# Patient Record
Sex: FEMALE | Race: WHITE | NOT HISPANIC OR LATINO | Employment: FULL TIME | URBAN - METROPOLITAN AREA
[De-identification: names, ages, dates, MRNs, and addresses within clinical notes are randomized per-mention and may not be internally consistent; named-entity substitution may affect disease eponyms.]

---

## 2017-01-19 ENCOUNTER — HOSPITAL ENCOUNTER (OUTPATIENT)
Dept: GENERAL RADIOLOGY | Facility: HOSPITAL | Age: 42
Discharge: HOME | End: 2017-01-19
Attending: NURSE PRACTITIONER | Admitting: NURSE PRACTITIONER

## 2017-01-19 ENCOUNTER — HOSPITAL ENCOUNTER (OUTPATIENT)
Dept: LAB | Facility: HOSPITAL | Age: 42
Discharge: HOME | End: 2017-01-19
Attending: NURSE PRACTITIONER | Admitting: NURSE PRACTITIONER

## 2017-01-19 LAB
ALBUMIN SERPL-MCNC: 4.1 G/DL (ref 3.5–4.8)
ALBUMIN/GLOB SERPL: 1.3 {RATIO} (ref 1–1.7)
ALP SERPL-CCNC: 79 IU/L (ref 32–91)
ALT SERPL-CCNC: 22 IU/L (ref 14–54)
ANION GAP SERPL CALC-SCNC: 14.1 MMOL/L (ref 10–20)
AST SERPL-CCNC: 22 IU/L (ref 15–41)
BASOPHILS # BLD AUTO: 0 10*3/UL (ref 0–0.2)
BASOPHILS NFR BLD AUTO: 1 % (ref 0–2)
BILIRUB SERPL-MCNC: 0.5 MG/DL (ref 0.3–1.2)
BILIRUB UR QL STRIP: NEGATIVE MG/DL
BUN SERPL-MCNC: 15 MG/DL (ref 8–20)
BUN/CREAT SERPL: 21.4 (ref 5.4–26.2)
CALCIUM SERPL-MCNC: 9.3 MG/DL (ref 8.9–10.3)
CASTS URNS QL MICRO: NORMAL /[LPF]
CHLORIDE SERPL-SCNC: 104 MMOL/L (ref 101–111)
COLOR UR: YELLOW
CONV BACTERIA IN URINE MICRO: NEGATIVE
CONV CLARITY OF URINE: CLEAR
CONV CO2: 26 MMOL/L (ref 22–32)
CONV HYALINE CASTS IN URINE MICRO: 1 /[LPF] (ref 0–5)
CONV PROTEIN IN URINE BY AUTOMATED TEST STRIP: NEGATIVE MG/DL
CONV SMALL ROUND CELLS: NORMAL /[HPF]
CONV TOTAL PROTEIN: 7.2 G/DL (ref 6.1–7.9)
CONV UROBILINOGEN IN URINE BY AUTOMATED TEST STRIP: 0.2 MG/DL
CREAT UR-MCNC: 0.7 MG/DL (ref 0.4–1)
CRP SERPL-MCNC: 1.21 MG/DL (ref 0–0.7)
CULTURE INDICATED?: NORMAL
DIFFERENTIAL METHOD BLD: (no result)
EOSINOPHIL # BLD AUTO: 0.1 10*3/UL (ref 0–0.3)
EOSINOPHIL # BLD AUTO: 3 % (ref 0–3)
ERYTHROCYTE [DISTWIDTH] IN BLOOD BY AUTOMATED COUNT: 12.6 % (ref 11.5–14.5)
ERYTHROCYTE [SEDIMENTATION RATE] IN BLOOD BY WESTERGREN METHOD: 28 MM/HR (ref 0–20)
GLOBULIN UR ELPH-MCNC: 3.1 G/DL (ref 2.5–3.8)
GLUCOSE SERPL-MCNC: 92 MG/DL (ref 65–99)
GLUCOSE UR QL: NEGATIVE MG/DL
HCT VFR BLD AUTO: 37 % (ref 35–49)
HGB BLD-MCNC: 12.7 G/DL (ref 12–15)
HGB UR QL STRIP: NEGATIVE
KETONES UR QL STRIP: NEGATIVE MG/DL
LEUKOCYTE ESTERASE UR QL STRIP: NEGATIVE
LYMPHOCYTES # BLD AUTO: 1.2 10*3/UL (ref 0.8–4.8)
LYMPHOCYTES NFR BLD AUTO: 25 % (ref 18–42)
MCH RBC QN AUTO: 29.5 PG (ref 26–32)
MCHC RBC AUTO-ENTMCNC: 34.3 G/DL (ref 32–36)
MCV RBC AUTO: 85.9 FL (ref 80–94)
MONOCYTES # BLD AUTO: 0.4 10*3/UL (ref 0.1–1.3)
MONOCYTES NFR BLD AUTO: 9 % (ref 2–11)
NEUTROPHILS # BLD AUTO: 3.1 10*3/UL (ref 2.3–8.6)
NEUTROPHILS NFR BLD AUTO: 62 % (ref 50–75)
NITRITE UR QL STRIP: NEGATIVE
NRBC BLD AUTO-RTO: 0 /100{WBCS}
NRBC/RBC NFR BLD MANUAL: 0 10*3/UL
PH UR STRIP.AUTO: 5.5 [PH] (ref 4.5–8)
PLATELET # BLD AUTO: 249 10*3/UL (ref 150–450)
PMV BLD AUTO: 8.1 FL (ref 7.4–10.4)
POTASSIUM SERPL-SCNC: 4.1 MMOL/L (ref 3.6–5.1)
RBC # BLD AUTO: 4.31 10*6/UL (ref 4–5.4)
RBC #/AREA URNS HPF: 3 /[HPF] (ref 0–3)
SODIUM SERPL-SCNC: 140 MMOL/L (ref 136–144)
SP GR UR: 1.03 (ref 1–1.03)
SPERM URNS QL MICRO: NORMAL /[HPF]
SQUAMOUS SPT QL MICRO: 1 /[HPF] (ref 0–5)
UNIDENT CRYS URNS QL MICRO: NORMAL /[HPF]
WBC # BLD AUTO: 4.9 10*3/UL (ref 4.5–11.5)
WBC #/AREA URNS HPF: 1 /[HPF] (ref 0–5)
YEAST SPEC QL WET PREP: NORMAL /[HPF]

## 2017-01-20 LAB
ANA SER QL IA: NORMAL
C3 SERPL-MCNC: 124 MG/DL (ref 79–152)
C4 SERPL-MCNC: 28.6 MG/DL (ref 18–55)
CHROMATIN AB SERPL-ACNC: <20 [IU]/ML (ref 0–20)

## 2017-02-10 ENCOUNTER — HOSPITAL ENCOUNTER (OUTPATIENT)
Dept: MAMMOGRAPHY | Facility: HOSPITAL | Age: 42
Discharge: HOME | End: 2017-02-10
Attending: FAMILY MEDICINE | Admitting: FAMILY MEDICINE

## 2017-03-20 ENCOUNTER — HOSPITAL ENCOUNTER (OUTPATIENT)
Dept: OTHER | Facility: HOSPITAL | Age: 42
Setting detail: SPECIMEN
Discharge: HOME | End: 2017-03-20
Attending: NURSE PRACTITIONER | Admitting: NURSE PRACTITIONER

## 2017-03-20 LAB
ALBUMIN SERPL-MCNC: 3.8 G/DL (ref 3.5–4.8)
ALBUMIN/GLOB SERPL: 1.2 {RATIO} (ref 1–1.7)
ALP SERPL-CCNC: 73 IU/L (ref 32–91)
ALT SERPL-CCNC: 44 IU/L (ref 14–54)
ANION GAP SERPL CALC-SCNC: 14.3 MMOL/L (ref 10–20)
AST SERPL-CCNC: 30 IU/L (ref 15–41)
BILIRUB SERPL-MCNC: 0.6 MG/DL (ref 0.3–1.2)
BUN SERPL-MCNC: 16 MG/DL (ref 8–20)
BUN/CREAT SERPL: 32 (ref 5.4–26.2)
CALCIUM SERPL-MCNC: 9 MG/DL (ref 8.9–10.3)
CHLORIDE SERPL-SCNC: 104 MMOL/L (ref 101–111)
CHOLEST SERPL-MCNC: 246 MG/DL
CHOLEST/HDLC SERPL: 4.2 {RATIO}
CONV CO2: 25 MMOL/L (ref 22–32)
CONV LDL CHOLESTEROL DIRECT: 150 MG/DL (ref 0–100)
CONV TOTAL PROTEIN: 7 G/DL (ref 6.1–7.9)
CREAT UR-MCNC: 0.5 MG/DL (ref 0.4–1)
GLOBULIN UR ELPH-MCNC: 3.2 G/DL (ref 2.5–3.8)
GLUCOSE SERPL-MCNC: 89 MG/DL (ref 65–99)
HDLC SERPL-MCNC: 59 MG/DL
LDLC/HDLC SERPL: 2.5 {RATIO}
LIPID INTERPRETATION: ABNORMAL
POTASSIUM SERPL-SCNC: 4.3 MMOL/L (ref 3.6–5.1)
SODIUM SERPL-SCNC: 139 MMOL/L (ref 136–144)
T4 FREE SERPL-MCNC: 0.81 NG/DL (ref 0.58–1.64)
TRIGL SERPL-MCNC: 138 MG/DL
TSH SERPL-ACNC: 0.7 UIU/ML (ref 0.34–5.6)
VLDLC SERPL CALC-MCNC: 36.4 MG/DL

## 2017-07-27 ENCOUNTER — HOSPITAL ENCOUNTER (OUTPATIENT)
Dept: OTHER | Facility: HOSPITAL | Age: 42
Setting detail: SPECIMEN
Discharge: HOME | End: 2017-07-27
Attending: NURSE PRACTITIONER | Admitting: NURSE PRACTITIONER

## 2017-07-27 LAB
ALBUMIN SERPL-MCNC: 4.1 G/DL (ref 3.5–4.8)
ALBUMIN/GLOB SERPL: 1.2 {RATIO} (ref 1–1.7)
ALP SERPL-CCNC: 80 IU/L (ref 32–91)
ALT SERPL-CCNC: 27 IU/L (ref 14–54)
ANION GAP SERPL CALC-SCNC: 12.4 MMOL/L (ref 10–20)
AST SERPL-CCNC: 25 IU/L (ref 15–41)
BILIRUB SERPL-MCNC: 0.4 MG/DL (ref 0.3–1.2)
BUN SERPL-MCNC: 23 MG/DL (ref 8–20)
BUN/CREAT SERPL: 38.3 (ref 5.4–26.2)
CALCIUM SERPL-MCNC: 9.1 MG/DL (ref 8.9–10.3)
CHLORIDE SERPL-SCNC: 106 MMOL/L (ref 101–111)
CHOLEST SERPL-MCNC: 265 MG/DL
CHOLEST/HDLC SERPL: 4.3 {RATIO}
CONV CO2: 24 MMOL/L (ref 22–32)
CONV LDL CHOLESTEROL DIRECT: 173 MG/DL (ref 0–100)
CONV TOTAL PROTEIN: 7.5 G/DL (ref 6.1–7.9)
CREAT UR-MCNC: 0.6 MG/DL (ref 0.4–1)
GLOBULIN UR ELPH-MCNC: 3.4 G/DL (ref 2.5–3.8)
GLUCOSE SERPL-MCNC: 90 MG/DL (ref 65–99)
HDLC SERPL-MCNC: 62 MG/DL
LDLC/HDLC SERPL: 2.8 {RATIO}
LIPID INTERPRETATION: ABNORMAL
POTASSIUM SERPL-SCNC: 4.4 MMOL/L (ref 3.6–5.1)
SODIUM SERPL-SCNC: 138 MMOL/L (ref 136–144)
TRIGL SERPL-MCNC: 145 MG/DL
VLDLC SERPL CALC-MCNC: 30.2 MG/DL

## 2017-09-28 ENCOUNTER — HOSPITAL ENCOUNTER (OUTPATIENT)
Dept: ORTHOPEDIC SURGERY | Facility: CLINIC | Age: 42
Discharge: HOME | End: 2017-09-28
Attending: ORTHOPAEDIC SURGERY | Admitting: ORTHOPAEDIC SURGERY

## 2017-10-03 ENCOUNTER — HOSPITAL ENCOUNTER (OUTPATIENT)
Dept: OTHER | Facility: HOSPITAL | Age: 42
Setting detail: SPECIMEN
Discharge: HOME | End: 2017-10-03
Attending: NURSE PRACTITIONER | Admitting: NURSE PRACTITIONER

## 2017-10-03 LAB
ALBUMIN SERPL-MCNC: 4 G/DL (ref 3.5–4.8)
ALBUMIN/GLOB SERPL: 1.3 {RATIO} (ref 1–1.7)
ALP SERPL-CCNC: 72 IU/L (ref 32–91)
ALT SERPL-CCNC: 24 IU/L (ref 14–54)
ANION GAP SERPL CALC-SCNC: 11 MMOL/L (ref 10–20)
AST SERPL-CCNC: 23 IU/L (ref 15–41)
BASOPHILS # BLD AUTO: 0.1 10*3/UL (ref 0–0.2)
BASOPHILS NFR BLD AUTO: 1 % (ref 0–2)
BILIRUB SERPL-MCNC: 0.3 MG/DL (ref 0.3–1.2)
BUN SERPL-MCNC: 30 MG/DL (ref 8–20)
BUN/CREAT SERPL: 37.5 (ref 5.4–26.2)
CALCIUM SERPL-MCNC: 8.8 MG/DL (ref 8.9–10.3)
CHLORIDE SERPL-SCNC: 106 MMOL/L (ref 101–111)
CHOLEST SERPL-MCNC: 215 MG/DL
CHOLEST/HDLC SERPL: 4.1 {RATIO}
CONV CO2: 26 MMOL/L (ref 22–32)
CONV LDL CHOLESTEROL DIRECT: 126 MG/DL (ref 0–100)
CONV TOTAL PROTEIN: 7 G/DL (ref 6.1–7.9)
CREAT UR-MCNC: 0.8 MG/DL (ref 0.4–1)
DIFFERENTIAL METHOD BLD: (no result)
EOSINOPHIL # BLD AUTO: 0.2 10*3/UL (ref 0–0.3)
EOSINOPHIL # BLD AUTO: 4 % (ref 0–3)
ERYTHROCYTE [DISTWIDTH] IN BLOOD BY AUTOMATED COUNT: 13.2 % (ref 11.5–14.5)
FERRITIN SERPL-MCNC: 48 NG/ML (ref 11–307)
FOLATE SERPL-MCNC: 18.8 NG/ML (ref 5.9–24.8)
GLOBULIN UR ELPH-MCNC: 3 G/DL (ref 2.5–3.8)
GLUCOSE SERPL-MCNC: 106 MG/DL (ref 65–99)
HCT VFR BLD AUTO: 36.4 % (ref 35–49)
HDLC SERPL-MCNC: 53 MG/DL
HGB BLD-MCNC: 12.4 G/DL (ref 12–15)
IRON SATN MFR SERPL: 12 % (ref 15–50)
IRON SERPL-MCNC: 45 UG/DL (ref 28–170)
LDLC/HDLC SERPL: 2.4 {RATIO}
LIPID INTERPRETATION: ABNORMAL
LYMPHOCYTES # BLD AUTO: 1.4 10*3/UL (ref 0.8–4.8)
LYMPHOCYTES NFR BLD AUTO: 21 % (ref 18–42)
MCH RBC QN AUTO: 28.9 PG (ref 26–32)
MCHC RBC AUTO-ENTMCNC: 34.2 G/DL (ref 32–36)
MCV RBC AUTO: 84.5 FL (ref 80–94)
MONOCYTES # BLD AUTO: 0.6 10*3/UL (ref 0.1–1.3)
MONOCYTES NFR BLD AUTO: 9 % (ref 2–11)
NEUTROPHILS # BLD AUTO: 4.2 10*3/UL (ref 2.3–8.6)
NEUTROPHILS NFR BLD AUTO: 65 % (ref 50–75)
NRBC BLD AUTO-RTO: 0 /100{WBCS}
NRBC/RBC NFR BLD MANUAL: 0 10*3/UL
PLATELET # BLD AUTO: 225 10*3/UL (ref 150–450)
PMV BLD AUTO: 7.9 FL (ref 7.4–10.4)
POTASSIUM SERPL-SCNC: 4 MMOL/L (ref 3.6–5.1)
RBC # BLD AUTO: 4.3 10*6/UL (ref 4–5.4)
SODIUM SERPL-SCNC: 139 MMOL/L (ref 136–144)
TIBC SERPL-MCNC: 375 UG/DL (ref 228–428)
TRIGL SERPL-MCNC: 268 MG/DL
TSH SERPL-ACNC: 1.58 UIU/ML (ref 0.34–5.6)
VIT B12 SERPL-MCNC: 353 PG/ML (ref 180–914)
VLDLC SERPL CALC-MCNC: 36.2 MG/DL
WBC # BLD AUTO: 6.5 10*3/UL (ref 4.5–11.5)

## 2017-10-26 ENCOUNTER — HOSPITAL ENCOUNTER (OUTPATIENT)
Dept: PHYSICAL THERAPY | Facility: HOSPITAL | Age: 42
Setting detail: RECURRING SERIES
Discharge: HOME | End: 2018-01-31
Attending: ORTHOPAEDIC SURGERY | Admitting: ORTHOPAEDIC SURGERY

## 2017-11-07 ENCOUNTER — HOSPITAL ENCOUNTER (OUTPATIENT)
Dept: MAMMOGRAPHY | Facility: HOSPITAL | Age: 42
Discharge: HOME | End: 2017-11-07
Attending: NURSE PRACTITIONER | Admitting: NURSE PRACTITIONER

## 2019-02-15 ENCOUNTER — HOSPITAL ENCOUNTER (OUTPATIENT)
Dept: OTHER | Facility: HOSPITAL | Age: 44
Setting detail: SPECIMEN
Discharge: HOME | End: 2019-02-15
Attending: NURSE PRACTITIONER | Admitting: NURSE PRACTITIONER

## 2019-02-15 LAB
ALBUMIN SERPL-MCNC: 4 G/DL (ref 3.5–4.8)
ALBUMIN/GLOB SERPL: 1.3 {RATIO} (ref 1–1.7)
ALP SERPL-CCNC: 73 IU/L (ref 32–91)
ALT SERPL-CCNC: 22 IU/L (ref 14–54)
ANION GAP SERPL CALC-SCNC: 14.6 MMOL/L (ref 10–20)
AST SERPL-CCNC: 22 IU/L (ref 15–41)
BASOPHILS # BLD AUTO: 0.1 10*3/UL (ref 0–0.2)
BASOPHILS NFR BLD AUTO: 1 % (ref 0–2)
BILIRUB SERPL-MCNC: 0.5 MG/DL (ref 0.3–1.2)
BUN SERPL-MCNC: 18 MG/DL (ref 8–20)
BUN/CREAT SERPL: 30 (ref 5.4–26.2)
CALCIUM SERPL-MCNC: 9 MG/DL (ref 8.9–10.3)
CHLORIDE SERPL-SCNC: 106 MMOL/L (ref 101–111)
CHOLEST SERPL-MCNC: 183 MG/DL
CHOLEST/HDLC SERPL: 3.7 {RATIO}
CONV CO2: 23 MMOL/L (ref 22–32)
CONV LDL CHOLESTEROL DIRECT: 119 MG/DL (ref 0–100)
CONV TOTAL PROTEIN: 7.1 G/DL (ref 6.1–7.9)
CREAT UR-MCNC: 0.6 MG/DL (ref 0.4–1)
DIFFERENTIAL METHOD BLD: (no result)
EOSINOPHIL # BLD AUTO: 0.2 10*3/UL (ref 0–0.3)
EOSINOPHIL # BLD AUTO: 4 % (ref 0–3)
ERYTHROCYTE [DISTWIDTH] IN BLOOD BY AUTOMATED COUNT: 12.7 % (ref 11.5–14.5)
GLOBULIN UR ELPH-MCNC: 3.1 G/DL (ref 2.5–3.8)
GLUCOSE SERPL-MCNC: 79 MG/DL (ref 65–99)
HCT VFR BLD AUTO: 37 % (ref 35–49)
HDLC SERPL-MCNC: 50 MG/DL
HGB BLD-MCNC: 12.5 G/DL (ref 12–15)
IRON SATN MFR SERPL: 13 % (ref 15–50)
IRON SERPL-MCNC: 40 UG/DL (ref 28–170)
LDLC/HDLC SERPL: 2.4 {RATIO}
LIPID INTERPRETATION: ABNORMAL
LYMPHOCYTES # BLD AUTO: 1.9 10*3/UL (ref 0.8–4.8)
LYMPHOCYTES NFR BLD AUTO: 32 % (ref 18–42)
MCH RBC QN AUTO: 29.1 PG (ref 26–32)
MCHC RBC AUTO-ENTMCNC: 33.7 G/DL (ref 32–36)
MCV RBC AUTO: 86.5 FL (ref 80–94)
MONOCYTES # BLD AUTO: 0.5 10*3/UL (ref 0.1–1.3)
MONOCYTES NFR BLD AUTO: 8 % (ref 2–11)
NEUTROPHILS # BLD AUTO: 3.3 10*3/UL (ref 2.3–8.6)
NEUTROPHILS NFR BLD AUTO: 55 % (ref 50–75)
NRBC BLD AUTO-RTO: 0 /100{WBCS}
NRBC/RBC NFR BLD MANUAL: 0 10*3/UL
PLATELET # BLD AUTO: 234 10*3/UL (ref 150–450)
PMV BLD AUTO: 8.1 FL (ref 7.4–10.4)
POTASSIUM SERPL-SCNC: 3.6 MMOL/L (ref 3.6–5.1)
RBC # BLD AUTO: 4.28 10*6/UL (ref 4–5.4)
SODIUM SERPL-SCNC: 140 MMOL/L (ref 136–144)
TIBC SERPL-MCNC: 317 UG/DL (ref 228–428)
TRIGL SERPL-MCNC: 119 MG/DL
VLDLC SERPL CALC-MCNC: 14.2 MG/DL
WBC # BLD AUTO: 5.9 10*3/UL (ref 4.5–11.5)

## 2019-06-17 RX ORDER — ZOLPIDEM TARTRATE 10 MG/1
TABLET ORAL
Qty: 30 TABLET | Refills: 0 | Status: SHIPPED | OUTPATIENT
Start: 2019-06-17 | End: 2019-06-18 | Stop reason: SDUPTHER

## 2019-06-17 RX ORDER — ESCITALOPRAM OXALATE 20 MG/1
TABLET ORAL
Qty: 90 TABLET | Refills: 0 | Status: SHIPPED | OUTPATIENT
Start: 2019-06-17 | End: 2019-09-19 | Stop reason: SDUPTHER

## 2019-06-18 RX ORDER — ZOLPIDEM TARTRATE 10 MG/1
10 TABLET ORAL
Qty: 30 TABLET | Refills: 0 | Status: SHIPPED | OUTPATIENT
Start: 2019-06-18 | End: 2019-07-17 | Stop reason: SDUPTHER

## 2019-07-17 RX ORDER — ZOLPIDEM TARTRATE 10 MG/1
TABLET ORAL
Qty: 30 TABLET | Refills: 0 | Status: SHIPPED | OUTPATIENT
Start: 2019-07-17 | End: 2019-08-21 | Stop reason: SDUPTHER

## 2019-08-21 RX ORDER — ZOLPIDEM TARTRATE 10 MG/1
TABLET ORAL
Qty: 30 TABLET | Refills: 0 | Status: SHIPPED | OUTPATIENT
Start: 2019-08-21 | End: 2019-09-19 | Stop reason: SDUPTHER

## 2019-09-19 RX ORDER — ESCITALOPRAM OXALATE 20 MG/1
TABLET ORAL
Qty: 90 TABLET | Refills: 0 | Status: SHIPPED | OUTPATIENT
Start: 2019-09-19 | End: 2020-01-09 | Stop reason: SDUPTHER

## 2019-09-19 RX ORDER — ZOLPIDEM TARTRATE 10 MG/1
TABLET ORAL
Qty: 30 TABLET | Refills: 0 | Status: SHIPPED | OUTPATIENT
Start: 2019-09-19 | End: 2019-10-15 | Stop reason: SDUPTHER

## 2019-10-15 RX ORDER — ZOLPIDEM TARTRATE 10 MG/1
TABLET ORAL
Qty: 30 TABLET | Refills: 0 | Status: SHIPPED | OUTPATIENT
Start: 2019-10-15 | End: 2019-11-21 | Stop reason: SDUPTHER

## 2019-11-20 RX ORDER — ZOLPIDEM TARTRATE 10 MG/1
TABLET ORAL
Qty: 30 TABLET | Refills: 0 | OUTPATIENT
Start: 2019-11-20

## 2019-11-21 ENCOUNTER — TELEPHONE (OUTPATIENT)
Dept: FAMILY MEDICINE CLINIC | Facility: CLINIC | Age: 44
End: 2019-11-21

## 2019-11-21 RX ORDER — ZOLPIDEM TARTRATE 10 MG/1
10 TABLET ORAL
Qty: 30 TABLET | Refills: 1 | Status: SHIPPED | OUTPATIENT
Start: 2019-11-21 | End: 2020-01-09 | Stop reason: SDUPTHER

## 2019-11-21 NOTE — TELEPHONE ENCOUNTER
Please schedule this patient an appointment.  I called her to let her know that her medication was filled.

## 2019-11-30 ENCOUNTER — HOSPITAL ENCOUNTER (EMERGENCY)
Facility: HOSPITAL | Age: 44
Discharge: HOME | End: 2019-11-30
Attending: EMERGENCY MEDICINE | Admitting: EMERGENCY MEDICINE

## 2019-11-30 ENCOUNTER — APPOINTMENT (OUTPATIENT)
Dept: GENERAL RADIOLOGY | Facility: HOSPITAL | Age: 44
End: 2019-11-30

## 2019-11-30 VITALS
TEMPERATURE: 98.6 F | HEIGHT: 62 IN | HEART RATE: 96 BPM | OXYGEN SATURATION: 99 % | RESPIRATION RATE: 16 BRPM | WEIGHT: 202.82 LBS | DIASTOLIC BLOOD PRESSURE: 54 MMHG | BODY MASS INDEX: 37.32 KG/M2 | SYSTOLIC BLOOD PRESSURE: 111 MMHG

## 2019-11-30 DIAGNOSIS — R06.00 DYSPNEA, UNSPECIFIED TYPE: Primary | ICD-10-CM

## 2019-11-30 DIAGNOSIS — J20.9 ACUTE BRONCHITIS, UNSPECIFIED ORGANISM: ICD-10-CM

## 2019-11-30 LAB
FLUAV SUBTYP SPEC NAA+PROBE: NOT DETECTED
FLUBV RNA ISLT QL NAA+PROBE: NOT DETECTED

## 2019-11-30 PROCEDURE — 87502 INFLUENZA DNA AMP PROBE: CPT | Performed by: EMERGENCY MEDICINE

## 2019-11-30 PROCEDURE — 71045 X-RAY EXAM CHEST 1 VIEW: CPT

## 2019-11-30 PROCEDURE — 94770: CPT

## 2019-11-30 PROCEDURE — 99283 EMERGENCY DEPT VISIT LOW MDM: CPT

## 2019-11-30 RX ORDER — AZITHROMYCIN 250 MG/1
TABLET, FILM COATED ORAL
Qty: 6 TABLET | Refills: 0 | Status: SHIPPED | OUTPATIENT
Start: 2019-11-30 | End: 2020-01-09

## 2020-01-09 ENCOUNTER — OFFICE VISIT (OUTPATIENT)
Dept: FAMILY MEDICINE CLINIC | Facility: CLINIC | Age: 45
End: 2020-01-09

## 2020-01-09 VITALS
HEIGHT: 62 IN | OXYGEN SATURATION: 98 % | HEART RATE: 82 BPM | SYSTOLIC BLOOD PRESSURE: 125 MMHG | BODY MASS INDEX: 38.76 KG/M2 | DIASTOLIC BLOOD PRESSURE: 86 MMHG | WEIGHT: 210.6 LBS

## 2020-01-09 DIAGNOSIS — R76.8 ELEVATED ANTINUCLEAR ANTIBODY (ANA) LEVEL: ICD-10-CM

## 2020-01-09 DIAGNOSIS — F41.9 ANXIETY: ICD-10-CM

## 2020-01-09 DIAGNOSIS — E78.2 MIXED HYPERLIPIDEMIA: Primary | ICD-10-CM

## 2020-01-09 DIAGNOSIS — M19.91 PRIMARY OSTEOARTHRITIS, UNSPECIFIED SITE: ICD-10-CM

## 2020-01-09 DIAGNOSIS — E53.8 VITAMIN B12 DEFICIENCY: ICD-10-CM

## 2020-01-09 DIAGNOSIS — E55.9 VITAMIN D DEFICIENCY: ICD-10-CM

## 2020-01-09 DIAGNOSIS — Z12.31 BREAST CANCER SCREENING BY MAMMOGRAM: ICD-10-CM

## 2020-01-09 DIAGNOSIS — F51.01 PRIMARY INSOMNIA: ICD-10-CM

## 2020-01-09 DIAGNOSIS — L65.9 HAIR LOSS: ICD-10-CM

## 2020-01-09 DIAGNOSIS — E89.40 POSTSURGICAL MENOPAUSE: ICD-10-CM

## 2020-01-09 DIAGNOSIS — Z23 FLU VACCINE NEED: ICD-10-CM

## 2020-01-09 DIAGNOSIS — F32.0 CURRENT MILD EPISODE OF MAJOR DEPRESSIVE DISORDER WITHOUT PRIOR EPISODE (HCC): ICD-10-CM

## 2020-01-09 DIAGNOSIS — R10.9 FLANK PAIN: ICD-10-CM

## 2020-01-09 DIAGNOSIS — M32.9 LUPUS (HCC): ICD-10-CM

## 2020-01-09 DIAGNOSIS — R73.09 ABNORMAL GLUCOSE: ICD-10-CM

## 2020-01-09 PROBLEM — M51.369 DEGENERATION OF INTERVERTEBRAL DISC OF LUMBAR REGION: Status: ACTIVE | Noted: 2017-01-26

## 2020-01-09 PROBLEM — M25.50 ARTHRALGIA: Status: ACTIVE | Noted: 2017-01-19

## 2020-01-09 PROBLEM — Z79.890 HORMONE REPLACEMENT THERAPY: Status: ACTIVE | Noted: 2017-10-03

## 2020-01-09 PROBLEM — E78.5 HYPERLIPIDEMIA: Status: ACTIVE | Noted: 2017-07-27

## 2020-01-09 PROBLEM — E66.9 OBESITY: Status: ACTIVE | Noted: 2017-03-20

## 2020-01-09 PROBLEM — M25.512 PAIN IN JOINT OF LEFT SHOULDER: Status: ACTIVE | Noted: 2017-10-19

## 2020-01-09 PROBLEM — M19.90 OSTEOARTHRITIS: Status: ACTIVE | Noted: 2017-01-26

## 2020-01-09 PROBLEM — M51.36 DEGENERATION OF INTERVERTEBRAL DISC OF LUMBAR REGION: Status: ACTIVE | Noted: 2017-01-26

## 2020-01-09 LAB
BILIRUB BLD-MCNC: NEGATIVE MG/DL
CLARITY, POC: CLEAR
COLOR UR: ABNORMAL
GLUCOSE UR STRIP-MCNC: NEGATIVE MG/DL
KETONES UR QL: NEGATIVE
LEUKOCYTE EST, POC: NEGATIVE
NITRITE UR-MCNC: NEGATIVE MG/ML
PH UR: 5 [PH] (ref 5–8)
PROT UR STRIP-MCNC: ABNORMAL MG/DL
RBC # UR STRIP: NEGATIVE /UL
SP GR UR: 1.03 (ref 1–1.03)
UROBILINOGEN UR QL: NORMAL

## 2020-01-09 PROCEDURE — 82306 VITAMIN D 25 HYDROXY: CPT | Performed by: NURSE PRACTITIONER

## 2020-01-09 PROCEDURE — 90471 IMMUNIZATION ADMIN: CPT | Performed by: NURSE PRACTITIONER

## 2020-01-09 PROCEDURE — 82607 VITAMIN B-12: CPT | Performed by: NURSE PRACTITIONER

## 2020-01-09 PROCEDURE — 36415 COLL VENOUS BLD VENIPUNCTURE: CPT | Performed by: NURSE PRACTITIONER

## 2020-01-09 PROCEDURE — 99214 OFFICE O/P EST MOD 30 MIN: CPT | Performed by: NURSE PRACTITIONER

## 2020-01-09 PROCEDURE — 86038 ANTINUCLEAR ANTIBODIES: CPT | Performed by: NURSE PRACTITIONER

## 2020-01-09 PROCEDURE — 80053 COMPREHEN METABOLIC PANEL: CPT | Performed by: NURSE PRACTITIONER

## 2020-01-09 PROCEDURE — 86140 C-REACTIVE PROTEIN: CPT | Performed by: NURSE PRACTITIONER

## 2020-01-09 PROCEDURE — 85027 COMPLETE CBC AUTOMATED: CPT | Performed by: NURSE PRACTITIONER

## 2020-01-09 PROCEDURE — 83036 HEMOGLOBIN GLYCOSYLATED A1C: CPT | Performed by: NURSE PRACTITIONER

## 2020-01-09 PROCEDURE — 80061 LIPID PANEL: CPT | Performed by: NURSE PRACTITIONER

## 2020-01-09 PROCEDURE — 90674 CCIIV4 VAC NO PRSV 0.5 ML IM: CPT | Performed by: NURSE PRACTITIONER

## 2020-01-09 PROCEDURE — 86431 RHEUMATOID FACTOR QUANT: CPT | Performed by: NURSE PRACTITIONER

## 2020-01-09 PROCEDURE — 81002 URINALYSIS NONAUTO W/O SCOPE: CPT | Performed by: NURSE PRACTITIONER

## 2020-01-09 PROCEDURE — 81099 UNLISTED URINALYSIS PX: CPT | Performed by: NURSE PRACTITIONER

## 2020-01-09 RX ORDER — CELECOXIB 100 MG/1
100 CAPSULE ORAL 2 TIMES DAILY PRN
Qty: 60 CAPSULE | Refills: 2 | Status: SHIPPED | OUTPATIENT
Start: 2020-01-09 | End: 2020-07-08

## 2020-01-09 RX ORDER — MULTIVIT-MIN/IRON/FOLIC ACID/K 18-600-40
1 CAPSULE ORAL EVERY 24 HOURS
COMMUNITY
Start: 2017-10-18 | End: 2020-09-02

## 2020-01-09 RX ORDER — ESTRADIOL 1 MG/1
1 TABLET ORAL DAILY
Qty: 90 TABLET | Refills: 1 | Status: SHIPPED | OUTPATIENT
Start: 2020-01-09 | End: 2020-07-08 | Stop reason: SDUPTHER

## 2020-01-09 RX ORDER — ESCITALOPRAM OXALATE 20 MG/1
20 TABLET ORAL DAILY
Qty: 90 TABLET | Refills: 1 | Status: SHIPPED | OUTPATIENT
Start: 2020-01-09 | End: 2020-07-08 | Stop reason: SDUPTHER

## 2020-01-09 RX ORDER — ZOLPIDEM TARTRATE 10 MG/1
10 TABLET ORAL
Qty: 30 TABLET | Refills: 5 | Status: SHIPPED | OUTPATIENT
Start: 2020-01-09 | End: 2020-07-08 | Stop reason: SDUPTHER

## 2020-01-09 NOTE — PROGRESS NOTES
"  Kelly Espinoza is a 44 y.o. female.     Chief Complaint   Patient presents with   • Annual Exam   • Lupus     Says hair loss has been \"accelerating this year\"   • Dry Mouth     Would like to find out if sugar is high   • Flank Pain     Left flank pain, has had hysterectomy.  Says pain is constant, but worse when she bends or squats.   • Med Refill     Has stopped taking estradiol for a while and would like to start again.       History of Present Illness     1. Lupus, significant hair loss, dry mouth, saw Dr. Chao but didn't want to take methotrexate d/t potential s/e. 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 No relief of pain ever.     2. L flank pain, patient reports the pain is described as sharp, constant and in the left flank/left lower quadrant that started approximately 3 days ago.  She has a history of a full hysterectomy and ulcerative colitis, she reports having normal bowel movements and no dysuria or hematuria    3.  Needs mammogram and labs    4.  Post menopausal symptoms, patient stopped estradiol last year but is having increased hot flashes and anxiety lately, would like to restart    5.  Anxiety patient takes Lexapro on a daily basis she had been stable, however with hair loss was concerned the Lexapro was the cause.  She started cutting her Lexapro in half, however her anxiety levels increased.  She is now back to taking full 20 mg tablet daily and has been for the last 2 weeks.      Subjective     Visit Vitals  /86 (BP Location: Left arm, Patient Position: Sitting, Cuff Size: Adult)   Pulse 82   Ht 157.5 cm (62.01\")   Wt 95.5 kg (210 lb 9.6 oz)   SpO2 98%   BMI 38.51 kg/m²       The following portions of the patient's history were reviewed and updated as appropriate: allergies, current medications, past family history, past medical " history, past social history, past surgical history and problem list.    Review of Systems   Constitutional: Negative for chills, fatigue and fever.   HENT: Negative for dental problem, ear pain, sinus pressure and sore throat.    Eyes: Negative for visual disturbance.   Respiratory: Negative for cough, shortness of breath and wheezing.    Gastrointestinal: Positive for abdominal pain. Negative for blood in stool, constipation, diarrhea, nausea, vomiting and GERD.   Genitourinary: Negative for difficulty urinating, frequency, urgency and urinary incontinence.   Musculoskeletal: Positive for arthralgias, back pain, gait problem and myalgias. Negative for joint swelling and neck pain.   Skin: Positive for rash. Negative for dry skin and pallor.   Neurological: Negative for dizziness, seizures, speech difficulty and weakness.   Hematological: Negative for adenopathy.   Psychiatric/Behavioral: Positive for depressed mood and stress. Negative for sleep disturbance. The patient is nervous/anxious.        Objective     Physical Exam   Constitutional: She is oriented to person, place, and time. She appears well-developed and well-nourished. No distress.   HENT:   Head: Normocephalic.   Eyes: Pupils are equal, round, and reactive to light. Conjunctivae are normal.   Neck: Normal range of motion. Neck supple. No JVD present. No thyromegaly present.   Cardiovascular: Normal rate, regular rhythm and normal heart sounds.   No murmur heard.  Pulmonary/Chest: Effort normal and breath sounds normal.   Abdominal: Soft. Bowel sounds are normal. She exhibits no distension. There is tenderness (severe LLQ ttp with palpable nodule).   Musculoskeletal: Normal range of motion. She exhibits tenderness (pain and tenderness multijoints, mod jimenes rom). She exhibits no edema.   Neurological: She is alert and oriented to person, place, and time. No sensory deficit.   Skin: Skin is warm and dry. Rash (scattered petechiae on torso) noted. She is  not diaphoretic. No erythema.   Psychiatric: She has a normal mood and affect. Her behavior is normal. Judgment normal.   Nursing note and vitals reviewed.        Assessment/Plan   April was seen today for annual exam, lupus, dry mouth, flank pain and med refill.    Diagnoses and all orders for this visit:    Mixed hyperlipidemia  -     Lipid Panel  Check labs and notify patient results    Anxiety  Continue Lexapro at 20 mg, if no improvement will consider Trintellix in the future  Primary osteoarthritis, unspecified site    Vitamin B12 deficiency  -     Vitamin B12    Vitamin D deficiency  -     Vitamin D 25 Hydroxy  Continue OTC, check level today.    Current mild episode of major depressive disorder without prior episode (CMS/HCC)  Depression is stable, continue Lexapro    Lupus (CMS/HCC)  -     C-reactive Protein  -     Rheumatoid Factor  -     SARMAD  -     Comprehensive Metabolic Panel  -     CBC (No Diff)  Call Dr. Ibanez for lupus re-eval, saw Dr. Chao in past, will refer if needed, patient reports she will call for appointment.  Trial Celebrex, patient failed ibuprofen, Mobic, diclofenac and topicals    Hair loss  -     Hemoglobin A1c  Likely hormone versus autoimmune    Abnormal glucose  Check A1c    Postsurgical menopause  Okay to resume estradiol, patient is a non-smoker    Flank pain  -     POC Urinalysis Dipstick  -     XR Abdomen KUB; Future  Check KUB, will notify patient results  Needs repeat colonoscopy, hx of UC.   urine dip negative with trace protein    Primary insomnia  -     zolpidem (AMBIEN) 10 MG tablet; Take 1 tablet by mouth every night at bedtime.  Okay to refill Ambien, sleep is currently stable    Breast cancer screening by mammogram  -     Mammo Screening Digital Tomosynthesis Bilateral With CAD; Future    Other orders  -     estradiol (ESTRACE) 1 MG tablet; Take 1 tablet by mouth Daily.  -     escitalopram (LEXAPRO) 20 MG tablet; Take 1 tablet by mouth Daily.  -     celecoxib (CELEBREX)  100 MG capsule; Take 1 capsule by mouth 2 (Two) Times a Day As Needed for Mild Pain .      Patient to return in 6 months or earlier as needed             Glucose   Date Value Ref Range Status   02/15/2019 79 65 - 99 mg/dL Final     BUN   Date Value Ref Range Status   02/15/2019 18 8 - 20 mg/dL Final     Creatinine   Date Value Ref Range Status   02/15/2019 0.6 0.4 - 1.0 mg/dl Final     Sodium   Date Value Ref Range Status   02/15/2019 140 136 - 144 mmol/L Final     Potassium   Date Value Ref Range Status   02/15/2019 3.6 3.6 - 5.1 mmol/L Final     Chloride   Date Value Ref Range Status   02/15/2019 106 101 - 111 mmol/L Final     CO2   Date Value Ref Range Status   02/15/2019 23 22 - 32 mmol/L Final     Calcium   Date Value Ref Range Status   02/15/2019 9.0 8.9 - 10.3 mg/dL Final     Total Protein   Date Value Ref Range Status   02/15/2019 7.1 6.1 - 7.9 g/dL Final     Albumin   Date Value Ref Range Status   02/15/2019 4.0 3.5 - 4.8 g/dL Final     ALT (SGPT)   Date Value Ref Range Status   02/15/2019 22 14 - 54 IU/L Final     AST (SGOT)   Date Value Ref Range Status   02/15/2019 22 15 - 41 IU/L Final     Alkaline Phosphatase   Date Value Ref Range Status   02/15/2019 73 32 - 91 IU/L Final     Total Bilirubin   Date Value Ref Range Status   02/15/2019 0.5 0.3 - 1.2 mg/dL Final     A/G Ratio   Date Value Ref Range Status   02/15/2019 1.3 1.0 - 1.7 Final     BUN/Creatinine Ratio   Date Value Ref Range Status   02/15/2019 30.0 (H) 5.4 - 26.2 Final     Anion Gap   Date Value Ref Range Status   02/15/2019 14.6 10 - 20 Final

## 2020-01-10 LAB
25(OH)D3 SERPL-MCNC: 25.8 NG/ML (ref 30–100)
ALBUMIN SERPL-MCNC: 4.2 G/DL (ref 3.5–5.2)
ALBUMIN/GLOB SERPL: 1.2 G/DL
ALP SERPL-CCNC: 84 U/L (ref 39–117)
ALT SERPL W P-5'-P-CCNC: 19 U/L (ref 1–33)
ANA SER QL: NEGATIVE
ANION GAP SERPL CALCULATED.3IONS-SCNC: 13.7 MMOL/L (ref 5–15)
AST SERPL-CCNC: 21 U/L (ref 1–32)
BILIRUB SERPL-MCNC: 0.3 MG/DL (ref 0.2–1.2)
BUN BLD-MCNC: 17 MG/DL (ref 6–20)
BUN/CREAT SERPL: 27.9 (ref 7–25)
CALCIUM SPEC-SCNC: 9.7 MG/DL (ref 8.6–10.5)
CHLORIDE SERPL-SCNC: 99 MMOL/L (ref 98–107)
CHOLEST SERPL-MCNC: 187 MG/DL (ref 0–200)
CHROMATIN AB SERPL-ACNC: <10 IU/ML (ref 0–14)
CO2 SERPL-SCNC: 25.3 MMOL/L (ref 22–29)
CREAT BLD-MCNC: 0.61 MG/DL (ref 0.57–1)
CRP SERPL-MCNC: 0.69 MG/DL (ref 0–0.5)
DEPRECATED RDW RBC AUTO: 39 FL (ref 37–54)
ERYTHROCYTE [DISTWIDTH] IN BLOOD BY AUTOMATED COUNT: 12.7 % (ref 12.3–15.4)
GFR SERPL CREATININE-BSD FRML MDRD: 107 ML/MIN/1.73
GLOBULIN UR ELPH-MCNC: 3.5 GM/DL
GLUCOSE BLD-MCNC: 84 MG/DL (ref 65–99)
HBA1C MFR BLD: 5.3 % (ref 3.5–5.6)
HCT VFR BLD AUTO: 37.2 % (ref 34–46.6)
HDLC SERPL-MCNC: 45 MG/DL (ref 40–60)
HGB BLD-MCNC: 12.5 G/DL (ref 12–15.9)
LDLC SERPL CALC-MCNC: 90 MG/DL (ref 0–100)
LDLC/HDLC SERPL: 2 {RATIO}
MCH RBC QN AUTO: 28.7 PG (ref 26.6–33)
MCHC RBC AUTO-ENTMCNC: 33.6 G/DL (ref 31.5–35.7)
MCV RBC AUTO: 85.3 FL (ref 79–97)
PLATELET # BLD AUTO: 244 10*3/MM3 (ref 140–450)
PMV BLD AUTO: 10.4 FL (ref 6–12)
POTASSIUM BLD-SCNC: 4.5 MMOL/L (ref 3.5–5.2)
PROT SERPL-MCNC: 7.7 G/DL (ref 6–8.5)
RBC # BLD AUTO: 4.36 10*6/MM3 (ref 3.77–5.28)
SODIUM BLD-SCNC: 138 MMOL/L (ref 136–145)
TRIGL SERPL-MCNC: 259 MG/DL (ref 0–150)
VIT B12 BLD-MCNC: 588 PG/ML (ref 211–946)
VLDLC SERPL-MCNC: 51.8 MG/DL (ref 5–40)
WBC NRBC COR # BLD: 6.34 10*3/MM3 (ref 3.4–10.8)

## 2020-01-15 ENCOUNTER — TELEPHONE (OUTPATIENT)
Dept: FAMILY MEDICINE CLINIC | Facility: CLINIC | Age: 45
End: 2020-01-15

## 2020-01-15 DIAGNOSIS — R10.9 FLANK PAIN: ICD-10-CM

## 2020-01-16 RX ORDER — ERGOCALCIFEROL 1.25 MG/1
50000 CAPSULE ORAL WEEKLY
Qty: 12 CAPSULE | Refills: 1 | Status: SHIPPED | OUTPATIENT
Start: 2020-01-16 | End: 2020-07-08 | Stop reason: SDUPTHER

## 2020-01-16 NOTE — TELEPHONE ENCOUNTER
"Patient said that she takes Vitamin D3 2000 units.  Should she take more?  She said she used to take a statin for her cholesterol but \"it made her feel bad.\"  Is there anything else she can take for her triglycerides?  Thank you."

## 2020-01-16 NOTE — TELEPHONE ENCOUNTER
KUB Shows mild to moderate colonic stool burden greatest in the descending colonic segment.  No urinary tract stone is seen.  Urine sample was normal triglycerides are elevated at 259 should be less than 150,  B12, glucose, blood counts, liver, kidney all within normal limits and autoimmune work-up is negative.   Vitamin D is low, she should start vitamin D 2000 u daily over-the-counter and start MiraLAX daily for constipation.

## 2020-04-16 ENCOUNTER — TELEPHONE (OUTPATIENT)
Dept: FAMILY MEDICINE CLINIC | Facility: CLINIC | Age: 45
End: 2020-04-16

## 2020-04-16 NOTE — TELEPHONE ENCOUNTER
Patient states that celebrex isn't helping her joint pain and asked if there is something stronger you could prescribe for her?

## 2020-04-16 NOTE — TELEPHONE ENCOUNTER
Has patient been able to make appointment with the new rheumatologist Dr. Yvette Ibanez?  Could you check on where that referral process is?  I could prescribe a few tramadol for severe pain, however the narcotic pain medication is not getting to the root of the problem.  She really needs to see rheumatology.

## 2020-04-18 ENCOUNTER — TELEPHONE (OUTPATIENT)
Dept: FAMILY MEDICINE CLINIC | Facility: CLINIC | Age: 45
End: 2020-04-18

## 2020-04-18 DIAGNOSIS — M32.9 LUPUS (HCC): Primary | ICD-10-CM

## 2020-04-18 DIAGNOSIS — M25.50 PAIN, JOINT, MULTIPLE SITES: ICD-10-CM

## 2020-04-18 RX ORDER — TRAMADOL HYDROCHLORIDE 50 MG/1
50 TABLET ORAL EVERY 6 HOURS PRN
Qty: 10 TABLET | Refills: 0 | Status: SHIPPED | OUTPATIENT
Start: 2020-04-18 | End: 2020-07-30 | Stop reason: SDUPTHER

## 2020-04-18 NOTE — TELEPHONE ENCOUNTER
April called with acite joint pain  Hx Lupus. She works at [employer] , has been working long houirs  Taking celebrex, tylenol,  All not helping  Tramadol ordered for 5 days, pt will call office Monday for fu

## 2020-04-20 NOTE — TELEPHONE ENCOUNTER
She used to see Dr. Chao and I believe he referred her to Dr. Yvette Ibanez prior to his longterm.  If still having significant joint pain she needs to call to make appointment with a new rheumatologist.  If needs a new referral let me know.

## 2020-04-20 NOTE — TELEPHONE ENCOUNTER
Patient has not yet seen Dr. Ibanez.  I told her she will need to call them to find out if they're accepting new patients.

## 2020-04-21 ENCOUNTER — TELEPHONE (OUTPATIENT)
Dept: FAMILY MEDICINE CLINIC | Facility: CLINIC | Age: 45
End: 2020-04-21

## 2020-04-21 DIAGNOSIS — M32.9 LUPUS (HCC): Primary | ICD-10-CM

## 2020-04-21 NOTE — TELEPHONE ENCOUNTER
Patient tried making appt with Dr Ibanez but she will need another referral from you.  Can you please order?  Thanks.

## 2020-04-22 ENCOUNTER — TELEPHONE (OUTPATIENT)
Dept: FAMILY MEDICINE CLINIC | Facility: CLINIC | Age: 45
End: 2020-04-22

## 2020-04-22 NOTE — TELEPHONE ENCOUNTER
Pt called ma line left message about referral for a Rheumatologist? I thought she said it had been put in, but she hasn't heard anything.  She said she is really hurting and would like a call back about  What may be going on with this? Thanks bm

## 2020-07-08 ENCOUNTER — OFFICE VISIT (OUTPATIENT)
Dept: FAMILY MEDICINE CLINIC | Facility: CLINIC | Age: 45
End: 2020-07-08

## 2020-07-08 VITALS
HEART RATE: 90 BPM | WEIGHT: 211.8 LBS | TEMPERATURE: 98.4 F | BODY MASS INDEX: 38.98 KG/M2 | HEIGHT: 62 IN | SYSTOLIC BLOOD PRESSURE: 116 MMHG | DIASTOLIC BLOOD PRESSURE: 76 MMHG | OXYGEN SATURATION: 98 %

## 2020-07-08 DIAGNOSIS — F41.9 ANXIETY: ICD-10-CM

## 2020-07-08 DIAGNOSIS — Z79.890 HORMONE REPLACEMENT THERAPY: ICD-10-CM

## 2020-07-08 DIAGNOSIS — K44.9 HIATAL HERNIA: ICD-10-CM

## 2020-07-08 DIAGNOSIS — F51.01 PRIMARY INSOMNIA: ICD-10-CM

## 2020-07-08 DIAGNOSIS — K51.818 OTHER ULCERATIVE COLITIS WITH OTHER COMPLICATION (HCC): Primary | ICD-10-CM

## 2020-07-08 DIAGNOSIS — E55.9 VITAMIN D DEFICIENCY: ICD-10-CM

## 2020-07-08 DIAGNOSIS — E78.2 MIXED HYPERLIPIDEMIA: ICD-10-CM

## 2020-07-08 PROCEDURE — 99214 OFFICE O/P EST MOD 30 MIN: CPT | Performed by: NURSE PRACTITIONER

## 2020-07-08 RX ORDER — OMEPRAZOLE 40 MG/1
40 CAPSULE, DELAYED RELEASE ORAL DAILY
Qty: 90 CAPSULE | Refills: 1 | Status: SHIPPED | OUTPATIENT
Start: 2020-07-08 | End: 2020-10-26 | Stop reason: SDUPTHER

## 2020-07-08 RX ORDER — METRONIDAZOLE 500 MG/1
500 TABLET ORAL 3 TIMES DAILY
Qty: 21 TABLET | Refills: 0 | Status: SHIPPED | OUTPATIENT
Start: 2020-07-08 | End: 2020-09-02

## 2020-07-08 RX ORDER — ERGOCALCIFEROL 1.25 MG/1
50000 CAPSULE ORAL WEEKLY
Qty: 12 CAPSULE | Refills: 1 | Status: SHIPPED | OUTPATIENT
Start: 2020-07-08 | End: 2020-12-23

## 2020-07-08 RX ORDER — ESCITALOPRAM OXALATE 20 MG/1
20 TABLET ORAL DAILY
Qty: 90 TABLET | Refills: 1 | Status: SHIPPED | OUTPATIENT
Start: 2020-07-08 | End: 2021-01-11 | Stop reason: SDUPTHER

## 2020-07-08 RX ORDER — ZOLPIDEM TARTRATE 10 MG/1
10 TABLET ORAL
Qty: 30 TABLET | Refills: 5 | Status: SHIPPED | OUTPATIENT
Start: 2020-07-08 | End: 2021-01-11 | Stop reason: SDUPTHER

## 2020-07-08 RX ORDER — HYDROXYZINE HYDROCHLORIDE 10 MG/1
10 TABLET, FILM COATED ORAL EVERY 8 HOURS PRN
Qty: 30 TABLET | Refills: 2 | Status: SHIPPED | OUTPATIENT
Start: 2020-07-08 | End: 2020-07-08

## 2020-07-08 RX ORDER — ESTRADIOL 1 MG/1
1 TABLET ORAL DAILY
Qty: 90 TABLET | Refills: 1 | Status: SHIPPED | OUTPATIENT
Start: 2020-07-08 | End: 2021-01-11 | Stop reason: SDUPTHER

## 2020-07-08 RX ORDER — HYDROXYZINE HYDROCHLORIDE 10 MG/1
10 TABLET, FILM COATED ORAL EVERY 8 HOURS PRN
Qty: 30 TABLET | Refills: 2 | OUTPATIENT
Start: 2020-07-08 | End: 2020-10-11

## 2020-07-08 NOTE — PROGRESS NOTES
"Chief Complaint   Patient presents with   • Hyperlipidemia     would like another upper/lower gi test because last time she had it, she had a hernia and she is having issues again.   • Follow-up   • Med Refill   • Anxiety     would like to increase lexapro because she is still \"having racing thoughts, etc\"       HPI     Anxiety, patient on Lexapro 20 mg, reports increasing intermittent anxiety especially through the pandemic, she reports weight gain, insomnia, denies hypersomnia, psychomotor agitation, psychomotor retardation, fatigue (loss of energy), feelings of worthlessness (guilt), impaired concentration (indecisiveness), thoughts of death or suicide.       GERD/hiatal hernia/ulcerative colitis, previously diagnosed by gastroenterologist patient cannot remember the name, greater than 10 years ago she has poor diet, high fat and not currently on PPI.  She does report mucus in stool now worsening over the last 2 weeks but present intermittently for the last 2 months, denies blood in the stool, black or tarry stool, nausea, vomiting, constipation, abdominal pain and diarrhea.     Insomnia, This is a chronic problem. The current episode started more than 1 year ago. The problem occurs intermittently. The problem has been unchanged. Pertinent negatives include no abdominal pain, arthralgias, chills, coughing, fever, joint swelling, myalgias, nausea, neck pain, rash, sore throat, vomiting or weakness, pt reports daytime fatigue. Nothing aggravates the symptoms. Treatments tried: OTC sleep aids ineffective, Ambien nightly is effective for staying asleep. The treatment provides significant relief.       The following portions of the patient's history were reviewed and updated as appropriate: allergies, current medications, past family history, past medical history, past social history, past surgical history and problem list.    Past Medical History:   Diagnosis Date   • ADD (attention deficit disorder)    • SARMAD " positive    • Anxiety    • Arthralgia    • Asthma    • Bursitis of left hip    • DDD (degenerative disc disease), lumbar    • Depression, major    • Flu syndrome    • GERD (gastroesophageal reflux disease)    • Hiatal hernia    • Hormone replacement therapy    • Hyperlipidemia    • Inflammatory arthritis    • Influenza    • Insomnia    • Knee pain, bilateral    • Lupus (CMS/HCC)    • Neck pain    • Obesity    • Osteoarthritis    • Pain, joint, shoulder, left    • Pharyngitis, acute    • Postmenopausal    • Sinus congestion    • Surgical menopause    • Vitamin B12 deficiency    • Vitamin D deficiency      Past Surgical History:   Procedure Laterality Date   • CHOLECYSTECTOMY     • LUMBAR DISCECTOMY  2000   • TONSILLECTOMY     • TOTAL ABDOMINAL HYSTERECTOMY WITH SALPINGO OOPHORECTOMY Bilateral 2009    FOR ENDOMETRIOSIS     Family History   Problem Relation Age of Onset   • Hypertension Mother    • Hypertension Father    • Diabetes Other    • Heart disease Other    • Hypertension Other    • Cancer Other      Social History     Tobacco Use   • Smoking status: Former Smoker   Substance Use Topics   • Alcohol use: Not Currently         Current Outpatient Medications:   •  Cholecalciferol (VITAMIN D) 50 MCG (2000 UT) capsule, 1 capsule Daily., Disp: , Rfl:   •  escitalopram (LEXAPRO) 20 MG tablet, Take 1 tablet by mouth Daily., Disp: 90 tablet, Rfl: 1  •  estradiol (ESTRACE) 1 MG tablet, Take 1 tablet by mouth Daily., Disp: 90 tablet, Rfl: 1  •  FOLIC ACID PO, Take  by mouth., Disp: , Rfl:   •  traMADol (ULTRAM) 50 MG tablet, Take 1 tablet by mouth Every 6 (Six) Hours As Needed for Moderate Pain ., Disp: 10 tablet, Rfl: 0  •  vitamin D (ERGOCALCIFEROL) 1.25 MG (73095 UT) capsule capsule, Take 1 capsule by mouth 1 (One) Time Per Week., Disp: 12 capsule, Rfl: 1  •  zolpidem (AMBIEN) 10 MG tablet, Take 1 tablet by mouth every night at bedtime., Disp: 30 tablet, Rfl: 5  •  hydrOXYzine (ATARAX) 10 MG tablet, Take 1 tablet by  "mouth Every 8 (Eight) Hours As Needed for Anxiety., Disp: 30 tablet, Rfl: 2  •  metroNIDAZOLE (Flagyl) 500 MG tablet, Take 1 tablet by mouth 3 (Three) Times a Day., Disp: 21 tablet, Rfl: 0  •  omeprazole (PrilOSEC) 40 MG capsule, Take 1 capsule by mouth Daily., Disp: 90 capsule, Rfl: 1      Review of Systems       A full 12 point review of systems has been obtained as mentioned in HPI, otherwise negative      Vitals:    07/08/20 0930   BP: 116/76   BP Location: Left arm   Patient Position: Sitting   Cuff Size: Large Adult   Pulse: 90   Temp: 98.4 °F (36.9 °C)   TempSrc: Skin   SpO2: 98%   Weight: 96.1 kg (211 lb 12.8 oz)   Height: 157.5 cm (62.01\")     Body mass index is 38.73 kg/m².    Physical Exam   Constitutional: She is oriented to person, place, and time. She appears well-developed and well-nourished. No distress.   Eyes: Pupils are equal, round, and reactive to light.   Neck: Normal range of motion. Neck supple. No JVD present. No thyromegaly present.   Cardiovascular: Normal rate, regular rhythm, normal heart sounds and intact distal pulses.   No murmur heard.  Pulmonary/Chest: Effort normal and breath sounds normal. No respiratory distress.   Abdominal: Soft. Bowel sounds are normal. She exhibits no distension. There is no tenderness.   Musculoskeletal: Normal range of motion. She exhibits no tenderness.   Neurological: She is alert and oriented to person, place, and time. No sensory deficit.   Skin: Skin is warm and dry. She is not diaphoretic.   Psychiatric: Her behavior is normal. Judgment and thought content normal. Her mood appears anxious.   Nursing note and vitals reviewed.      No visits with results within 7 Day(s) from this visit.   Latest known visit with results is:   Office Visit on 01/09/2020   Component Date Value Ref Range Status   • Color 01/09/2020 Straw  Yellow, Straw, Dark Yellow, Khushi Final   • Clarity, UA 01/09/2020 Clear  Clear Final   • Glucose, UA 01/09/2020 Negative  Negative, " 1000 mg/dL (3+) mg/dL Final   • Bilirubin 01/09/2020 Negative  Negative Final   • Ketones, UA 01/09/2020 Negative  Negative Final   • Specific Gravity  01/09/2020 1.030  1.005 - 1.030 Final   • Blood, UA 01/09/2020 Negative  Negative Final   • pH, Urine 01/09/2020 5.0  5.0 - 8.0 Final   • Protein, POC 01/09/2020 Trace* Negative mg/dL Final   • Urobilinogen, UA 01/09/2020 Normal  Normal Final   • Leukocytes 01/09/2020 Negative  Negative Final   • Nitrite, UA 01/09/2020 Negative  Negative Final   • C-Reactive Protein 01/09/2020 0.69* 0.00 - 0.50 mg/dL Final   • Rheumatoid Factor Quantitative 01/09/2020 <10.0  0.0 - 14.0 IU/mL Final   • Antinuclear Antibodies (SARMAD) 01/09/2020 Negative  Negative Final   • Glucose 01/09/2020 84  65 - 99 mg/dL Final   • BUN 01/09/2020 17  6 - 20 mg/dL Final   • Creatinine 01/09/2020 0.61  0.57 - 1.00 mg/dL Final   • Sodium 01/09/2020 138  136 - 145 mmol/L Final   • Potassium 01/09/2020 4.5  3.5 - 5.2 mmol/L Final   • Chloride 01/09/2020 99  98 - 107 mmol/L Final   • CO2 01/09/2020 25.3  22.0 - 29.0 mmol/L Final   • Calcium 01/09/2020 9.7  8.6 - 10.5 mg/dL Final   • Total Protein 01/09/2020 7.7  6.0 - 8.5 g/dL Final   • Albumin 01/09/2020 4.20  3.50 - 5.20 g/dL Final   • ALT (SGPT) 01/09/2020 19  1 - 33 U/L Final   • AST (SGOT) 01/09/2020 21  1 - 32 U/L Final   • Alkaline Phosphatase 01/09/2020 84  39 - 117 U/L Final   • Total Bilirubin 01/09/2020 0.3  0.2 - 1.2 mg/dL Final   • eGFR Non African Amer 01/09/2020 107  >60 mL/min/1.73 Final   • Globulin 01/09/2020 3.5  gm/dL Final   • A/G Ratio 01/09/2020 1.2  g/dL Final   • BUN/Creatinine Ratio 01/09/2020 27.9* 7.0 - 25.0 Final   • Anion Gap 01/09/2020 13.7  5.0 - 15.0 mmol/L Final   • WBC 01/09/2020 6.34  3.40 - 10.80 10*3/mm3 Final   • RBC 01/09/2020 4.36  3.77 - 5.28 10*6/mm3 Final   • Hemoglobin 01/09/2020 12.5  12.0 - 15.9 g/dL Final   • Hematocrit 01/09/2020 37.2  34.0 - 46.6 % Final   • MCV 01/09/2020 85.3  79.0 - 97.0 fL Final   • MCH  01/09/2020 28.7  26.6 - 33.0 pg Final   • MCHC 01/09/2020 33.6  31.5 - 35.7 g/dL Final   • RDW 01/09/2020 12.7  12.3 - 15.4 % Final   • RDW-SD 01/09/2020 39.0  37.0 - 54.0 fl Final   • MPV 01/09/2020 10.4  6.0 - 12.0 fL Final   • Platelets 01/09/2020 244  140 - 450 10*3/mm3 Final   • Hemoglobin A1C 01/09/2020 5.3  3.5 - 5.6 % Final   • 25 Hydroxy, Vitamin D 01/09/2020 25.8* 30.0 - 100.0 ng/ml Final   • Vitamin B-12 01/09/2020 588  211 - 946 pg/mL Final   • Total Cholesterol 01/09/2020 187  0 - 200 mg/dL Final   • Triglycerides 01/09/2020 259* 0 - 150 mg/dL Final   • HDL Cholesterol 01/09/2020 45  40 - 60 mg/dL Final   • LDL Cholesterol  01/09/2020 90  0 - 100 mg/dL Final   • VLDL Cholesterol 01/09/2020 51.8* 5 - 40 mg/dL Final   • LDL/HDL Ratio 01/09/2020 2.00   Final       Diagnoses and all orders for this visit:    1. Other ulcerative colitis with other complication (CMS/HCC) (Primary)  -     Ambulatory Referral to Gastroenterology  -     metroNIDAZOLE (Flagyl) 500 MG tablet; Take 1 tablet by mouth 3 (Three) Times a Day.  Dispense: 21 tablet; Refill: 0  Referral to gastroenterology for possible EGD/colonoscopy  start omeprazole daily and Flagyl, discussed diet modifications and low-fat, no seeds, nuts or kernels.    2. Primary insomnia  -     zolpidem (AMBIEN) 10 MG tablet; Take 1 tablet by mouth every night at bedtime.  Dispense: 30 tablet; Refill: 5  Stable refill and continue Ambien    3. Hiatal hernia  -     Ambulatory Referral to Gastroenterology  Start omeprazole  Discussed weight loss and improving diet    4. Mixed hyperlipidemia  Elevated triglycerides reviewed from January labs, discussed with patient lifestyle modifications and limiting fatty, fried and greasy foods    5. Anxiety  -     hydrOXYzine (ATARAX) 10 MG tablet; Take 1 tablet by mouth Every 8 (Eight) Hours As Needed for Anxiety.  Dispense: 30 tablet; Refill: 2  -     escitalopram (LEXAPRO) 20 MG tablet; Take 1 tablet by mouth Daily.  Dispense:  90 tablet; Refill: 1  Continue Lexapro and add hydroxyzine for as needed use for anxiety    6. Hormone replacement therapy  -     estradiol (ESTRACE) 1 MG tablet; Take 1 tablet by mouth Daily.  Dispense: 90 tablet; Refill: 1  Stable, continue estradiol    7. Vitamin D deficiency  -     vitamin D (ERGOCALCIFEROL) 1.25 MG (53070 UT) capsule capsule; Take 1 capsule by mouth 1 (One) Time Per Week.  Dispense: 12 capsule; Refill: 1  Stable, refill vitamin D    Other orders  -     omeprazole (PrilOSEC) 40 MG capsule; Take 1 capsule by mouth Daily.  Dispense: 90 capsule; Refill: 1    -Patient to call priority radiology check price of mammogram, set up payment plan  -We will plan fasting hypertension panel 1 week prior to next follow-up appointment in 6 months  -Return to clinic earlier if needed

## 2020-07-30 ENCOUNTER — TELEPHONE (OUTPATIENT)
Dept: FAMILY MEDICINE CLINIC | Facility: CLINIC | Age: 45
End: 2020-07-30

## 2020-07-30 DIAGNOSIS — M25.50 PAIN, JOINT, MULTIPLE SITES: ICD-10-CM

## 2020-07-30 DIAGNOSIS — M32.9 LUPUS (HCC): ICD-10-CM

## 2020-07-30 RX ORDER — PREDNISONE 10 MG/1
TABLET ORAL
Qty: 15 TABLET | Refills: 0 | Status: SHIPPED | OUTPATIENT
Start: 2020-07-30 | End: 2020-09-02

## 2020-07-30 RX ORDER — TRAMADOL HYDROCHLORIDE 50 MG/1
50 TABLET ORAL EVERY 6 HOURS PRN
Qty: 20 TABLET | Refills: 0 | Status: SHIPPED | OUTPATIENT
Start: 2020-07-30 | End: 2020-09-18 | Stop reason: SDUPTHER

## 2020-07-30 NOTE — TELEPHONE ENCOUNTER
Pt reports that she is having a flare-up with her lupus.  She has an appt with Dr. Ibanez, but asked if you could send tramadol and prednisone to her pharmacy.  She states that they helped last time when an on-call dr sent them for her.

## 2020-08-19 PROCEDURE — U0003 INFECTIOUS AGENT DETECTION BY NUCLEIC ACID (DNA OR RNA); SEVERE ACUTE RESPIRATORY SYNDROME CORONAVIRUS 2 (SARS-COV-2) (CORONAVIRUS DISEASE [COVID-19]), AMPLIFIED PROBE TECHNIQUE, MAKING USE OF HIGH THROUGHPUT TECHNOLOGIES AS DESCRIBED BY CMS-2020-01-R: HCPCS | Performed by: FAMILY MEDICINE

## 2020-08-23 ENCOUNTER — APPOINTMENT (OUTPATIENT)
Dept: CT IMAGING | Facility: HOSPITAL | Age: 45
End: 2020-08-23

## 2020-08-23 ENCOUNTER — HOSPITAL ENCOUNTER (EMERGENCY)
Facility: HOSPITAL | Age: 45
Discharge: HOME | End: 2020-08-23
Attending: EMERGENCY MEDICINE | Admitting: EMERGENCY MEDICINE

## 2020-08-23 VITALS
TEMPERATURE: 98.3 F | HEART RATE: 64 BPM | BODY MASS INDEX: 39.39 KG/M2 | RESPIRATION RATE: 16 BRPM | WEIGHT: 214.07 LBS | OXYGEN SATURATION: 98 % | SYSTOLIC BLOOD PRESSURE: 124 MMHG | DIASTOLIC BLOOD PRESSURE: 70 MMHG | HEIGHT: 62 IN

## 2020-08-23 DIAGNOSIS — R51.9 NONINTRACTABLE HEADACHE, UNSPECIFIED CHRONICITY PATTERN, UNSPECIFIED HEADACHE TYPE: Primary | ICD-10-CM

## 2020-08-23 PROCEDURE — 25010000002 PROCHLORPERAZINE 10 MG/2ML SOLUTION: Performed by: EMERGENCY MEDICINE

## 2020-08-23 PROCEDURE — 96374 THER/PROPH/DIAG INJ IV PUSH: CPT

## 2020-08-23 PROCEDURE — 25010000002 DIPHENHYDRAMINE PER 50 MG: Performed by: EMERGENCY MEDICINE

## 2020-08-23 PROCEDURE — 25010000002 KETOROLAC TROMETHAMINE PER 15 MG: Performed by: EMERGENCY MEDICINE

## 2020-08-23 PROCEDURE — 96375 TX/PRO/DX INJ NEW DRUG ADDON: CPT

## 2020-08-23 PROCEDURE — 70450 CT HEAD/BRAIN W/O DYE: CPT

## 2020-08-23 PROCEDURE — 99284 EMERGENCY DEPT VISIT MOD MDM: CPT

## 2020-08-23 RX ORDER — SODIUM CHLORIDE 0.9 % (FLUSH) 0.9 %
10 SYRINGE (ML) INJECTION AS NEEDED
Status: DISCONTINUED | OUTPATIENT
Start: 2020-08-23 | End: 2020-08-23 | Stop reason: HOSPADM

## 2020-08-23 RX ORDER — PROCHLORPERAZINE EDISYLATE 5 MG/ML
10 INJECTION INTRAMUSCULAR; INTRAVENOUS ONCE
Status: COMPLETED | OUTPATIENT
Start: 2020-08-23 | End: 2020-08-23

## 2020-08-23 RX ORDER — CYCLOBENZAPRINE HCL 5 MG
5 TABLET ORAL 3 TIMES DAILY PRN
Qty: 15 TABLET | Refills: 0 | Status: SHIPPED | OUTPATIENT
Start: 2020-08-23 | End: 2020-09-02

## 2020-08-23 RX ORDER — KETOROLAC TROMETHAMINE 15 MG/ML
15 INJECTION, SOLUTION INTRAMUSCULAR; INTRAVENOUS ONCE
Status: COMPLETED | OUTPATIENT
Start: 2020-08-23 | End: 2020-08-23

## 2020-08-23 RX ORDER — PREDNISONE 50 MG/1
50 TABLET ORAL DAILY
Qty: 4 TABLET | Refills: 0 | Status: SHIPPED | OUTPATIENT
Start: 2020-08-23 | End: 2020-09-02

## 2020-08-23 RX ORDER — DIPHENHYDRAMINE HYDROCHLORIDE 50 MG/ML
25 INJECTION INTRAMUSCULAR; INTRAVENOUS ONCE
Status: COMPLETED | OUTPATIENT
Start: 2020-08-23 | End: 2020-08-23

## 2020-08-23 RX ADMIN — Medication 10 ML: at 08:34

## 2020-08-23 RX ADMIN — DIPHENHYDRAMINE HYDROCHLORIDE 25 MG: 50 INJECTION, SOLUTION INTRAMUSCULAR; INTRAVENOUS at 08:33

## 2020-08-23 RX ADMIN — SODIUM CHLORIDE 500 ML: 900 INJECTION, SOLUTION INTRAVENOUS at 08:34

## 2020-08-23 RX ADMIN — KETOROLAC TROMETHAMINE 15 MG: 15 INJECTION, SOLUTION INTRAMUSCULAR; INTRAVENOUS at 08:34

## 2020-08-23 RX ADMIN — PROCHLORPERAZINE EDISYLATE 10 MG: 5 INJECTION INTRAMUSCULAR; INTRAVENOUS at 08:34

## 2020-08-23 NOTE — ED PROVIDER NOTES
Subjective   History of Present Illness  Headache  45-year-old female complains of a moderate to severe headache in the left posterior aspect of her head and throughout the left side of her neck and into her left trapezius muscle and shoulder.  She states there is a gradual onset about 1 week ago and has persisted since that time.  She reports no fevers or chills or trauma or focal numbness or weakness or blurry vision.  States it has not been relieved with ibuprofen.  She states she had a COVID screen last week was negative.  She is not reporting any known ill contacts.  She reports no trauma  Review of Systems   Constitutional: Negative.    Eyes: Negative.    Respiratory: Negative.    Cardiovascular: Negative.    Gastrointestinal: Negative.    Genitourinary: Negative.    Musculoskeletal: Positive for myalgias.   Skin: Negative.    Neurological: Positive for headaches. Negative for dizziness, syncope, speech difficulty, weakness and light-headedness.   Hematological: Negative.    Psychiatric/Behavioral: Negative.        Past Medical History:   Diagnosis Date   • ADD (attention deficit disorder)    • SARMAD positive    • Anxiety    • Arthralgia    • Asthma    • Bursitis of left hip    • DDD (degenerative disc disease), lumbar    • Depression, major    • Flu syndrome    • GERD (gastroesophageal reflux disease)    • Hiatal hernia    • Hormone replacement therapy    • Hyperlipidemia    • Inflammatory arthritis    • Influenza    • Insomnia    • Knee pain, bilateral    • Lupus (CMS/HCC)    • Neck pain    • Obesity    • Osteoarthritis    • Pain, joint, shoulder, left    • Pharyngitis, acute    • Postmenopausal    • Sinus congestion    • Surgical menopause     at age 34   • Vitamin B12 deficiency    • Vitamin D deficiency        Allergies   Allergen Reactions   • Sulfa Antibiotics Hives       Past Surgical History:   Procedure Laterality Date   • CHOLECYSTECTOMY     • LUMBAR DISCECTOMY  2000   • TONSILLECTOMY     • TOTAL  ABDOMINAL HYSTERECTOMY WITH SALPINGO OOPHORECTOMY Bilateral 2009    FOR ENDOMETRIOSIS       Family History   Problem Relation Age of Onset   • Hypertension Mother    • Hypertension Father    • Diabetes Other    • Heart disease Other    • Hypertension Other    • Cancer Other        Social History     Socioeconomic History   • Marital status:      Spouse name: Not on file   • Number of children: Not on file   • Years of education: Not on file   • Highest education level: Not on file   Tobacco Use   • Smoking status: Former Smoker   • Smokeless tobacco: Never Used   Substance and Sexual Activity   • Alcohol use: Not Currently   • Drug use: Not Currently       Prior to Admission medications    Medication Sig Start Date End Date Taking? Authorizing Provider   Cholecalciferol (VITAMIN D) 50 MCG (2000 UT) capsule 1 capsule Daily. 10/18/17   Provider, MD Jacinto   escitalopram (LEXAPRO) 20 MG tablet Take 1 tablet by mouth Daily. 7/8/20   Vivian Fontanez APRN   estradiol (ESTRACE) 1 MG tablet Take 1 tablet by mouth Daily. 7/8/20   Vivian Fontanez APRN   FOLIC ACID PO Take  by mouth.    Provider, MD Jacinto   hydrOXYzine (ATARAX) 10 MG tablet Take 1 tablet by mouth Every 8 (Eight) Hours As Needed for Anxiety. 7/8/20   Vivian Fontanez APRANURAG   metroNIDAZOLE (Flagyl) 500 MG tablet Take 1 tablet by mouth 3 (Three) Times a Day. 7/8/20   Vivian Fontanez APRANURAG   omeprazole (PrilOSEC) 40 MG capsule Take 1 capsule by mouth Daily. 7/8/20   Vivian Fontanez L APRN   predniSONE (DELTASONE) 10 MG tablet Take 3 po for 2 days, Take 2 for 2 days, then 1 for 5 days, then stop 7/30/20   Vivian Fontanez L APRN   traMADol (ULTRAM) 50 MG tablet Take 1 tablet by mouth Every 6 (Six) Hours As Needed for Severe Pain . 7/30/20   Vivian Fontanez APRANURAG   vitamin D (ERGOCALCIFEROL) 1.25 MG (58504 UT) capsule capsule Take 1 capsule by mouth 1 (One) Time Per Week. 7/8/20   Vivian Fontanez L APRN   zolpidem (AMBIEN) 10 MG tablet Take 1 tablet by mouth  "every night at bedtime. 7/8/20   Vivian Fontanez APRN     /71 (BP Location: Left arm, Patient Position: Lying)   Pulse 64   Temp 98.3 °F (36.8 °C) (Oral)   Resp 16   Ht 157.5 cm (62\")   Wt 97.1 kg (214 lb 1.1 oz)   SpO2 98%   Breastfeeding No   BMI 39.15 kg/m²   I examined the patient using the appropriate personal protective equipment.        Objective   Physical Exam  General: Well-developed well-appearing, no acute distress, alert and appropriate  Eyes: Pupils round and reactive, sclera nonicteric  HEENT: Mucous membranes moist, no mucosal swelling, tympanic membrane clear  Neck: Supple, no nuchal rigidity,  there is some paraspinous tenderness on the left side posterior lateral and throughout the left trapezius muscle as well as throughout the left occiput, no swelling or erythema or lymphadenopathy  Respirations: Respirations nonlabored, equal breath sounds bilaterally, clear lungs  Heart regular rate and rhythm, no murmurs rubs or gallops,   Abdomen soft nontender nondistended,   Extremities no clubbing cyanosis or edema, calves are symmetric and nontender  Neuro cranial nerves II through XII intact , normal sensory/motor function and strength in four extremities, no slurred speech, no facial droop, normal finger to nose, normal heel to shin, no nuchal rigidity  Psych oriented, pleasant affect  Skin no rash, brisk cap refill  Procedures           ED Course            Results for orders placed or performed during the hospital encounter of 08/19/20   COVID-19,[facility] ROUTINE, NP/OP SWAB IN TRANSPORT MEDIA OR ESWAB 72 HR TAT - Swab, Anterior nasal   Result Value Ref Range    SARS-CoV-2, LUIS ALBERTO Not Detected Not Detected     Ct Head Without Contrast    Result Date: 8/23/2020  Unremarkable noncontrast head CT  Electronically Signed By-Mil Cool DO. On:8/23/2020 9:34 AM This report was finalized on [id number] by  Mil Cool DO..                                      [facility]  Patient continued to " have a normal neurologic exam throughout the emergency room course.  She is not describing thunderclap onset or nuchal rigidity or fever to suggest subarachnoid hemorrhage or meningitis.  Some of her pain is reproducible with palpation in the musculature of the neck and scalp.  There is no sign of soft tissue infection or peripheral neuropathy.  CT scan of the head showed no acute abnormality.  She states she was feeling better after Compazine and Benadryl and Toradol.  She is advised the findings she is discharged good condition was prescribed prednisone and Flexeril.  She was given warning signs for return  Final diagnoses:   Nonintractable headache, unspecified chronicity pattern, unspecified headache type            Danilo Aldana MD  08/23/20 0917

## 2020-08-23 NOTE — DISCHARGE INSTRUCTIONS
Rest, consider heating pad, Flexeril.  Return for increased pain, numbness, weakness, fever, blurry vision, increased dizziness or any other concerns

## 2020-08-24 ENCOUNTER — TELEPHONE (OUTPATIENT)
Dept: URGENT CARE | Facility: CLINIC | Age: 45
End: 2020-08-24

## 2020-09-02 ENCOUNTER — OFFICE VISIT (OUTPATIENT)
Dept: FAMILY MEDICINE CLINIC | Facility: CLINIC | Age: 45
End: 2020-09-02

## 2020-09-02 VITALS
OXYGEN SATURATION: 98 % | WEIGHT: 213.6 LBS | DIASTOLIC BLOOD PRESSURE: 87 MMHG | HEIGHT: 62 IN | SYSTOLIC BLOOD PRESSURE: 123 MMHG | BODY MASS INDEX: 39.31 KG/M2 | TEMPERATURE: 97.3 F | HEART RATE: 103 BPM

## 2020-09-02 DIAGNOSIS — G43.909 MIGRAINE WITHOUT STATUS MIGRAINOSUS, NOT INTRACTABLE, UNSPECIFIED MIGRAINE TYPE: ICD-10-CM

## 2020-09-02 DIAGNOSIS — G25.81 RLS (RESTLESS LEGS SYNDROME): ICD-10-CM

## 2020-09-02 DIAGNOSIS — M54.2 CERVICALGIA: Primary | ICD-10-CM

## 2020-09-02 PROCEDURE — 99214 OFFICE O/P EST MOD 30 MIN: CPT | Performed by: NURSE PRACTITIONER

## 2020-09-02 RX ORDER — GABAPENTIN 300 MG/1
300 CAPSULE ORAL 3 TIMES DAILY
Qty: 90 CAPSULE | Refills: 0 | Status: SHIPPED | OUTPATIENT
Start: 2020-09-02 | End: 2020-09-30 | Stop reason: SDUPTHER

## 2020-09-02 NOTE — PROGRESS NOTES
Chief Complaint   Patient presents with   • Migraine     lasting for a week   • Follow-up     hospital f/u from migraine.  she went to UC, had covid test which was neg.  went to ER, did CT scan which was neg.  It is believed that the pain in coming from her neck so she asked if you could order MRI on neck.   • Restless Legs Syndrome     pt wants to discuss medication.       HPI     Patient with chronic L shoulder/neck pain that typically is precursor to h/a. She reports having a migraine for 1 wk that seems to extend from the mid back/neck and presented to the ED for evaluation, she is currently using ibuprofen, tylenol, completed Prednisone/flexeril which have resolved the migraine, now with continued neck pain, and occasional popping, has not seen chiro, she also reports worsening of restless legs at night.     CT head/brain neg at ED    She does follow with rheumatology, Dr. Ibanez currently, saw Dr. Chao in the past, all labs are negative so far for lupus and rheumatoid arthritis. Dr. Ibanez told the patient xrays of feet, hips, knees show OA. She is on folic acid only.         The following portions of the patient's history were reviewed and updated as appropriate: allergies, current medications, past family history, past medical history, past social history, past surgical history and problem list.    Past Medical History:   Diagnosis Date   • ADD (attention deficit disorder)    • SARMAD positive    • Anxiety    • Arthralgia    • Asthma    • Bursitis of left hip    • DDD (degenerative disc disease), lumbar    • Depression, major    • Flu syndrome    • GERD (gastroesophageal reflux disease)    • Hiatal hernia    • Hormone replacement therapy    • Hyperlipidemia    • Inflammatory arthritis    • Influenza    • Insomnia    • Knee pain, bilateral    • Lupus (CMS/HCC)    • Neck pain    • Obesity    • Osteoarthritis    • Pain, joint, shoulder, left    • Pharyngitis, acute    • Postmenopausal    • Sinus congestion    •  Surgical menopause    • Vitamin B12 deficiency    • Vitamin D deficiency      Past Surgical History:   Procedure Laterality Date   • CHOLECYSTECTOMY     • LUMBAR DISCECTOMY  2000   • TONSILLECTOMY     • TOTAL ABDOMINAL HYSTERECTOMY WITH SALPINGO OOPHORECTOMY Bilateral 2009    FOR ENDOMETRIOSIS     Family History   Problem Relation Age of Onset   • Hypertension Mother    • Hypertension Father    • Diabetes Other    • Heart disease Other    • Hypertension Other    • Cancer Other      Social History     Tobacco Use   • Smoking status: Former Smoker   • Smokeless tobacco: Never Used   Substance Use Topics   • Alcohol use: Not Currently         Current Outpatient Medications:   •  escitalopram (LEXAPRO) 20 MG tablet, Take 1 tablet by mouth Daily., Disp: 90 tablet, Rfl: 1  •  estradiol (ESTRACE) 1 MG tablet, Take 1 tablet by mouth Daily., Disp: 90 tablet, Rfl: 1  •  FOLIC ACID PO, Take  by mouth., Disp: , Rfl:   •  omeprazole (PrilOSEC) 40 MG capsule, Take 1 capsule by mouth Daily., Disp: 90 capsule, Rfl: 1  •  traMADol (ULTRAM) 50 MG tablet, Take 1 tablet by mouth Every 6 (Six) Hours As Needed for Severe Pain ., Disp: 20 tablet, Rfl: 0  •  vitamin D (ERGOCALCIFEROL) 1.25 MG (56186 UT) capsule capsule, Take 1 capsule by mouth 1 (One) Time Per Week., Disp: 12 capsule, Rfl: 1  •  zolpidem (AMBIEN) 10 MG tablet, Take 1 tablet by mouth every night at bedtime., Disp: 30 tablet, Rfl: 5  •  gabapentin (NEURONTIN) 300 MG capsule, Take 1 capsule by mouth 3 (Three) Times a Day., Disp: 90 capsule, Rfl: 0  •  hydrOXYzine (ATARAX) 10 MG tablet, Take 1 tablet by mouth Every 8 (Eight) Hours As Needed for Anxiety., Disp: 30 tablet, Rfl: 2      Review of Systems       Obtained as mentioned in HPI, otherwise negative.       Vitals:    09/02/20 1128   BP: 123/87   BP Location: Left arm   Patient Position: Sitting   Cuff Size: Adult   Pulse: 103   Temp: 97.3 °F (36.3 °C)   TempSrc: Skin   SpO2: 98%   Weight: 96.9 kg (213 lb 9.6 oz)   Height:  "157.5 cm (62.01\")     Body mass index is 39.06 kg/m².     Physical Exam   Constitutional: She is oriented to person, place, and time. She appears well-developed and well-nourished. No distress.   Eyes: Pupils are equal, round, and reactive to light.   Neck: Normal range of motion. Neck supple. No JVD present. No thyromegaly present.   Cardiovascular: Normal rate, regular rhythm, normal heart sounds and intact distal pulses.   No murmur heard.  Pulmonary/Chest: Effort normal and breath sounds normal. No respiratory distress.   Abdominal: Soft. Bowel sounds are normal. She exhibits no distension. There is no tenderness.   Musculoskeletal: Normal range of motion. She exhibits tenderness (c4-5 severe ttp, mild jimenes rom, with kyphosis).   Neurological: She is alert and oriented to person, place, and time. A sensory deficit ( + dec sensation of hands/feet) is present.   Skin: Skin is warm and dry. She is not diaphoretic.   Psychiatric: She has a normal mood and affect. Her behavior is normal. Judgment and thought content normal.   Nursing note and vitals reviewed.      No visits with results within 7 Day(s) from this visit.   Latest known visit with results is:   Admission on 08/19/2020, Discharged on 08/19/2020   Component Date Value Ref Range Status   • SARS-CoV-2, LUIS ALBERTO 08/19/2020 Not Detected  Not Detected Final    This test was developed and its performance characteristics determined  by [employer]. This test has not been FDA cleared or  approved. This test has been authorized by FDA under an Emergency Use  Authorization (EUA). This test is only authorized for the duration of  time the declaration that circumstances exist justifying the  authorization of the emergency use of in vitro diagnostic tests for  detection of SARS-CoV-2 virus and/or diagnosis of COVID-19 infection  under section 564(b)(1) of the Act, 21 U.S.C. 360bbb-3(b)(1), unless  the authorization is terminated or revoked sooner.  When diagnostic " testing is negative, the possibility of a false  negative result should be considered in the context of a patient's  recent exposures and the presence of clinical signs and symptoms  consistent with COVID-19. An individual without symptoms of COVID-19  and who is not shedding SARS-CoV-2 virus would expect to have a  negative (not detected) result in this assay.       Diagnoses and all orders for this visit:    1. Cervicalgia (Primary)  Comments:  Trial gabapentin for neck pain, migraine and RLS.  Check x-ray of the cervical spine, and notify patient results.  Consider referral if needed vs PT/chiro  Orders:  -     XR Spine Cervical Complete 4 or 5 View; Future    2. Migraine without status migrainosus, not intractable, unspecified migraine type  -     XR Spine Cervical Complete 4 or 5 View; Future    3. RLS (restless legs syndrome)    Other orders  -     gabapentin (NEURONTIN) 300 MG capsule; Take 1 capsule by mouth 3 (Three) Times a Day.  Dispense: 90 capsule; Refill: 0       Continue NSAIDs, Tylenol as needed  Return to clinic for next scheduled appointment in January, will plan to collect fasting hypertension panel, hepatitis C antibody labs 1 week prior to the visit

## 2020-09-17 ENCOUNTER — TELEPHONE (OUTPATIENT)
Dept: FAMILY MEDICINE CLINIC | Facility: CLINIC | Age: 45
End: 2020-09-17

## 2020-09-18 DIAGNOSIS — M25.50 PAIN, JOINT, MULTIPLE SITES: ICD-10-CM

## 2020-09-18 DIAGNOSIS — M32.9 LUPUS (HCC): ICD-10-CM

## 2020-09-18 RX ORDER — METHYLPREDNISOLONE 4 MG/1
TABLET ORAL
Qty: 21 TABLET | Refills: 0 | Status: SHIPPED | OUTPATIENT
Start: 2020-09-18 | End: 2020-10-11

## 2020-09-18 RX ORDER — TRAMADOL HYDROCHLORIDE 50 MG/1
50 TABLET ORAL EVERY 6 HOURS PRN
Qty: 20 TABLET | Refills: 0 | OUTPATIENT
Start: 2020-09-18 | End: 2020-10-11

## 2020-09-30 NOTE — TELEPHONE ENCOUNTER
Called patient to see how she is doing and to make sure she received her Medrol dosepak and Tramadol.  Stated she could call the office if she needed anything.  cc

## 2020-10-02 RX ORDER — GABAPENTIN 300 MG/1
300 CAPSULE ORAL 3 TIMES DAILY
Qty: 90 CAPSULE | Refills: 4 | Status: SHIPPED | OUTPATIENT
Start: 2020-10-02 | End: 2021-01-11 | Stop reason: SDUPTHER

## 2020-10-11 PROCEDURE — U0003 INFECTIOUS AGENT DETECTION BY NUCLEIC ACID (DNA OR RNA); SEVERE ACUTE RESPIRATORY SYNDROME CORONAVIRUS 2 (SARS-COV-2) (CORONAVIRUS DISEASE [COVID-19]), AMPLIFIED PROBE TECHNIQUE, MAKING USE OF HIGH THROUGHPUT TECHNOLOGIES AS DESCRIBED BY CMS-2020-01-R: HCPCS | Performed by: NURSE PRACTITIONER

## 2020-10-19 ENCOUNTER — TELEPHONE (OUTPATIENT)
Dept: FAMILY MEDICINE CLINIC | Facility: CLINIC | Age: 45
End: 2020-10-19

## 2020-10-19 NOTE — TELEPHONE ENCOUNTER
Pt reports that she has a lingering that has been present for 8 days.  She said it started improving at 100 and last night jumped up at 102.  Today it was at 101.  She has been taking fever reducers off and on, but didn't last or today.  She asked if that is normal or do you have any recommendations?

## 2020-10-20 NOTE — TELEPHONE ENCOUNTER
She just needs to treat the symptoms, tylenol/ibuprofen, cough meds prn, she can have symptoms for a few weeks or a few days. IF she develops significant soa, resp distress should go to ED.

## 2020-10-26 RX ORDER — OMEPRAZOLE 40 MG/1
40 CAPSULE, DELAYED RELEASE ORAL DAILY
Qty: 90 CAPSULE | Refills: 1 | Status: SHIPPED | OUTPATIENT
Start: 2020-10-26 | End: 2021-04-14 | Stop reason: SDUPTHER

## 2020-11-11 DIAGNOSIS — M32.9 LUPUS (HCC): ICD-10-CM

## 2020-11-11 DIAGNOSIS — M25.50 PAIN, JOINT, MULTIPLE SITES: ICD-10-CM

## 2020-11-11 RX ORDER — METHYLPREDNISOLONE 4 MG/1
TABLET ORAL
Qty: 21 EACH | Refills: 0 | OUTPATIENT
Start: 2020-11-11

## 2020-11-11 RX ORDER — TRAMADOL HYDROCHLORIDE 50 MG/1
TABLET ORAL
Qty: 20 TABLET | Refills: 0 | Status: SHIPPED | OUTPATIENT
Start: 2020-11-11 | End: 2021-01-06 | Stop reason: SDUPTHER

## 2020-11-25 ENCOUNTER — TELEPHONE (OUTPATIENT)
Dept: FAMILY MEDICINE CLINIC | Facility: CLINIC | Age: 45
End: 2020-11-25

## 2020-11-25 NOTE — TELEPHONE ENCOUNTER
Pt reports that the gabapentin has helped a little, but is still experiencing restlessness and pain.  She asked if there is a higher dose she can take?

## 2020-12-21 DIAGNOSIS — E55.9 VITAMIN D DEFICIENCY: ICD-10-CM

## 2020-12-23 RX ORDER — ERGOCALCIFEROL 1.25 MG/1
CAPSULE ORAL
Qty: 12 CAPSULE | Refills: 0 | Status: SHIPPED | OUTPATIENT
Start: 2020-12-23 | End: 2021-01-11 | Stop reason: SDUPTHER

## 2021-01-05 ENCOUNTER — LAB (OUTPATIENT)
Dept: FAMILY MEDICINE CLINIC | Facility: CLINIC | Age: 46
End: 2021-01-05

## 2021-01-05 DIAGNOSIS — Z11.59 NEED FOR HEPATITIS C SCREENING TEST: ICD-10-CM

## 2021-01-05 DIAGNOSIS — E78.2 MIXED HYPERLIPIDEMIA: Primary | ICD-10-CM

## 2021-01-05 DIAGNOSIS — E53.8 VITAMIN B12 DEFICIENCY: ICD-10-CM

## 2021-01-05 PROCEDURE — 36415 COLL VENOUS BLD VENIPUNCTURE: CPT | Performed by: NURSE PRACTITIONER

## 2021-01-05 PROCEDURE — 84443 ASSAY THYROID STIM HORMONE: CPT | Performed by: NURSE PRACTITIONER

## 2021-01-05 PROCEDURE — 80053 COMPREHEN METABOLIC PANEL: CPT | Performed by: NURSE PRACTITIONER

## 2021-01-05 PROCEDURE — 86803 HEPATITIS C AB TEST: CPT | Performed by: NURSE PRACTITIONER

## 2021-01-05 PROCEDURE — 85027 COMPLETE CBC AUTOMATED: CPT | Performed by: NURSE PRACTITIONER

## 2021-01-05 PROCEDURE — 80061 LIPID PANEL: CPT | Performed by: NURSE PRACTITIONER

## 2021-01-06 DIAGNOSIS — M32.9 LUPUS (HCC): ICD-10-CM

## 2021-01-06 DIAGNOSIS — M25.50 PAIN, JOINT, MULTIPLE SITES: ICD-10-CM

## 2021-01-06 LAB
ALBUMIN SERPL-MCNC: 4.3 G/DL (ref 3.5–5.2)
ALBUMIN/GLOB SERPL: 1.7 G/DL
ALP SERPL-CCNC: 74 U/L (ref 39–117)
ALT SERPL W P-5'-P-CCNC: 24 U/L (ref 1–33)
ANION GAP SERPL CALCULATED.3IONS-SCNC: 11.5 MMOL/L (ref 5–15)
AST SERPL-CCNC: 20 U/L (ref 1–32)
BILIRUB SERPL-MCNC: 0.3 MG/DL (ref 0–1.2)
BUN SERPL-MCNC: 22 MG/DL (ref 6–20)
BUN/CREAT SERPL: 36.7 (ref 7–25)
CALCIUM SPEC-SCNC: 9 MG/DL (ref 8.6–10.5)
CHLORIDE SERPL-SCNC: 106 MMOL/L (ref 98–107)
CHOLEST SERPL-MCNC: 208 MG/DL (ref 0–200)
CO2 SERPL-SCNC: 26.5 MMOL/L (ref 22–29)
CREAT SERPL-MCNC: 0.6 MG/DL (ref 0.57–1)
DEPRECATED RDW RBC AUTO: 41 FL (ref 37–54)
ERYTHROCYTE [DISTWIDTH] IN BLOOD BY AUTOMATED COUNT: 13.3 % (ref 12.3–15.4)
GFR SERPL CREATININE-BSD FRML MDRD: 108 ML/MIN/1.73
GLOBULIN UR ELPH-MCNC: 2.5 GM/DL
GLUCOSE SERPL-MCNC: 89 MG/DL (ref 65–99)
HCT VFR BLD AUTO: 36.2 % (ref 34–46.6)
HCV AB SER DONR QL: NORMAL
HDLC SERPL-MCNC: 53 MG/DL (ref 40–60)
HGB BLD-MCNC: 12.2 G/DL (ref 12–15.9)
LDLC SERPL CALC-MCNC: 138 MG/DL (ref 0–100)
LDLC/HDLC SERPL: 2.56 {RATIO}
MCH RBC QN AUTO: 29 PG (ref 26.6–33)
MCHC RBC AUTO-ENTMCNC: 33.7 G/DL (ref 31.5–35.7)
MCV RBC AUTO: 86 FL (ref 79–97)
PLATELET # BLD AUTO: 238 10*3/MM3 (ref 140–450)
PMV BLD AUTO: 10.3 FL (ref 6–12)
POTASSIUM SERPL-SCNC: 3.9 MMOL/L (ref 3.5–5.2)
PROT SERPL-MCNC: 6.8 G/DL (ref 6–8.5)
RBC # BLD AUTO: 4.21 10*6/MM3 (ref 3.77–5.28)
SODIUM SERPL-SCNC: 144 MMOL/L (ref 136–145)
TRIGL SERPL-MCNC: 97 MG/DL (ref 0–150)
TSH SERPL DL<=0.05 MIU/L-ACNC: 1.6 UIU/ML (ref 0.27–4.2)
VLDLC SERPL-MCNC: 17 MG/DL (ref 5–40)
WBC # BLD AUTO: 6.49 10*3/MM3 (ref 3.4–10.8)

## 2021-01-06 RX ORDER — TRAMADOL HYDROCHLORIDE 50 MG/1
50 TABLET ORAL EVERY 6 HOURS PRN
Qty: 20 TABLET | Refills: 0 | Status: SHIPPED | OUTPATIENT
Start: 2021-01-06 | End: 2021-02-01 | Stop reason: SDUPTHER

## 2021-01-11 ENCOUNTER — OFFICE VISIT (OUTPATIENT)
Dept: FAMILY MEDICINE CLINIC | Facility: CLINIC | Age: 46
End: 2021-01-11

## 2021-01-11 VITALS
HEIGHT: 62 IN | OXYGEN SATURATION: 98 % | TEMPERATURE: 97.5 F | HEART RATE: 87 BPM | BODY MASS INDEX: 40.19 KG/M2 | SYSTOLIC BLOOD PRESSURE: 128 MMHG | DIASTOLIC BLOOD PRESSURE: 85 MMHG | WEIGHT: 218.4 LBS

## 2021-01-11 DIAGNOSIS — Z79.890 HORMONE REPLACEMENT THERAPY: ICD-10-CM

## 2021-01-11 DIAGNOSIS — K51.818 OTHER ULCERATIVE COLITIS WITH OTHER COMPLICATION (HCC): ICD-10-CM

## 2021-01-11 DIAGNOSIS — E55.9 VITAMIN D DEFICIENCY: ICD-10-CM

## 2021-01-11 DIAGNOSIS — Z12.11 COLON CANCER SCREENING: ICD-10-CM

## 2021-01-11 DIAGNOSIS — Z12.31 BREAST CANCER SCREENING BY MAMMOGRAM: ICD-10-CM

## 2021-01-11 DIAGNOSIS — F51.01 PRIMARY INSOMNIA: ICD-10-CM

## 2021-01-11 DIAGNOSIS — M54.2 CERVICALGIA: Primary | ICD-10-CM

## 2021-01-11 DIAGNOSIS — F41.9 ANXIETY: ICD-10-CM

## 2021-01-11 DIAGNOSIS — M15.9 PRIMARY OSTEOARTHRITIS INVOLVING MULTIPLE JOINTS: ICD-10-CM

## 2021-01-11 PROCEDURE — 99214 OFFICE O/P EST MOD 30 MIN: CPT | Performed by: NURSE PRACTITIONER

## 2021-01-11 RX ORDER — ESTRADIOL 1 MG/1
1 TABLET ORAL DAILY
Qty: 90 TABLET | Refills: 1 | Status: SHIPPED | OUTPATIENT
Start: 2021-01-11 | End: 2021-02-09 | Stop reason: SDUPTHER

## 2021-01-11 RX ORDER — ERGOCALCIFEROL 1.25 MG/1
50000 CAPSULE ORAL WEEKLY
Qty: 12 CAPSULE | Refills: 1 | Status: SHIPPED | OUTPATIENT
Start: 2021-01-11 | End: 2021-02-09 | Stop reason: SDUPTHER

## 2021-01-11 RX ORDER — GABAPENTIN 400 MG/1
400 CAPSULE ORAL 3 TIMES DAILY
Qty: 90 CAPSULE | Refills: 2 | Status: SHIPPED | OUTPATIENT
Start: 2021-01-11 | End: 2021-02-09 | Stop reason: SDUPTHER

## 2021-01-11 RX ORDER — BUSPIRONE HYDROCHLORIDE 5 MG/1
5 TABLET ORAL 2 TIMES DAILY
Qty: 60 TABLET | Refills: 2 | Status: SHIPPED | OUTPATIENT
Start: 2021-01-11 | End: 2021-02-09 | Stop reason: SDUPTHER

## 2021-01-11 RX ORDER — ESCITALOPRAM OXALATE 20 MG/1
20 TABLET ORAL DAILY
Qty: 90 TABLET | Refills: 1 | Status: SHIPPED | OUTPATIENT
Start: 2021-01-11 | End: 2021-02-09 | Stop reason: SDUPTHER

## 2021-01-11 RX ORDER — ZOLPIDEM TARTRATE 10 MG/1
10 TABLET ORAL
Qty: 30 TABLET | Refills: 5 | Status: SHIPPED | OUTPATIENT
Start: 2021-01-11 | End: 2021-07-14 | Stop reason: SDUPTHER

## 2021-01-11 NOTE — PROGRESS NOTES
"Chief Complaint  Ulcerative Colitis, Anxiety (pt has tried hydroxyzine but made her too tired.  she wants to know if you can add anything to the prozac because it isn't helping like it used to.), Follow-up (6 mo), and Pain (asked about increase of gabapentin, says it doesn't always help)    Subjective          Kelly Espinoza presents to [facility] for   History of Present Illness     Chronic pain, follows yearly, for OA, negative RA/lupus markers, following Dr. Ibanez. Improved with gabapentin but not resolved.     Anxiety, patient on Lexapro 20 mg, still labile with fatigue (loss of energy) and insomnia.  Patient reports fear of driving to [place]. Denies hypersomnia, psychomotor agitation, psychomotor retardation,  feelings of worthlessness (guilt), impaired concentration (indecisiveness), thoughts of death or suicide.       GERD/hiatal hernia/ulcerative colitis, previously diagnosed by gastroenterologist greater than 10 years ago, she has gained weight, still w/ poor diet, high fat, omeprazole helps. Denies blood in the stool, black or tarry stool, nausea, vomiting, constipation, abdominal pain and diarrhea.      Insomnia, This is a chronic problem. The current episode started more than 1 year ago. The problem occurs nightly, The problem has been unchanged. Pertinent negatives include no abdominal pain, arthralgias, chills, coughing, fever, nausea, rash, sore throat, vomiting or weakness, pt reports daytime fatigue. Nothing aggravates the symptoms. Treatments tried: OTC sleep aids ineffective, Ambien nightly is effective for staying asleep. The treatment provides significant relief.       Objective   Vital Signs:   /85 (BP Location: Right arm, Patient Position: Sitting, Cuff Size: Adult)   Pulse 87   Temp 97.5 °F (36.4 °C) (Skin)   Ht 157.5 cm (62.01\")   Wt 99.1 kg (218 lb 6.4 oz)   SpO2 98%   BMI 39.94 kg/m²     Physical Exam  Vitals signs and nursing note reviewed. "   Constitutional:       General: She is not in acute distress.     Appearance: She is well-developed. She is obese. She is not diaphoretic.   Eyes:      Pupils: Pupils are equal, round, and reactive to light.   Neck:      Musculoskeletal: Normal range of motion and neck supple.      Thyroid: No thyromegaly.      Vascular: No JVD.   Cardiovascular:      Rate and Rhythm: Normal rate and regular rhythm.      Heart sounds: Normal heart sounds. No murmur.   Pulmonary:      Effort: Pulmonary effort is normal. No respiratory distress.      Breath sounds: Normal breath sounds.   Abdominal:      General: Bowel sounds are normal. There is no distension.      Palpations: Abdomen is soft.      Tenderness: There is no abdominal tenderness.   Musculoskeletal: Normal range of motion.         General: Tenderness (mutli joint OA, good rom) present.   Skin:     General: Skin is warm and dry.   Neurological:      Mental Status: She is alert and oriented to person, place, and time.      Sensory: No sensory deficit.   Psychiatric:         Mood and Affect: Mood is anxious and depressed.         Behavior: Behavior normal.         Thought Content: Thought content normal.         Judgment: Judgment normal.        Result Review :     Common labs    Common Labsle 1/5/21 1/5/21 1/5/21    0904 0904 0904   BUN  22 (A)    Creatinine  0.60    eGFR Non African Am  108    Sodium  144    Potassium  3.9    Chloride  106    Calcium  9.0    Albumin  4.30    Total Bilirubin  0.3    Alkaline Phosphatase  74    AST (SGOT)  20    ALT (SGPT)  24    WBC 6.49     Hemoglobin 12.2     Hematocrit 36.2     Platelets 238     Triglycerides   97   HDL Cholesterol   53   LDL Cholesterol    138 (A)   (A) Abnormal value                      Assessment and Plan    Problem List Items Addressed This Visit        Endocrine and Metabolic    Hormone replacement therapy    Relevant Medications    estradiol (ESTRACE) 1 MG tablet    Vitamin D deficiency    Relevant Medications     vitamin D (ERGOCALCIFEROL) 1.25 MG (12860 UT) capsule capsule       Mental Health    Anxiety    Relevant Medications    busPIRone (BUSPAR) 5 MG tablet    escitalopram (LEXAPRO) 20 MG tablet       Musculoskeletal and Injuries    Osteoarthritis    Relevant Medications    gabapentin (NEURONTIN) 400 MG capsule       Sleep    Insomnia    Relevant Medications    zolpidem (AMBIEN) 10 MG tablet      Other Visit Diagnoses     Cervicalgia    -  Primary    Other ulcerative colitis with other complication (CMS/HCC)        Breast cancer screening by mammogram        Relevant Orders    Mammo Screening Digital Tomosynthesis Bilateral With CAD    Colon cancer screening        Relevant Orders    Cologuard - Stool, Per Rectum        1. Reviewed labs, discussed healthy heart diet, limiting fatty, fried, greasy foods and increasing exercise habits  2. Ok to increase gabapentin to 400mg TID, keep f/u with Dr. Ibanez as directed, ok for prn tramadol if needed in future.   3. rf ambien, insomnia stable  4. Ordered mammo and cologuard, full hysterectomy, no pap needed. Cont estradiol  5. Cont lexapro, add buspirone BID, titrate prn  6. gerd stable, cont omeprazole       I spent 30 minutes caring for April on this date of service. This time includes time spent by me in the following activities:preparing for the visit, reviewing tests, performing a medically appropriate examination and/or evaluation , counseling and educating the patient/family/caregiver, ordering medications, tests, or procedures and documenting information in the medical record     Follow Up   Return in about 3 months (around 4/11/2021) for gabapentin change, buspirone start, anxiety/dep f/u.  Patient was given instructions and counseling regarding her condition or for health maintenance advice. Please see specific information pulled into the AVS if appropriate.

## 2021-02-01 DIAGNOSIS — M25.50 PAIN, JOINT, MULTIPLE SITES: ICD-10-CM

## 2021-02-01 DIAGNOSIS — M32.9 LUPUS (HCC): ICD-10-CM

## 2021-02-01 RX ORDER — TRAMADOL HYDROCHLORIDE 50 MG/1
50 TABLET ORAL EVERY 6 HOURS PRN
Qty: 20 TABLET | Refills: 0 | Status: SHIPPED | OUTPATIENT
Start: 2021-02-01 | End: 2021-03-11 | Stop reason: SDUPTHER

## 2021-02-04 PROCEDURE — U0003 INFECTIOUS AGENT DETECTION BY NUCLEIC ACID (DNA OR RNA); SEVERE ACUTE RESPIRATORY SYNDROME CORONAVIRUS 2 (SARS-COV-2) (CORONAVIRUS DISEASE [COVID-19]), AMPLIFIED PROBE TECHNIQUE, MAKING USE OF HIGH THROUGHPUT TECHNOLOGIES AS DESCRIBED BY CMS-2020-01-R: HCPCS | Performed by: FAMILY MEDICINE

## 2021-02-09 ENCOUNTER — TELEPHONE (OUTPATIENT)
Dept: FAMILY MEDICINE CLINIC | Facility: CLINIC | Age: 46
End: 2021-02-09

## 2021-02-09 DIAGNOSIS — Z79.890 HORMONE REPLACEMENT THERAPY: ICD-10-CM

## 2021-02-09 DIAGNOSIS — M15.9 PRIMARY OSTEOARTHRITIS INVOLVING MULTIPLE JOINTS: ICD-10-CM

## 2021-02-09 DIAGNOSIS — F41.9 ANXIETY: ICD-10-CM

## 2021-02-09 DIAGNOSIS — E55.9 VITAMIN D DEFICIENCY: ICD-10-CM

## 2021-02-09 RX ORDER — METHYLPREDNISOLONE 4 MG/1
TABLET ORAL
Qty: 21 TABLET | Refills: 0 | Status: SHIPPED | OUTPATIENT
Start: 2021-02-09 | End: 2021-04-14

## 2021-02-09 RX ORDER — AZITHROMYCIN 250 MG/1
TABLET, FILM COATED ORAL
Qty: 6 TABLET | Refills: 0 | Status: SHIPPED | OUTPATIENT
Start: 2021-02-09 | End: 2021-04-14

## 2021-02-09 NOTE — TELEPHONE ENCOUNTER
Pt stated that she has a dry cough, but it having some nasal drainage. She said it is clear most of the time, but sometimes it is yellow colored in the morning. She said she did have a fever when everything first started that was right around 102. She has not had a fever since Saturday.

## 2021-02-09 NOTE — TELEPHONE ENCOUNTER
Caller: Alexis April R    Relationship: Self    Best call back number: [phone]    What medication are you requesting: SOMETHING FOR COUGH/CONGESTION    What are your current symptoms: CHEST FEELING RAW    How long have you been experiencing symptoms: Wednesday 02/03/2021    Have you had these symptoms before:    [x] Yes  [] No    Have you been treated for these symptoms before:   [] Yes  [x] No    If a prescription is needed, what is your preferred [facility] and phone number[facility] [place] [facility] [address] - [phone]  - [phone]      Additional notes: PATIENT STATES THAT SHE WENT TO [facility] ON Thursday 02/04/2021 AND TESTED NEGATIVE FOR COVID. SHE WAS GIVEN STAYHIST AND  TESSLON PEARLS AND THEY ARE NOT HELPING. SHE IS NEEDING SOMETHING FOR COUGH. IT IS KEEPING HER UP AT NIGHT. PLEASE ADVISE.

## 2021-02-09 NOTE — TELEPHONE ENCOUNTER
Please check with patient, is she coughing anything up?  Nasal drainage?  Colored sputum?  Fever?

## 2021-02-09 NOTE — TELEPHONE ENCOUNTER
Sounds like she needs to continue to quarantine 10 days from possible exposure or st day onset s/s, in the event she tested too early. I will send abx and steroid to her pharmacy. Cont mucinex dm, or dayquil/nyquil. Ok for stahist and prn tessalon as well. Thanks.

## 2021-02-10 RX ORDER — GABAPENTIN 400 MG/1
400 CAPSULE ORAL 3 TIMES DAILY
Qty: 90 CAPSULE | Refills: 2 | Status: SHIPPED | OUTPATIENT
Start: 2021-02-10 | End: 2021-04-14 | Stop reason: SDUPTHER

## 2021-02-10 RX ORDER — ERGOCALCIFEROL 1.25 MG/1
50000 CAPSULE ORAL WEEKLY
Qty: 12 CAPSULE | Refills: 1 | Status: SHIPPED | OUTPATIENT
Start: 2021-02-10 | End: 2021-07-14 | Stop reason: SDUPTHER

## 2021-02-10 RX ORDER — ESCITALOPRAM OXALATE 20 MG/1
20 TABLET ORAL DAILY
Qty: 90 TABLET | Refills: 1 | Status: SHIPPED | OUTPATIENT
Start: 2021-02-10 | End: 2021-04-14

## 2021-02-10 RX ORDER — BUSPIRONE HYDROCHLORIDE 5 MG/1
5 TABLET ORAL 2 TIMES DAILY
Qty: 180 TABLET | Refills: 1 | Status: SHIPPED | OUTPATIENT
Start: 2021-02-10 | End: 2021-07-14 | Stop reason: SINTOL

## 2021-02-10 RX ORDER — ESTRADIOL 1 MG/1
1 TABLET ORAL DAILY
Qty: 90 TABLET | Refills: 1 | Status: SHIPPED | OUTPATIENT
Start: 2021-02-10 | End: 2021-07-14

## 2021-03-11 DIAGNOSIS — M32.9 LUPUS (HCC): ICD-10-CM

## 2021-03-11 DIAGNOSIS — M25.50 PAIN, JOINT, MULTIPLE SITES: ICD-10-CM

## 2021-03-11 RX ORDER — TRAMADOL HYDROCHLORIDE 50 MG/1
50 TABLET ORAL EVERY 6 HOURS PRN
Qty: 20 TABLET | Refills: 0 | Status: SHIPPED | OUTPATIENT
Start: 2021-03-11 | End: 2021-04-07 | Stop reason: SDUPTHER

## 2021-03-19 ENCOUNTER — TELEPHONE (OUTPATIENT)
Dept: FAMILY MEDICINE CLINIC | Facility: CLINIC | Age: 46
End: 2021-03-19

## 2021-03-19 DIAGNOSIS — R92.8 ABNORMALITY OF LEFT BREAST ON SCREENING MAMMOGRAM: Primary | ICD-10-CM

## 2021-03-19 NOTE — TELEPHONE ENCOUNTER
[facility] called to say that this patient is scheduled for Left Diagnostic Mammo with US on Monday and they are needing order to proceed.  Recommended by radiologist based on Bilateral Mammo results.  Thanks.  cc

## 2021-03-31 ENCOUNTER — TELEPHONE (OUTPATIENT)
Dept: FAMILY MEDICINE CLINIC | Facility: CLINIC | Age: 46
End: 2021-03-31

## 2021-04-07 DIAGNOSIS — M25.50 PAIN, JOINT, MULTIPLE SITES: ICD-10-CM

## 2021-04-07 DIAGNOSIS — M32.9 LUPUS (HCC): ICD-10-CM

## 2021-04-07 RX ORDER — TRAMADOL HYDROCHLORIDE 50 MG/1
50 TABLET ORAL EVERY 6 HOURS PRN
Qty: 20 TABLET | Refills: 0 | Status: SHIPPED | OUTPATIENT
Start: 2021-04-07 | End: 2021-04-28 | Stop reason: SDUPTHER

## 2021-04-14 ENCOUNTER — OFFICE VISIT (OUTPATIENT)
Dept: FAMILY MEDICINE CLINIC | Facility: CLINIC | Age: 46
End: 2021-04-14

## 2021-04-14 VITALS
HEART RATE: 85 BPM | DIASTOLIC BLOOD PRESSURE: 82 MMHG | TEMPERATURE: 97.7 F | BODY MASS INDEX: 38.87 KG/M2 | WEIGHT: 211.2 LBS | OXYGEN SATURATION: 100 % | SYSTOLIC BLOOD PRESSURE: 122 MMHG | HEIGHT: 62 IN

## 2021-04-14 DIAGNOSIS — R76.8 ELEVATED ANTINUCLEAR ANTIBODY (ANA) LEVEL: ICD-10-CM

## 2021-04-14 DIAGNOSIS — F41.9 ANXIETY: ICD-10-CM

## 2021-04-14 DIAGNOSIS — M15.9 PRIMARY OSTEOARTHRITIS INVOLVING MULTIPLE JOINTS: Primary | ICD-10-CM

## 2021-04-14 DIAGNOSIS — F51.01 PRIMARY INSOMNIA: ICD-10-CM

## 2021-04-14 DIAGNOSIS — G25.81 RESTLESS LEG SYNDROME: ICD-10-CM

## 2021-04-14 PROCEDURE — 99214 OFFICE O/P EST MOD 30 MIN: CPT | Performed by: NURSE PRACTITIONER

## 2021-04-14 RX ORDER — OMEPRAZOLE 40 MG/1
40 CAPSULE, DELAYED RELEASE ORAL DAILY
Qty: 30 CAPSULE | Refills: 5 | Status: SHIPPED | OUTPATIENT
Start: 2021-04-14 | End: 2021-10-20 | Stop reason: SDUPTHER

## 2021-04-14 RX ORDER — DULOXETIN HYDROCHLORIDE 30 MG/1
30 CAPSULE, DELAYED RELEASE ORAL DAILY
Qty: 30 CAPSULE | Refills: 5 | Status: SHIPPED | OUTPATIENT
Start: 2021-04-14 | End: 2021-10-07

## 2021-04-14 RX ORDER — GABAPENTIN 400 MG/1
400 CAPSULE ORAL 3 TIMES DAILY
Qty: 30 CAPSULE | Refills: 5 | Status: SHIPPED | OUTPATIENT
Start: 2021-04-14 | End: 2021-04-15 | Stop reason: SDUPTHER

## 2021-04-14 NOTE — PROGRESS NOTES
PulsesChief Complaint  Anxiety (still having episodes, says maybe it's assoc with pain.  says buspar isn't helping, asked if she needs to switch antidepressant or what you recommend.), Depression, Follow-up (3 mo), Restless Legs Syndrome (says gabapentin is helping), and Hypertension (reports that she could feel pulse in ears and is worried that her bp has been high. )    Subjective          Kelly Espinoza presents to [facility] for   History of Present Illness     Anxiety, as mentioned in chief complaint, Prozac was switched to Lexapro, buspirone was added at low-dose.  Patient denies significant weight loss/gain, insomnia, hypersomnia, psychomotor agitation, psychomotor retardation, fatigue (loss of energy), feelings of worthlessness (guilt), impaired concentration (indecisiveness), thoughts of death or suicide.      Insomnia, This is a chronic problem, started more than 1 year ago, pt stable on medication. Pt denies abdominal pain, arthralgias, chills, coughing, fever, joint swelling, myalgias, nausea, neck pain, rash, sore throat, vomiting or weakness, pt reports daytime fatigue. Nothing aggravates the symptoms. Treatments tried: OTC sleep aids ineffective, ambien effective for staying asleep and provides significant relief.    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  Reports her BP reportedly at work and at home is 120/80 and heart rate consistently in the 70s-80s as well.  Denies chest pain, headache, shortness of air, palpitations and swelling of extremities.     Patient with chronic pain of multiple joints, reports gabapentin is effective, uses tramadol for severe pain.  She has seen rheumatology with negative rheumatoid factor and diagnosed with osteoarthritis.  She is overweight and working on diet improvements and modifications      The following portions of the patient's history were reviewed and updated as  appropriate: allergies, current medications, past family history, past medical history, past social history, past surgical history and problem list.    Past Medical History:   Diagnosis Date   • ADD (attention deficit disorder)    • SARMAD positive    • Anxiety    • Arthralgia    • Asthma    • Bursitis of left hip    • DDD (degenerative disc disease), lumbar    • Depression, major    • Flu syndrome    • GERD (gastroesophageal reflux disease)    • Hiatal hernia    • Hormone replacement therapy    • Hyperlipidemia    • Inflammatory arthritis    • Influenza    • Insomnia    • Knee pain, bilateral    • Lupus (CMS/HCC)    • Neck pain    • Obesity    • Osteoarthritis    • Pain, joint, shoulder, left    • Pharyngitis, acute    • Postmenopausal    • Sinus congestion    • Surgical menopause    • Vitamin B12 deficiency    • Vitamin D deficiency      Past Surgical History:   Procedure Laterality Date   • CHOLECYSTECTOMY     • LUMBAR DISCECTOMY     • TONSILLECTOMY     • TOTAL ABDOMINAL HYSTERECTOMY WITH SALPINGO OOPHORECTOMY Bilateral     FOR ENDOMETRIOSIS     Family History   Problem Relation Age of Onset   • Hypertension Mother    • Hypertension Father    • Diabetes Other    • Heart disease Other    • Hypertension Other    • Cancer Other      Social History     Tobacco Use   • Smoking status: Former Smoker     Packs/day: 0.50     Years: 3.00     Pack years: 1.50     Types: Cigarettes     Start date:      Quit date:      Years since quittin.2   • Smokeless tobacco: Never Used   Substance Use Topics   • Alcohol use: Not Currently       Current Outpatient Medications:   •  busPIRone (BUSPAR) 5 MG tablet, Take 1 tablet by mouth 2 (two) times a day., Disp: 180 tablet, Rfl: 1  •  estradiol (ESTRACE) 1 MG tablet, Take 1 tablet by mouth Daily., Disp: 90 tablet, Rfl: 1  •  gabapentin (NEURONTIN) 400 MG capsule, Take 1 capsule by mouth 3 (Three) Times a Day., Disp: 30 capsule, Rfl: 5  •  omeprazole (priLOSEC) 40 MG  "capsule, Take 1 capsule by mouth Daily., Disp: 30 capsule, Rfl: 5  •  traMADol (ULTRAM) 50 MG tablet, Take 1 tablet by mouth Every 6 (Six) Hours As Needed for Severe Pain ., Disp: 20 tablet, Rfl: 0  •  vitamin D (ERGOCALCIFEROL) 1.25 MG (47157 UT) capsule capsule, Take 1 capsule by mouth 1 (One) Time Per Week., Disp: 12 capsule, Rfl: 1  •  zolpidem (AMBIEN) 10 MG tablet, Take 1 tablet by mouth every night at bedtime., Disp: 30 tablet, Rfl: 5  •  DULoxetine (CYMBALTA) 30 MG capsule, Take 1 capsule by mouth Daily., Disp: 30 capsule, Rfl: 5    Objective   Vital Signs:   /82 (BP Location: Left arm, Patient Position: Sitting, Cuff Size: Adult)   Pulse 85   Temp 97.7 °F (36.5 °C) (Infrared)   Ht 157.5 cm (62.01\")   Wt 95.8 kg (211 lb 3.2 oz)   SpO2 100%   BMI 38.62 kg/m²       Physical Exam  Vitals and nursing note reviewed.   Constitutional:       General: She is not in acute distress.     Appearance: She is well-developed. She is not diaphoretic.   Eyes:      Pupils: Pupils are equal, round, and reactive to light.   Neck:      Thyroid: No thyromegaly.      Vascular: No JVD.   Cardiovascular:      Rate and Rhythm: Normal rate and regular rhythm.      Heart sounds: Normal heart sounds. No murmur heard.     Pulmonary:      Effort: Pulmonary effort is normal. No respiratory distress.      Breath sounds: Normal breath sounds.   Abdominal:      General: Bowel sounds are normal. There is no distension.      Palpations: Abdomen is soft.      Tenderness: There is no abdominal tenderness.   Musculoskeletal:         General: Tenderness (multi joints, chronic oa and pain ) and deformity (multi nodules all joints of fingers) present. Normal range of motion.      Cervical back: Normal range of motion and neck supple.   Skin:     General: Skin is warm and dry.   Neurological:      Mental Status: She is alert and oriented to person, place, and time.      Sensory: No sensory deficit.   Psychiatric:         Behavior: Behavior " normal.         Thought Content: Thought content normal.         Judgment: Judgment normal.          Result Review :     No visits with results within 7 Day(s) from this visit.   Latest known visit with results is:   Admission on 02/04/2021, Discharged on 02/04/2021   Component Date Value Ref Range Status   • SARS-CoV-2, LUIS ALBERTO 02/04/2021 Not Detected  Not Detected Final    Comment: This nucleic acid amplification test was developed and its performance  characteristics determined by [employer]. Nucleic acid  amplification tests include RT-PCR and TMA. This test has not been  FDA cleared or approved. This test has been authorized by FDA under  an Emergency Use Authorization (EUA). This test is only authorized  for the duration of time the declaration that circumstances exist  justifying the authorization of the emergency use of in vitro  diagnostic tests for detection of SARS-CoV-2 virus and/or diagnosis  of COVID-19 infection under section 564(b)(1) of the Act, 21 U.S.C.  360bbb-3(b) (1), unless the authorization is terminated or revoked  sooner.  When diagnostic testing is negative, the possibility of a false  negative result should be considered in the context of a patient's  recent exposures and the presence of clinical signs and symptoms  consistent with COVID-19. An individual without symptoms of COVID-19  and who is not shedding SARS-CoV-2 virus                            would expect to have a  negative (not detected) result in this assay.                       Assessment and Plan    Diagnoses and all orders for this visit:    1. Primary osteoarthritis involving multiple joints (Primary)  Comments:  see rheum as directed, cont camilo, resume celebrex 200mg in am. start cymbalta, d/c lexapro. stop artificial sweeteners.   Orders:  -     gabapentin (NEURONTIN) 400 MG capsule; Take 1 capsule by mouth 3 (Three) Times a Day.  Dispense: 30 capsule; Refill: 5    2. Anxiety  Comments:  Worse, recommend patient  try Cymbalta again for pain/anxiety/depression, DC Lexapro.  Patient has T/F Prozac and Zoloft. cont BuSpar    3. Primary insomnia  Comments:  Likely secondary to pain, somewhat improved with gabapentin, trial Cymbalta, continue buspirone    4. Restless leg syndrome  Comments:  Improved with gabapentin, continue/refill    5. Elevated antinuclear antibody (SARMAD) level  Comments:  History of elevated SARMAD, recommend patient keep follow-ups with rheumatology yearly and as needed    Other orders  -     DULoxetine (CYMBALTA) 30 MG capsule; Take 1 capsule by mouth Daily.  Dispense: 30 capsule; Refill: 5  -     omeprazole (priLOSEC) 40 MG capsule; Take 1 capsule by mouth Daily.  Dispense: 30 capsule; Refill: 5      bp stable, pulsations heard are likely stress/anxiety or pain related in BP.  Continue to keep BP log at home    I spent 25 minutes caring for April on this date of service. This time includes time spent by me in the following activities:preparing for the visit, reviewing tests, performing a medically appropriate examination and/or evaluation , counseling and educating the patient/family/caregiver, ordering medications, tests, or procedures and documenting information in the medical record    Follow Up {Instructions Charge Capture  Follow-up Communications :23}  Return in about 3 months (around 7/14/2021) for insomnia, anxiety.  Patient was given instructions and counseling regarding her condition or for health maintenance advice. Please see specific information pulled into the AVS if appropriate.

## 2021-04-15 DIAGNOSIS — M15.9 PRIMARY OSTEOARTHRITIS INVOLVING MULTIPLE JOINTS: ICD-10-CM

## 2021-04-15 RX ORDER — GABAPENTIN 400 MG/1
400 CAPSULE ORAL 3 TIMES DAILY
Qty: 90 CAPSULE | Refills: 5 | Status: SHIPPED | OUTPATIENT
Start: 2021-04-15 | End: 2021-10-06 | Stop reason: SDUPTHER

## 2021-04-28 DIAGNOSIS — M32.9 LUPUS (HCC): ICD-10-CM

## 2021-04-28 DIAGNOSIS — M25.50 PAIN, JOINT, MULTIPLE SITES: ICD-10-CM

## 2021-04-28 RX ORDER — TRAMADOL HYDROCHLORIDE 50 MG/1
50 TABLET ORAL EVERY 6 HOURS PRN
Qty: 20 TABLET | Refills: 2 | Status: SHIPPED | OUTPATIENT
Start: 2021-04-28 | End: 2021-06-30 | Stop reason: SDUPTHER

## 2021-06-16 RX ORDER — CELECOXIB 200 MG/1
200 CAPSULE ORAL 2 TIMES DAILY
Qty: 60 CAPSULE | Refills: 0 | Status: SHIPPED | OUTPATIENT
Start: 2021-06-16 | End: 2021-07-14 | Stop reason: SDUPTHER

## 2021-06-30 DIAGNOSIS — M32.9 LUPUS (HCC): ICD-10-CM

## 2021-06-30 DIAGNOSIS — M25.50 PAIN, JOINT, MULTIPLE SITES: ICD-10-CM

## 2021-06-30 RX ORDER — TRAMADOL HYDROCHLORIDE 50 MG/1
50 TABLET ORAL EVERY 6 HOURS PRN
Qty: 20 TABLET | Refills: 0 | Status: SHIPPED | OUTPATIENT
Start: 2021-06-30 | End: 2021-07-14 | Stop reason: SDUPTHER

## 2021-07-14 ENCOUNTER — OFFICE VISIT (OUTPATIENT)
Dept: FAMILY MEDICINE CLINIC | Facility: CLINIC | Age: 46
End: 2021-07-14

## 2021-07-14 VITALS
DIASTOLIC BLOOD PRESSURE: 82 MMHG | HEART RATE: 99 BPM | OXYGEN SATURATION: 99 % | HEIGHT: 61 IN | SYSTOLIC BLOOD PRESSURE: 124 MMHG | BODY MASS INDEX: 38.55 KG/M2 | WEIGHT: 204.2 LBS

## 2021-07-14 DIAGNOSIS — E55.9 VITAMIN D DEFICIENCY: ICD-10-CM

## 2021-07-14 DIAGNOSIS — F51.01 PRIMARY INSOMNIA: ICD-10-CM

## 2021-07-14 DIAGNOSIS — M54.2 CERVICALGIA: ICD-10-CM

## 2021-07-14 DIAGNOSIS — F41.9 ANXIETY: Primary | ICD-10-CM

## 2021-07-14 DIAGNOSIS — M15.9 PRIMARY OSTEOARTHRITIS INVOLVING MULTIPLE JOINTS: ICD-10-CM

## 2021-07-14 DIAGNOSIS — Z79.890 HORMONE REPLACEMENT THERAPY: ICD-10-CM

## 2021-07-14 DIAGNOSIS — L84 SKIN CALLUS: ICD-10-CM

## 2021-07-14 DIAGNOSIS — G25.81 RLS (RESTLESS LEGS SYNDROME): ICD-10-CM

## 2021-07-14 PROCEDURE — 99214 OFFICE O/P EST MOD 30 MIN: CPT | Performed by: NURSE PRACTITIONER

## 2021-07-14 RX ORDER — ZOLPIDEM TARTRATE 10 MG/1
10 TABLET ORAL
Qty: 30 TABLET | Refills: 5 | Status: SHIPPED | OUTPATIENT
Start: 2021-07-14 | End: 2021-10-14 | Stop reason: SDUPTHER

## 2021-07-14 RX ORDER — CELECOXIB 200 MG/1
200 CAPSULE ORAL 2 TIMES DAILY
Qty: 60 CAPSULE | Refills: 5 | Status: SHIPPED | OUTPATIENT
Start: 2021-07-14 | End: 2022-01-06

## 2021-07-14 RX ORDER — TRAMADOL HYDROCHLORIDE 50 MG/1
50 TABLET ORAL EVERY 6 HOURS PRN
Qty: 20 TABLET | Refills: 2 | Status: SHIPPED | OUTPATIENT
Start: 2021-07-14 | End: 2021-09-30 | Stop reason: SDUPTHER

## 2021-07-14 RX ORDER — ERGOCALCIFEROL 1.25 MG/1
50000 CAPSULE ORAL WEEKLY
Qty: 12 CAPSULE | Refills: 1 | Status: SHIPPED | OUTPATIENT
Start: 2021-07-14 | End: 2022-01-23 | Stop reason: SDUPTHER

## 2021-07-14 RX ORDER — ESTRADIOL 2 MG/1
2 TABLET ORAL DAILY
Qty: 90 TABLET | Refills: 1 | Status: SHIPPED | OUTPATIENT
Start: 2021-07-14 | End: 2021-10-14 | Stop reason: SDUPTHER

## 2021-07-14 NOTE — PROGRESS NOTES
Chief Complaint  Insomnia, Anxiety, Follow-up (3 mo), and Neck Pain (pt reports that you ordered an xr for her at some point for neck pain.  i didn't see it in her chart so she is asking you to order again.  she never had the other one and reports never having an xray on neck before.)    Subjective          April SABA Espinoza presents to [facility] for   History of Present Illness     Patient with chronic neck pain with radiation into the left shoulder that typically turns into a headache several days/wk. Denies paresthesia. cervical spine x-ray was ordered in September 2020 but not completed.  She is taking gabapentin 3 times daily, celebrex BID which is effective for pain and tramadol for severe breakthrough pain only as needed as well    Chronic OA pain of multiple joints, reports gabapentin is effective, uses tramadol for severe pain, Cymbalta seems to be effective so far and Celebrex BID has improved symptoms as well.  She has seen rheumatology with negative rheumatoid factor and diagnosed with osteoarthritis.  She is working on diet improvements and lifestyle modifications, has lost approximately 15 pounds    Post-menopausal, excessive sweating, worse since starting Cymbalta, had previously been stable on estradiol.     Insomnia, This is a chronic problem, started more than 1 year ago, pt stable on medication. Pt reports daytime fatigue. Nothing aggravates the symptoms. Treatments tried: OTC sleep aids ineffective, Ambien effective for staying asleep and provides significant relief.    Anxiety, improved on Cymbalta patient denies significant weight loss/gain, insomnia, hypersomnia, psychomotor agitation, psychomotor retardation, fatigue (loss of energy), feelings of worthlessness (guilt), impaired concentration (indecisiveness), thoughts of death or suicide.               The following portions of the patient's history were reviewed and updated as appropriate: allergies, current  medications, past family history, past medical history, past social history, past surgical history and problem list.    Past Medical History:   Diagnosis Date   • ADD (attention deficit disorder)    • SARMAD positive    • Anxiety    • Arthralgia    • Asthma    • Bursitis of left hip    • DDD (degenerative disc disease), lumbar    • Depression, major    • Flu syndrome    • GERD (gastroesophageal reflux disease)    • Hiatal hernia    • Hormone replacement therapy    • Hyperlipidemia    • Inflammatory arthritis    • Influenza    • Insomnia    • Knee pain, bilateral    • Lupus (CMS/HCC)    • Neck pain    • Obesity    • Osteoarthritis    • Pain, joint, shoulder, left    • Pharyngitis, acute    • Postmenopausal    • Sinus congestion    • Surgical menopause    • Vitamin B12 deficiency    • Vitamin D deficiency      Past Surgical History:   Procedure Laterality Date   • CHOLECYSTECTOMY     • LUMBAR DISCECTOMY     • TONSILLECTOMY     • TOTAL ABDOMINAL HYSTERECTOMY WITH SALPINGO OOPHORECTOMY Bilateral     FOR ENDOMETRIOSIS     Family History   Problem Relation Age of Onset   • Hypertension Mother    • Hypertension Father    • Diabetes Other    • Heart disease Other    • Hypertension Other    • Cancer Other      Social History     Tobacco Use   • Smoking status: Former Smoker     Packs/day: 0.50     Years: 3.00     Pack years: 1.50     Types: Cigarettes     Start date:      Quit date:      Years since quittin.5   • Smokeless tobacco: Never Used   Substance Use Topics   • Alcohol use: Not Currently     Alcohol/week: 0.0 standard drinks       Current Outpatient Medications:   •  celecoxib (CeleBREX) 200 MG capsule, Take 1 capsule by mouth 2 (Two) Times a Day., Disp: 60 capsule, Rfl: 5  •  DULoxetine (CYMBALTA) 30 MG capsule, Take 1 capsule by mouth Daily., Disp: 30 capsule, Rfl: 5  •  estradiol (ESTRACE) 2 MG tablet, Take 1 tablet by mouth Daily., Disp: 90 tablet, Rfl: 1  •  gabapentin (NEURONTIN) 400 MG  "capsule, Take 1 capsule by mouth 3 (Three) Times a Day., Disp: 90 capsule, Rfl: 5  •  omeprazole (priLOSEC) 40 MG capsule, Take 1 capsule by mouth Daily., Disp: 30 capsule, Rfl: 5  •  traMADol (ULTRAM) 50 MG tablet, Take 1 tablet by mouth Every 6 (Six) Hours As Needed for Severe Pain ., Disp: 20 tablet, Rfl: 2  •  vitamin D (ERGOCALCIFEROL) 1.25 MG (19172 UT) capsule capsule, Take 1 capsule by mouth 1 (One) Time Per Week., Disp: 12 capsule, Rfl: 1  •  zolpidem (AMBIEN) 10 MG tablet, Take 1 tablet by mouth every night at bedtime., Disp: 30 tablet, Rfl: 5    Objective   Vital Signs:   /82 (BP Location: Left arm, Patient Position: Sitting, Cuff Size: Adult)   Pulse 99   Ht 154.9 cm (60.98\")   Wt 92.6 kg (204 lb 3.2 oz)   SpO2 99%   BMI 38.60 kg/m²       Physical Exam  Vitals and nursing note reviewed.   Constitutional:       General: She is not in acute distress.     Appearance: She is well-developed. She is not diaphoretic.   Eyes:      Pupils: Pupils are equal, round, and reactive to light.   Neck:      Thyroid: No thyromegaly.      Vascular: No JVD.   Cardiovascular:      Rate and Rhythm: Normal rate and regular rhythm.      Heart sounds: Normal heart sounds. No murmur heard.     Pulmonary:      Effort: Pulmonary effort is normal. No respiratory distress.      Breath sounds: Normal breath sounds.   Abdominal:      General: Bowel sounds are normal. There is no distension.      Palpations: Abdomen is soft.      Tenderness: There is no abdominal tenderness.   Musculoskeletal:         General: Tenderness (C spine ttp, mod jimenes rom) and deformity (multi nodules all joints of fingers) present. Normal range of motion.      Cervical back: Normal range of motion and neck supple.   Skin:     General: Skin is warm and dry.      Findings: Lesion (callous of the R knee) present.   Neurological:      Mental Status: She is alert and oriented to person, place, and time.      Sensory: No sensory deficit.   Psychiatric:    "      Behavior: Behavior normal.         Thought Content: Thought content normal.         Judgment: Judgment normal.          Result Review :     No visits with results within 7 Day(s) from this visit.   Latest known visit with results is:   Admission on 02/04/2021, Discharged on 02/04/2021   Component Date Value Ref Range Status   • SARS-CoV-2, LUIS ALBERTO 02/04/2021 Not Detected  Not Detected Final    Comment: This nucleic acid amplification test was developed and its performance  characteristics determined by [employer]. Nucleic acid  amplification tests include RT-PCR and TMA. This test has not been  FDA cleared or approved. This test has been authorized by FDA under  an Emergency Use Authorization (EUA). This test is only authorized  for the duration of time the declaration that circumstances exist  justifying the authorization of the emergency use of in vitro  diagnostic tests for detection of SARS-CoV-2 virus and/or diagnosis  of COVID-19 infection under section 564(b)(1) of the Act, 21 U.S.C.  360bbb-3(b) (1), unless the authorization is terminated or revoked  sooner.  When diagnostic testing is negative, the possibility of a false  negative result should be considered in the context of a patient's  recent exposures and the presence of clinical signs and symptoms  consistent with COVID-19. An individual without symptoms of COVID-19  and who is not shedding SARS-CoV-2 virus                            would expect to have a  negative (not detected) result in this assay.                       Assessment and Plan    Diagnoses and all orders for this visit:    1. Anxiety (Primary)  Comments:  Stable and improving on Cymbalta, no longer using buspirone-    2. Primary insomnia  Comments:  Stable, refill Ambien  Orders:  -     zolpidem (AMBIEN) 10 MG tablet; Take 1 tablet by mouth every night at bedtime.  Dispense: 30 tablet; Refill: 5    3. Cervicalgia  Comments:  Chronic, labile at times.  Continue gabapentin,  Celebrex, Cymbalta for pain as well as anxiety/depression and tramadol for severe btp. check c-spine xray.   Orders:  -     XR Spine Cervical Complete 4 or 5 View; Future    4. Hormone replacement therapy  Comments:  Recommend increase estradiol to 2 mg due to excessive sweating  Orders:  -     estradiol (ESTRACE) 2 MG tablet; Take 1 tablet by mouth Daily.  Dispense: 90 tablet; Refill: 1    5. RLS (restless legs syndrome)  Comments:  Stable and improved with gabapentin, continue    6. Vitamin D deficiency  Comments:  We will plan to check hypertension panel, vitamin D and B12 prior to appointment in 3 months fasting  Orders:  -     vitamin D (ERGOCALCIFEROL) 1.25 MG (90718 UT) capsule capsule; Take 1 capsule by mouth 1 (One) Time Per Week.  Dispense: 12 capsule; Refill: 1    7. Primary osteoarthritis involving multiple joints  -     celecoxib (CeleBREX) 200 MG capsule; Take 1 capsule by mouth 2 (Two) Times a Day.  Dispense: 60 capsule; Refill: 5  -     traMADol (ULTRAM) 50 MG tablet; Take 1 tablet by mouth Every 6 (Six) Hours As Needed for Severe Pain .  Dispense: 20 tablet; Refill: 2    8. Skin callus  Comments:  R knee d/t kneeling at job. rec knee pain prn.         I spent 32 minutes caring for Kelly Espinoza on this date of service. This time includes time spent by me in the following activities: preparing for the visit, reviewing tests, performing a medically appropriate examination and/or evaluation , counseling and educating the patient/family/caregiver, ordering medications, tests, or procedures and documenting information in the medical record        Follow Up     Return for pain, insomnia, RLS. HTN panel, vit D and B12 1 wk prior to appt.  Patient was given instructions and counseling regarding her condition or for health maintenance advice. Please see specific information pulled into the AVS if appropriate.      EMR Dragon transcription disclaimer:  Some of this encounter note is an electronic  transcription translation of spoken language to printed text. The electronic translation of spoken language may permit erroneous, or at times, nonsensical words or phrases to be inadvertently transcribed; Although I have reviewed the note for such errors some may still exist.

## 2021-07-15 RX ORDER — ONDANSETRON 4 MG/1
4 TABLET, FILM COATED ORAL EVERY 8 HOURS PRN
Qty: 20 TABLET | Refills: 0 | Status: SHIPPED | OUTPATIENT
Start: 2021-07-15 | End: 2021-12-28

## 2021-07-28 DIAGNOSIS — M47.22 OSTEOARTHRITIS OF SPINE WITH RADICULOPATHY, CERVICAL REGION: ICD-10-CM

## 2021-07-28 DIAGNOSIS — M54.2 CERVICALGIA: Primary | ICD-10-CM

## 2021-08-23 DIAGNOSIS — M47.22 OSTEOARTHRITIS OF SPINE WITH RADICULOPATHY, CERVICAL REGION: ICD-10-CM

## 2021-08-23 DIAGNOSIS — M54.2 CERVICALGIA: Primary | ICD-10-CM

## 2021-09-05 ENCOUNTER — HOSPITAL ENCOUNTER (EMERGENCY)
Facility: HOSPITAL | Age: 46
Discharge: HOME | End: 2021-09-05
Admitting: EMERGENCY MEDICINE

## 2021-09-05 VITALS
BODY MASS INDEX: 37.91 KG/M2 | WEIGHT: 206 LBS | RESPIRATION RATE: 16 BRPM | SYSTOLIC BLOOD PRESSURE: 122 MMHG | HEART RATE: 69 BPM | OXYGEN SATURATION: 95 % | TEMPERATURE: 97.8 F | DIASTOLIC BLOOD PRESSURE: 75 MMHG | HEIGHT: 62 IN

## 2021-09-05 DIAGNOSIS — R51.9 NONINTRACTABLE HEADACHE, UNSPECIFIED CHRONICITY PATTERN, UNSPECIFIED HEADACHE TYPE: Primary | ICD-10-CM

## 2021-09-05 LAB
ANION GAP SERPL CALCULATED.3IONS-SCNC: 8 MMOL/L (ref 5–15)
BASOPHILS # BLD AUTO: 0.1 10*3/MM3 (ref 0–0.2)
BASOPHILS NFR BLD AUTO: 0.7 % (ref 0–1.5)
BUN SERPL-MCNC: 18 MG/DL (ref 6–20)
BUN/CREAT SERPL: 32.1 (ref 7–25)
CALCIUM SPEC-SCNC: 8.8 MG/DL (ref 8.6–10.5)
CHLORIDE SERPL-SCNC: 104 MMOL/L (ref 98–107)
CO2 SERPL-SCNC: 27 MMOL/L (ref 22–29)
CREAT SERPL-MCNC: 0.56 MG/DL (ref 0.57–1)
DEPRECATED RDW RBC AUTO: 40.3 FL (ref 37–54)
EOSINOPHIL # BLD AUTO: 0 10*3/MM3 (ref 0–0.4)
EOSINOPHIL NFR BLD AUTO: 0.4 % (ref 0.3–6.2)
ERYTHROCYTE [DISTWIDTH] IN BLOOD BY AUTOMATED COUNT: 13.5 % (ref 12.3–15.4)
GFR SERPL CREATININE-BSD FRML MDRD: 117 ML/MIN/1.73
GLUCOSE SERPL-MCNC: 94 MG/DL (ref 65–99)
HCT VFR BLD AUTO: 38.9 % (ref 34–46.6)
HGB BLD-MCNC: 13.2 G/DL (ref 12–15.9)
LYMPHOCYTES # BLD AUTO: 1.5 10*3/MM3 (ref 0.7–3.1)
LYMPHOCYTES NFR BLD AUTO: 16.6 % (ref 19.6–45.3)
MCH RBC QN AUTO: 29.5 PG (ref 26.6–33)
MCHC RBC AUTO-ENTMCNC: 34.1 G/DL (ref 31.5–35.7)
MCV RBC AUTO: 86.8 FL (ref 79–97)
MONOCYTES # BLD AUTO: 0.7 10*3/MM3 (ref 0.1–0.9)
MONOCYTES NFR BLD AUTO: 8.3 % (ref 5–12)
NEUTROPHILS NFR BLD AUTO: 6.5 10*3/MM3 (ref 1.7–7)
NEUTROPHILS NFR BLD AUTO: 74 % (ref 42.7–76)
NRBC BLD AUTO-RTO: 0 /100 WBC (ref 0–0.2)
PLATELET # BLD AUTO: 251 10*3/MM3 (ref 140–450)
PMV BLD AUTO: 7.6 FL (ref 6–12)
POTASSIUM SERPL-SCNC: 4.2 MMOL/L (ref 3.5–5.2)
RBC # BLD AUTO: 4.48 10*6/MM3 (ref 3.77–5.28)
SODIUM SERPL-SCNC: 139 MMOL/L (ref 136–145)
WBC # BLD AUTO: 8.7 10*3/MM3 (ref 3.4–10.8)

## 2021-09-05 PROCEDURE — 85025 COMPLETE CBC W/AUTO DIFF WBC: CPT | Performed by: PHYSICIAN ASSISTANT

## 2021-09-05 PROCEDURE — 99283 EMERGENCY DEPT VISIT LOW MDM: CPT

## 2021-09-05 PROCEDURE — 25010000002 DROPERIDOL PER 5 MG: Performed by: PHYSICIAN ASSISTANT

## 2021-09-05 PROCEDURE — 96375 TX/PRO/DX INJ NEW DRUG ADDON: CPT

## 2021-09-05 PROCEDURE — 25010000002 LORAZEPAM PER 2 MG: Performed by: PHYSICIAN ASSISTANT

## 2021-09-05 PROCEDURE — 80048 BASIC METABOLIC PNL TOTAL CA: CPT | Performed by: PHYSICIAN ASSISTANT

## 2021-09-05 PROCEDURE — 25010000002 DIPHENHYDRAMINE PER 50 MG: Performed by: PHYSICIAN ASSISTANT

## 2021-09-05 PROCEDURE — 96374 THER/PROPH/DIAG INJ IV PUSH: CPT

## 2021-09-05 RX ORDER — SODIUM CHLORIDE 0.9 % (FLUSH) 0.9 %
10 SYRINGE (ML) INJECTION AS NEEDED
Status: DISCONTINUED | OUTPATIENT
Start: 2021-09-05 | End: 2021-09-06 | Stop reason: HOSPADM

## 2021-09-05 RX ORDER — LORAZEPAM 2 MG/ML
0.5 INJECTION INTRAMUSCULAR ONCE
Status: COMPLETED | OUTPATIENT
Start: 2021-09-05 | End: 2021-09-05

## 2021-09-05 RX ORDER — DROPERIDOL 2.5 MG/ML
2.5 INJECTION, SOLUTION INTRAMUSCULAR; INTRAVENOUS ONCE
Status: COMPLETED | OUTPATIENT
Start: 2021-09-05 | End: 2021-09-05

## 2021-09-05 RX ORDER — CYCLOBENZAPRINE HCL 5 MG
10 TABLET ORAL 3 TIMES DAILY PRN
Qty: 15 TABLET | Refills: 0 | Status: SHIPPED | OUTPATIENT
Start: 2021-09-05 | End: 2021-10-20 | Stop reason: SDUPTHER

## 2021-09-05 RX ORDER — DIPHENHYDRAMINE HYDROCHLORIDE 50 MG/ML
25 INJECTION INTRAMUSCULAR; INTRAVENOUS ONCE
Status: COMPLETED | OUTPATIENT
Start: 2021-09-05 | End: 2021-09-05

## 2021-09-05 RX ADMIN — DIPHENHYDRAMINE HYDROCHLORIDE 25 MG: 50 INJECTION, SOLUTION INTRAMUSCULAR; INTRAVENOUS at 19:47

## 2021-09-05 RX ADMIN — LORAZEPAM 0.5 MG: 2 INJECTION INTRAMUSCULAR; INTRAVENOUS at 21:07

## 2021-09-05 RX ADMIN — DROPERIDOL 2.5 MG: 2.5 INJECTION, SOLUTION INTRAMUSCULAR; INTRAVENOUS at 19:47

## 2021-09-05 RX ADMIN — SODIUM CHLORIDE 1000 ML: 9 INJECTION, SOLUTION INTRAVENOUS at 19:47

## 2021-09-05 NOTE — ED PROVIDER NOTES
"Subjective   Chief Complaint: Headache    Patient is a 46 year old  female with history of migraines, arthritis, hypertension presents the ER with complaints of migraine for last 4 days.  Patient reports history of migraines, states that her headache today feels similar to previous migraines.  She states the pain starts on her left side of her neck and radiates into her head which is a constant throbbing pain that she rates a 6/10.  Denies thunderclap onset, denies worsening of her life.  She reports some lightheadedness, no dizziness or blurry vision.  No slurred speech or unilateral weakness or paresthesias.  No chest pain or shortness of breath.  She denies any abdominal pain nausea vomiting or diarrhea.  Patient states she has taken Aleve, Tylenol, Excedrin, tramadol for pain relief with no success at home.  Patient states she does not want Compazine because it \"made me feel weird\" last time she was here.    Location: Head    Quality: Throbbing    Duration: 4 days    Timing: Constant    Severity: Mild to moderate    Associated Symptoms: None    PCP: Vivian Fontanez      History provided by:  Patient      Review of Systems   Constitutional: Negative for chills and fever.   HENT: Negative for sore throat and trouble swallowing.    Respiratory: Negative for shortness of breath and wheezing.    Cardiovascular: Negative for chest pain.   Gastrointestinal: Negative for abdominal pain, diarrhea, nausea and vomiting.   Genitourinary: Negative for dysuria.   Musculoskeletal: Negative for myalgias and neck pain.   Skin: Negative for rash.   Neurological: Positive for headaches. Negative for weakness, light-headedness and numbness.   Psychiatric/Behavioral: Negative for behavioral problems.   All other systems reviewed and are negative.      Past Medical History:   Diagnosis Date   • ADD (attention deficit disorder)    • SARMAD positive    • Anxiety    • Arthralgia    • Asthma    • Bursitis of left hip    • DDD " (degenerative disc disease), lumbar    • Depression, major    • Flu syndrome    • GERD (gastroesophageal reflux disease)    • Hiatal hernia    • Hormone replacement therapy    • Hyperlipidemia    • Inflammatory arthritis    • Influenza    • Insomnia    • Knee pain, bilateral    • Lupus (CMS/HCC)    • Neck pain    • Obesity    • Osteoarthritis    • Pain, joint, shoulder, left    • Pharyngitis, acute    • Postmenopausal    • Sinus congestion    • Surgical menopause     at age 34   • Vitamin B12 deficiency    • Vitamin D deficiency        Allergies   Allergen Reactions   • Sulfa Antibiotics Hives   • Compazine [Prochlorperazine] Irritability       Past Surgical History:   Procedure Laterality Date   • CHOLECYSTECTOMY     • LUMBAR DISCECTOMY     • TONSILLECTOMY     • TOTAL ABDOMINAL HYSTERECTOMY WITH SALPINGO OOPHORECTOMY Bilateral     FOR ENDOMETRIOSIS       Family History   Problem Relation Age of Onset   • Hypertension Mother    • Hypertension Father    • Diabetes Other    • Heart disease Other    • Hypertension Other    • Cancer Other        Social History     Socioeconomic History   • Marital status:      Spouse name: Not on file   • Number of children: Not on file   • Years of education: Not on file   • Highest education level: Not on file   Tobacco Use   • Smoking status: Former Smoker     Packs/day: 0.50     Years: 3.00     Pack years: 1.50     Types: Cigarettes     Start date:      Quit date:      Years since quittin.6   • Smokeless tobacco: Never Used   Substance and Sexual Activity   • Alcohol use: Not Currently     Alcohol/week: 0.0 standard drinks   • Drug use: Not Currently           Objective   Physical Exam  Vitals and nursing note reviewed.   Constitutional:       Appearance: Normal appearance. She is well-developed. She is not ill-appearing or toxic-appearing.   HENT:      Head: Normocephalic and atraumatic.      Right Ear: Tympanic membrane normal.      Left Ear: Tympanic  "membrane normal.      Nose: Nose normal.      Mouth/Throat:      Mouth: Mucous membranes are moist.   Eyes:      Pupils: Pupils are equal, round, and reactive to light.   Cardiovascular:      Rate and Rhythm: Normal rate and regular rhythm.      Pulses: Normal pulses.      Heart sounds: Normal heart sounds. No murmur heard.     Pulmonary:      Effort: Pulmonary effort is normal. No respiratory distress.      Breath sounds: Normal breath sounds. No wheezing.   Abdominal:      General: Bowel sounds are normal. There is no distension.      Palpations: Abdomen is soft.      Tenderness: There is no abdominal tenderness.   Musculoskeletal:         General: Normal range of motion.      Cervical back: Normal range of motion. No rigidity.   Skin:     General: Skin is warm and dry.      Capillary Refill: Capillary refill takes less than 2 seconds.      Findings: No rash.   Neurological:      General: No focal deficit present.      Mental Status: She is alert and oriented to person, place, and time.      Cranial Nerves: No cranial nerve deficit.      Comments: No focal neurological deficit   Psychiatric:         Mood and Affect: Mood normal.         Behavior: Behavior normal.         Procedures           ED Course  ED Course as of Sep 05 2142   Sun Sep 05, 2021   2040 Patient reports that her headache feels much better, now complaining of severe anxiety and feeling of \"crawling under my skin\".  Patient does report sensitivity to Compazine, patient may have sensitivity to either Benadryl or Inapsine.  We will try Ativan    [MM]   2133 Patient reevaluated she reports that she feels much better and states she is ready to go home.    [MM]      ED Course User Index  [MM] Vanessa Garcia PA    /77   Pulse 88   Temp 98 °F (36.7 °C)   Resp 16   Ht 157.5 cm (62\")   Wt 93.4 kg (206 lb)   SpO2 97%   BMI 37.68 kg/m²   Labs Reviewed   BASIC METABOLIC PANEL - Abnormal; Notable for the following components:       Result Value "    Creatinine 0.56 (*)     BUN/Creatinine Ratio 32.1 (*)     All other components within normal limits    Narrative:     GFR Normal >60  Chronic Kidney Disease <60  Kidney Failure <15     CBC WITH AUTO DIFFERENTIAL - Abnormal; Notable for the following components:    Lymphocyte % 16.6 (*)     All other components within normal limits   CBC AND DIFFERENTIAL    Narrative:     The following orders were created for panel order CBC & Differential.  Procedure                               Abnormality         Status                     ---------                               -----------         ------                     CBC Auto Differential[[id number]]        Abnormal            Final result                 Please view results for these tests on the individual orders.     Medications   sodium chloride 0.9 % flush 10 mL (has no administration in time range)   sodium chloride 0.9 % bolus 1,000 mL (0 mL Intravenous Stopped 9/5/21 2017)   diphenhydrAMINE (BENADRYL) injection 25 mg (25 mg Intravenous Given 9/5/21 1947)   droperidol (INAPSINE) injection 2.5 mg (2.5 mg Intravenous Given 9/5/21 1947)   LORazepam (ATIVAN) injection 0.5 mg (0.5 mg Intravenous Given 9/5/21 2107)     No radiology results for the last day                                         MDM  Number of Diagnoses or Management Options  Nonintractable headache, unspecified chronicity pattern, unspecified headache type  Diagnosis management comments: MEDICAL DECISION  Epic Chart Review:  Comorbidities: Migraines, hypertension  Differentials: Migraine, brain tumor, meningitis; this list is not all inclusive and does not constitute the entirety of considered causes  Lab interpretation:  Labs viewed by me significant for, as above    While in the ED IV was placed and labs were obtained appropriate PPE was worn during exam and throughout all encounters with the patient.  Patient had the above evaluation.  IV established, lab work obtained.  Patient was given  "droperidol and Benadryl as well as 1 L IV fluids.  Feel exam unremarkable, patient grossly normal neurological exam while in the ER and throughout ER stay.  Patient did have some musculoskeletal tenderness left side of her neck, no nuchal rigidity to suggest meningitis.  Patient requested no CT scan, states that her headache feels similar to previous migraines, denies worst headache of her life or thunderclap onset.  Patient requested no Compazine as it made her \"feel weird \"during her last ER visit.  Patient was given droperidol and Benadryl and 1 L IV fluids.  Lab work unremarkable.  Patient states she started to have feelings of anxiety, was given 0.5 mg Ativan.  Patient reported almost complete relief of her headache after medications.  Patient requesting discharge, states that she feels much better.  Patient be discharged with prescription for Flexeril for muscle stiffness, she states that her headache seems to always start after she has some stiffness and muscle pain on the left side of her neck.    Discharge plan and instructions were discussed with the patient who verbalized understanding and is in agreement with the plan, all questions were answered at this time.  Patient is aware of signs symptoms that would require immediate return to the emergency room.  Patient understands importance of following up with primary care provider for further evaluation and worsening concerns as well as blood pressure recheck in the next 4 weeks.    Patient remained afebrile, nontoxic-appearing, no acute respiratory distress throughout entire emergency room stay.  Patient was discharged in improved stable condition with an upright steady gait.       Amount and/or Complexity of Data Reviewed  Clinical lab tests: reviewed and ordered    Patient Progress  Patient progress: stable      Final diagnoses:   Nonintractable headache, unspecified chronicity pattern, unspecified headache type       ED Disposition  ED Disposition     ED " Disposition Condition Comment    Discharge Stable           Vivian Fontanez, APRN  [address]  [place] [id number] [phone]    Schedule an appointment as soon as possible for a visit in 2 days  As needed, If symptoms worsen         Medication List      New Prescriptions    cyclobenzaprine 5 MG tablet  Commonly known as: FLEXERIL  Take 2 tablets by mouth 3 (Three) Times a Day As Needed for Muscle Spasms for up to 15 doses.           Where to Get Your Medications      These medications were sent to [facility] [place], [place] - [address] - [phone]  - [phone] FX  [address] [facility] [place] [address]    Phone: [phone]   · cyclobenzaprine 5 MG tablet          Vanessa Garcia PA  09/05/21 3778

## 2021-09-05 NOTE — ED NOTES
Pt presents to ED with c/o generalized migraine that began 3 days ago. Pt reports pain is often worse on the left side near neck. Pt reports hx of migraines      Stephenie Garcia, RN  09/05/21 1910

## 2021-09-06 NOTE — DISCHARGE INSTRUCTIONS
Take Tylenol or ibuprofen as needed for headache.  Take Flexeril as needed for muscle stiffness or spasms  Drink plenty fluids    Follow-up with primary care for recheck especially symptoms persist or become worse    Return to the ER for new or worsening symptoms

## 2021-09-30 DIAGNOSIS — M15.9 PRIMARY OSTEOARTHRITIS INVOLVING MULTIPLE JOINTS: ICD-10-CM

## 2021-09-30 RX ORDER — TRAMADOL HYDROCHLORIDE 50 MG/1
50 TABLET ORAL EVERY 6 HOURS PRN
Qty: 20 TABLET | Refills: 0 | Status: SHIPPED | OUTPATIENT
Start: 2021-09-30 | End: 2021-10-20 | Stop reason: SDUPTHER

## 2021-10-06 ENCOUNTER — TREATMENT (OUTPATIENT)
Dept: PHYSICAL THERAPY | Facility: CLINIC | Age: 46
End: 2021-10-06

## 2021-10-06 DIAGNOSIS — M15.9 PRIMARY OSTEOARTHRITIS INVOLVING MULTIPLE JOINTS: ICD-10-CM

## 2021-10-06 DIAGNOSIS — M54.12 RADICULOPATHY, CERVICAL: Primary | ICD-10-CM

## 2021-10-06 PROCEDURE — 97140 MANUAL THERAPY 1/> REGIONS: CPT | Performed by: PHYSICAL THERAPIST

## 2021-10-06 PROCEDURE — 97110 THERAPEUTIC EXERCISES: CPT | Performed by: PHYSICAL THERAPIST

## 2021-10-06 PROCEDURE — 97162 PT EVAL MOD COMPLEX 30 MIN: CPT | Performed by: PHYSICAL THERAPIST

## 2021-10-06 NOTE — PROGRESS NOTES
Physical Therapy Initial Evaluation and Plan of Care    Patient: Kelly Espinoza   : 1975  Diagnosis/ICD-10 Code:  Radiculopathy, cervical [M54.12]  Referring practitioner: ITALO Sanders  Date of Initial Visit: 10/6/2021  Today's Date: 10/6/2021  Patient seen for 1 sessions           Subjective Questionnaire: NDI: 32%       Subjective Evaluation    History of Present Illness  Mechanism of injury: Patient presents to physical therapy with cc of neck pain and pain/N&T in BUE's.  Reports that neck pain has been present for the past several years, however has gotten worse.  Patient states that her headaches recently have caused her to seek treatment at ER due to severity.  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  Wishes to have decreased neck pain with ADL's and work activities.     Pain  Current pain ratin  At worst pain rating: 10  Quality: dull ache and sharp  Relieving factors: ice and heat  Aggravating factors: overhead activity, lifting, sleeping and prolonged positioning  Progression: worsening    Diagnostic Tests  X-ray: abnormal    Treatments  Previous treatment: physical therapy  Patient Goals  Patient goals for therapy: decreased pain, increased motion, return to sport/leisure activities and increased strength             Objective          Tenderness   Cervical Spine   Tenderness in the facet joint.     Additional Tenderness Details  TTP facet C5-C7 shirley    Active Range of Motion   Cervical/Thoracic Spine   Cervical    Flexion: 53 degrees   Extension: 34 degrees with pain  Left lateral flexion: 24 degrees WFL  Right lateral flexion: 25 degrees   Left rotation: 50 degrees WFL  Right rotation: 52 degrees with pain    Strength/Myotome Testing   Cervical Spine     Left   Normal strength    Right Shoulder     Planes of Motion   Flexion: 5   Abduction: 5   External rotation at 0°: 5   Internal rotation at 0°: 5     Right Elbow   Flexion: 4+  Extension:  4+    Right Wrist/Hand   Wrist extension: 4  Wrist flexion: 4-    Tests   Cervical     Left   Negative cervical distraction.     Right   Negative cervical distraction.           Assessment & Plan     Assessment  Impairments: abnormal or restricted ROM, impaired physical strength, lacks appropriate home exercise program and pain with function  Assessment details: Patient presents to physical therapy with s/s congruent with MD diagnosis of cervical radiculopathy.  Patient demonstrates weakness in RUE, as well as ROM limitations in cervical spine.  Noted mild postural deficits.  Patient is appropriate for PT intervention in order to address these deficits so that she may complete work activities with less pain/limitation.   Prognosis: good  Functional Limitations: uncomfortable because of pain  Goals  Plan Goals: In two weeks, patient will report at least 25% reduction in pain level.    In two weeks, patient will demonstrate at least 25% improvement in AROM in cervical spine.    In four weeks, patient will demonstrate at least 60 degrees of cervical rotation bilaterally.   In four weeks, patient will demonstrate at least 4+/5 muscular strength in RUE.   In four weeks, patient will demonstrate decreased perceived disability by decreasing score on NDI by at least 12%.     Plan  Therapy options: will be seen for skilled physical therapy services  Planned modality interventions: cryotherapy, thermotherapy (hydrocollator packs) and traction  Planned therapy interventions: manual therapy, neuromuscular re-education, postural training, soft tissue mobilization, spinal/joint mobilization, stretching, strengthening, therapeutic activities, joint mobilization, home exercise program and functional ROM exercises  Duration in visits: 20  Plan details: No electrical stimulation due to prior increase in pain with this modality     1-3 times per week        Manual Therapy:    10     mins  26848;  Therapeutic Exercise:    15     mins   47064;     Neuromuscular Ivett:        mins  48628;    Therapeutic Activity:          mins  13466;     Gait Training:           mins  05020;     Ultrasound:          mins  32557;    Electrical Stimulation:         mins  22361 ( );  Dry Needling          mins self-pay    Timed Treatment:   25   mins   Total Treatment:     50   mins    PT SIGNATURE: Casey Zavaleta, PT   DATE TREATMENT INITIATED: 10/6/2021    Initial Certification  Certification Period: 1/4/2022  I certify that the therapy services are furnished while this patient is under my care.  The services outlined above are required by this patient, and will be reviewed every 90 days.     PHYSICIAN: Vivian Fontanez, APRN      DATE:     Please sign and return via fax to [phone].. Thank you, [facility].

## 2021-10-07 RX ORDER — DULOXETIN HYDROCHLORIDE 30 MG/1
CAPSULE, DELAYED RELEASE ORAL
Qty: 30 CAPSULE | Refills: 5 | Status: SHIPPED | OUTPATIENT
Start: 2021-10-07 | End: 2022-04-06

## 2021-10-07 RX ORDER — GABAPENTIN 400 MG/1
400 CAPSULE ORAL 3 TIMES DAILY
Qty: 90 CAPSULE | Refills: 5 | Status: SHIPPED | OUTPATIENT
Start: 2021-10-07 | End: 2022-03-22

## 2021-10-14 ENCOUNTER — TELEPHONE (OUTPATIENT)
Dept: PHYSICAL THERAPY | Facility: CLINIC | Age: 46
End: 2021-10-14

## 2021-10-20 ENCOUNTER — TREATMENT (OUTPATIENT)
Dept: PHYSICAL THERAPY | Facility: CLINIC | Age: 46
End: 2021-10-20

## 2021-10-20 ENCOUNTER — OFFICE VISIT (OUTPATIENT)
Dept: FAMILY MEDICINE CLINIC | Facility: CLINIC | Age: 46
End: 2021-10-20

## 2021-10-20 VITALS
TEMPERATURE: 98 F | DIASTOLIC BLOOD PRESSURE: 84 MMHG | OXYGEN SATURATION: 100 % | HEIGHT: 61 IN | WEIGHT: 213.6 LBS | BODY MASS INDEX: 40.33 KG/M2 | SYSTOLIC BLOOD PRESSURE: 129 MMHG | HEART RATE: 82 BPM

## 2021-10-20 DIAGNOSIS — Z79.890 HORMONE REPLACEMENT THERAPY: ICD-10-CM

## 2021-10-20 DIAGNOSIS — F41.9 ANXIETY: ICD-10-CM

## 2021-10-20 DIAGNOSIS — G25.81 RLS (RESTLESS LEGS SYNDROME): ICD-10-CM

## 2021-10-20 DIAGNOSIS — F51.01 PRIMARY INSOMNIA: ICD-10-CM

## 2021-10-20 DIAGNOSIS — E55.9 VITAMIN D DEFICIENCY: ICD-10-CM

## 2021-10-20 DIAGNOSIS — E53.8 VITAMIN B12 DEFICIENCY: ICD-10-CM

## 2021-10-20 DIAGNOSIS — M15.9 PRIMARY OSTEOARTHRITIS INVOLVING MULTIPLE JOINTS: ICD-10-CM

## 2021-10-20 DIAGNOSIS — E78.2 MIXED HYPERLIPIDEMIA: ICD-10-CM

## 2021-10-20 DIAGNOSIS — M54.12 RADICULOPATHY, CERVICAL: Primary | ICD-10-CM

## 2021-10-20 DIAGNOSIS — M54.2 CERVICALGIA: Primary | ICD-10-CM

## 2021-10-20 PROCEDURE — 82607 VITAMIN B-12: CPT | Performed by: NURSE PRACTITIONER

## 2021-10-20 PROCEDURE — 80061 LIPID PANEL: CPT | Performed by: NURSE PRACTITIONER

## 2021-10-20 PROCEDURE — 82306 VITAMIN D 25 HYDROXY: CPT | Performed by: NURSE PRACTITIONER

## 2021-10-20 PROCEDURE — 99214 OFFICE O/P EST MOD 30 MIN: CPT | Performed by: NURSE PRACTITIONER

## 2021-10-20 PROCEDURE — 85027 COMPLETE CBC AUTOMATED: CPT | Performed by: NURSE PRACTITIONER

## 2021-10-20 PROCEDURE — 84443 ASSAY THYROID STIM HORMONE: CPT | Performed by: NURSE PRACTITIONER

## 2021-10-20 PROCEDURE — 80053 COMPREHEN METABOLIC PANEL: CPT | Performed by: NURSE PRACTITIONER

## 2021-10-20 PROCEDURE — 97140 MANUAL THERAPY 1/> REGIONS: CPT | Performed by: PHYSICAL THERAPIST

## 2021-10-20 RX ORDER — OMEPRAZOLE 40 MG/1
40 CAPSULE, DELAYED RELEASE ORAL DAILY
Qty: 30 CAPSULE | Refills: 5 | Status: SHIPPED | OUTPATIENT
Start: 2021-10-20 | End: 2022-04-14

## 2021-10-20 RX ORDER — CYCLOBENZAPRINE HCL 5 MG
10 TABLET ORAL 3 TIMES DAILY PRN
Qty: 30 TABLET | Refills: 2 | Status: SHIPPED | OUTPATIENT
Start: 2021-10-20 | End: 2022-06-01 | Stop reason: SDUPTHER

## 2021-10-20 RX ORDER — TRAMADOL HYDROCHLORIDE 50 MG/1
50 TABLET ORAL EVERY 6 HOURS PRN
Qty: 20 TABLET | Refills: 2 | Status: SHIPPED | OUTPATIENT
Start: 2021-10-20 | End: 2021-11-22 | Stop reason: SDUPTHER

## 2021-10-20 NOTE — PROGRESS NOTES
Chief Complaint  Restless Legs Syndrome, Hypertension (pt reports that her bp was up last week when she went to  for sinusitis, would like to discuss.), and Follow-up (3 mo;  pt reports that she went to PT and they are going to try to get her MRI approved.)    Subjective          Kelly Espinoza presents to [facility] for   History of Present Illness     RLS, labile and worse with busy work week and carrying heavy animals.     One recent episode of HTN during acute illness, bp has been otherwise stable and <140 sbp.     Obesity, pt cut out sugar for months, then fell off the diet, and gained back the weight    Patient with chronic neck pain with radiation into the left shoulder that typically turns into a headache several days/wk. She now reports parasthesia of the right arm with all diferent activities through the day. She is taking gabapentin 3 times daily, celebrex BID which is effective for pain and tramadol for severe breakthrough pain only as needed as well.   XR Spine Cervical Complete 4 or 5 View (07/14/2021)  MRI ordered but denied and pt needs to complete 6 weeks of PT treatment     Chronic OA pain of multiple joints, gabapentin, tramadol for severe pain, Cymbalta and Celebrex BID has improved symptoms.  She has seen rheumatology with negative rheumatoid factor and diagnosed with osteoarthritis.      Post-menopausal, excessive sweating improved since inc dose of estradiol.      Insomnia, This is a chronic problem, started more than 1 year ago, pt stable on medication. Pt reports daytime fatigue. Nothing aggravates the symptoms. Treatments tried: OTC sleep aids ineffective, Ambien effective for staying asleep and provides significant relief.     Anxiety, stable w/ Cymbalta patient denies significant weight loss/gain, insomnia, hypersomnia, psychomotor agitation, psychomotor retardation, fatigue (loss of energy), feelings of worthlessness (guilt), impaired concentration  (indecisiveness), thoughts of death or suicide.         The following portions of the patient's history were reviewed and updated as appropriate: allergies, current medications, past family history, past medical history, past social history, past surgical history and problem list.    Past Medical History:   Diagnosis Date   • ADD (attention deficit disorder)    • SARMAD positive    • Anxiety    • Arthralgia    • Asthma    • Bursitis of left hip    • DDD (degenerative disc disease), lumbar    • Depression, major    • Flu syndrome    • GERD (gastroesophageal reflux disease)    • Hiatal hernia    • Hormone replacement therapy    • Hyperlipidemia    • Inflammatory arthritis    • Influenza    • Insomnia    • Knee pain, bilateral    • Lupus (HCC)    • Neck pain    • Obesity    • Osteoarthritis    • Pain, joint, shoulder, left    • Pharyngitis, acute    • Postmenopausal    • Sinus congestion    • Surgical menopause    • Vitamin B12 deficiency    • Vitamin D deficiency      Past Surgical History:   Procedure Laterality Date   • CHOLECYSTECTOMY     • LUMBAR DISCECTOMY     • TONSILLECTOMY     • TOTAL ABDOMINAL HYSTERECTOMY WITH SALPINGO OOPHORECTOMY Bilateral     FOR ENDOMETRIOSIS     Family History   Problem Relation Age of Onset   • Hypertension Mother    • Hypertension Father    • Diabetes Other    • Heart disease Other    • Hypertension Other    • Cancer Other      Social History     Tobacco Use   • Smoking status: Former Smoker     Packs/day: 0.50     Years: 3.00     Pack years: 1.50     Types: Cigarettes     Start date:      Quit date:      Years since quittin.8   • Smokeless tobacco: Never Used   Substance Use Topics   • Alcohol use: Not Currently     Alcohol/week: 0.0 standard drinks       Current Outpatient Medications:   •  celecoxib (CeleBREX) 200 MG capsule, Take 1 capsule by mouth 2 (Two) Times a Day., Disp: 60 capsule, Rfl: 5  •  cyclobenzaprine (FLEXERIL) 5 MG tablet, Take 2 tablets by mouth 3  "(Three) Times a Day As Needed for Muscle Spasms., Disp: 30 tablet, Rfl: 2  •  DULoxetine (CYMBALTA) 30 MG capsule, TAKE ONE CAPSULE BY MOUTH DAILY, Disp: 30 capsule, Rfl: 5  •  estradiol (ESTRACE) 1 MG tablet, Take 2 tablets by mouth Daily., Disp: , Rfl:   •  gabapentin (NEURONTIN) 400 MG capsule, Take 1 capsule by mouth 3 (Three) Times a Day., Disp: 90 capsule, Rfl: 5  •  omeprazole (priLOSEC) 40 MG capsule, Take 1 capsule by mouth Daily., Disp: 30 capsule, Rfl: 5  •  ondansetron (Zofran) 4 MG tablet, Take 1 tablet by mouth Every 8 (Eight) Hours As Needed for Nausea or Vomiting., Disp: 20 tablet, Rfl: 0  •  traMADol (ULTRAM) 50 MG tablet, Take 1 tablet by mouth Every 6 (Six) Hours As Needed for Severe Pain ., Disp: 20 tablet, Rfl: 2  •  vitamin D (ERGOCALCIFEROL) 1.25 MG (46163 UT) capsule capsule, Take 1 capsule by mouth 1 (One) Time Per Week., Disp: 12 capsule, Rfl: 1  •  zolpidem (Ambien) 10 MG tablet, Take 1 tablet by mouth At Night As Needed., Disp: , Rfl:     Objective   Vital Signs:   /84 (BP Location: Left arm, Patient Position: Sitting, Cuff Size: Large Adult)   Pulse 82   Temp 98 °F (36.7 °C) (Infrared)   Ht 154.9 cm (60.98\")   Wt 96.9 kg (213 lb 9.6 oz)   SpO2 100%   BMI 40.38 kg/m²       Physical Exam  Vitals and nursing note reviewed.   Constitutional:       General: She is not in acute distress.     Appearance: She is well-developed. She is not diaphoretic.   Eyes:      Pupils: Pupils are equal, round, and reactive to light.   Neck:      Thyroid: No thyromegaly.      Vascular: No JVD.   Cardiovascular:      Rate and Rhythm: Normal rate and regular rhythm.      Heart sounds: Normal heart sounds. No murmur heard.      Pulmonary:      Effort: Pulmonary effort is normal. No respiratory distress.      Breath sounds: Normal breath sounds.   Abdominal:      General: Bowel sounds are normal. There is no distension.      Palpations: Abdomen is soft.      Tenderness: There is no abdominal " tenderness.   Musculoskeletal:         General: Tenderness (C-spine ttp, mod jimenes rom and radiation of pain/umbness into RUE) and deformity (multi nodules all joints of fingers) present. Normal range of motion.      Cervical back: Normal range of motion and neck supple.   Skin:     General: Skin is warm and dry.      Findings: Lesion (callous of the R knee) present.   Neurological:      Mental Status: She is alert and oriented to person, place, and time.      Sensory: No sensory deficit.   Psychiatric:         Behavior: Behavior normal.         Thought Content: Thought content normal.         Judgment: Judgment normal.          Result Review :     Office Visit on 10/20/2021   Component Date Value Ref Range Status   • Total Cholesterol 10/20/2021 205* 0 - 200 mg/dL Final   • Triglycerides 10/20/2021 102  0 - 150 mg/dL Final   • HDL Cholesterol 10/20/2021 59  40 - 60 mg/dL Final   • LDL Cholesterol  10/20/2021 128* 0 - 100 mg/dL Final   • VLDL Cholesterol 10/20/2021 18  5 - 40 mg/dL Final   • LDL/HDL Ratio 10/20/2021 2.13   Final   • Glucose 10/20/2021 84  65 - 99 mg/dL Final   • BUN 10/20/2021 16  6 - 20 mg/dL Final   • Creatinine 10/20/2021 0.51* 0.57 - 1.00 mg/dL Final   • Sodium 10/20/2021 142  136 - 145 mmol/L Final   • Potassium 10/20/2021 4.0  3.5 - 5.2 mmol/L Final   • Chloride 10/20/2021 104  98 - 107 mmol/L Final   • CO2 10/20/2021 26.0  22.0 - 29.0 mmol/L Final   • Calcium 10/20/2021 9.0  8.6 - 10.5 mg/dL Final   • Total Protein 10/20/2021 7.4  6.0 - 8.5 g/dL Final   • Albumin 10/20/2021 4.40  3.50 - 5.20 g/dL Final   • ALT (SGPT) 10/20/2021 15  1 - 33 U/L Final   • AST (SGOT) 10/20/2021 16  1 - 32 U/L Final   • Alkaline Phosphatase 10/20/2021 82  39 - 117 U/L Final   • Total Bilirubin 10/20/2021 0.3  0.0 - 1.2 mg/dL Final   • eGFR Non African Amer 10/20/2021 130  >60 mL/min/1.73 Final   • Globulin 10/20/2021 3.0  gm/dL Final   • A/G Ratio 10/20/2021 1.5  g/dL Final   • BUN/Creatinine Ratio 10/20/2021 31.4*  7.0 - 25.0 Final   • Anion Gap 10/20/2021 12.0  5.0 - 15.0 mmol/L Final   • WBC 10/20/2021 6.51  3.40 - 10.80 10*3/mm3 Final   • RBC 10/20/2021 4.50  3.77 - 5.28 10*6/mm3 Final   • Hemoglobin 10/20/2021 12.8  12.0 - 15.9 g/dL Final   • Hematocrit 10/20/2021 39.6  34.0 - 46.6 % Final   • MCV 10/20/2021 88.0  79.0 - 97.0 fL Final   • MCH 10/20/2021 28.4  26.6 - 33.0 pg Final   • MCHC 10/20/2021 32.3  31.5 - 35.7 g/dL Final   • RDW 10/20/2021 12.9  12.3 - 15.4 % Final   • RDW-SD 10/20/2021 41.3  37.0 - 54.0 fl Final   • MPV 10/20/2021 10.2  6.0 - 12.0 fL Final   • Platelets 10/20/2021 261  140 - 450 10*3/mm3 Final   • TSH 10/20/2021 0.828  0.270 - 4.200 uIU/mL Final   • Vitamin B-12 10/20/2021 550  211 - 946 pg/mL Final   • 25 Hydroxy, Vitamin D 10/20/2021 31.9  30.0 - 100.0 ng/ml Final                       Assessment and Plan    Diagnoses and all orders for this visit:    1. Cervicalgia (Primary)    2. Anxiety    3. Vitamin D deficiency  -     Vitamin D 25 Hydroxy    4. RLS (restless legs syndrome)  -     Comprehensive Metabolic Panel  -     CBC (No Diff)  -     TSH  -     Vitamin B12    5. Hormone replacement therapy    6. Primary insomnia    7. Primary osteoarthritis involving multiple joints  -     traMADol (ULTRAM) 50 MG tablet; Take 1 tablet by mouth Every 6 (Six) Hours As Needed for Severe Pain .  Dispense: 20 tablet; Refill: 2    8. Vitamin B12 deficiency    9. Mixed hyperlipidemia  -     Lipid Panel    Other orders  -     cyclobenzaprine (FLEXERIL) 5 MG tablet; Take 2 tablets by mouth 3 (Three) Times a Day As Needed for Muscle Spasms.  Dispense: 30 tablet; Refill: 2  -     omeprazole (priLOSEC) 40 MG capsule; Take 1 capsule by mouth Daily.  Dispense: 30 capsule; Refill: 5      -Patient must complete 6 weeks of physical therapy before insurance will cover MRI of the cervical spine.   -Continue all medications as directed  -Okay for refill tramadol as needed  -Labs fasting today as ordered above will notify  results  -d/w pt hhd and limiting fatty, fried, greasy foods and increasing exercise habits, inc water intake and weight loss.           I spent 30 minutes caring for Kelly Espinoza on this date of service. This time includes time spent by me in the following activities: preparing for the visit, reviewing tests, performing a medically appropriate examination and/or evaluation , counseling and educating the patient/family/caregiver, ordering medications, tests, or procedures and documenting information in the medical record        Follow Up     Return in about 3 months (around 1/20/2022) for C-spine pain, multi joint pain, OA, med refills. .  Patient was given instructions and counseling regarding her condition or for health maintenance advice. Please see specific information pulled into the AVS if appropriate.      EMR Dragon transcription disclaimer:  Some of this encounter note is an electronic transcription translation of spoken language to printed text. The electronic translation of spoken language may permit erroneous, or at times, nonsensical words or phrases to be inadvertently transcribed; Although I have reviewed the note for such errors some may still exist.

## 2021-10-20 NOTE — PROGRESS NOTES
Injection  Injection performed in right arm by Beena Zuleta MA. Patient tolerated the procedure well without complications.  Pt was fasting.  10/20/21   Beena Zuleta MA

## 2021-10-20 NOTE — PROGRESS NOTES
Physical Therapy Daily Progress Note    Patient: April SABA Espinoza   : 1975  Diagnosis/ICD-10 Code:  Radiculopathy, cervical [M54.12]  Referring practitioner: ITALO Sanders  Date of Initial Visit: Type: THERAPY  Noted: 10/6/2021  Today's Date: 10/20/2021  Patient seen for 2 sessions         Kelly Espinoza reports:  Had busy work week last week and reports increased tingling in her hands and increased neck pain on left side.     Objective   See Exercise, Manual, and Modality Logs for complete treatment.       Assessment/Plan     Unable to do modalities due to patient need to leave session early.  Patient tolerates manual therapy well, as well as demonstrates proper technique with newly added postural correction exercise.      Progress per Plan of Care           Manual Therapy:    25     mins  81493;  Therapeutic Exercise:    5     mins  76123;     Neuromuscular Ivett:        mins  20958;    Therapeutic Activity:          mins  92630;     Gait Training:           mins  97254;     Ultrasound:          mins  30684;    Electrical Stimulation:         mins  25748 ( );  Dry Needling          mins self-pay    Timed Treatment:   30   mins   Total Treatment:     30   mins    Casey Zavaleta PT  Physical Therapist

## 2021-10-21 LAB
25(OH)D3 SERPL-MCNC: 31.9 NG/ML (ref 30–100)
ALBUMIN SERPL-MCNC: 4.4 G/DL (ref 3.5–5.2)
ALBUMIN/GLOB SERPL: 1.5 G/DL
ALP SERPL-CCNC: 82 U/L (ref 39–117)
ALT SERPL W P-5'-P-CCNC: 15 U/L (ref 1–33)
ANION GAP SERPL CALCULATED.3IONS-SCNC: 12 MMOL/L (ref 5–15)
AST SERPL-CCNC: 16 U/L (ref 1–32)
BILIRUB SERPL-MCNC: 0.3 MG/DL (ref 0–1.2)
BUN SERPL-MCNC: 16 MG/DL (ref 6–20)
BUN/CREAT SERPL: 31.4 (ref 7–25)
CALCIUM SPEC-SCNC: 9 MG/DL (ref 8.6–10.5)
CHLORIDE SERPL-SCNC: 104 MMOL/L (ref 98–107)
CHOLEST SERPL-MCNC: 205 MG/DL (ref 0–200)
CO2 SERPL-SCNC: 26 MMOL/L (ref 22–29)
CREAT SERPL-MCNC: 0.51 MG/DL (ref 0.57–1)
DEPRECATED RDW RBC AUTO: 41.3 FL (ref 37–54)
ERYTHROCYTE [DISTWIDTH] IN BLOOD BY AUTOMATED COUNT: 12.9 % (ref 12.3–15.4)
GFR SERPL CREATININE-BSD FRML MDRD: 130 ML/MIN/1.73
GLOBULIN UR ELPH-MCNC: 3 GM/DL
GLUCOSE SERPL-MCNC: 84 MG/DL (ref 65–99)
HCT VFR BLD AUTO: 39.6 % (ref 34–46.6)
HDLC SERPL-MCNC: 59 MG/DL (ref 40–60)
HGB BLD-MCNC: 12.8 G/DL (ref 12–15.9)
LDLC SERPL CALC-MCNC: 128 MG/DL (ref 0–100)
LDLC/HDLC SERPL: 2.13 {RATIO}
MCH RBC QN AUTO: 28.4 PG (ref 26.6–33)
MCHC RBC AUTO-ENTMCNC: 32.3 G/DL (ref 31.5–35.7)
MCV RBC AUTO: 88 FL (ref 79–97)
PLATELET # BLD AUTO: 261 10*3/MM3 (ref 140–450)
PMV BLD AUTO: 10.2 FL (ref 6–12)
POTASSIUM SERPL-SCNC: 4 MMOL/L (ref 3.5–5.2)
PROT SERPL-MCNC: 7.4 G/DL (ref 6–8.5)
RBC # BLD AUTO: 4.5 10*6/MM3 (ref 3.77–5.28)
SODIUM SERPL-SCNC: 142 MMOL/L (ref 136–145)
TRIGL SERPL-MCNC: 102 MG/DL (ref 0–150)
TSH SERPL DL<=0.05 MIU/L-ACNC: 0.83 UIU/ML (ref 0.27–4.2)
VIT B12 BLD-MCNC: 550 PG/ML (ref 211–946)
VLDLC SERPL-MCNC: 18 MG/DL (ref 5–40)
WBC # BLD AUTO: 6.51 10*3/MM3 (ref 3.4–10.8)

## 2021-10-27 ENCOUNTER — TREATMENT (OUTPATIENT)
Dept: PHYSICAL THERAPY | Facility: CLINIC | Age: 46
End: 2021-10-27

## 2021-10-27 DIAGNOSIS — M54.12 RADICULOPATHY, CERVICAL: Primary | ICD-10-CM

## 2021-10-27 PROCEDURE — 97140 MANUAL THERAPY 1/> REGIONS: CPT | Performed by: PHYSICAL THERAPIST

## 2021-10-27 NOTE — PROGRESS NOTES
Physical Therapy Daily Progress Note    Patient: April SABA Espinoza   : 1975  Diagnosis/ICD-10 Code:  Radiculopathy, cervical [M54.12]  Referring practitioner: ITALO Sanders  Date of Initial Visit: Type: THERAPY  Noted: 10/6/2021  Today's Date: 10/27/2021  Patient seen for 3 sessions         Kelly Espinoza reports:  Felt well over the past few days.  Reports still mild tingling in right hand.     Objective   See Exercise, Manual, and Modality Logs for complete treatment.       Assessment/Plan     Feeling better after manual therapy.  Noted hypomobility in thoracic spine.  Also noted closing restriction at C5-C6 facet on right.     Progress per Plan of Care           Manual Therapy:    38     mins  01052;  Therapeutic Exercise:    5     mins  04702;     Neuromuscular Ivett:        mins  94148;    Therapeutic Activity:          mins  29620;     Gait Training:           mins  71549;     Ultrasound:          mins  37074;    Electrical Stimulation:         mins  46963 (MC );  Dry Needling          mins self-pay    Timed Treatment:   43   mins   Total Treatment:     43   mins    Casey Zavaleta PT  Physical Therapist

## 2021-11-03 ENCOUNTER — TREATMENT (OUTPATIENT)
Dept: PHYSICAL THERAPY | Facility: CLINIC | Age: 46
End: 2021-11-03

## 2021-11-03 DIAGNOSIS — M54.12 RADICULOPATHY, CERVICAL: Primary | ICD-10-CM

## 2021-11-03 PROCEDURE — 97140 MANUAL THERAPY 1/> REGIONS: CPT | Performed by: PHYSICAL THERAPIST

## 2021-11-03 NOTE — PROGRESS NOTES
Physical Therapy Daily Progress Note      Patient: April SABA Espinoza   : 1975  Diagnosis/ICD-10 Code:  Radiculopathy, cervical [M54.12]  Referring practitioner: ITALO Sanders  Date of Initial Visit: Type: THERAPY  Noted: 10/6/2021  Today's Date: 11/3/2021  Patient seen for 4 sessions             Subjective Therapy is helping but she had set back after yesterday work duties of having to try an hold down a pitbull in order to give it it's shots.    Objective   See Exercise, Manual, and Modality Logs for complete treatment.       Assessment/Plan  Responded well with manual techniques today, having decreased c/o pain.    Progress per Plan of Care           Timed:         Manual Therapy:    38     mins  32805;           Timed Treatment:   38   mins   Total Treatment:     38   mins    Yovany Burciaga PTA  Physical Therapist Assistant

## 2021-11-09 RX ORDER — METHYLPREDNISOLONE 4 MG/1
TABLET ORAL
Qty: 21 TABLET | Refills: 0 | Status: SHIPPED | OUTPATIENT
Start: 2021-11-09 | End: 2021-11-22 | Stop reason: SDUPTHER

## 2021-11-10 ENCOUNTER — TREATMENT (OUTPATIENT)
Dept: PHYSICAL THERAPY | Facility: CLINIC | Age: 46
End: 2021-11-10

## 2021-11-10 DIAGNOSIS — M54.12 RADICULOPATHY, CERVICAL: Primary | ICD-10-CM

## 2021-11-10 PROCEDURE — 97140 MANUAL THERAPY 1/> REGIONS: CPT | Performed by: PHYSICAL THERAPIST

## 2021-11-10 NOTE — PROGRESS NOTES
Physical Therapy Daily Progress Note      Patient: Kelly Espinoza   : 1975  Diagnosis/ICD-10 Code:  Radiculopathy, cervical [M54.12]  Referring practitioner: ITALO Sanders  Date of Initial Visit: Type: THERAPY  Noted: 10/6/2021  Today's Date: 11/10/2021  Patient seen for 5 sessions             Subjective Pt reports that she has been really stiff and hurting the past few days.  She almost cancelled but her  encouraged her to come to PT.    Objective   See Exercise, Manual, and Modality Logs for complete treatment.       Assessment/Plan  Muscle guarding noted in B UT and cervical PVM today.  Pt seemed to respond well with manual techniques today, having decreased c/o stiffness and pain afterwards.    Progress per Plan of Care           Timed:         Manual Therapy:    38     mins  76912;         Timed Treatment:   38   mins   Total Treatment:     38   mins    Yovany Burciaga PTA  Physical Therapist Assistant

## 2021-11-17 ENCOUNTER — TREATMENT (OUTPATIENT)
Dept: PHYSICAL THERAPY | Facility: CLINIC | Age: 46
End: 2021-11-17

## 2021-11-17 DIAGNOSIS — M54.12 RADICULOPATHY, CERVICAL: Primary | ICD-10-CM

## 2021-11-17 PROCEDURE — 97140 MANUAL THERAPY 1/> REGIONS: CPT | Performed by: PHYSICAL THERAPIST

## 2021-11-17 NOTE — PROGRESS NOTES
Physical Therapy Daily Progress Note      Patient: April SABA Espinoza   : 1975  Diagnosis/ICD-10 Code:  Radiculopathy, cervical [M54.12]  Referring practitioner: ITALO Sanders  Date of Initial Visit: Type: THERAPY  Noted: 10/6/2021  Today's Date: 2021  Patient seen for 6 sessions             Subjective Pt felt better after last visit and continues to feel better coming to PT today than she did coming to PT last visit.  Mainly sore on her left side of neck.  She said MRI should be approved since she has been to PT the required amount.     Objective   See Exercise, Manual, and Modality Logs for complete treatment.       Assessment/Plan  Continues to respond well to manual techniques.    Progress per Plan of Care           Timed:         Manual Therapy:    38     mins  82882;         Timed Treatment:   38   mins   Total Treatment:     38   mins        Yovany Burciaga PTA  Physical Therapist Assistant

## 2021-11-22 DIAGNOSIS — M54.2 CERVICALGIA: Primary | ICD-10-CM

## 2021-11-22 DIAGNOSIS — M47.22 OSTEOARTHRITIS OF SPINE WITH RADICULOPATHY, CERVICAL REGION: ICD-10-CM

## 2021-11-22 DIAGNOSIS — M15.9 PRIMARY OSTEOARTHRITIS INVOLVING MULTIPLE JOINTS: ICD-10-CM

## 2021-11-22 RX ORDER — METHYLPREDNISOLONE 4 MG/1
TABLET ORAL
Qty: 21 TABLET | Refills: 0 | Status: SHIPPED | OUTPATIENT
Start: 2021-11-22 | End: 2021-12-03

## 2021-11-22 RX ORDER — TRAMADOL HYDROCHLORIDE 50 MG/1
50 TABLET ORAL EVERY 6 HOURS PRN
Qty: 20 TABLET | Refills: 2 | Status: SHIPPED | OUTPATIENT
Start: 2021-11-22 | End: 2021-12-14 | Stop reason: SDUPTHER

## 2021-11-22 NOTE — TELEPHONE ENCOUNTER
Rx Refill Note  Requested Prescriptions     Pending Prescriptions Disp Refills   • traMADol (ULTRAM) 50 MG tablet 20 tablet 2     Sig: Take 1 tablet by mouth Every 6 (Six) Hours As Needed for Severe Pain .   • methylPREDNISolone (MEDROL) 4 MG dose pack 21 tablet 0     Sig: Take as directed on package instructions.      Last office visit with prescribing clinician: 10/20/2021      Next office visit with prescribing clinician: 1/19/2022            Beena Zuleta MA  11/22/21, 08:14 EST     Please see MyChart message

## 2021-11-29 ENCOUNTER — TREATMENT (OUTPATIENT)
Dept: PHYSICAL THERAPY | Facility: CLINIC | Age: 46
End: 2021-11-29

## 2021-11-29 DIAGNOSIS — M54.12 RADICULOPATHY, CERVICAL: Primary | ICD-10-CM

## 2021-11-29 PROCEDURE — 97140 MANUAL THERAPY 1/> REGIONS: CPT | Performed by: PHYSICAL THERAPIST

## 2021-11-29 NOTE — PROGRESS NOTES
Physical Therapy Daily Progress Note      Patient: April SABA Espinoza   : 1975  Diagnosis/ICD-10 Code:  Radiculopathy, cervical [M54.12]  Referring practitioner: ITALO Sanders  Date of Initial Visit: Type: THERAPY  Noted: 10/6/2021  Today's Date: 2021  Patient seen for 7 sessions         Kelly Espinoza reports:  Tingling in hands still present.  Reports that symptoms have decrease in frequency some since starting PT intervention, however headaches and tingling still present.    Objective   See Exercise, Manual, and Modality Logs for complete treatment.       Assessment/Plan     Feeling slightly better after manual therapy.  Discussed trial of mechanical traction next visit if s/s not improving.     Progress per Plan of Care           Manual Therapy:    40     mins  57765;  Therapeutic Exercise:         mins  17887;     Neuromuscular Ivett:        mins  30228;    Therapeutic Activity:          mins  24515;     Gait Training:           mins  45445;     Ultrasound:          mins  57018;    Electrical Stimulation:         mins  87752 ( );  Dry Needling          mins self-pay    Timed Treatment:   40   mins   Total Treatment:     40   mins    Casey Zavaleta PT  Physical Therapist

## 2021-12-03 PROCEDURE — U0004 COV-19 TEST NON-CDC HGH THRU: HCPCS | Performed by: FAMILY MEDICINE

## 2021-12-08 ENCOUNTER — TREATMENT (OUTPATIENT)
Dept: PHYSICAL THERAPY | Facility: CLINIC | Age: 46
End: 2021-12-08

## 2021-12-08 DIAGNOSIS — M54.12 RADICULOPATHY, CERVICAL: Primary | ICD-10-CM

## 2021-12-08 PROCEDURE — 20560 NDL INSJ W/O NJX 1 OR 2 MUSC: CPT | Performed by: PHYSICAL THERAPIST

## 2021-12-08 PROCEDURE — 97140 MANUAL THERAPY 1/> REGIONS: CPT | Performed by: PHYSICAL THERAPIST

## 2021-12-08 PROCEDURE — 97110 THERAPEUTIC EXERCISES: CPT | Performed by: PHYSICAL THERAPIST

## 2021-12-08 NOTE — PROGRESS NOTES
Physical Therapy Daily Progress Note    Patient: April SABA Espinoza   : 1975  Diagnosis/ICD-10 Code:  Radiculopathy, cervical [M54.12]  Referring practitioner: ITALO Sanders  Date of Initial Visit: Type: THERAPY  Noted: 10/6/2021  Today's Date: 2021  Patient seen for 8 sessions         Kelly Espinoza reports: Worked two 14 hour shifts the past two days.  Reports increased neck pain and pain in middle of back.     Objective   See Exercise, Manual, and Modality Logs for complete treatment.       Assessment/Plan     Noted increased muscle guarding on left side of cervical spine.  Noted improved mobility after MET to lower cervical spine.  Hypomobility noted in upper thoracic spine, improved after mobilization.  Patient reports feeling well after dry needling treatment.  Progressing well.     Progress per Plan of Care           Manual Therapy:    15     mins  14575;  Therapeutic Exercise:    10     mins  63684;     Neuromuscular Ivett:        mins  86017;    Therapeutic Activity:          mins  83365;     Gait Training:           mins  30057;     Ultrasound:          mins  32333;    Electrical Stimulation:         mins  75585 ( );  Dry Needling     10     mins 74447    Timed Treatment:   25   mins   Total Treatment:     35   mins    Casey Zavaleta PT  Physical Therapist

## 2021-12-14 ENCOUNTER — OFFICE VISIT (OUTPATIENT)
Dept: FAMILY MEDICINE CLINIC | Facility: CLINIC | Age: 46
End: 2021-12-14

## 2021-12-14 VITALS
WEIGHT: 213 LBS | BODY MASS INDEX: 39.2 KG/M2 | DIASTOLIC BLOOD PRESSURE: 83 MMHG | OXYGEN SATURATION: 98 % | HEART RATE: 98 BPM | HEIGHT: 62 IN | SYSTOLIC BLOOD PRESSURE: 127 MMHG

## 2021-12-14 DIAGNOSIS — M54.12 CERVICAL RADICULOPATHY: ICD-10-CM

## 2021-12-14 DIAGNOSIS — G56.03 CARPAL TUNNEL SYNDROME, BILATERAL: ICD-10-CM

## 2021-12-14 DIAGNOSIS — M54.2 CERVICALGIA: Primary | ICD-10-CM

## 2021-12-14 DIAGNOSIS — M15.9 PRIMARY OSTEOARTHRITIS INVOLVING MULTIPLE JOINTS: ICD-10-CM

## 2021-12-14 DIAGNOSIS — R01.1 CARDIAC MURMUR: ICD-10-CM

## 2021-12-14 PROCEDURE — 99214 OFFICE O/P EST MOD 30 MIN: CPT | Performed by: NURSE PRACTITIONER

## 2021-12-14 RX ORDER — TRAMADOL HYDROCHLORIDE 50 MG/1
50 TABLET ORAL EVERY 8 HOURS PRN
Qty: 40 TABLET | Refills: 2 | Status: SHIPPED | OUTPATIENT
Start: 2021-12-14 | End: 2022-03-02 | Stop reason: SDUPTHER

## 2021-12-14 NOTE — PROGRESS NOTES
Chief Complaint  Neck Pain and Follow-up (discuss referrals)    Subjective          Kelly Espinoza presents to [facility] for   History of Present Illness     Patient with chronic neck pain, worsening with radiation of pain into the left shoulder and arm, associated with headaches. She continues to report parasthesia of Left greater than right arm with all diferent activities through the day. She is taking gabapentin 3 times daily, celebrex BID which is intermittently effective for pain and tramadol for severe breakthrough pain only as needed as well, but using more frequently. She has completed 8 wks of PT, MRI ordered but denied until PT completed, pain and mobility has not improved. She has a dry needling appt tomorrow for 2nd attempt. She would like referral to neurosurgery vs pain mgmt to improve her function, but MRI needs to be completed.   -Mri was again denied after resubmitted following completion of 6 weeks of PT.   XR Spine Cervical Complete 4 or 5 View (07/14/2021)     BUE/hand R>L numbness, elbow to hand with specific positions and bending of the wrist or repetitive motions.         The following portions of the patient's history were reviewed and updated as appropriate: allergies, current medications, past family history, past medical history, past social history, past surgical history and problem list.    Past Medical History:   Diagnosis Date   • ADD (attention deficit disorder)    • SARMAD positive    • Anxiety    • Arthralgia    • Asthma    • Bursitis of left hip    • DDD (degenerative disc disease), lumbar    • Depression, major    • Flu syndrome    • GERD (gastroesophageal reflux disease)    • Hiatal hernia    • Hormone replacement therapy    • Hyperlipidemia    • Inflammatory arthritis    • Influenza    • Insomnia    • Knee pain, bilateral    • Lupus (HCC)    • Neck pain    • Obesity    • Osteoarthritis    • Pain, joint, shoulder, left    • Pharyngitis, acute    •  Postmenopausal    • Sinus congestion    • Surgical menopause    • Vitamin B12 deficiency    • Vitamin D deficiency      Past Surgical History:   Procedure Laterality Date   • CHOLECYSTECTOMY     • LUMBAR DISCECTOMY     • TONSILLECTOMY     • TOTAL ABDOMINAL HYSTERECTOMY WITH SALPINGO OOPHORECTOMY Bilateral     FOR ENDOMETRIOSIS     Family History   Problem Relation Age of Onset   • Hypertension Mother    • Hypertension Father    • Diabetes Other    • Heart disease Other    • Hypertension Other    • Cancer Other      Social History     Tobacco Use   • Smoking status: Former Smoker     Packs/day: 0.50     Years: 3.00     Pack years: 1.50     Types: Cigarettes     Start date:      Quit date:      Years since quittin.9   • Smokeless tobacco: Never Used   Substance Use Topics   • Alcohol use: Not Currently     Alcohol/week: 0.0 standard drinks       Current Outpatient Medications:   •  celecoxib (CeleBREX) 200 MG capsule, Take 1 capsule by mouth 2 (Two) Times a Day., Disp: 60 capsule, Rfl: 5  •  cyclobenzaprine (FLEXERIL) 5 MG tablet, Take 2 tablets by mouth 3 (Three) Times a Day As Needed for Muscle Spasms., Disp: 30 tablet, Rfl: 2  •  DULoxetine (CYMBALTA) 30 MG capsule, TAKE ONE CAPSULE BY MOUTH DAILY, Disp: 30 capsule, Rfl: 5  •  estradiol (ESTRACE) 2 MG tablet, , Disp: , Rfl:   •  gabapentin (NEURONTIN) 400 MG capsule, Take 1 capsule by mouth 3 (Three) Times a Day., Disp: 90 capsule, Rfl: 5  •  omeprazole (priLOSEC) 40 MG capsule, Take 1 capsule by mouth Daily., Disp: 30 capsule, Rfl: 5  •  ondansetron (Zofran) 4 MG tablet, Take 1 tablet by mouth Every 8 (Eight) Hours As Needed for Nausea or Vomiting., Disp: 20 tablet, Rfl: 0  •  traMADol (ULTRAM) 50 MG tablet, Take 1 tablet by mouth Every 8 (Eight) Hours As Needed for Severe Pain ., Disp: 40 tablet, Rfl: 2  •  vitamin D (ERGOCALCIFEROL) 1.25 MG (95907 UT) capsule capsule, Take 1 capsule by mouth 1 (One) Time Per Week., Disp: 12 capsule, Rfl:  "1  •  zolpidem (Ambien) 10 MG tablet, Take 1 tablet by mouth At Night As Needed., Disp: , Rfl:     Objective   Vital Signs:   /83 (BP Location: Left arm, Patient Position: Sitting, Cuff Size: Large Adult)   Pulse 98   Ht 157.5 cm (62.01\")   Wt 96.6 kg (213 lb)   SpO2 98%   BMI 38.95 kg/m²       Physical Exam  Vitals and nursing note reviewed.   Constitutional:       General: She is not in acute distress.     Appearance: She is well-developed. She is not diaphoretic.   Eyes:      Pupils: Pupils are equal, round, and reactive to light.   Neck:      Thyroid: No thyromegaly.      Vascular: No JVD.   Cardiovascular:      Rate and Rhythm: Normal rate and regular rhythm.      Heart sounds: Murmur heard.       Pulmonary:      Effort: Pulmonary effort is normal. No respiratory distress.      Breath sounds: Normal breath sounds.   Abdominal:      General: Bowel sounds are normal. There is no distension.      Palpations: Abdomen is soft.      Tenderness: There is no abdominal tenderness.   Musculoskeletal:         General: Tenderness (C-spine, mod/severe jimenes rom with radiaton and sensation decrease of LUE. +phalens B, R>L) present.      Cervical back: Neck supple. Tenderness (L) present.   Skin:     General: Skin is warm and dry.   Neurological:      Mental Status: She is alert and oriented to person, place, and time.      Sensory: No sensory deficit.   Psychiatric:         Behavior: Behavior normal.         Thought Content: Thought content normal.         Judgment: Judgment normal.          Result Review :     No visits with results within 7 Day(s) from this visit.   Latest known visit with results is:   Admission on 12/03/2021, Discharged on 12/03/2021   Component Date Value Ref Range Status   • COVID19 12/03/2021 Not Detected  Not Detected - Ref. Range Final                       Assessment and Plan    Diagnoses and all orders for this visit:    1. Cervicalgia (Primary)  Comments:  worse and progressing. " completed PT 8 wks now, will resubmit MRI to be completed at [facility]. refer to neurosurgery vs pain mgmt for injections pending results of MRI  Orders:  -     traMADol (ULTRAM) 50 MG tablet; Take 1 tablet by mouth Every 8 (Eight) Hours As Needed for Severe Pain .  Dispense: 40 tablet; Refill: 2    2. Cervical radiculopathy  Comments:  cont gabapentin, celebrex, rf tramadol and inc dose. pt with worsening of BUE paresis/neuropathy and headaches  Orders:  -     traMADol (ULTRAM) 50 MG tablet; Take 1 tablet by mouth Every 8 (Eight) Hours As Needed for Severe Pain .  Dispense: 40 tablet; Refill: 2    3. Primary osteoarthritis involving multiple joints  Comments:  continue celebrex  Orders:  -     traMADol (ULTRAM) 50 MG tablet; Take 1 tablet by mouth Every 8 (Eight) Hours As Needed for Severe Pain .  Dispense: 40 tablet; Refill: 2    4. Cardiac murmur  Comments:  new, with slight tachycardia today. check echo  Orders:  -     Adult Transthoracic Echo Complete W/ Cont if Necessary Per Protocol; Future    5. Carpal tunnel syndrome, bilateral  Comments:  cont celebrex, rec wrist braces at night. consider [facility] referral.         I spent 30 minutes caring for Kelly Espinoza on this date of service. This time includes time spent by me in the following activities: preparing for the visit, reviewing tests, performing a medically appropriate examination and/or evaluation , counseling and educating the patient/family/caregiver, ordering medications, tests, or procedures and documenting information in the medical record        Follow Up     Return if symptoms worsen or fail to improve, for Next scheduled follow up.  Patient was given instructions and counseling regarding her condition or for health maintenance advice. Please see specific information pulled into the AVS if appropriate.      EMR Dragon transcription disclaimer:  Some of this encounter note is an electronic transcription translation of spoken language to printed text. The  electronic translation of spoken language may permit erroneous, or at times, nonsensical words or phrases to be inadvertently transcribed; Although I have reviewed the note for such errors some may still exist.

## 2021-12-15 ENCOUNTER — TREATMENT (OUTPATIENT)
Dept: PHYSICAL THERAPY | Facility: CLINIC | Age: 46
End: 2021-12-15

## 2021-12-15 DIAGNOSIS — M54.12 RADICULOPATHY, CERVICAL: Primary | ICD-10-CM

## 2021-12-15 PROCEDURE — 97140 MANUAL THERAPY 1/> REGIONS: CPT | Performed by: PHYSICAL THERAPIST

## 2021-12-15 NOTE — PROGRESS NOTES
Physical Therapy Daily Progress Note    Patient: April SABA Espinoza   : 1975  Diagnosis/ICD-10 Code:  Radiculopathy, cervical [M54.12]  Referring practitioner: ITALO Sanders  Date of Initial Visit: Type: THERAPY  Noted: 10/6/2021  Today's Date: 12/15/2021  Patient seen for 9 sessions         Kelly Espinoza reports: Right shoulder was sore for about three days after dry needling treatment at last visit.  Feels mobility is improving some.  Wishes to just do manual therapy today due to needing to  her daughter.     Objective   See Exercise, Manual, and Modality Logs for complete treatment.       Assessment/Plan     Tolerates manual therapy to cervical spine well.  Noted improved left rotation after MET (right sidebending/left rotation).  Progressing well.     Progress per Plan of Care           Manual Therapy:    30     mins  13282;  Therapeutic Exercise:         mins  05762;     Neuromuscular Ivett:        mins  99974;    Therapeutic Activity:          mins  01351;     Gait Training:           mins  28661;     Ultrasound:          mins  39323;    Electrical Stimulation:         mins  95136 ( );  Dry Needling          mins self-pay    Timed Treatment:   30   mins   Total Treatment:     30   mins    Casey Zavaleta PT  Physical Therapist

## 2021-12-28 ENCOUNTER — APPOINTMENT (OUTPATIENT)
Dept: CARDIOLOGY | Facility: HOSPITAL | Age: 46
End: 2021-12-28

## 2021-12-28 PROBLEM — Z11.52 ENCOUNTER FOR SCREENING FOR COVID-19: Status: ACTIVE | Noted: 2021-12-28

## 2021-12-28 PROCEDURE — U0004 COV-19 TEST NON-CDC HGH THRU: HCPCS | Performed by: FAMILY MEDICINE

## 2022-01-06 DIAGNOSIS — M15.9 PRIMARY OSTEOARTHRITIS INVOLVING MULTIPLE JOINTS: ICD-10-CM

## 2022-01-06 RX ORDER — CELECOXIB 200 MG/1
CAPSULE ORAL
Qty: 60 CAPSULE | Refills: 5 | Status: SHIPPED | OUTPATIENT
Start: 2022-01-06 | End: 2022-06-01 | Stop reason: SDUPTHER

## 2022-01-10 RX ORDER — ESTRADIOL 2 MG/1
TABLET ORAL
Qty: 30 TABLET | Refills: 0 | Status: SHIPPED | OUTPATIENT
Start: 2022-01-10 | End: 2022-02-09

## 2022-01-19 DIAGNOSIS — M54.2 CERVICALGIA: ICD-10-CM

## 2022-01-19 DIAGNOSIS — M54.12 CERVICAL RADICULOPATHY: Primary | ICD-10-CM

## 2022-01-23 DIAGNOSIS — F51.01 PRIMARY INSOMNIA: Primary | ICD-10-CM

## 2022-01-23 DIAGNOSIS — E55.9 VITAMIN D DEFICIENCY: ICD-10-CM

## 2022-01-25 RX ORDER — ZOLPIDEM TARTRATE 10 MG/1
10 TABLET ORAL NIGHTLY PRN
Qty: 30 TABLET | Refills: 2 | Status: SHIPPED | OUTPATIENT
Start: 2022-01-25 | End: 2022-06-01 | Stop reason: SDUPTHER

## 2022-01-25 RX ORDER — ERGOCALCIFEROL 1.25 MG/1
50000 CAPSULE ORAL WEEKLY
Qty: 12 CAPSULE | Refills: 1 | Status: SHIPPED | OUTPATIENT
Start: 2022-01-25 | End: 2022-06-01 | Stop reason: SDUPTHER

## 2022-01-26 DIAGNOSIS — R01.1 CARDIAC MURMUR: Primary | ICD-10-CM

## 2022-02-01 ENCOUNTER — APPOINTMENT (OUTPATIENT)
Dept: CARDIOLOGY | Facility: HOSPITAL | Age: 47
End: 2022-02-01

## 2022-02-02 ENCOUNTER — HOSPITAL ENCOUNTER (OUTPATIENT)
Dept: CARDIOLOGY | Facility: HOSPITAL | Age: 47
Discharge: HOME | End: 2022-02-02
Admitting: NURSE PRACTITIONER

## 2022-02-02 DIAGNOSIS — R01.1 CARDIAC MURMUR: ICD-10-CM

## 2022-02-02 LAB
BH CV ECHO MEAS - ACS: 2.1 CM
BH CV ECHO MEAS - AO MAX PG (FULL): 0.83 MMHG
BH CV ECHO MEAS - AO MAX PG: 10 MMHG
BH CV ECHO MEAS - AO MEAN PG (FULL): 0.03 MMHG
BH CV ECHO MEAS - AO MEAN PG: 4.5 MMHG
BH CV ECHO MEAS - AO ROOT AREA (BSA CORRECTED): 1.6
BH CV ECHO MEAS - AO ROOT AREA: 7.9 CM^2
BH CV ECHO MEAS - AO ROOT DIAM: 3.2 CM
BH CV ECHO MEAS - AO V2 MAX: 157.7 CM/SEC
BH CV ECHO MEAS - AO V2 MEAN: 98.8 CM/SEC
BH CV ECHO MEAS - AO V2 VTI: 29.6 CM
BH CV ECHO MEAS - AVA(I,A): 4.2 CM^2
BH CV ECHO MEAS - AVA(I,D): 4.2 CM^2
BH CV ECHO MEAS - AVA(V,A): 3.3 CM^2
BH CV ECHO MEAS - AVA(V,D): 3.3 CM^2
BH CV ECHO MEAS - BSA(HAYCOCK): 2.1 M^2
BH CV ECHO MEAS - BSA: 1.9 M^2
BH CV ECHO MEAS - BZI_BMI: 40.2 KILOGRAMS/M^2
BH CV ECHO MEAS - BZI_METRIC_HEIGHT: 154.9 CM
BH CV ECHO MEAS - BZI_METRIC_WEIGHT: 96.6 KG
BH CV ECHO MEAS - EDV(CUBED): 96.9 ML
BH CV ECHO MEAS - EDV(MOD-SP4): 91.2 ML
BH CV ECHO MEAS - EDV(TEICH): 97 ML
BH CV ECHO MEAS - EF(CUBED): 62.7 %
BH CV ECHO MEAS - EF(MOD-BP): 70 %
BH CV ECHO MEAS - EF(MOD-SP4): 69.8 %
BH CV ECHO MEAS - EF(TEICH): 54.3 %
BH CV ECHO MEAS - ESV(CUBED): 36.1 ML
BH CV ECHO MEAS - ESV(MOD-SP4): 27.6 ML
BH CV ECHO MEAS - ESV(TEICH): 44.3 ML
BH CV ECHO MEAS - FS: 28 %
BH CV ECHO MEAS - IVS/LVPW: 0.91
BH CV ECHO MEAS - IVSD: 0.83 CM
BH CV ECHO MEAS - LA DIMENSION(2D): 3.2 CM
BH CV ECHO MEAS - LV DIASTOLIC VOL/BSA (35-75): 47 ML/M^2
BH CV ECHO MEAS - LV MASS(C)D: 132.2 GRAMS
BH CV ECHO MEAS - LV MASS(C)DI: 68.1 GRAMS/M^2
BH CV ECHO MEAS - LV MAX PG: 9.1 MMHG
BH CV ECHO MEAS - LV MEAN PG: 4.5 MMHG
BH CV ECHO MEAS - LV SYSTOLIC VOL/BSA (12-30): 14.2 ML/M^2
BH CV ECHO MEAS - LV V1 MAX: 151 CM/SEC
BH CV ECHO MEAS - LV V1 MEAN: 100.7 CM/SEC
BH CV ECHO MEAS - LV V1 VTI: 36.2 CM
BH CV ECHO MEAS - LVIDD: 4.6 CM
BH CV ECHO MEAS - LVIDS: 3.3 CM
BH CV ECHO MEAS - LVOT AREA: 3.4 CM^2
BH CV ECHO MEAS - LVOT DIAM: 2.1 CM
BH CV ECHO MEAS - LVPWD: 0.91 CM
BH CV ECHO MEAS - MR MAX PG: 73.8 MMHG
BH CV ECHO MEAS - MR MAX VEL: 428 CM/SEC
BH CV ECHO MEAS - MV A MAX VEL: 77.2 CM/SEC
BH CV ECHO MEAS - MV DEC SLOPE: 479.8 CM/SEC^2
BH CV ECHO MEAS - MV DEC TIME: 0.22 SEC
BH CV ECHO MEAS - MV E MAX VEL: 105.2 CM/SEC
BH CV ECHO MEAS - MV E/A: 1.4
BH CV ECHO MEAS - MV MAX PG: 3.5 MMHG
BH CV ECHO MEAS - MV MEAN PG: 1.9 MMHG
BH CV ECHO MEAS - MV V2 MAX: 93.3 CM/SEC
BH CV ECHO MEAS - MV V2 MEAN: 66.4 CM/SEC
BH CV ECHO MEAS - MV V2 VTI: 21.8 CM
BH CV ECHO MEAS - MVA(VTI): 5.7 CM^2
BH CV ECHO MEAS - PA MAX PG (FULL): 2.4 MMHG
BH CV ECHO MEAS - PA MAX PG: 6.2 MMHG
BH CV ECHO MEAS - PA V2 MAX: 124.4 CM/SEC
BH CV ECHO MEAS - PULM A REVS DUR: 0.07 SEC
BH CV ECHO MEAS - PULM A REVS VEL: 32.6 CM/SEC
BH CV ECHO MEAS - PULM DIAS VEL: 40.2 CM/SEC
BH CV ECHO MEAS - PULM S/D: 1.5
BH CV ECHO MEAS - PULM SYS VEL: 59.3 CM/SEC
BH CV ECHO MEAS - PVA(V,A): 1.3 CM^2
BH CV ECHO MEAS - PVA(V,D): 1.3 CM^2
BH CV ECHO MEAS - QP/QS: 0.37
BH CV ECHO MEAS - RV MAX PG: 3.8 MMHG
BH CV ECHO MEAS - RV MEAN PG: 2.4 MMHG
BH CV ECHO MEAS - RV V1 MAX: 97.5 CM/SEC
BH CV ECHO MEAS - RV V1 MEAN: 76.1 CM/SEC
BH CV ECHO MEAS - RV V1 VTI: 27.7 CM
BH CV ECHO MEAS - RVDD: 1.9 CM
BH CV ECHO MEAS - RVOT AREA: 1.7 CM^2
BH CV ECHO MEAS - RVOT DIAM: 1.5 CM
BH CV ECHO MEAS - SI(AO): 120.4 ML/M^2
BH CV ECHO MEAS - SI(CUBED): 31.3 ML/M^2
BH CV ECHO MEAS - SI(LVOT): 64.4 ML/M^2
BH CV ECHO MEAS - SI(MOD-SP4): 32.8 ML/M^2
BH CV ECHO MEAS - SI(TEICH): 27.1 ML/M^2
BH CV ECHO MEAS - SV(AO): 233.7 ML
BH CV ECHO MEAS - SV(CUBED): 60.8 ML
BH CV ECHO MEAS - SV(LVOT): 125 ML
BH CV ECHO MEAS - SV(MOD-SP4): 63.6 ML
BH CV ECHO MEAS - SV(RVOT): 46.2 ML
BH CV ECHO MEAS - SV(TEICH): 52.7 ML

## 2022-02-02 PROCEDURE — 93306 TTE W/DOPPLER COMPLETE: CPT | Performed by: INTERNAL MEDICINE

## 2022-02-02 PROCEDURE — 93306 TTE W/DOPPLER COMPLETE: CPT

## 2022-02-09 RX ORDER — ESTRADIOL 2 MG/1
TABLET ORAL
Qty: 30 TABLET | Refills: 5 | Status: SHIPPED | OUTPATIENT
Start: 2022-02-09 | End: 2022-08-08

## 2022-02-11 DIAGNOSIS — M47.812 FACET ARTHRITIS, DEGENERATIVE, CERVICAL SPINE: Primary | ICD-10-CM

## 2022-02-18 NOTE — PROGRESS NOTES
CHIEF COMPLAINT  Neck Pain      Subjective   April R Alexis is a 47 y.o. female who was referred by Vivian Fontanez APRN  to our pain management clinic for consultation, evaluation and treatment of neck pain.  Her pain started about 3 years ago without any significant injuries. 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     Neck pain is 4/10 on VAS, at maximum is 5/10. Pain is aching, burning, throbbing, tingling, weakness, numbness in nature. Pain is referred to left neck and shoulder. The pain is constant. The pain is improved by prednisone, tramadol, gabapentin. The pain is worse with bending, lying down, lifting.  Also complains of severe headaches (had to go to ED- starts in occipital region) and numbness and tingling in both hands. Gripping issues with left arm.  Her main complaint is numbness and severe intermittent headaches.    PHQ-9- 9  SOAPP-2     PMH:   Lupus, GERD, anxiety, ADD    Current Medications:   Celebrex 200 mg BID PRN  Cymbalta 30 mg daily  Gabapentin 400 mg TID   Tramadol 50 mg q8h PRN-1/16/2022.  Ambien      Past Medications:    Past Modalities:  TENS:       no          Physical Therapy Within The Last 6 Months     Yes - 8 weeks of PT.  Psychotherapy     no  Massage Therapy      no    Patient Complains Of:  Uro-Fecal Incontinence no  Weight Gain/Loss  no  Fever/Chills   no  Weakness   no      PEG Assessment   What number best describes your pain on average in the past week?4  What number best describes how, during the past week, pain has interfered with your enjoyment of life?5  What number best describes how, during the past week, pain has interfered with your general activity?  5        Current Outpatient Medications:   •  celecoxib (CeleBREX) 200 MG capsule, TAKE ONE CAPSULE BY MOUTH TWICE A DAY, Disp: 60 capsule, Rfl: 5  •  cyclobenzaprine (FLEXERIL) 5 MG tablet, Take 2 tablets by mouth 3 (Three) Times a Day As Needed for Muscle Spasms., Disp: 30 tablet, Rfl: 2  •   DULoxetine (CYMBALTA) 30 MG capsule, TAKE ONE CAPSULE BY MOUTH DAILY, Disp: 30 capsule, Rfl: 5  •  estradiol (ESTRACE) 2 MG tablet, TAKE ONE TABLET BY MOUTH DAILY, Disp: 30 tablet, Rfl: 5  •  gabapentin (NEURONTIN) 400 MG capsule, Take 1 capsule by mouth 3 (Three) Times a Day., Disp: 90 capsule, Rfl: 5  •  omeprazole (priLOSEC) 40 MG capsule, Take 1 capsule by mouth Daily., Disp: 30 capsule, Rfl: 5  •  ondansetron ODT (ZOFRAN-ODT) 4 MG disintegrating tablet, 1 tablet orally every 6 hours as needed for nausea and vomiting, Disp: 12 tablet, Rfl: 0  •  traMADol (ULTRAM) 50 MG tablet, Take 1 tablet by mouth Every 8 (Eight) Hours As Needed for Severe Pain ., Disp: 40 tablet, Rfl: 2  •  vitamin D (ERGOCALCIFEROL) 1.25 MG (14806 UT) capsule capsule, Take 1 capsule by mouth 1 (One) Time Per Week., Disp: 12 capsule, Rfl: 1  •  zolpidem (Ambien) 10 MG tablet, Take 1 tablet by mouth At Night As Needed for Sleep., Disp: 30 tablet, Rfl: 2    The following portions of the patient's history were reviewed and updated as appropriate: allergies, current medications, past family history, past medical history, past social history, past surgical history, and problem list.      REVIEW OF PERTINENT MEDICAL DATA    Past Medical History:   Diagnosis Date   • ADD (attention deficit disorder)    • SARMAD positive    • Anxiety    • Arthralgia    • Asthma    • Bursitis of left hip    • DDD (degenerative disc disease), lumbar    • Depression, major    • Flu syndrome    • GERD (gastroesophageal reflux disease)    • Heart disease    • Hiatal hernia    • Hormone replacement therapy    • Hyperlipidemia    • Inflammatory arthritis    • Influenza    • Insomnia    • Knee pain, bilateral    • Lupus (HCC)    • Neck pain    • Obesity    • Osteoarthritis    • Pain, joint, shoulder, left    • Pharyngitis, acute    • Postmenopausal    • Sinus congestion    • Surgical menopause     at age 34   • Vitamin B12 deficiency    • Vitamin D deficiency      Past Surgical  "History:   Procedure Laterality Date   • CHOLECYSTECTOMY     • LUMBAR DISCECTOMY     • TONSILLECTOMY     • TOTAL ABDOMINAL HYSTERECTOMY WITH SALPINGO OOPHORECTOMY Bilateral     FOR ENDOMETRIOSIS     Family History   Problem Relation Age of Onset   • Hypertension Mother    • Hypertension Father    • Diabetes Other    • Heart disease Other    • Hypertension Other    • Cancer Other      Social History     Socioeconomic History   • Marital status:    Tobacco Use   • Smoking status: Former Smoker     Packs/day: 0.50     Years: 3.00     Pack years: 1.50     Types: Cigarettes     Start date:      Quit date:      Years since quittin.1   • Smokeless tobacco: Never Used   Vaping Use   • Vaping Use: Never used   Substance and Sexual Activity   • Alcohol use: Not Currently     Alcohol/week: 0.0 standard drinks   • Drug use: Not Currently         Review of Systems   Musculoskeletal: Positive for neck pain and neck stiffness.   Neurological: Positive for dizziness and headaches.         Vitals:    22 1423   BP: 134/76   Pulse: 99   Resp: 16   SpO2: 99%   Weight: 93.9 kg (207 lb)   Height: 154.9 cm (61\")   PainSc:   4         Objective   Physical Exam  Neck:     Musculoskeletal:         General: Tenderness present.           Imaging Reviewed:  MRI cervical spine-2022  -C2-3-degenerative facet changes bilateral  C3-4-WNL  C4-5-WNL  C5-6-mild facet changes.  Mild left neural foraminal narrowing.  C6-7-mild bilateral uncovertebral joint spurring.  C7-T1-WNL    Assessment:    1. DDD (degenerative disc disease), cervical    2. High risk medication use    3. Cervical spondylosis    4. Occipital neuralgia of left side         Plan:  1. New patient visit, urine drug screen per office policy. UDS today to monitor for compliance with medication use. The UDS is medically necessary to monitor for compliance due to the potential side effects and complications of misuse of opioids. UDS done today will " review on next visit.   Will take over tramadol if needed.  2. We discussed trying a course of formal physical therapy.  Physical therapy can help strengthen and stretch the muscles around the joints. Continue to be as active as possible.  Patient has done 8 weeks of physical therapy without much improvement.  3. Patient has pain in the neck with radicular pain in the arm, patient has failed conservative therapy including PT and pharmacological management for more than 6 weeks and pain interferes with activities of daily living. Spurling’s test is positive. MRI is not impressive but does show mild left neural foraminal narrowing at C5-6.  Unable to explain right-sided radicular symptoms.  Patient has tried PT, neuropathic agents, pain medications without significant pain relief.  Discussed cervical JAYE C6-7 (left). Discussed the possibility of infection, bleeding, nerve damage, post dural puncture headache, increased pain, paraplegia. Patient understands and agrees.   4.  Discussed with patient that if her headaches continue, she will benefit from left-sided greater and lesser occipital nerve block.    RTC for injection and then 3 week follow up.     Rome Springer DO  Pain Management   [facility]         INSPECT REPORT    As part of the patient's treatment plan, I may be prescribing controlled substances. The patient has been made aware of appropriate use of such medications, including potential risk of somnolence, limited ability to drive and/or work safely, and the potential for dependence or overdose. It has also been made clear that these medications are for use by this patient only, without concomitant use of alcohol or other substances unless prescribed.     Patient has completed prescribing agreement detailing terms of continued prescribing of controlled substances, including monitoring INSPECT reports, urine drug screening, and pill counts if necessary. The patient is aware that inappropriate use will  results in cessation of prescribing such medications.    INSPECT report has been reviewed and scanned into the patient's chart.      EMR Dragon/Transcription Disclaimer:   Much of this encounter note is an electronic transcription/translation of spoken language to printed text. The electronic translation of spoken language may permit erroneous, or at times, nonsensical words or phrases to be inadvertently transcribed; Although I have reviewed the note for such errors, some may still exist.

## 2022-02-21 ENCOUNTER — OFFICE VISIT (OUTPATIENT)
Dept: PAIN MEDICINE | Facility: CLINIC | Age: 47
End: 2022-02-21

## 2022-02-21 VITALS
RESPIRATION RATE: 16 BRPM | WEIGHT: 207 LBS | DIASTOLIC BLOOD PRESSURE: 76 MMHG | OXYGEN SATURATION: 99 % | BODY MASS INDEX: 39.08 KG/M2 | HEIGHT: 61 IN | HEART RATE: 99 BPM | SYSTOLIC BLOOD PRESSURE: 134 MMHG

## 2022-02-21 DIAGNOSIS — M50.30 DDD (DEGENERATIVE DISC DISEASE), CERVICAL: Primary | ICD-10-CM

## 2022-02-21 DIAGNOSIS — Z79.899 HIGH RISK MEDICATION USE: ICD-10-CM

## 2022-02-21 DIAGNOSIS — M47.812 CERVICAL SPONDYLOSIS: ICD-10-CM

## 2022-02-21 DIAGNOSIS — M54.81 OCCIPITAL NEURALGIA OF LEFT SIDE: ICD-10-CM

## 2022-02-21 PROCEDURE — 99204 OFFICE O/P NEW MOD 45 MIN: CPT | Performed by: STUDENT IN AN ORGANIZED HEALTH CARE EDUCATION/TRAINING PROGRAM

## 2022-03-01 ENCOUNTER — TELEPHONE (OUTPATIENT)
Dept: PAIN MEDICINE | Facility: CLINIC | Age: 47
End: 2022-03-01

## 2022-03-01 NOTE — TELEPHONE ENCOUNTER
ATTEMPTED TO WARM TRANSFER    Caller: Kelly BAINS    Relationship to patient: SELF    Best call back number:  [phone]    Chief complaint: PATIENT IS SCHEDULED FOR CERVICAL EPIDURAL ON 3/3/22 AND WANTS TO RESCHEDULE. WANTS TO COME ON 3/2/22 IF POSSIBLE.    Type of visit: EPIDURAL

## 2022-03-02 DIAGNOSIS — M54.12 CERVICAL RADICULOPATHY: ICD-10-CM

## 2022-03-02 DIAGNOSIS — M15.9 PRIMARY OSTEOARTHRITIS INVOLVING MULTIPLE JOINTS: ICD-10-CM

## 2022-03-02 DIAGNOSIS — M54.2 CERVICALGIA: ICD-10-CM

## 2022-03-02 RX ORDER — TRAMADOL HYDROCHLORIDE 50 MG/1
50 TABLET ORAL EVERY 8 HOURS PRN
Qty: 40 TABLET | Refills: 2 | Status: SHIPPED | OUTPATIENT
Start: 2022-03-02 | End: 2022-06-01 | Stop reason: SDUPTHER

## 2022-03-03 ENCOUNTER — APPOINTMENT (OUTPATIENT)
Dept: PAIN MEDICINE | Facility: HOSPITAL | Age: 47
End: 2022-03-03

## 2022-03-08 ENCOUNTER — HOSPITAL ENCOUNTER (OUTPATIENT)
Dept: PAIN MEDICINE | Facility: HOSPITAL | Age: 47
Discharge: HOME | End: 2022-03-08

## 2022-03-08 VITALS
HEART RATE: 78 BPM | TEMPERATURE: 97.1 F | BODY MASS INDEX: 39.08 KG/M2 | SYSTOLIC BLOOD PRESSURE: 125 MMHG | RESPIRATION RATE: 16 BRPM | DIASTOLIC BLOOD PRESSURE: 68 MMHG | WEIGHT: 207 LBS | OXYGEN SATURATION: 97 % | HEIGHT: 61 IN

## 2022-03-08 DIAGNOSIS — M50.30 DDD (DEGENERATIVE DISC DISEASE), CERVICAL: Primary | ICD-10-CM

## 2022-03-08 DIAGNOSIS — R52 PAIN: ICD-10-CM

## 2022-03-08 PROCEDURE — 62321 NJX INTERLAMINAR CRV/THRC: CPT | Performed by: STUDENT IN AN ORGANIZED HEALTH CARE EDUCATION/TRAINING PROGRAM

## 2022-03-08 PROCEDURE — 25010000002 DEXAMETHASONE SODIUM PHOSPHATE 10 MG/ML SOLUTION: Performed by: STUDENT IN AN ORGANIZED HEALTH CARE EDUCATION/TRAINING PROGRAM

## 2022-03-08 PROCEDURE — 77003 FLUOROGUIDE FOR SPINE INJECT: CPT

## 2022-03-08 PROCEDURE — 0 IOPAMIDOL 41 % SOLUTION: Performed by: STUDENT IN AN ORGANIZED HEALTH CARE EDUCATION/TRAINING PROGRAM

## 2022-03-08 RX ORDER — DEXAMETHASONE SODIUM PHOSPHATE 10 MG/ML
10 INJECTION, SOLUTION INTRAMUSCULAR; INTRAVENOUS ONCE
Status: COMPLETED | OUTPATIENT
Start: 2022-03-08 | End: 2022-03-08

## 2022-03-08 RX ORDER — DEXAMETHASONE SODIUM PHOSPHATE 10 MG/ML
10 INJECTION, SOLUTION INTRAMUSCULAR; INTRAVENOUS ONCE
Status: DISCONTINUED | OUTPATIENT
Start: 2022-03-08 | End: 2022-03-09 | Stop reason: HOSPADM

## 2022-03-08 RX ADMIN — IOPAMIDOL 3 ML: 408 INJECTION, SOLUTION INTRATHECAL at 13:39

## 2022-03-08 RX ADMIN — DEXAMETHASONE SODIUM PHOSPHATE 10 MG: 10 INJECTION, SOLUTION INTRAMUSCULAR; INTRAVENOUS at 13:39

## 2022-03-08 NOTE — PROCEDURES
PREOPERATIVE DIAGNOSIS:    1. Cervical DDD    POSTOPERATIVE DIAGNOSIS:  Same.    PROCEDURE PERFORMED: Cervical JAYE C6-7 (left)     PROCEDURE IN DETAIL:    Written informed consent was taken from the patient. Pre-procedure blood pressure and pulse were stable and recorded in patients clinic chart. The patient was brought to the procedure room and placed in the prone position on fluoroscopy table. The patient’s neck was prepped with chloraprep and draped in the usual sterile fashion.   The skin over the C6-7 space was identified under fluoroscopic guidance and infiltrated with 1% lidocaine for local anesthesia via 25 gauge needle.  A 17 gauge Tuohy needle was inserted through the skin under fluoroscopic guidance until we got to the epidural space with loss of resistance technique.  Negative for CSF and heme.  2 ml of omnipaque contrast was injected under continuous fluoroscopic guidance and good spread was noted from C5-7 space bilaterally. 10 mg of dexamethasone mixed with 4 ml of preservative free normal saline was injected with minimal pressure. The needle was cleared with preservative free normal saline and removed. Band aid was applied.  Following the procedure the patient's vital signs were stable. The patient was discharged home in good condition after being given discharge instructions.    COMPLICATIONS: None.     Rome Springer DO  Pain Management   [facility]

## 2022-03-08 NOTE — DISCHARGE INSTRUCTIONS

## 2022-03-08 NOTE — H&P
H and P reviewed from previous visit and no changes to patient's clinical presentation. Will proceed with procedure as planned. Patient denies history of DM and being on blood thinners.    Rome Springer DO  Pain Management   [facility]

## 2022-03-09 ENCOUNTER — TELEPHONE (OUTPATIENT)
Dept: PAIN MEDICINE | Facility: HOSPITAL | Age: 47
End: 2022-03-09

## 2022-03-22 DIAGNOSIS — M15.9 PRIMARY OSTEOARTHRITIS INVOLVING MULTIPLE JOINTS: ICD-10-CM

## 2022-03-22 RX ORDER — GABAPENTIN 400 MG/1
CAPSULE ORAL
Qty: 90 CAPSULE | Refills: 5 | Status: SHIPPED | OUTPATIENT
Start: 2022-03-22 | End: 2022-08-31

## 2022-04-06 RX ORDER — DULOXETIN HYDROCHLORIDE 30 MG/1
CAPSULE, DELAYED RELEASE ORAL
Qty: 30 CAPSULE | Refills: 5 | Status: SHIPPED | OUTPATIENT
Start: 2022-04-06 | End: 2022-10-05

## 2022-04-14 RX ORDER — OMEPRAZOLE 40 MG/1
CAPSULE, DELAYED RELEASE ORAL
Qty: 30 CAPSULE | Refills: 5 | Status: SHIPPED | OUTPATIENT
Start: 2022-04-14 | End: 2022-06-01

## 2022-04-21 PROCEDURE — U0004 COV-19 TEST NON-CDC HGH THRU: HCPCS | Performed by: NURSE PRACTITIONER

## 2022-06-01 ENCOUNTER — OFFICE VISIT (OUTPATIENT)
Dept: FAMILY MEDICINE CLINIC | Facility: CLINIC | Age: 47
End: 2022-06-01

## 2022-06-01 VITALS
OXYGEN SATURATION: 99 % | BODY MASS INDEX: 40.52 KG/M2 | SYSTOLIC BLOOD PRESSURE: 140 MMHG | DIASTOLIC BLOOD PRESSURE: 85 MMHG | WEIGHT: 214.6 LBS | HEART RATE: 89 BPM | HEIGHT: 61 IN

## 2022-06-01 DIAGNOSIS — I10 PRIMARY HYPERTENSION: ICD-10-CM

## 2022-06-01 DIAGNOSIS — J45.21 MILD INTERMITTENT ASTHMA WITH ACUTE EXACERBATION: ICD-10-CM

## 2022-06-01 DIAGNOSIS — R09.81 NASAL CONGESTION: Primary | ICD-10-CM

## 2022-06-01 DIAGNOSIS — I34.0 MODERATE MITRAL REGURGITATION BY PRIOR ECHOCARDIOGRAM: ICD-10-CM

## 2022-06-01 DIAGNOSIS — R05.9 COUGH: ICD-10-CM

## 2022-06-01 DIAGNOSIS — F51.01 PRIMARY INSOMNIA: ICD-10-CM

## 2022-06-01 DIAGNOSIS — M15.9 PRIMARY OSTEOARTHRITIS INVOLVING MULTIPLE JOINTS: ICD-10-CM

## 2022-06-01 DIAGNOSIS — E66.01 CLASS 3 SEVERE OBESITY WITHOUT SERIOUS COMORBIDITY WITH BODY MASS INDEX (BMI) OF 40.0 TO 44.9 IN ADULT, UNSPECIFIED OBESITY TYPE: ICD-10-CM

## 2022-06-01 DIAGNOSIS — E55.9 VITAMIN D DEFICIENCY: ICD-10-CM

## 2022-06-01 DIAGNOSIS — M47.812 FACET ARTHRITIS, DEGENERATIVE, CERVICAL SPINE: ICD-10-CM

## 2022-06-01 DIAGNOSIS — M54.2 CERVICALGIA: ICD-10-CM

## 2022-06-01 DIAGNOSIS — Z00.00 PREVENTATIVE HEALTH CARE: ICD-10-CM

## 2022-06-01 LAB
EXPIRATION DATE: NORMAL
FLUAV AG UPPER RESP QL IA.RAPID: NOT DETECTED
FLUBV AG UPPER RESP QL IA.RAPID: NOT DETECTED
INTERNAL CONTROL: NORMAL
Lab: NORMAL
SARS-COV-2 AG UPPER RESP QL IA.RAPID: NOT DETECTED

## 2022-06-01 PROCEDURE — 90715 TDAP VACCINE 7 YRS/> IM: CPT | Performed by: NURSE PRACTITIONER

## 2022-06-01 PROCEDURE — 90471 IMMUNIZATION ADMIN: CPT | Performed by: NURSE PRACTITIONER

## 2022-06-01 PROCEDURE — 99214 OFFICE O/P EST MOD 30 MIN: CPT | Performed by: NURSE PRACTITIONER

## 2022-06-01 PROCEDURE — 87428 SARSCOV & INF VIR A&B AG IA: CPT | Performed by: NURSE PRACTITIONER

## 2022-06-01 RX ORDER — TRAMADOL HYDROCHLORIDE 50 MG/1
50 TABLET ORAL EVERY 8 HOURS PRN
Qty: 40 TABLET | Refills: 2 | Status: SHIPPED | OUTPATIENT
Start: 2022-06-01 | End: 2022-08-29 | Stop reason: SDUPTHER

## 2022-06-01 RX ORDER — ERGOCALCIFEROL 1.25 MG/1
50000 CAPSULE ORAL WEEKLY
Qty: 12 CAPSULE | Refills: 1 | Status: SHIPPED | OUTPATIENT
Start: 2022-06-01 | End: 2022-10-25

## 2022-06-01 RX ORDER — ZOLPIDEM TARTRATE 10 MG/1
10 TABLET ORAL NIGHTLY PRN
Qty: 30 TABLET | Refills: 2 | Status: SHIPPED | OUTPATIENT
Start: 2022-06-01 | End: 2022-08-29

## 2022-06-01 RX ORDER — CELECOXIB 200 MG/1
200 CAPSULE ORAL 2 TIMES DAILY
Qty: 60 CAPSULE | Refills: 5 | Status: SHIPPED | OUTPATIENT
Start: 2022-06-01 | End: 2022-12-19

## 2022-06-01 RX ORDER — ALBUTEROL SULFATE 0.63 MG/3ML
1 SOLUTION RESPIRATORY (INHALATION) EVERY 6 HOURS PRN
Qty: 120 EACH | Refills: 0 | Status: SHIPPED | OUTPATIENT
Start: 2022-06-01

## 2022-06-01 RX ORDER — CYCLOBENZAPRINE HCL 10 MG
10 TABLET ORAL 3 TIMES DAILY PRN
Qty: 30 TABLET | Refills: 5 | Status: SHIPPED | OUTPATIENT
Start: 2022-06-01 | End: 2022-08-29 | Stop reason: SDUPTHER

## 2022-06-01 NOTE — PROGRESS NOTES
Chief Complaint  Chief Complaint   Patient presents with   • Neck Pain     Wants referral to Ortho   • Chest Pain     Tightness, wants Lungs to be listened to           Subjective          April SABA Espinoza presents to [facility] for   History of Present Illness       Answers for HPI/ROS submitted by the patient on 2022  Please describe your symptoms.: Need to recheck some bloodwork on levels that were low.  Still hvn bad, sometimes debilitating legs,hip,arm pain with numbness and burning. , Need TDap vax for work.  Have you had these symptoms before?: Yes  How long have you been having these symptoms?: Greater than 2 weeks  Please list any medications you are currently taking for this condition.: Tramadol,  gabapentin,  tylenol,  celebrex.  Please describe any probable cause for these symptoms. : Osteoarthritis, lupus, bad lower back.  What is the primary reason for your visit?: Other      Patient continues to complain of cervical spine pain, MRI showed multilevel degenerative facet changes with mild neuroforaminal narrowing on the left at C5-6 level.  She underwent epidural spinal injections per Dr. Springer, has helped h/a but not the neck pain. She reports recurrence of R hand numbness, took leftover pred and helped.     HTN, has been borderline elevated in the past, reports lately running 140/80-90, associated with palpitations, soa, denies chest pain, headache and swelling of extremities.  She has also gained weight over the last 1 year.  She has difficulty with exercise due to chronic pain  -2D echocardiogram performed earlier this year due to new murmur revealed moderate mitral regurgitation    Asthma, has not had a flareup in several years.  She reports with the heat and allergens lately has developed shortness of air, nonproductive cough and wheezing.  She has nebulizer at home but her old albuterol prescription has         The following portions of the patient's  history were reviewed and updated as appropriate: allergies, current medications, past family history, past medical history, past social history, past surgical history and problem list.    Past Medical History:   Diagnosis Date   • ADD (attention deficit disorder)    • SARMAD positive    • Anxiety    • Arthralgia    • Asthma    • Bursitis of left hip    • DDD (degenerative disc disease), lumbar    • Depression, major    • Flu syndrome    • GERD (gastroesophageal reflux disease)    • Heart disease    • Hiatal hernia    • Hormone replacement therapy    • Hyperlipidemia    • Inflammatory arthritis    • Influenza    • Insomnia    • Knee pain, bilateral    • Lupus (HCC)    • Neck pain    • Obesity    • Osteoarthritis    • Pain, joint, shoulder, left    • Pharyngitis, acute    • Postmenopausal    • Sinus congestion    • Surgical menopause    • Vitamin B12 deficiency    • Vitamin D deficiency      Past Surgical History:   Procedure Laterality Date   • CHOLECYSTECTOMY     • LUMBAR DISCECTOMY     • TONSILLECTOMY     • TOTAL ABDOMINAL HYSTERECTOMY WITH SALPINGO OOPHORECTOMY Bilateral     FOR ENDOMETRIOSIS     Family History   Problem Relation Age of Onset   • Hypertension Mother    • Hypertension Father    • Diabetes Other    • Heart disease Other    • Hypertension Other    • Cancer Other      Social History     Tobacco Use   • Smoking status: Former Smoker     Packs/day: 0.50     Years: 3.00     Pack years: 1.50     Types: Cigarettes     Start date:      Quit date:      Years since quittin.4   • Smokeless tobacco: Never Used   Substance Use Topics   • Alcohol use: Not Currently     Alcohol/week: 0.0 standard drinks       Current Outpatient Medications:   •  celecoxib (CeleBREX) 200 MG capsule, Take 1 capsule by mouth 2 (Two) Times a Day., Disp: 60 capsule, Rfl: 5  •  cyclobenzaprine (FLEXERIL) 10 MG tablet, Take 1 tablet by mouth 3 (Three) Times a Day As Needed for Muscle Spasms., Disp: 30 tablet, Rfl: 5  •  " DULoxetine (CYMBALTA) 30 MG capsule, TAKE ONE CAPSULE BY MOUTH DAILY, Disp: 30 capsule, Rfl: 5  •  estradiol (ESTRACE) 2 MG tablet, TAKE ONE TABLET BY MOUTH DAILY, Disp: 30 tablet, Rfl: 5  •  gabapentin (NEURONTIN) 400 MG capsule, TAKE ONE CAPSULE BY MOUTH THREE TIMES A DAY, Disp: 90 capsule, Rfl: 5  •  ondansetron ODT (ZOFRAN-ODT) 4 MG disintegrating tablet, 1 tablet orally every 6 hours as needed for nausea and vomiting, Disp: 12 tablet, Rfl: 0  •  traMADol (ULTRAM) 50 MG tablet, Take 1 tablet by mouth Every 8 (Eight) Hours As Needed for Severe Pain ., Disp: 40 tablet, Rfl: 2  •  vitamin D (ERGOCALCIFEROL) 1.25 MG (09890 UT) capsule capsule, Take 1 capsule by mouth 1 (One) Time Per Week., Disp: 12 capsule, Rfl: 1  •  zolpidem (Ambien) 10 MG tablet, Take 1 tablet by mouth At Night As Needed for Sleep., Disp: 30 tablet, Rfl: 2  •  albuterol (ACCUNEB) 0.63 MG/3ML nebulizer solution, Take 3 mL by nebulization Every 6 (Six) Hours As Needed for Wheezing or Shortness of Air., Disp: 120 each, Rfl: 0  •  Chlorcyclizine-Pseudoephed 25-60 MG tablet, Take 1 tablet by mouth 2 (Two) Times a Day As Needed (congestion)., Disp: 42 tablet, Rfl: 0  •  metoprolol tartrate (LOPRESSOR) 25 MG tablet, Take 1 tablet by mouth 2 (Two) Times a Day., Disp: 60 tablet, Rfl: 5    Objective   Vital Signs:   /85   Pulse 89   Ht 154.9 cm (61\")   Wt 97.3 kg (214 lb 9.6 oz)   SpO2 99%   BMI 40.55 kg/m²           Physical Exam  Vitals and nursing note reviewed.   Constitutional:       General: She is not in acute distress.     Appearance: She is well-developed. She is not diaphoretic.   Eyes:      Pupils: Pupils are equal, round, and reactive to light.   Neck:      Thyroid: No thyromegaly.      Vascular: No JVD.   Cardiovascular:      Rate and Rhythm: Normal rate and regular rhythm.      Heart sounds: Murmur ( GIII/VI) heard.   Pulmonary:      Effort: Pulmonary effort is normal. No respiratory distress.      Breath sounds: Wheezing " present.   Abdominal:      General: Bowel sounds are normal. There is no distension.      Palpations: Abdomen is soft.      Tenderness: There is no abdominal tenderness.   Musculoskeletal:         General: Tenderness (Chronic multijoint pain) present. Normal range of motion.      Cervical back: Normal range of motion and neck supple. Tenderness (C-spine mod/severe ttp with severe limited rotation of the neck to the right) present.   Skin:     General: Skin is warm and dry.   Neurological:      Mental Status: She is alert and oriented to person, place, and time.      Sensory: No sensory deficit.   Psychiatric:         Behavior: Behavior normal.         Thought Content: Thought content normal.         Judgment: Judgment normal.          Result Review :     Office Visit on 06/01/2022   Component Date Value Ref Range Status   • SARS Antigen 06/01/2022 Not Detected  Not Detected, Presumptive Negative Final   • Influenza A Antigen HOOD 06/01/2022 Not Detected  Not Detected Final   • Influenza B Antigen HOOD 06/01/2022 Not Detected  Not Detected Final   • Internal Control 06/01/2022 Passed  Passed Final   • Lot Number 06/01/2022 [id number]   Final   • Expiration Date 06/01/2022 [id number]   Final                  Class 3 Severe Obesity (BMI >=40). Obesity-related health conditions include the following: hypertension and osteoarthritis. Obesity is worsening. BMI is is above average; BMI management plan is completed. We discussed low calorie, low carb based diet program, portion control and increasing exercise.           Assessment and Plan    Diagnoses and all orders for this visit:    1. Nasal congestion (Primary)  Comments:  Negative for rapid COVID-19, flu A/B  Orders:  -     POCT SARS-CoV-2 Antigen HOOD + Flu    2. Cough  -     POCT SARS-CoV-2 Antigen HOOD + Flu    3. Facet arthritis, degenerative, cervical spine  Comments:  referral to [facility], pt should call Dr. Springer for further evaluation.   Orders:  -     Ambulatory Referral  to Neurosurgery    4. Moderate mitral regurgitation by prior echocardiogram  Comments:  Consider referral to cardiology, start metoprolol    5. Class 3 severe obesity without serious comorbidity with body mass index (BMI) of 40.0 to 44.9 in adult, unspecified obesity type (HCC)  Comments:  work on weight loss, calories <1800/day, consider weight watchers, low impact exercise    6. Primary hypertension  Comments:  start metoprolol 25mg BID for worsening of hypertension and palpitations.   Orders:  -     metoprolol tartrate (LOPRESSOR) 25 MG tablet; Take 1 tablet by mouth 2 (Two) Times a Day.  Dispense: 60 tablet; Refill: 5    7. Vitamin D deficiency  Comments:  We will plan to check hypertension panel, vitamin D and B12 prior to appointment in 3 months fasting  Orders:  -     vitamin D (ERGOCALCIFEROL) 1.25 MG (61018 UT) capsule capsule; Take 1 capsule by mouth 1 (One) Time Per Week.  Dispense: 12 capsule; Refill: 1    8. Primary osteoarthritis involving multiple joints  Comments:  continue celebrex  Orders:  -     celecoxib (CeleBREX) 200 MG capsule; Take 1 capsule by mouth 2 (Two) Times a Day.  Dispense: 60 capsule; Refill: 5  -     traMADol (ULTRAM) 50 MG tablet; Take 1 tablet by mouth Every 8 (Eight) Hours As Needed for Severe Pain .  Dispense: 40 tablet; Refill: 2    9. Cervicalgia  Comments:  min improvment s/p JAYE, completed PT, MRI completed, still c/o pain, refer to [facility] and f/u with pain mgmt as well. cont camilo, tramadol, flexeril and celebrex.   Orders:  -     cyclobenzaprine (FLEXERIL) 10 MG tablet; Take 1 tablet by mouth 3 (Three) Times a Day As Needed for Muscle Spasms.  Dispense: 30 tablet; Refill: 5  -     traMADol (ULTRAM) 50 MG tablet; Take 1 tablet by mouth Every 8 (Eight) Hours As Needed for Severe Pain .  Dispense: 40 tablet; Refill: 2  -     Ambulatory Referral to Neurosurgery    10. Primary insomnia  Comments:  continue and refill ambien for nightly use  Orders:  -     zolpidem (Ambien) 10 MG  tablet; Take 1 tablet by mouth At Night As Needed for Sleep.  Dispense: 30 tablet; Refill: 2    11. Mild intermittent asthma with acute exacerbation  Comments:  start albuterol nebs, has nebulizer at home.  Also recommend OTC allergy meds and/or Stahist during flareup.  Notify if worse or no better in 3 to 4 days  Orders:  -     albuterol (ACCUNEB) 0.63 MG/3ML nebulizer solution; Take 3 mL by nebulization Every 6 (Six) Hours As Needed for Wheezing or Shortness of Air.  Dispense: 120 each; Refill: 0  -     POCT SARS-CoV-2 Antigen HOOD + Flu    12. Preventative health care  Comments:  Negative for COVID, flu A/B.  Okay for Tdap today  Orders:  -     Tdap Vaccine Greater Than or Equal To 6yo IM        I spent 35 minutes caring for Kelly Espinoza on this date of service. This time includes time spent by me in the following activities: preparing for the visit, reviewing tests, performing a medically appropriate examination and/or evaluation , counseling and educating the patient/family/caregiver, ordering medications, tests, or procedures and documenting information in the medical record        Follow Up     Return in about 3 months (around 9/1/2022) for Recheck HTN, asthma, neck pain, obesity. HTN panel and vit d prior to appt.  Patient was given instructions and counseling regarding her condition or for health maintenance advice. Please see specific information pulled into the AVS if appropriate.        Part of this note may be an electronic transcription/translation of spoken language to printed text using the Dragon Dictation System   [employer]

## 2022-06-09 NOTE — PROGRESS NOTES
Subjective   History of Present Illness: Kelly Espinoza is a 47 y.o. female is being seen for consultation today at the request of ITALO Sanders for neck and bilateral upper extremity pain L>R with numbness and tingling.  Patient has a long history of some neck and left shoulder pain as well as some flareups of numbness and tingling in her upper extremities bilaterally.  She says that this will wake her up from sleeping and can be quite severe to the point she feels she is weak in her hands.  This is intermittent and does not occur throughout the day.  Patient is undergone physical therapy as well as cervical JAYE without lasting improvement.  Patient does note that headache she was having prior to the JAYE did improve afterwards for a period of time.  Patient denies any other difficulty using her hands or dropping things.  She does have a history of lupus.  She says when she gets the numbness and tingling is primarily in her third and fourth digits, but can involve more of her hand and arm.    Chief Complaint   Patient presents with   • Neck Pain   • Arm Pain     Bilateral upper extremity pain L>R          Previous Treatment: Cervical JAYE, Celebrex, Gabapentin, & Tramadol, Physical therapy    The following portions of the patient's history were reviewed and updated as appropriate: allergies, current medications, past family history, past medical history, past social history, past surgical history and problem list.    Review of Systems   Constitutional: Positive for activity change.   HENT: Negative.    Eyes: Negative.    Respiratory: Negative for chest tightness and shortness of breath.    Cardiovascular: Negative for chest pain.   Gastrointestinal: Negative.    Endocrine: Negative.    Genitourinary: Negative.    Musculoskeletal: Positive for myalgias and neck pain.        Upper extremity pain   Skin: Negative.    Allergic/Immunologic: Negative.    Neurological: Positive for weakness and numbness.         "Positive for tingling & burning   Hematological: Negative.    Psychiatric/Behavioral: Negative.        Objective     /90   Pulse 77   Temp 98.4 °F (36.9 °C)   Resp 18   Ht 154.9 cm (61\")   Wt 96.2 kg (212 lb)   SpO2 99%   BMI 40.06 kg/m²    Body mass index is 40.06 kg/m².      Neurologic Exam     Mental Status   Oriented to person, place, and time.     Cranial Nerves   Cranial nerves II through XII intact.     Motor Exam     Strength   Strength 5/5 throughout.     Sensory Exam   Light touch normal.     Gait, Coordination, and Reflexes     Reflexes   Right Sam: absent  Left Sam: absent      Assessment & Plan   Independent Review of Radiographic Studies:      I personally reviewed and interpreted the images from the following studies.    MRI cervical spine: Mild degenerative changes without any significant central or foraminal stenosis    Cervical x-ray: Redemonstration of degenerative changes without evidence of spondylolisthesis    Medical Decision Making:      Kelly Espinoza is a 47 y.o. female with a history of episodes of tingling and numbness in her upper extremities as well as neck pain and headaches.  MRI and x-ray are overall reassuring with no evidence of high-grade central or foraminal stenosis or compression of nerves or spinal cord.  Alignment is also normal.  Her symptoms of numbness and tingling are intermittent and do not necessarily follow pattern for typical peripheral compressive neuropathies.  Patient would benefit from consultation with neurology for further evaluation.  She does have history of autoimmune disease and this could be related.  I will see her back as needed.      Diagnoses and all orders for this visit:    1. Cervical spondylosis without myelopathy (Primary)    2. Hand numbness      No follow-ups on file.    This patient was examined wearing appropriate personal protective equipment.                      Dr. Yonis Cisneros IV    06/13/22  09:00 EDT  "

## 2022-06-13 ENCOUNTER — OFFICE VISIT (OUTPATIENT)
Dept: NEUROSURGERY | Facility: CLINIC | Age: 47
End: 2022-06-13

## 2022-06-13 VITALS
WEIGHT: 212 LBS | RESPIRATION RATE: 18 BRPM | HEIGHT: 61 IN | SYSTOLIC BLOOD PRESSURE: 132 MMHG | TEMPERATURE: 98.4 F | BODY MASS INDEX: 40.02 KG/M2 | HEART RATE: 77 BPM | DIASTOLIC BLOOD PRESSURE: 90 MMHG | OXYGEN SATURATION: 99 %

## 2022-06-13 DIAGNOSIS — M47.812 CERVICAL SPONDYLOSIS WITHOUT MYELOPATHY: Primary | ICD-10-CM

## 2022-06-13 DIAGNOSIS — R20.0 HAND NUMBNESS: ICD-10-CM

## 2022-06-13 PROCEDURE — 99204 OFFICE O/P NEW MOD 45 MIN: CPT | Performed by: NEUROLOGICAL SURGERY

## 2022-06-13 RX ORDER — ALBUTEROL SULFATE 90 UG/1
2 AEROSOL, METERED RESPIRATORY (INHALATION) EVERY 4 HOURS PRN
Qty: 18 G | Refills: 2 | Status: SHIPPED | OUTPATIENT
Start: 2022-06-13 | End: 2022-06-14 | Stop reason: CLARIF

## 2022-06-14 ENCOUNTER — TELEPHONE (OUTPATIENT)
Dept: FAMILY MEDICINE CLINIC | Facility: CLINIC | Age: 47
End: 2022-06-14

## 2022-06-14 RX ORDER — LEVALBUTEROL TARTRATE 45 UG/1
1-2 AEROSOL, METERED ORAL EVERY 4 HOURS PRN
Qty: 15 G | Refills: 5 | Status: SHIPPED | OUTPATIENT
Start: 2022-06-14

## 2022-06-14 NOTE — TELEPHONE ENCOUNTER
Pt's insurance will not cover the albuterol HFA inhaler. They prefer Levalbuterol inhaler. If it is okay to switch please send new script to pharmacy. If you are wanting a PA please let me know and I can get one started. Thank you

## 2022-06-17 ENCOUNTER — PATIENT ROUNDING (BHMG ONLY) (OUTPATIENT)
Dept: NEUROSURGERY | Facility: CLINIC | Age: 47
End: 2022-06-17

## 2022-06-21 RX ORDER — FLUCONAZOLE 150 MG/1
150 TABLET ORAL ONCE
Qty: 2 TABLET | Refills: 0 | Status: SHIPPED | OUTPATIENT
Start: 2022-06-21 | End: 2022-06-21

## 2022-06-23 DIAGNOSIS — U07.1 LAB TEST POSITIVE FOR DETECTION OF COVID-19 VIRUS: Primary | ICD-10-CM

## 2022-06-23 RX ORDER — PROMETHAZINE HYDROCHLORIDE AND PHENYLEPHRINE HYDROCHLORIDE 6.25; 5 MG/5ML; MG/5ML
5 SYRUP ORAL EVERY 4 HOURS PRN
Qty: 120 ML | Refills: 0 | Status: SHIPPED | OUTPATIENT
Start: 2022-06-23 | End: 2022-08-09

## 2022-06-23 RX ORDER — PROMETHAZINE HYDROCHLORIDE, PHENYLEPHRINE HYDROCHLORIDE AND CODEINE PHOSPHATE 6.25; 5; 1 MG/5ML; MG/5ML; MG/5ML
5 SOLUTION ORAL EVERY 6 HOURS PRN
Qty: 120 ML | Refills: 0 | Status: SHIPPED | OUTPATIENT
Start: 2022-06-23 | End: 2022-06-23

## 2022-06-27 ENCOUNTER — OFFICE VISIT (OUTPATIENT)
Dept: FAMILY MEDICINE CLINIC | Facility: CLINIC | Age: 47
End: 2022-06-27

## 2022-06-27 VITALS — TEMPERATURE: 97.9 F | HEART RATE: 116 BPM | OXYGEN SATURATION: 96 %

## 2022-06-27 DIAGNOSIS — H92.01 RIGHT EAR PAIN: ICD-10-CM

## 2022-06-27 DIAGNOSIS — R53.83 FATIGUE, UNSPECIFIED TYPE: ICD-10-CM

## 2022-06-27 DIAGNOSIS — J45.21 MILD INTERMITTENT ASTHMA WITH ACUTE EXACERBATION: ICD-10-CM

## 2022-06-27 DIAGNOSIS — U07.1 COVID-19 VIRUS INFECTION: Primary | ICD-10-CM

## 2022-06-27 PROCEDURE — 99213 OFFICE O/P EST LOW 20 MIN: CPT | Performed by: NURSE PRACTITIONER

## 2022-06-27 RX ORDER — AZITHROMYCIN 250 MG/1
TABLET, FILM COATED ORAL
Qty: 6 TABLET | Refills: 0 | Status: SHIPPED | OUTPATIENT
Start: 2022-06-27 | End: 2022-08-09

## 2022-06-27 NOTE — PROGRESS NOTES
Chief Complaint  Chief Complaint   Patient presents with   • Earache   • Shortness of Breath     Pt reports that the cough syrup isn't really working too.           Subjective          Kelly Espinoza presents to [facility] for   History of Present Illness     Patient has had asthmatic flareup starting around 6/13/2022.  She started taking prednisone 20 mg and using albuterol inhaler that she had at home.  Levalbuterol was refilled to pharmacy due to formulary change.  She then developed thrush, was started on nystatin swish and swallow and provided Diflucan, symptoms resolved.  On 6/22/2022 she tested positive for COVID-19, has had harsh and painful dry cough.  Promethazine VC prescribed to her pharmacy (nationwide shortage of prometh vc with codeine so it was d/c).  Symptoms have have improved slightly, however she presents today for evaluation of ear pain, continued cough, headache and severe fatigue.         The following portions of the patient's history were reviewed and updated as appropriate: allergies, current medications, past family history, past medical history, past social history, past surgical history and problem list.    Past Medical History:   Diagnosis Date   • ADD (attention deficit disorder)    • SARMAD positive    • Anxiety    • Arthralgia    • Asthma    • Bursitis of left hip    • DDD (degenerative disc disease), lumbar    • Depression, major    • Flu syndrome    • GERD (gastroesophageal reflux disease)    • Heart disease    • Hiatal hernia    • Hormone replacement therapy    • Hyperlipidemia    • Inflammatory arthritis    • Influenza    • Insomnia    • Knee pain, bilateral    • Lupus (HCC)    • Neck pain    • Obesity    • Osteoarthritis    • Pain, joint, shoulder, left    • Pharyngitis, acute    • Postmenopausal    • Sinus congestion    • Surgical menopause    • Vitamin B12 deficiency    • Vitamin D deficiency      Past Surgical History:   Procedure Laterality Date    • CHOLECYSTECTOMY     • LUMBAR DISCECTOMY     • TONSILLECTOMY     • TOTAL ABDOMINAL HYSTERECTOMY WITH SALPINGO OOPHORECTOMY Bilateral     FOR ENDOMETRIOSIS     Family History   Problem Relation Age of Onset   • Hypertension Mother    • Hypertension Father    • Diabetes Other    • Heart disease Other    • Hypertension Other    • Cancer Other      Social History     Tobacco Use   • Smoking status: Former Smoker     Packs/day: 0.50     Years: 3.00     Pack years: 1.50     Types: Cigarettes     Start date:      Quit date:      Years since quittin.4   • Smokeless tobacco: Never Used   Substance Use Topics   • Alcohol use: Not Currently     Alcohol/week: 0.0 standard drinks       Current Outpatient Medications:   •  albuterol (ACCUNEB) 0.63 MG/3ML nebulizer solution, Take 3 mL by nebulization Every 6 (Six) Hours As Needed for Wheezing or Shortness of Air., Disp: 120 each, Rfl: 0  •  azithromycin (Zithromax) 250 MG tablet, Take 2 tablets the first day, then 1 tablet daily for 4 days., Disp: 6 tablet, Rfl: 0  •  celecoxib (CeleBREX) 200 MG capsule, Take 1 capsule by mouth 2 (Two) Times a Day., Disp: 60 capsule, Rfl: 5  •  cyclobenzaprine (FLEXERIL) 10 MG tablet, Take 1 tablet by mouth 3 (Three) Times a Day As Needed for Muscle Spasms., Disp: 30 tablet, Rfl: 5  •  DULoxetine (CYMBALTA) 30 MG capsule, TAKE ONE CAPSULE BY MOUTH DAILY, Disp: 30 capsule, Rfl: 5  •  estradiol (ESTRACE) 2 MG tablet, TAKE ONE TABLET BY MOUTH DAILY, Disp: 30 tablet, Rfl: 5  •  gabapentin (NEURONTIN) 400 MG capsule, TAKE ONE CAPSULE BY MOUTH THREE TIMES A DAY, Disp: 90 capsule, Rfl: 5  •  levalbuterol (XOPENEX HFA) 45 MCG/ACT inhaler, Inhale 1-2 puffs Every 4 (Four) Hours As Needed for Wheezing or Shortness of Air., Disp: 15 g, Rfl: 5  •  metoprolol tartrate (LOPRESSOR) 25 MG tablet, Take 1 tablet by mouth 2 (Two) Times a Day., Disp: 60 tablet, Rfl: 5  •  nystatin (MYCOSTATIN) 100,000 unit/mL suspension, Swish and swallow 5 mL  4 (Four) Times a Day. Do not stop until symptoms have resolved for 48 hours, Disp: 473 mL, Rfl: 0  •  ondansetron ODT (ZOFRAN-ODT) 4 MG disintegrating tablet, 1 tablet orally every 6 hours as needed for nausea and vomiting, Disp: 12 tablet, Rfl: 0  •  promethazine-phenylephrine (promethazine-phenylephrine) 6.25-5 MG/5ML syrup syrup, Take 5 mL by mouth Every 4 (Four) Hours As Needed (cough/congestion)., Disp: 120 mL, Rfl: 0  •  traMADol (ULTRAM) 50 MG tablet, Take 1 tablet by mouth Every 8 (Eight) Hours As Needed for Severe Pain ., Disp: 40 tablet, Rfl: 2  •  vitamin D (ERGOCALCIFEROL) 1.25 MG (54082 UT) capsule capsule, Take 1 capsule by mouth 1 (One) Time Per Week., Disp: 12 capsule, Rfl: 1  •  zolpidem (Ambien) 10 MG tablet, Take 1 tablet by mouth At Night As Needed for Sleep., Disp: 30 tablet, Rfl: 2    Objective   Vital Signs:   Pulse 116   Temp 97.9 °F (36.6 °C) (Oral)   SpO2 96%           Physical Exam  Vitals and nursing note reviewed.   Constitutional:       General: She is not in acute distress.     Appearance: She is well-developed. She is ill-appearing. She is not diaphoretic.      Comments: Exam performed at patient's vehicle, provider wearing appropriate PPE   HENT:      Head: Normocephalic.      Right Ear: Tympanic membrane and ear canal normal.      Left Ear: Tympanic membrane and ear canal normal.      Ears:      Comments: B tm's erythemic w/ minimal fluid present R TM.      Nose: Congestion present.   Eyes:      Pupils: Pupils are equal, round, and reactive to light.   Neck:      Thyroid: No thyromegaly.      Vascular: No JVD.   Cardiovascular:      Rate and Rhythm: Regular rhythm. Tachycardia present.      Heart sounds: Normal heart sounds. No murmur heard.  Pulmonary:      Effort: Pulmonary effort is normal. No respiratory distress.      Breath sounds: No wheezing.      Comments: Tight, dry Cough  Abdominal:      General: Bowel sounds are normal. There is no distension.      Palpations: Abdomen  is soft.      Tenderness: There is no abdominal tenderness.   Musculoskeletal:         General: No tenderness. Normal range of motion.      Cervical back: Normal range of motion and neck supple.   Skin:     General: Skin is warm and dry.   Neurological:      Mental Status: She is alert and oriented to person, place, and time.      Sensory: No sensory deficit.   Psychiatric:         Behavior: Behavior normal.         Thought Content: Thought content normal.         Judgment: Judgment normal.          Result Review :     No visits with results within 7 Day(s) from this visit.   Latest known visit with results is:   Office Visit on 06/01/2022   Component Date Value Ref Range Status   • SARS Antigen 06/01/2022 Not Detected  Not Detected, Presumptive Negative Final   • Influenza A Antigen HOOD 06/01/2022 Not Detected  Not Detected Final   • Influenza B Antigen HOOD 06/01/2022 Not Detected  Not Detected Final   • Internal Control 06/01/2022 Passed  Passed Final   • Lot Number 06/01/2022 [id number]   Final   • Expiration Date 06/01/2022 [id number]   Final                             Assessment and Plan    Diagnoses and all orders for this visit:    1. COVID-19 virus infection (Primary)  Comments:  pt 7 days s/p +result, start zpak for ears, cont inhaler, promethazine vc. rec start asa daily for 1mo, vit d, zinc and b12.     2. Fatigue, unspecified type    3. Right ear pain    4. Mild intermittent asthma with acute exacerbation    Other orders  -     azithromycin (Zithromax) 250 MG tablet; Take 2 tablets the first day, then 1 tablet daily for 4 days.  Dispense: 6 tablet; Refill: 0      1. Rec pt get mucinex fast max and treat symptoms with ibuprofen/Tylenol as needed, rest and push fluids.  2. Rec patient to go immediately to ED if develops sudden and worsening of shortness of air  3.  Continue allergy medicines    I spent 20 minutes caring for Kelly Espinoza on this date of service. This time includes time spent by me in the  following activities: preparing for the visit, reviewing tests, performing a medically appropriate examination and/or evaluation , counseling and educating the patient/family/caregiver, ordering medications, tests, or procedures and documenting information in the medical record        Follow Up     Return if symptoms worsen or fail to improve in 2 to 3 days, for Next scheduled follow up.  Patient was given instructions and counseling regarding her condition or for health maintenance advice. Please see specific information pulled into the AVS if appropriate.        Part of this note may be an electronic transcription/translation of spoken language to printed text using the Dragon Dictation System

## 2022-07-28 ENCOUNTER — TELEPHONE (OUTPATIENT)
Dept: FAMILY MEDICINE CLINIC | Facility: CLINIC | Age: 47
End: 2022-07-28

## 2022-07-28 ENCOUNTER — LAB (OUTPATIENT)
Dept: FAMILY MEDICINE CLINIC | Facility: CLINIC | Age: 47
End: 2022-07-28

## 2022-07-28 DIAGNOSIS — R30.0 DYSURIA: Primary | ICD-10-CM

## 2022-07-28 LAB
BILIRUB BLD-MCNC: NEGATIVE MG/DL
CLARITY, POC: CLEAR
COLOR UR: YELLOW
GLUCOSE UR STRIP-MCNC: NEGATIVE MG/DL
KETONES UR QL: NEGATIVE
LEUKOCYTE EST, POC: NEGATIVE
NITRITE UR-MCNC: NEGATIVE MG/ML
PH UR: 5 [PH] (ref 5–8)
PROT UR STRIP-MCNC: ABNORMAL MG/DL
RBC # UR STRIP: ABNORMAL /UL
SP GR UR: 1.03 (ref 1–1.03)
UROBILINOGEN UR QL: NORMAL

## 2022-07-28 PROCEDURE — 81002 URINALYSIS NONAUTO W/O SCOPE: CPT | Performed by: NURSE PRACTITIONER

## 2022-07-28 NOTE — TELEPHONE ENCOUNTER
Pt presented to office today c/o urinary urgency and burning with urination.  Pt reports that the burning just recently started.  Her urine results are in chart, do you want to culture just in case?  Only contained blood and trace protein.

## 2022-07-28 NOTE — TELEPHONE ENCOUNTER
Focus Note    Lucas Kelsey Loo DO, PGY I   OB/GYN Resident  2/5/2021      I have also sent this information the patient on my chart.  Urinalysis has blood and protein present, this is likely a bladder inflammation/cystitis.  There is no bacteria or nitrates, negative for UTI.  Recommend increase water intake, limit 4 C's; citrus, carbonation, chocolate and caffeine. Try cranberry pills or juice daily.  Always wipe front to back, no tub bathing and urinate after any sexual intercourse. Try taking tagamet otc for the pain/inflammation, this is typically used for heartburn but sometimes is effective for cystitis

## 2022-08-02 ENCOUNTER — TELEPHONE (OUTPATIENT)
Dept: FAMILY MEDICINE CLINIC | Facility: CLINIC | Age: 47
End: 2022-08-02

## 2022-08-02 ENCOUNTER — LAB (OUTPATIENT)
Dept: FAMILY MEDICINE CLINIC | Facility: CLINIC | Age: 47
End: 2022-08-02

## 2022-08-02 DIAGNOSIS — R30.0 DYSURIA: Primary | ICD-10-CM

## 2022-08-02 LAB
BILIRUB BLD-MCNC: NEGATIVE MG/DL
CLARITY, POC: CLEAR
COLOR UR: YELLOW
GLUCOSE UR STRIP-MCNC: NEGATIVE MG/DL
KETONES UR QL: NEGATIVE
LEUKOCYTE EST, POC: NEGATIVE
NITRITE UR-MCNC: NEGATIVE MG/ML
PH UR: 7 [PH] (ref 5–8)
PROT UR STRIP-MCNC: NEGATIVE MG/DL
RBC # UR STRIP: ABNORMAL /UL
SP GR UR: 1.01 (ref 1–1.03)
UROBILINOGEN UR QL: NORMAL

## 2022-08-02 PROCEDURE — 87186 SC STD MICRODIL/AGAR DIL: CPT

## 2022-08-02 PROCEDURE — 81001 URINALYSIS AUTO W/SCOPE: CPT | Performed by: NURSE PRACTITIONER

## 2022-08-02 PROCEDURE — 81002 URINALYSIS NONAUTO W/O SCOPE: CPT | Performed by: NURSE PRACTITIONER

## 2022-08-02 PROCEDURE — 87077 CULTURE AEROBIC IDENTIFY: CPT | Performed by: NURSE PRACTITIONER

## 2022-08-02 PROCEDURE — 87086 URINE CULTURE/COLONY COUNT: CPT | Performed by: NURSE PRACTITIONER

## 2022-08-02 NOTE — TELEPHONE ENCOUNTER
Negative UA still, no bacteria. Pls send for ua/cs to confirm.  I did recommend that she start Tagamet OTC for probable cystitis, also increase water intake, limit 4 C's; citrus, carbonation, chocolate and caffeine. Try cranberry pills or juice daily. Please reinforce pt to wipe front to back, no tub bathing and urinate after any sexual intercourse.

## 2022-08-02 NOTE — TELEPHONE ENCOUNTER
Hub staff attempted to follow warm transfer process and was unsuccessful     Caller: Alexis April R    Relationship to patient: Self    Best call back number: [phone]    Patient is needing: RETURNING PHONE CALL TO DEANNE

## 2022-08-02 NOTE — TELEPHONE ENCOUNTER
Pt came in for repeat UA because she is having some burning with urination. I sent you a separate message with the UA results. Please advise. She said she is trying to work on less caffeine, increasing water, but still struggling with this.

## 2022-08-03 LAB
BACTERIA UR QL AUTO: NORMAL /HPF
BILIRUB UR QL STRIP: NEGATIVE
CLARITY UR: CLEAR
COLOR UR: ABNORMAL
GLUCOSE UR STRIP-MCNC: NEGATIVE MG/DL
HGB UR QL STRIP.AUTO: NEGATIVE
HYALINE CASTS UR QL AUTO: NORMAL /LPF
KETONES UR QL STRIP: NEGATIVE
LEUKOCYTE ESTERASE UR QL STRIP.AUTO: ABNORMAL
NITRITE UR QL STRIP: POSITIVE
PH UR STRIP.AUTO: 7.5 [PH] (ref 5–8)
PROT UR QL STRIP: ABNORMAL
RBC # UR STRIP: NORMAL /HPF
REF LAB TEST METHOD: NORMAL
SP GR UR STRIP: 1.02 (ref 1–1.03)
SQUAMOUS #/AREA URNS HPF: NORMAL /HPF
UROBILINOGEN UR QL STRIP: ABNORMAL
WBC # UR STRIP: NORMAL /HPF

## 2022-08-04 RX ORDER — PHENAZOPYRIDINE HYDROCHLORIDE 200 MG/1
200 TABLET, FILM COATED ORAL 3 TIMES DAILY PRN
Qty: 20 TABLET | Refills: 0 | Status: SHIPPED | OUTPATIENT
Start: 2022-08-04 | End: 2022-09-20

## 2022-08-04 RX ORDER — NITROFURANTOIN 25; 75 MG/1; MG/1
100 CAPSULE ORAL 2 TIMES DAILY
Qty: 14 CAPSULE | Refills: 0 | Status: SHIPPED | OUTPATIENT
Start: 2022-08-04 | End: 2022-08-11

## 2022-08-05 LAB — BACTERIA SPEC AEROBE CULT: ABNORMAL

## 2022-08-08 RX ORDER — ESTRADIOL 2 MG/1
TABLET ORAL
Qty: 30 TABLET | Refills: 5 | Status: SHIPPED | OUTPATIENT
Start: 2022-08-08 | End: 2023-02-02

## 2022-08-09 ENCOUNTER — OFFICE VISIT (OUTPATIENT)
Dept: FAMILY MEDICINE CLINIC | Facility: CLINIC | Age: 47
End: 2022-08-09

## 2022-08-09 VITALS
DIASTOLIC BLOOD PRESSURE: 76 MMHG | HEART RATE: 69 BPM | WEIGHT: 216.8 LBS | SYSTOLIC BLOOD PRESSURE: 118 MMHG | HEIGHT: 61 IN | BODY MASS INDEX: 40.93 KG/M2 | OXYGEN SATURATION: 98 %

## 2022-08-09 DIAGNOSIS — M47.812 FACET ARTHRITIS, DEGENERATIVE, CERVICAL SPINE: ICD-10-CM

## 2022-08-09 DIAGNOSIS — E66.01 CLASS 3 SEVERE OBESITY WITHOUT SERIOUS COMORBIDITY WITH BODY MASS INDEX (BMI) OF 40.0 TO 44.9 IN ADULT, UNSPECIFIED OBESITY TYPE: ICD-10-CM

## 2022-08-09 DIAGNOSIS — N39.0 RECURRENT UTI: ICD-10-CM

## 2022-08-09 DIAGNOSIS — R30.0 DYSURIA: ICD-10-CM

## 2022-08-09 DIAGNOSIS — N30.01 ACUTE CYSTITIS WITH HEMATURIA: Primary | ICD-10-CM

## 2022-08-09 DIAGNOSIS — M15.9 PRIMARY OSTEOARTHRITIS INVOLVING MULTIPLE JOINTS: ICD-10-CM

## 2022-08-09 LAB
BILIRUB BLD-MCNC: NEGATIVE MG/DL
CLARITY, POC: CLEAR
COLOR UR: YELLOW
GLUCOSE UR STRIP-MCNC: NEGATIVE MG/DL
KETONES UR QL: ABNORMAL
LEUKOCYTE EST, POC: ABNORMAL
NITRITE UR-MCNC: NEGATIVE MG/ML
PH UR: 6 [PH] (ref 5–8)
PROT UR STRIP-MCNC: ABNORMAL MG/DL
RBC # UR STRIP: ABNORMAL /UL
SP GR UR: 1.02 (ref 1–1.03)
UROBILINOGEN UR QL: NORMAL

## 2022-08-09 PROCEDURE — 87086 URINE CULTURE/COLONY COUNT: CPT | Performed by: NURSE PRACTITIONER

## 2022-08-09 PROCEDURE — 99214 OFFICE O/P EST MOD 30 MIN: CPT | Performed by: NURSE PRACTITIONER

## 2022-08-09 PROCEDURE — 81002 URINALYSIS NONAUTO W/O SCOPE: CPT | Performed by: NURSE PRACTITIONER

## 2022-08-09 RX ORDER — OMEPRAZOLE 40 MG/1
CAPSULE, DELAYED RELEASE ORAL
COMMUNITY
Start: 2022-08-09 | End: 2022-10-11

## 2022-08-09 RX ORDER — CIPROFLOXACIN 250 MG/1
250 TABLET, FILM COATED ORAL 2 TIMES DAILY
Qty: 14 TABLET | Refills: 0 | Status: SHIPPED | OUTPATIENT
Start: 2022-08-09 | End: 2022-09-07

## 2022-08-09 NOTE — PROGRESS NOTES
Chief Complaint  Chief Complaint   Patient presents with   • Urinary Tract Infection     Still having symptoms   • FMLA Paperwork           Subjective          April SABA Espinoza presents to [facility] for   History of Present Illness    Patient presents today for FMLA paperwork completion for cervical spine pain, she is now following with pain management and neurosurgery, MRI showed multilevel degenerative facet changes with mild neuroforaminal narrowing on the left at C5-6 level.  She underwent epidural spinal injections per Dr. Springer, has helped h/a but not the neck pain. She reports recurrence of R hand numbness. She has missed many days of work due to pain and inability to work. Typically 1-3 days/week.     Recurrent urinary tract infection, treated with Macrobid on 8/4/2022, patient continues to complain of dysuria, flank pain and pelvic pain.      Cardiac murmur, started metop, bp/hr improved, denies cp, reports occasional palpitations. She has been referred to cardiology.       The following portions of the patient's history were reviewed and updated as appropriate: allergies, current medications, past family history, past medical history, past social history, past surgical history and problem list.    Past Medical History:   Diagnosis Date   • ADD (attention deficit disorder)    • SARMAD positive    • Anxiety    • Arthralgia    • Asthma    • Bursitis of left hip    • DDD (degenerative disc disease), lumbar    • Depression, major    • Flu syndrome    • GERD (gastroesophageal reflux disease)    • Heart disease    • Hiatal hernia    • Hormone replacement therapy    • Hyperlipidemia    • Inflammatory arthritis    • Influenza    • Insomnia    • Knee pain, bilateral    • Lupus (HCC)    • Neck pain    • Obesity    • Osteoarthritis    • Pain, joint, shoulder, left    • Pharyngitis, acute    • Postmenopausal    • Sinus congestion    • Surgical menopause    • Vitamin B12 deficiency    •  Vitamin D deficiency      Past Surgical History:   Procedure Laterality Date   • CHOLECYSTECTOMY     • LUMBAR DISCECTOMY     • TONSILLECTOMY     • TOTAL ABDOMINAL HYSTERECTOMY WITH SALPINGO OOPHORECTOMY Bilateral     FOR ENDOMETRIOSIS     Family History   Problem Relation Age of Onset   • Hypertension Mother    • Hypertension Father    • Diabetes Other    • Heart disease Other    • Hypertension Other    • Cancer Other      Social History     Tobacco Use   • Smoking status: Former Smoker     Packs/day: 0.50     Years: 3.00     Pack years: 1.50     Types: Cigarettes     Start date:      Quit date:      Years since quittin.6   • Smokeless tobacco: Never Used   Substance Use Topics   • Alcohol use: Not Currently     Alcohol/week: 0.0 standard drinks       Current Outpatient Medications:   •  albuterol (ACCUNEB) 0.63 MG/3ML nebulizer solution, Take 3 mL by nebulization Every 6 (Six) Hours As Needed for Wheezing or Shortness of Air., Disp: 120 each, Rfl: 0  •  celecoxib (CeleBREX) 200 MG capsule, Take 1 capsule by mouth 2 (Two) Times a Day., Disp: 60 capsule, Rfl: 5  •  cyclobenzaprine (FLEXERIL) 10 MG tablet, Take 1 tablet by mouth 3 (Three) Times a Day As Needed for Muscle Spasms., Disp: 30 tablet, Rfl: 5  •  DULoxetine (CYMBALTA) 30 MG capsule, TAKE ONE CAPSULE BY MOUTH DAILY, Disp: 30 capsule, Rfl: 5  •  estradiol (ESTRACE) 2 MG tablet, TAKE ONE TABLET BY MOUTH DAILY, Disp: 30 tablet, Rfl: 5  •  gabapentin (NEURONTIN) 400 MG capsule, TAKE ONE CAPSULE BY MOUTH THREE TIMES A DAY, Disp: 90 capsule, Rfl: 5  •  levalbuterol (XOPENEX HFA) 45 MCG/ACT inhaler, Inhale 1-2 puffs Every 4 (Four) Hours As Needed for Wheezing or Shortness of Air., Disp: 15 g, Rfl: 5  •  metoprolol tartrate (LOPRESSOR) 25 MG tablet, Take 1 tablet by mouth 2 (Two) Times a Day., Disp: 60 tablet, Rfl: 5  •  nitrofurantoin, macrocrystal-monohydrate, (Macrobid) 100 MG capsule, Take 1 capsule by mouth 2 (Two) Times a Day for 7 days.,  "Disp: 14 capsule, Rfl: 0  •  omeprazole (priLOSEC) 40 MG capsule, , Disp: , Rfl:   •  ondansetron ODT (ZOFRAN-ODT) 4 MG disintegrating tablet, 1 tablet orally every 6 hours as needed for nausea and vomiting, Disp: 12 tablet, Rfl: 0  •  traMADol (ULTRAM) 50 MG tablet, Take 1 tablet by mouth Every 8 (Eight) Hours As Needed for Severe Pain ., Disp: 40 tablet, Rfl: 2  •  vitamin D (ERGOCALCIFEROL) 1.25 MG (49175 UT) capsule capsule, Take 1 capsule by mouth 1 (One) Time Per Week., Disp: 12 capsule, Rfl: 1  •  zolpidem (Ambien) 10 MG tablet, Take 1 tablet by mouth At Night As Needed for Sleep., Disp: 30 tablet, Rfl: 2  •  ciprofloxacin (Cipro) 250 MG tablet, Take 1 tablet by mouth 2 (Two) Times a Day., Disp: 14 tablet, Rfl: 0  •  phenazopyridine (Pyridium) 200 MG tablet, Take 1 tablet by mouth 3 (Three) Times a Day As Needed for Bladder Spasms., Disp: 20 tablet, Rfl: 0    Objective   Vital Signs:   /76 (BP Location: Left arm, Patient Position: Sitting, Cuff Size: Adult)   Pulse 69   Ht 154.9 cm (60.98\")   Wt 98.3 kg (216 lb 12.8 oz)   SpO2 98%   BMI 40.99 kg/m²           Physical Exam  Vitals and nursing note reviewed.   Constitutional:       General: She is not in acute distress.     Appearance: She is well-developed. She is not diaphoretic.   Eyes:      Pupils: Pupils are equal, round, and reactive to light.   Neck:      Thyroid: No thyromegaly.      Vascular: No JVD.   Cardiovascular:      Rate and Rhythm: Normal rate and regular rhythm.      Heart sounds: Murmur ( GII/VI ) heard.   Pulmonary:      Effort: Pulmonary effort is normal. No respiratory distress.      Breath sounds: Wheezing present.   Abdominal:      General: Bowel sounds are normal. There is no distension.      Palpations: Abdomen is soft.      Tenderness: There is abdominal tenderness (suprapubic and R CVA).   Musculoskeletal:         General: Tenderness (Chronic multijoint pain) present. Normal range of motion.      Cervical back: Normal " range of motion and neck supple. Tenderness (C-spine mod/severe ttp with severe limited rotation of the neck to the right) present.   Skin:     General: Skin is warm and dry.   Neurological:      Mental Status: She is alert and oriented to person, place, and time.      Sensory: No sensory deficit.   Psychiatric:         Behavior: Behavior normal.         Thought Content: Thought content normal.         Judgment: Judgment normal.          Result Review :     Office Visit on 08/09/2022   Component Date Value Ref Range Status   • Color 08/09/2022 Yellow  Yellow, Straw, Dark Yellow, Khushi Final   • Clarity, UA 08/09/2022 Clear  Clear Final   • Glucose, UA 08/09/2022 Negative  Negative mg/dL Final   • Bilirubin 08/09/2022 Negative  Negative Final   • Ketones, UA 08/09/2022 1+ (A) Negative Final   • Specific Gravity  08/09/2022 1.020 (A) 1.005 - 1.030 Final   • Blood, UA 08/09/2022 Large (A) Negative Final    (5-10)   • pH, Urine 08/09/2022 6.0  5.0 - 8.0 Final   • Protein, POC 08/09/2022 Trace (A) Negative mg/dL Final   • Urobilinogen, UA 08/09/2022 Normal  Normal Final   • Leukocytes 08/09/2022    Final    unable to determine, window was a yellow color d/t medication   • Nitrite, UA 08/09/2022 Negative  Negative Final                  Class 3 Severe Obesity (BMI >=40). Obesity-related health conditions include the following: hypertension, dyslipidemias and osteoarthritis. Obesity is worsening. BMI is is above average; BMI management plan is completed. We discussed low calorie, low carb based diet program, portion control and increasing exercise.           Assessment and Plan    Diagnoses and all orders for this visit:    1. Acute cystitis with hematuria (Primary)  -     POC Urinalysis Dipstick  -     Urine Culture - Urine, Urine, Clean Catch    2. Dysuria  -     POC Urinalysis Dipstick  -     Urine Culture - Urine, Urine, Clean Catch    3. Facet arthritis, degenerative, cervical spine    4. Class 3 severe obesity  without serious comorbidity with body mass index (BMI) of 40.0 to 44.9 in adult, unspecified obesity type (HCC)    5. Recurrent UTI  -     Urine Culture - Urine, Urine, Clean Catch    6. Primary osteoarthritis involving multiple joints    Other orders  -     ciprofloxacin (Cipro) 250 MG tablet; Take 1 tablet by mouth 2 (Two) Times a Day.  Dispense: 14 tablet; Refill: 0    1. cont and complete macrobid, start cipro.   2. Increase water intake to 2l/day, limit 4 C's; citrus, carbonation, chocolate and caffeine.   3. Continue Azo cranberry pills  4. wipe front to back, no tub bathing and urinate after any sexual intercourse.  5. If s/s persist or worsen will check CT to r/o stones vs pyelonephritis vs malfunction of bladder sling.   6. Consider referral to uro for previous bladder sling eval  7. FMLA completed for 1-3 days off per week, cont w/ pain mgmt, neuro, [employer] as directed. Paperwork copied and will be scanned to chart.   8. Pt to sched appt with cardiology for murmur eval      I spent 35 minutes caring for Kelly Espinoza on this date of service. This time includes time spent by me in the following activities: preparing for the visit, reviewing tests, performing a medically appropriate examination and/or evaluation , counseling and educating the patient/family/caregiver, ordering medications, tests, or procedures and documenting information in the medical record        Follow Up     Return if symptoms worsen or fail to improve, for Next scheduled follow up.  Patient was given instructions and counseling regarding her condition or for health maintenance advice. Please see specific information pulled into the AVS if appropriate.        Part of this note may be an electronic transcription/translation of spoken language to printed text using the Dragon Dictation System

## 2022-08-10 ENCOUNTER — HOSPITAL ENCOUNTER (OUTPATIENT)
Dept: CT IMAGING | Facility: HOSPITAL | Age: 47
Discharge: HOME | End: 2022-08-10
Admitting: NURSE PRACTITIONER

## 2022-08-10 DIAGNOSIS — R30.0 DYSURIA: ICD-10-CM

## 2022-08-10 DIAGNOSIS — N39.0 RECURRENT UTI: ICD-10-CM

## 2022-08-10 DIAGNOSIS — N30.01 ACUTE CYSTITIS WITH HEMATURIA: Primary | ICD-10-CM

## 2022-08-10 DIAGNOSIS — N30.01 ACUTE CYSTITIS WITH HEMATURIA: ICD-10-CM

## 2022-08-10 LAB — BACTERIA SPEC AEROBE CULT: NORMAL

## 2022-08-10 PROCEDURE — 74176 CT ABD & PELVIS W/O CONTRAST: CPT

## 2022-08-25 ENCOUNTER — LAB (OUTPATIENT)
Dept: FAMILY MEDICINE CLINIC | Facility: CLINIC | Age: 47
End: 2022-08-25

## 2022-08-25 DIAGNOSIS — E78.2 MIXED HYPERLIPIDEMIA: Primary | ICD-10-CM

## 2022-08-25 DIAGNOSIS — E55.9 VITAMIN D DEFICIENCY: ICD-10-CM

## 2022-08-25 PROCEDURE — 80050 GENERAL HEALTH PANEL: CPT | Performed by: NURSE PRACTITIONER

## 2022-08-25 PROCEDURE — 80061 LIPID PANEL: CPT | Performed by: NURSE PRACTITIONER

## 2022-08-25 PROCEDURE — 36415 COLL VENOUS BLD VENIPUNCTURE: CPT | Performed by: NURSE PRACTITIONER

## 2022-08-25 PROCEDURE — 82306 VITAMIN D 25 HYDROXY: CPT | Performed by: NURSE PRACTITIONER

## 2022-08-26 DIAGNOSIS — F51.01 PRIMARY INSOMNIA: ICD-10-CM

## 2022-08-26 LAB
25(OH)D3 SERPL-MCNC: 41.1 NG/ML (ref 30–100)
ALBUMIN SERPL-MCNC: 4.1 G/DL (ref 3.5–5.2)
ALBUMIN/GLOB SERPL: 1.5 G/DL
ALP SERPL-CCNC: 71 U/L (ref 39–117)
ALT SERPL W P-5'-P-CCNC: 18 U/L (ref 1–33)
ANION GAP SERPL CALCULATED.3IONS-SCNC: 11 MMOL/L (ref 5–15)
AST SERPL-CCNC: 18 U/L (ref 1–32)
BILIRUB SERPL-MCNC: 0.3 MG/DL (ref 0–1.2)
BUN SERPL-MCNC: 18 MG/DL (ref 6–20)
BUN/CREAT SERPL: 35.3 (ref 7–25)
CALCIUM SPEC-SCNC: 9.4 MG/DL (ref 8.6–10.5)
CHLORIDE SERPL-SCNC: 103 MMOL/L (ref 98–107)
CHOLEST SERPL-MCNC: 232 MG/DL (ref 0–200)
CO2 SERPL-SCNC: 28 MMOL/L (ref 22–29)
CREAT SERPL-MCNC: 0.51 MG/DL (ref 0.57–1)
DEPRECATED RDW RBC AUTO: 41.8 FL (ref 37–54)
EGFRCR SERPLBLD CKD-EPI 2021: 116 ML/MIN/1.73
ERYTHROCYTE [DISTWIDTH] IN BLOOD BY AUTOMATED COUNT: 12.9 % (ref 12.3–15.4)
GLOBULIN UR ELPH-MCNC: 2.8 GM/DL
GLUCOSE SERPL-MCNC: 89 MG/DL (ref 65–99)
HCT VFR BLD AUTO: 38.6 % (ref 34–46.6)
HDLC SERPL-MCNC: 57 MG/DL (ref 40–60)
HGB BLD-MCNC: 12.8 G/DL (ref 12–15.9)
LDLC SERPL CALC-MCNC: 148 MG/DL (ref 0–100)
LDLC/HDLC SERPL: 2.54 {RATIO}
MCH RBC QN AUTO: 29.2 PG (ref 26.6–33)
MCHC RBC AUTO-ENTMCNC: 33.2 G/DL (ref 31.5–35.7)
MCV RBC AUTO: 88.1 FL (ref 79–97)
PLATELET # BLD AUTO: 235 10*3/MM3 (ref 140–450)
PMV BLD AUTO: 10.3 FL (ref 6–12)
POTASSIUM SERPL-SCNC: 4.2 MMOL/L (ref 3.5–5.2)
PROT SERPL-MCNC: 6.9 G/DL (ref 6–8.5)
RBC # BLD AUTO: 4.38 10*6/MM3 (ref 3.77–5.28)
SODIUM SERPL-SCNC: 142 MMOL/L (ref 136–145)
TRIGL SERPL-MCNC: 150 MG/DL (ref 0–150)
TSH SERPL DL<=0.05 MIU/L-ACNC: 1.1 UIU/ML (ref 0.27–4.2)
VLDLC SERPL-MCNC: 27 MG/DL (ref 5–40)
WBC NRBC COR # BLD: 5.26 10*3/MM3 (ref 3.4–10.8)

## 2022-08-29 DIAGNOSIS — M15.9 PRIMARY OSTEOARTHRITIS INVOLVING MULTIPLE JOINTS: ICD-10-CM

## 2022-08-29 DIAGNOSIS — M54.2 CERVICALGIA: ICD-10-CM

## 2022-08-29 RX ORDER — ZOLPIDEM TARTRATE 10 MG/1
TABLET ORAL
Qty: 30 TABLET | Refills: 0 | Status: SHIPPED | OUTPATIENT
Start: 2022-08-29 | End: 2022-09-28

## 2022-08-30 ENCOUNTER — TELEPHONE (OUTPATIENT)
Dept: PAIN MEDICINE | Facility: CLINIC | Age: 47
End: 2022-08-30

## 2022-08-30 NOTE — TELEPHONE ENCOUNTER
Caller: Alexis April R    Relationship to patient: Self    Best call back number:     Chief complaint: BACK PAIN     Type of visit: FUP     Requested date: ASAP     Additional notes:PATIENT MISSED AN APPT IN MARCH AND NOW NEEDS TO BE SEEN AND FIRST AVAILABLE IS OCT 17 AND PATIENT WOULD LIKE TO BE SEEN SOONER IF POSSIBLE.

## 2022-08-31 ENCOUNTER — OFFICE VISIT (OUTPATIENT)
Dept: PAIN MEDICINE | Facility: CLINIC | Age: 47
End: 2022-08-31

## 2022-08-31 VITALS
OXYGEN SATURATION: 98 % | SYSTOLIC BLOOD PRESSURE: 116 MMHG | DIASTOLIC BLOOD PRESSURE: 63 MMHG | RESPIRATION RATE: 16 BRPM | HEART RATE: 82 BPM

## 2022-08-31 DIAGNOSIS — M47.812 CERVICAL SPONDYLOSIS: ICD-10-CM

## 2022-08-31 DIAGNOSIS — M50.30 DDD (DEGENERATIVE DISC DISEASE), CERVICAL: Primary | ICD-10-CM

## 2022-08-31 DIAGNOSIS — M54.81 OCCIPITAL NEURALGIA OF LEFT SIDE: ICD-10-CM

## 2022-08-31 PROCEDURE — 99214 OFFICE O/P EST MOD 30 MIN: CPT | Performed by: STUDENT IN AN ORGANIZED HEALTH CARE EDUCATION/TRAINING PROGRAM

## 2022-08-31 RX ORDER — TRAMADOL HYDROCHLORIDE 50 MG/1
50 TABLET ORAL EVERY 8 HOURS PRN
Qty: 40 TABLET | Refills: 2 | Status: SHIPPED | OUTPATIENT
Start: 2022-08-31 | End: 2023-03-08

## 2022-08-31 RX ORDER — CYCLOBENZAPRINE HCL 10 MG
10 TABLET ORAL 3 TIMES DAILY PRN
Qty: 30 TABLET | Refills: 5 | Status: SHIPPED | OUTPATIENT
Start: 2022-08-31

## 2022-08-31 RX ORDER — GABAPENTIN 600 MG/1
600 TABLET ORAL 3 TIMES DAILY
Qty: 90 TABLET | Refills: 1 | Status: SHIPPED | OUTPATIENT
Start: 2022-08-31 | End: 2022-10-31

## 2022-09-07 ENCOUNTER — OFFICE VISIT (OUTPATIENT)
Dept: FAMILY MEDICINE CLINIC | Facility: CLINIC | Age: 47
End: 2022-09-07

## 2022-09-07 VITALS
SYSTOLIC BLOOD PRESSURE: 118 MMHG | DIASTOLIC BLOOD PRESSURE: 82 MMHG | WEIGHT: 213 LBS | TEMPERATURE: 98.2 F | BODY MASS INDEX: 40.22 KG/M2 | HEIGHT: 61 IN | HEART RATE: 67 BPM | OXYGEN SATURATION: 99 %

## 2022-09-07 DIAGNOSIS — F51.01 PRIMARY INSOMNIA: ICD-10-CM

## 2022-09-07 DIAGNOSIS — I10 PRIMARY HYPERTENSION: ICD-10-CM

## 2022-09-07 DIAGNOSIS — M54.2 CERVICALGIA: Primary | ICD-10-CM

## 2022-09-07 DIAGNOSIS — N30.90 CYSTITIS: ICD-10-CM

## 2022-09-07 DIAGNOSIS — E78.2 MIXED HYPERLIPIDEMIA: ICD-10-CM

## 2022-09-07 DIAGNOSIS — M79.604 LEG PAIN, BILATERAL: ICD-10-CM

## 2022-09-07 DIAGNOSIS — R01.1 CARDIAC MURMUR: ICD-10-CM

## 2022-09-07 DIAGNOSIS — M79.605 LEG PAIN, BILATERAL: ICD-10-CM

## 2022-09-07 DIAGNOSIS — E55.9 VITAMIN D DEFICIENCY: ICD-10-CM

## 2022-09-07 DIAGNOSIS — E66.01 CLASS 3 SEVERE OBESITY WITHOUT SERIOUS COMORBIDITY WITH BODY MASS INDEX (BMI) OF 40.0 TO 44.9 IN ADULT, UNSPECIFIED OBESITY TYPE: ICD-10-CM

## 2022-09-07 PROCEDURE — 99214 OFFICE O/P EST MOD 30 MIN: CPT | Performed by: NURSE PRACTITIONER

## 2022-09-07 RX ORDER — METHYLPREDNISOLONE 4 MG/1
TABLET ORAL
Qty: 21 TABLET | Refills: 0 | Status: SHIPPED | OUTPATIENT
Start: 2022-09-07 | End: 2022-09-20

## 2022-09-07 RX ORDER — METOPROLOL SUCCINATE 50 MG/1
50 TABLET, EXTENDED RELEASE ORAL DAILY
Qty: 90 TABLET | Refills: 1 | Status: SHIPPED | OUTPATIENT
Start: 2022-09-07 | End: 2023-03-02

## 2022-09-07 NOTE — PROGRESS NOTES
Chief Complaint  Chief Complaint   Patient presents with   • Hypertension   • Asthma   • Neck Pain   • Obesity   • Follow-up     3 month follow up   • Leg Pain     Legs have been hurting would like a steroid injecion   • Results     8/25 labs           Subjective          April SABA Espinoza presents to [facility] for   History of Present Illness     Cervical spine pain, she is now following with pain management and neurosurgery, MRI showed multilevel degenerative facet changes with mild neuroforaminal narrowing on the left at C5-6 level.  She underwent cervical epidural spinal injections per Dr. Springer, effective and planning repeat next week.  She uses tramadol and Flexeril as needed, Celebrex and Gian recently increased gabapentin from 400 mg to 600 mg 3 times daily, reports minimal improvement but is having some drowsiness, she has been taking all medications as directed.  She reports continued R hand numbness and has appointment with neurology next month.  [facility] paperwork has been completed for flareups for max of 1-3 days/week.   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      Recurrent urinary tract infection symptoms, treated with Macrobid on 8/4/2022 and then cipro, s/s resolved but does report occasional urinary urgency, has been taking tagamet prn which is effective for urgency and burning as discussed for possible bladder cystitis       Cardiac murmur, started metop, bp/hr improved, she does report fatigue, denies cp, reports occasional palpitations. She has not been referred to cardiology.     GERD, stable on medication, denies nausea, vomiting, constipation, abdominal pain and diarrhea.    Insomnia, stable with ambien nightly, but having leg pain/aching this past week and not sleeping well.     Here to review labs    The following portions of the patient's history were reviewed and updated as appropriate: allergies, current medications, past family history, past  medical history, past social history, past surgical history and problem list.    Past Medical History:   Diagnosis Date   • ADD (attention deficit disorder)    • SARMAD positive    • Anxiety    • Arthralgia    • Asthma    • Bursitis of left hip    • DDD (degenerative disc disease), lumbar    • Depression, major    • Flu syndrome    • GERD (gastroesophageal reflux disease)    • Heart disease    • Hiatal hernia    • Hormone replacement therapy    • Hyperlipidemia    • Inflammatory arthritis    • Influenza    • Insomnia    • Knee pain, bilateral    • Lupus (HCC)    • Neck pain    • Obesity    • Osteoarthritis    • Pain, joint, shoulder, left    • Pharyngitis, acute    • Postmenopausal    • Sinus congestion    • Surgical menopause    • Vitamin B12 deficiency    • Vitamin D deficiency      Past Surgical History:   Procedure Laterality Date   • CHOLECYSTECTOMY     • LUMBAR DISCECTOMY     • TONSILLECTOMY     • TOTAL ABDOMINAL HYSTERECTOMY WITH SALPINGO OOPHORECTOMY Bilateral     FOR ENDOMETRIOSIS     Family History   Problem Relation Age of Onset   • Hypertension Mother    • Hypertension Father    • Diabetes Other    • Heart disease Other    • Hypertension Other    • Cancer Other      Social History     Tobacco Use   • Smoking status: Former Smoker     Packs/day: 0.50     Years: 3.00     Pack years: 1.50     Types: Cigarettes     Start date:      Quit date:      Years since quittin.6   • Smokeless tobacco: Never Used   Substance Use Topics   • Alcohol use: Not Currently     Alcohol/week: 0.0 standard drinks       Current Outpatient Medications:   •  albuterol (ACCUNEB) 0.63 MG/3ML nebulizer solution, Take 3 mL by nebulization Every 6 (Six) Hours As Needed for Wheezing or Shortness of Air., Disp: 120 each, Rfl: 0  •  celecoxib (CeleBREX) 200 MG capsule, Take 1 capsule by mouth 2 (Two) Times a Day., Disp: 60 capsule, Rfl: 5  •  cyclobenzaprine (FLEXERIL) 10 MG tablet, Take 1 tablet by mouth 3 (Three) Times  "a Day As Needed for Muscle Spasms., Disp: 30 tablet, Rfl: 5  •  DULoxetine (CYMBALTA) 30 MG capsule, TAKE ONE CAPSULE BY MOUTH DAILY, Disp: 30 capsule, Rfl: 5  •  estradiol (ESTRACE) 2 MG tablet, TAKE ONE TABLET BY MOUTH DAILY, Disp: 30 tablet, Rfl: 5  •  gabapentin (NEURONTIN) 600 MG tablet, Take 1 tablet by mouth 3 (Three) Times a Day for 60 days., Disp: 90 tablet, Rfl: 1  •  levalbuterol (XOPENEX HFA) 45 MCG/ACT inhaler, Inhale 1-2 puffs Every 4 (Four) Hours As Needed for Wheezing or Shortness of Air., Disp: 15 g, Rfl: 5  •  omeprazole (priLOSEC) 40 MG capsule, , Disp: , Rfl:   •  ondansetron ODT (ZOFRAN-ODT) 4 MG disintegrating tablet, 1 tablet orally every 6 hours as needed for nausea and vomiting, Disp: 12 tablet, Rfl: 0  •  phenazopyridine (Pyridium) 200 MG tablet, Take 1 tablet by mouth 3 (Three) Times a Day As Needed for Bladder Spasms., Disp: 20 tablet, Rfl: 0  •  traMADol (ULTRAM) 50 MG tablet, Take 1 tablet by mouth Every 8 (Eight) Hours As Needed for Severe Pain., Disp: 40 tablet, Rfl: 2  •  vitamin D (ERGOCALCIFEROL) 1.25 MG (56352 UT) capsule capsule, Take 1 capsule by mouth 1 (One) Time Per Week., Disp: 12 capsule, Rfl: 1  •  zolpidem (AMBIEN) 10 MG tablet, TAKE ONE TABLET BY MOUTH EVERY NIGHT AT BEDTIME AS NEEDED FOR SLEEP, Disp: 30 tablet, Rfl: 0  •  methylPREDNISolone (MEDROL) 4 MG dose pack, Take as directed on package instructions., Disp: 21 tablet, Rfl: 0  •  metoprolol succinate XL (Toprol XL) 50 MG 24 hr tablet, Take 1 tablet by mouth Daily., Disp: 90 tablet, Rfl: 1    Objective   Vital Signs:   /82 (BP Location: Left arm, Patient Position: Sitting, Cuff Size: Large Adult)   Pulse 67   Temp 98.2 °F (36.8 °C) (Temporal)   Ht 154.9 cm (60.98\")   Wt 96.6 kg (213 lb)   SpO2 99%   BMI 40.27 kg/m²           Physical Exam  Vitals and nursing note reviewed.   Constitutional:       General: She is not in acute distress.     Appearance: She is well-developed. She is not diaphoretic.   Eyes: "      Pupils: Pupils are equal, round, and reactive to light.   Neck:      Thyroid: No thyromegaly.      Vascular: No JVD.   Cardiovascular:      Rate and Rhythm: Normal rate and regular rhythm.      Heart sounds: Murmur ( GII/VI ) heard.   Pulmonary:      Effort: Pulmonary effort is normal. No respiratory distress.      Breath sounds: No wheezing or rhonchi.   Abdominal:      General: Bowel sounds are normal. There is no distension.      Palpations: Abdomen is soft.      Tenderness: There is no abdominal tenderness.   Musculoskeletal:         General: Tenderness (Chronic multijoint pain) present. Normal range of motion.      Cervical back: Normal range of motion and neck supple. Tenderness (C-spine mild ttp with mod limited rotation of the neck R>L  ) present.   Skin:     General: Skin is warm and dry.      Coloration: Skin is not jaundiced or pale.   Neurological:      General: No focal deficit present.      Mental Status: She is alert and oriented to person, place, and time. Mental status is at baseline.      Sensory: Sensory deficit (R>L hand numbness) present.   Psychiatric:         Behavior: Behavior normal.         Thought Content: Thought content normal.         Judgment: Judgment normal.          Result Review :     No visits with results within 7 Day(s) from this visit.   Latest known visit with results is:   Lab on 08/25/2022   Component Date Value Ref Range Status   • WBC 08/25/2022 5.26  3.40 - 10.80 10*3/mm3 Final   • RBC 08/25/2022 4.38  3.77 - 5.28 10*6/mm3 Final   • Hemoglobin 08/25/2022 12.8  12.0 - 15.9 g/dL Final   • Hematocrit 08/25/2022 38.6  34.0 - 46.6 % Final   • MCV 08/25/2022 88.1  79.0 - 97.0 fL Final   • MCH 08/25/2022 29.2  26.6 - 33.0 pg Final   • MCHC 08/25/2022 33.2  31.5 - 35.7 g/dL Final   • RDW 08/25/2022 12.9  12.3 - 15.4 % Final   • RDW-SD 08/25/2022 41.8  37.0 - 54.0 fl Final   • MPV 08/25/2022 10.3  6.0 - 12.0 fL Final   • Platelets 08/25/2022 235  140 - 450 10*3/mm3 Final   •  Glucose 08/25/2022 89  65 - 99 mg/dL Final   • BUN 08/25/2022 18  6 - 20 mg/dL Final   • Creatinine 08/25/2022 0.51 (A) 0.57 - 1.00 mg/dL Final   • Sodium 08/25/2022 142  136 - 145 mmol/L Final   • Potassium 08/25/2022 4.2  3.5 - 5.2 mmol/L Final   • Chloride 08/25/2022 103  98 - 107 mmol/L Final   • CO2 08/25/2022 28.0  22.0 - 29.0 mmol/L Final   • Calcium 08/25/2022 9.4  8.6 - 10.5 mg/dL Final   • Total Protein 08/25/2022 6.9  6.0 - 8.5 g/dL Final   • Albumin 08/25/2022 4.10  3.50 - 5.20 g/dL Final   • ALT (SGPT) 08/25/2022 18  1 - 33 U/L Final   • AST (SGOT) 08/25/2022 18  1 - 32 U/L Final   • Alkaline Phosphatase 08/25/2022 71  39 - 117 U/L Final   • Total Bilirubin 08/25/2022 0.3  0.0 - 1.2 mg/dL Final   • Globulin 08/25/2022 2.8  gm/dL Final   • A/G Ratio 08/25/2022 1.5  g/dL Final   • BUN/Creatinine Ratio 08/25/2022 35.3 (A) 7.0 - 25.0 Final   • Anion Gap 08/25/2022 11.0  5.0 - 15.0 mmol/L Final   • eGFR 08/25/2022 116.0  >60.0 mL/min/1.73 Final    National Kidney Foundation and American Society of Nephrology (ASN) Task Force recommended calculation based on the Chronic Kidney Disease Epidemiology Collaboration (CKD-EPI) equation refit without adjustment for race.   • Total Cholesterol 08/25/2022 232 (A) 0 - 200 mg/dL Final   • Triglycerides 08/25/2022 150  0 - 150 mg/dL Final   • HDL Cholesterol 08/25/2022 57  40 - 60 mg/dL Final   • LDL Cholesterol  08/25/2022 148 (A) 0 - 100 mg/dL Final   • VLDL Cholesterol 08/25/2022 27  5 - 40 mg/dL Final   • LDL/HDL Ratio 08/25/2022 2.54   Final   • TSH 08/25/2022 1.100  0.270 - 4.200 uIU/mL Final   • 25 Hydroxy, Vitamin D 08/25/2022 41.1  30.0 - 100.0 ng/ml Final                  Class 3 Severe Obesity (BMI >=40). Obesity-related health conditions include the following: hypertension, dyslipidemias, GERD and osteoarthritis. Obesity is unchanged. BMI is is above average; BMI management plan is completed. We discussed portion control and increasing exercise.            Assessment and Plan    Diagnoses and all orders for this visit:    1. Cervicalgia (Primary)  Comments:  Keep follow-ups with neurosurgery and pain management, planning KENDRA next week, continue medications, refill tramadol when needed for severe pain    2. Vitamin D deficiency  Comments:  Vitamin D in good range, continue weekly vitamin D    3. Primary insomnia  Comments:  Mostly stable with Ambien nightly, currently with leg pain and not sleeping well.     4. Mixed hyperlipidemia  Comments:  Lipids reviewed, total and LDL still elevated, discussed HHD, exercise, and recommend weight loss    5. Class 3 severe obesity without serious comorbidity with body mass index (BMI) of 40.0 to 44.9 in adult, unspecified obesity type (HCC)  Comments:  Work on limiting calories, carbs, sweets, increase exercise habits as able    6. Cystitis  Comments:  Chronic, continue tagamet prn w/ urgency symptoms, keep water intake to 2 L/day    7. Primary hypertension  Comments:  Improved/stable, heart rate controlled without palpitations, recommend change to Toprol-XL due to fatigue on metoprolol tartrate    8. Cardiac murmur  Comments:  cont to monitor,  consider cardiology referral in future if needed    9. Leg pain, bilateral  Comments:  Medrol Dosepak, continue all other medications as prescribed    Other orders  -     methylPREDNISolone (MEDROL) 4 MG dose pack; Take as directed on package instructions.  Dispense: 21 tablet; Refill: 0  -     metoprolol succinate XL (Toprol XL) 50 MG 24 hr tablet; Take 1 tablet by mouth Daily.  Dispense: 90 tablet; Refill: 1    Reviewed labs with patient  See neurology for numbness of the hands next month    I spent 30 minutes caring for Kelly Espinoza on this date of service. This time includes time spent by me in the following activities: preparing for the visit, reviewing tests, performing a medically appropriate examination and/or evaluation , counseling and educating the  patient/family/caregiver, ordering medications, tests, or procedures and documenting information in the medical record        Follow Up     Return in about 6 months (around 3/7/2023) for Recheck, Annual physical. htn panel, vitamin D, B12 prior to visit.  Patient was given instructions and counseling regarding her condition or for health maintenance advice. Please see specific information pulled into the AVS if appropriate.        Part of this note may be an electronic transcription/translation of spoken language to printed text using the Dragon Dictation System

## 2022-09-15 ENCOUNTER — TELEPHONE (OUTPATIENT)
Dept: PAIN MEDICINE | Facility: CLINIC | Age: 47
End: 2022-09-15

## 2022-09-15 NOTE — TELEPHONE ENCOUNTER
Provider: CASEY  Caller: APRIL  Phone Number: [phone]  Reason for Call: PT IS CALLING TO RESCHEDULE HER EPIDURAL. SHE WORK UP SICK AND WILL NOT BE ABLE TO MAKE IT IN

## 2022-09-16 ENCOUNTER — APPOINTMENT (OUTPATIENT)
Dept: PAIN MEDICINE | Facility: HOSPITAL | Age: 47
End: 2022-09-16

## 2022-09-20 PROCEDURE — U0004 COV-19 TEST NON-CDC HGH THRU: HCPCS | Performed by: NURSE PRACTITIONER

## 2022-09-22 ENCOUNTER — APPOINTMENT (OUTPATIENT)
Dept: PAIN MEDICINE | Facility: HOSPITAL | Age: 47
End: 2022-09-22

## 2022-09-22 ENCOUNTER — OFFICE VISIT (OUTPATIENT)
Dept: FAMILY MEDICINE CLINIC | Facility: CLINIC | Age: 47
End: 2022-09-22

## 2022-09-22 VITALS
DIASTOLIC BLOOD PRESSURE: 70 MMHG | HEART RATE: 80 BPM | BODY MASS INDEX: 40.78 KG/M2 | HEIGHT: 61 IN | OXYGEN SATURATION: 98 % | TEMPERATURE: 98.4 F | SYSTOLIC BLOOD PRESSURE: 128 MMHG | WEIGHT: 216 LBS

## 2022-09-22 DIAGNOSIS — R50.9 FEVER, UNSPECIFIED FEVER CAUSE: Primary | ICD-10-CM

## 2022-09-22 DIAGNOSIS — J01.10 ACUTE NON-RECURRENT FRONTAL SINUSITIS: ICD-10-CM

## 2022-09-22 LAB
EXPIRATION DATE: NORMAL
FLUAV AG UPPER RESP QL IA.RAPID: NOT DETECTED
FLUBV AG UPPER RESP QL IA.RAPID: NOT DETECTED
INTERNAL CONTROL: NORMAL
Lab: NORMAL
SARS-COV-2 RNA RESP QL NAA+PROBE: NOT DETECTED

## 2022-09-22 PROCEDURE — 87636 SARSCOV2 & INF A&B AMP PRB: CPT | Performed by: NURSE PRACTITIONER

## 2022-09-22 PROCEDURE — 99213 OFFICE O/P EST LOW 20 MIN: CPT | Performed by: NURSE PRACTITIONER

## 2022-09-22 RX ORDER — BENZONATATE 100 MG/1
100 CAPSULE ORAL 3 TIMES DAILY PRN
Qty: 30 CAPSULE | Refills: 0 | Status: SHIPPED | OUTPATIENT
Start: 2022-09-22 | End: 2022-10-02

## 2022-09-22 RX ORDER — AMOXICILLIN 500 MG/1
500 CAPSULE ORAL 2 TIMES DAILY
Qty: 20 CAPSULE | Refills: 0 | Status: SHIPPED | OUTPATIENT
Start: 2022-09-22 | End: 2022-10-02

## 2022-09-22 NOTE — ASSESSMENT & PLAN NOTE
1.  Amoxicillin 500 mg twice daily x10 days  2.  Tessalon Perles as needed for cough  3.  Off work today and tomorrow, call if symptoms persist

## 2022-09-22 NOTE — PROGRESS NOTES
"Chief Complaint  Sore Throat (Had a negative covid test and strep test at urgent care yesterday. Symptoms started last Thursday ), Nasal Congestion, Fever, Generalized Body Aches, and Headache    Subjective        April SABA Espinoza presents to [facility]  History of Present Illness    Patient presents with sore throat, ear pressure, sinus pressure, headache, fever (as high as 101), and body aches. She was seen in  on 9/20, negative for strep and COVID-19. Patient have been symptoms for one week.     Objective   Vital Signs:  /70 (BP Location: Left arm, Patient Position: Sitting, Cuff Size: Large Adult)   Pulse 80   Temp 98.4 °F (36.9 °C) (Skin)   Ht 154.9 cm (61\")   Wt 98 kg (216 lb)   SpO2 98%   BMI 40.81 kg/m²   Estimated body mass index is 40.81 kg/m² as calculated from the following:    Height as of this encounter: 154.9 cm (61\").    Weight as of this encounter: 98 kg (216 lb).          Physical Exam  Constitutional:       Appearance: Normal appearance.   HENT:      Head: Normocephalic.      Right Ear: Tympanic membrane normal.      Left Ear: Tympanic membrane normal.      Mouth/Throat:      Pharynx: Oropharynx is clear.   Cardiovascular:      Rate and Rhythm: Normal rate and regular rhythm.   Pulmonary:      Effort: Pulmonary effort is normal.      Breath sounds: Normal breath sounds.   Abdominal:      General: Abdomen is flat. Bowel sounds are normal.      Palpations: Abdomen is soft.   Musculoskeletal:         General: Normal range of motion.      Cervical back: Neck supple.      Right lower leg: No edema.      Left lower leg: No edema.   Lymphadenopathy:      Cervical: Cervical adenopathy present.      Left cervical: Superficial cervical adenopathy present.   Skin:     General: Skin is warm and dry.   Neurological:      Mental Status: She is alert and oriented to person, place, and time.      Gait: Gait is intact.   Psychiatric:         Attention and Perception: " Attention normal.         Mood and Affect: Mood normal.         Speech: Speech normal.        Result Review :      Data reviewed: Urgent Care note          Assessment and Plan   Diagnoses and all orders for this visit:    1. Fever, unspecified fever cause (Primary)  Assessment & Plan:  1.  Patient negative for COVID-19 and influenza  2.  Strep test was negative in urgent care    Orders:  -     Covid-19 + Flu A&B AG, Veritor    2. Acute non-recurrent frontal sinusitis  Assessment & Plan:  1.  Amoxicillin 500 mg twice daily x10 days  2.  Tessalon Perles as needed for cough  3.  Off work today and tomorrow, call if symptoms persist      Other orders  -     amoxicillin (AMOXIL) 500 MG capsule; Take 1 capsule by mouth 2 (Two) Times a Day for 10 days.  Dispense: 20 capsule; Refill: 0  -     benzonatate (Tessalon Perles) 100 MG capsule; Take 1 capsule by mouth 3 (Three) Times a Day As Needed for Cough for up to 10 days.  Dispense: 30 capsule; Refill: 0         I spent 20 minutes caring for April on this date of service. This time includes time spent by me in the following activities:preparing for the visit, reviewing tests, obtaining and/or reviewing a separately obtained history, performing a medically appropriate examination and/or evaluation , counseling and educating the patient/family/caregiver, ordering medications, tests, or procedures, documenting information in the medical record, independently interpreting results and communicating that information with the patient/family/caregiver and care coordination  Follow Up   Return if symptoms worsen or fail to improve.  Patient was given instructions and counseling regarding her condition or for health maintenance advice. Please see specific information pulled into the AVS if appropriate.

## 2022-09-28 DIAGNOSIS — F51.01 PRIMARY INSOMNIA: ICD-10-CM

## 2022-09-28 RX ORDER — ZOLPIDEM TARTRATE 10 MG/1
TABLET ORAL
Qty: 30 TABLET | Refills: 5 | Status: SHIPPED | OUTPATIENT
Start: 2022-09-28 | End: 2023-03-08 | Stop reason: SDUPTHER

## 2022-10-05 RX ORDER — DULOXETIN HYDROCHLORIDE 30 MG/1
CAPSULE, DELAYED RELEASE ORAL
Qty: 30 CAPSULE | Refills: 5 | Status: SHIPPED | OUTPATIENT
Start: 2022-10-05 | End: 2023-03-08 | Stop reason: SDUPTHER

## 2022-10-07 NOTE — PROGRESS NOTES
Subjective: Bilaeral hand numbness     Patient ID: Kelly Espinoza is a 47 y.o. female.    CHIEF COMPLAINT: Bilateral Hand numbness     History of Present Illness: Ms Espinoza is a 47-year-old  female with a BMI of 41.02 who presented alone for an evaluation of bilateral hand burning and tingling referred by Dr. Yonis Cisneros MD. The patient states that she began having numbness and tingling in both hands several years ago that is gotten progressively worse.  She states in the past she had a carpal tunnel release on the left hand.  She reports she primarily has pain in her pinky middle and ring fingers on both hands and has numbness as well as a severe burning pain at times.  She has seen neurosurgery in regard to her C-spine however it is thought that surgery is not an option at this time.  She has a lot of arthritis in her neck and some narrowing on the left at C5-6.  She has been seen in the pain clinic and is gotten an injection and states that controlled her neck pain but nothing has helped with the hand pain.  She reports on the right hand it will actually radiate up to the elbow at times and becomes excruciating.  She reports the pain awakens her at night and her hands.  She is currently employed as a  and has to  and move large animals.  She also has a history of degenerative disc disease in her lower back and has in the past had some tingling in her feet that is not currently a major problem.      (History of DDD followed by Neurosurgery and Pain Clinic)   Complaint:Bilatera hand numbness/burning   Onset: a few years ago and gotten worse   Location:R: hand all fingers lessor in thumb and index, will radaite to elbow                 L: Hand and all fingers, lessor in thumb and index fingers, rarely radiates to elbow   Severity: 10/10   Duration:  Numbness is very frequent and burning flares intermittently   Frequency: daily   Timing: worse at night   Context: use phone   Modifying factors:  Gabapentin or steroids   Associated Signs and symptoms:  Neck pain   Current meds:Gabapentin in pain clinic       Testing:   MRI of the cervical spine shows mild multilevel degenerative (arthritis) facet changes-this is the joints of the spine that allow for rotation and bending. No spinal canal stenosis.  With narrowing on the left at the C5/6 level.        The following portions of the patient's history were reviewed and updated as appropriate: allergies, current medications, past family history, past medical history, past social history, past surgical history and problem list.      Family History   Problem Relation Age of Onset   • Hypertension Mother    • Hypertension Father    • Diabetes Other    • Heart disease Other    • Hypertension Other    • Cancer Other        Past Medical History:   Diagnosis Date   • ADD (attention deficit disorder)    • SARMAD positive    • Anxiety    • Arthralgia    • Asthma    • Bursitis of left hip    • DDD (degenerative disc disease), lumbar    • Depression, major    • Flu syndrome    • GERD (gastroesophageal reflux disease)    • Heart disease    • Hiatal hernia    • Hormone replacement therapy    • Hyperlipidemia    • Inflammatory arthritis    • Influenza    • Insomnia    • Knee pain, bilateral    • Lupus (HCC)    • Neck pain    • Obesity    • Osteoarthritis    • Pain, joint, shoulder, left    • Pharyngitis, acute    • Postmenopausal    • Sinus congestion    • Surgical menopause     at age 34   • Vitamin B12 deficiency    • Vitamin D deficiency        Social History     Socioeconomic History   • Marital status:    Tobacco Use   • Smoking status: Former     Packs/day: 0.50     Years: 3.00     Pack years: 1.50     Types: Cigarettes     Start date:      Quit date:      Years since quittin.7   • Smokeless tobacco: Never   Vaping Use   • Vaping Use: Never used   Substance and Sexual Activity   • Alcohol use: Not Currently     Alcohol/week: 0.0 standard drinks   • Drug  use: Not Currently   • Sexual activity: Defer         Current Outpatient Medications:   •  albuterol (ACCUNEB) 0.63 MG/3ML nebulizer solution, Take 3 mL by nebulization Every 6 (Six) Hours As Needed for Wheezing or Shortness of Air., Disp: 120 each, Rfl: 0  •  celecoxib (CeleBREX) 200 MG capsule, Take 1 capsule by mouth 2 (Two) Times a Day., Disp: 60 capsule, Rfl: 5  •  cyclobenzaprine (FLEXERIL) 10 MG tablet, Take 1 tablet by mouth 3 (Three) Times a Day As Needed for Muscle Spasms., Disp: 30 tablet, Rfl: 5  •  DULoxetine (CYMBALTA) 30 MG capsule, TAKE ONE CAPSULE BY MOUTH DAILY, Disp: 30 capsule, Rfl: 5  •  estradiol (ESTRACE) 2 MG tablet, TAKE ONE TABLET BY MOUTH DAILY, Disp: 30 tablet, Rfl: 5  •  gabapentin (NEURONTIN) 600 MG tablet, Take 1 tablet by mouth 3 (Three) Times a Day for 60 days., Disp: 90 tablet, Rfl: 1  •  levalbuterol (XOPENEX HFA) 45 MCG/ACT inhaler, Inhale 1-2 puffs Every 4 (Four) Hours As Needed for Wheezing or Shortness of Air., Disp: 15 g, Rfl: 5  •  metoprolol succinate XL (Toprol XL) 50 MG 24 hr tablet, Take 1 tablet by mouth Daily., Disp: 90 tablet, Rfl: 1  •  omeprazole (priLOSEC) 40 MG capsule, TAKE ONE CAPSULE BY MOUTH DAILY, Disp: 30 capsule, Rfl: 0  •  ondansetron ODT (ZOFRAN-ODT) 4 MG disintegrating tablet, 1 tablet orally every 6 hours as needed for nausea and vomiting, Disp: 12 tablet, Rfl: 0  •  traMADol (ULTRAM) 50 MG tablet, Take 1 tablet by mouth Every 8 (Eight) Hours As Needed for Severe Pain., Disp: 40 tablet, Rfl: 2  •  vitamin D (ERGOCALCIFEROL) 1.25 MG (00021 UT) capsule capsule, Take 1 capsule by mouth 1 (One) Time Per Week., Disp: 12 capsule, Rfl: 1  •  zolpidem (AMBIEN) 10 MG tablet, TAKE ONE TABLET BY MOUTH EVERY NIGHT AT BEDTIME AS NEEDED FOR SLEEP, Disp: 30 tablet, Rfl: 5  •  brompheniramine-pseudoephedrine-DM (Bromfed DM) 30-2-10 MG/5ML syrup, Take 5 mL by mouth 4 (Four) Times a Day As Needed for Cough., Disp: 119 mL, Rfl: 0    Review of Systems   Constitutional:  Positive for activity change and fatigue.   Cardiovascular: Positive for palpitations.   Genitourinary: Positive for flank pain.   Musculoskeletal: Positive for back pain, neck pain and neck stiffness.   Skin: Positive for rash.   Allergic/Immunologic: Positive for environmental allergies and immunocompromised state.   Neurological: Positive for dizziness, weakness, numbness and headaches.   Psychiatric/Behavioral: Positive for sleep disturbance. The patient is nervous/anxious.         I have reviewed ROS completed by medical assistant.     Objective:    Neurologic Exam     Mental Status   Oriented to person, place, and time.   Speech: speech is normal     Cranial Nerves     CN III, IV, VI   Pupils are equal, round, and reactive to light.    Gait, Coordination, and Reflexes     Gait  Gait: normal    Coordination   Finger to nose coordination: normal  Tandem walking coordination: normal    Reflexes   Right brachioradialis: 2+  Left brachioradialis: 2+  Right biceps: 1+  Left biceps: 1+  Right triceps: 1+  Left triceps: 1+  Right patellar: 2+  Left patellar: 2+  Right achilles: 1+  Left achilles: 1+      Physical Exam  Vitals and nursing note reviewed.   Constitutional:       Appearance: Normal appearance. She is overweight.   HENT:      Head: Normocephalic and atraumatic.      Right Ear: Hearing normal.      Left Ear: Hearing normal.      Nose: Nose normal.      Mouth/Throat:      Lips: Pink.      Mouth: Mucous membranes are moist.   Eyes:      General: Lids are normal. No visual field deficit.     Pupils: Pupils are equal, round, and reactive to light.   Cardiovascular:      Rate and Rhythm: Normal rate and regular rhythm.      Pulses: Normal pulses.      Heart sounds: Normal heart sounds, S1 normal and S2 normal.   Pulmonary:      Effort: Pulmonary effort is normal.      Breath sounds: Normal breath sounds.   Musculoskeletal:         General: Normal range of motion.      Cervical back: Normal range of motion.  Tenderness (Some tenderness looking down and lateral) present.      Comments: BUE 5/5 including   BLE 5/5 including dorsiflexion plantar flexion   Skin:     General: Skin is warm and dry.   Neurological:      Mental Status: She is alert and oriented to person, place, and time.      Cranial Nerves: No cranial nerve deficit, dysarthria or facial asymmetry.      Sensory: Sensory deficit (Bilateral hands as noted above) present.      Motor: Atrophy (Palmar atrophy bilateral hands and thenar muscle) present. No weakness, tremor, abnormal muscle tone, seizure activity or pronator drift.      Coordination: Coordination is intact. Romberg sign negative. Coordination normal. Finger-Nose-Finger Test and Heel to Shin Test normal. Rapid alternating movements normal.      Gait: Gait is intact. Gait and tandem walk normal.      Deep Tendon Reflexes: Babinski sign absent on the right side. Babinski sign absent on the left side.      Reflex Scores:       Tricep reflexes are 1+ on the right side and 1+ on the left side.       Bicep reflexes are 1+ on the right side and 1+ on the left side.       Brachioradialis reflexes are 2+ on the right side and 2+ on the left side.       Patellar reflexes are 2+ on the right side and 2+ on the left side.       Achilles reflexes are 1+ on the right side and 1+ on the left side.  Psychiatric:         Attention and Perception: Attention normal.         Mood and Affect: Mood normal.         Speech: Speech normal.         Behavior: Behavior normal. Behavior is cooperative.         Assessment/Plan:  The patient will be scheduled for a bilateral upper extremity EMG nerve conduction study and that report should be forwarded to Dr. Cisneros.   No labs were ordered at this time as this is most likely related to C-spine.  Labs could be ordered at a later time if needed.  The patient should continue follow-ups in the pain clinic.      Diagnoses and all orders for this visit:    1. Paresthesia and pain of  both upper extremities (Primary)  -     EMG 2 Limbs; Future    2. Degeneration of intervertebral disc of lumbar region        Return in about 6 months (around 4/12/2023) for Valentina or Dr Seipel .    I spent 34 minutes caring for April on this date of service. This time includes time spent by me in the following activities: reviewing tests, obtaining and/or reviewing a separately obtained history, performing a medically appropriate examination and/or evaluation, counseling and educating the patient/family/caregiver, ordering medications, tests, or procedures and documenting information in the medical record.      This document has been electronically signed by Valentina GUAJARDO on October 12, 2022 15:40 EDT

## 2022-10-11 RX ORDER — OMEPRAZOLE 40 MG/1
CAPSULE, DELAYED RELEASE ORAL
Qty: 30 CAPSULE | Refills: 0 | Status: SHIPPED | OUTPATIENT
Start: 2022-10-11 | End: 2022-11-07

## 2022-10-12 ENCOUNTER — OFFICE VISIT (OUTPATIENT)
Dept: NEUROLOGY | Facility: CLINIC | Age: 47
End: 2022-10-12

## 2022-10-12 VITALS
WEIGHT: 217 LBS | HEIGHT: 61 IN | TEMPERATURE: 98.7 F | DIASTOLIC BLOOD PRESSURE: 86 MMHG | BODY MASS INDEX: 40.97 KG/M2 | HEART RATE: 83 BPM | SYSTOLIC BLOOD PRESSURE: 122 MMHG | OXYGEN SATURATION: 97 %

## 2022-10-12 DIAGNOSIS — M79.601 PARESTHESIA AND PAIN OF BOTH UPPER EXTREMITIES: Primary | ICD-10-CM

## 2022-10-12 DIAGNOSIS — M79.602 PARESTHESIA AND PAIN OF BOTH UPPER EXTREMITIES: Primary | ICD-10-CM

## 2022-10-12 DIAGNOSIS — R20.2 PARESTHESIA AND PAIN OF BOTH UPPER EXTREMITIES: Primary | ICD-10-CM

## 2022-10-12 PROCEDURE — 99213 OFFICE O/P EST LOW 20 MIN: CPT | Performed by: NURSE PRACTITIONER

## 2022-10-19 ENCOUNTER — TELEPHONE (OUTPATIENT)
Dept: PAIN MEDICINE | Facility: CLINIC | Age: 47
End: 2022-10-19

## 2022-10-19 NOTE — TELEPHONE ENCOUNTER
Hub staff attempted to follow warm transfer process and was unsuccessful    Caller: Kelly BAINS    Relationship to patient: SELF    Best call back number: [phone]    Patient is needing: WANTS TO RESCHEDULE EPIDURAL

## 2022-10-24 ENCOUNTER — OFFICE VISIT (OUTPATIENT)
Dept: PAIN MEDICINE | Facility: CLINIC | Age: 47
End: 2022-10-24

## 2022-10-24 ENCOUNTER — TELEPHONE (OUTPATIENT)
Dept: PAIN MEDICINE | Facility: CLINIC | Age: 47
End: 2022-10-24

## 2022-10-24 VITALS
SYSTOLIC BLOOD PRESSURE: 137 MMHG | OXYGEN SATURATION: 98 % | HEART RATE: 84 BPM | DIASTOLIC BLOOD PRESSURE: 72 MMHG | RESPIRATION RATE: 16 BRPM

## 2022-10-24 DIAGNOSIS — M53.3 SACROILIAC JOINT DYSFUNCTION: Primary | ICD-10-CM

## 2022-10-24 DIAGNOSIS — Z79.899 HIGH RISK MEDICATION USE: Primary | ICD-10-CM

## 2022-10-24 DIAGNOSIS — M50.30 DDD (DEGENERATIVE DISC DISEASE), CERVICAL: ICD-10-CM

## 2022-10-24 DIAGNOSIS — M47.812 CERVICAL SPONDYLOSIS: ICD-10-CM

## 2022-10-24 DIAGNOSIS — M54.81 OCCIPITAL NEURALGIA OF LEFT SIDE: ICD-10-CM

## 2022-10-24 PROCEDURE — 99214 OFFICE O/P EST MOD 30 MIN: CPT | Performed by: STUDENT IN AN ORGANIZED HEALTH CARE EDUCATION/TRAINING PROGRAM

## 2022-10-24 RX ORDER — HYDROCODONE BITARTRATE AND ACETAMINOPHEN 5; 325 MG/1; MG/1
1 TABLET ORAL 3 TIMES DAILY PRN
Qty: 21 TABLET | Refills: 0 | Status: SHIPPED | OUTPATIENT
Start: 2022-10-24 | End: 2022-10-31

## 2022-10-24 RX ORDER — METHYLPREDNISOLONE 4 MG/1
TABLET ORAL
Qty: 21 TABLET | Refills: 0 | Status: SHIPPED | OUTPATIENT
Start: 2022-10-24 | End: 2022-11-30

## 2022-10-24 NOTE — PROGRESS NOTES
Subjective   Kelly Espinoza is a 47 y.o. female is here for follow up for neck pain. Patient was last seen on 3/8/2022 with KENDRA C6-C7 with 75% pain relief lasting for about 4 months. Patient did not follow-up since that time. She has been seen by neurosurgery and was told she was non surgical candidate. Will be getting b/l EMG with Dr. Berumen. She also new complaint of lower back pain. Long history of lower back pain but significantly worse few days ago after overuse.     On last visit:     Neck pain is 4/10 on VAS, and maximum 5/10.  Pain is aching, burning, throbbing, tingling, weakness, numbness in nature.  Pain is referred to left neck and shoulder.  Pain is constant.  Improved by prednisone, tramadol, gabapentin.  Worse with bending, lying down, lifting.  Also complains of severe headache in occipital region and numbness and tingling in both hands. - mostly in 3,4, 5th digits  Gripping issues in left arm.  Her main complaint is numbness and severe intermittent headaches.    Lower back pain is 7/10 on VAS, at maximum is 8/10. Pain is sharp, shooting and stabbing in nature. Pain is referred left buttock, left anterior thigh, left anterior shin and left foot. The pain is constnat. The pain is improved by nothing. The pain is worse with bending.      Previous Injection:   3/8/2022-KENDRA C6/7- 75% pain relief- pain relief, improved headaches and improved flexibility - 4 months     Hx: Referred by Vivian Fontanez APRN  for neck pain.  Her pain started about 3 years ago without any significant injuries. 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        PHQ-9- 9  SOAPP-2      PMH:   Lupus, GERD, anxiety, ADD, s/p L5-S1 dissectomy 2000.      Current Medications:   Celebrex 200 mg BID PRN  Cymbalta 30 mg daily  Gabapentin 600 mg TID   Tramadol 50 mg q8h PRN-1/16/2022.  Ambien        Past Medications:     Past Modalities:  TENS:                                                                           no                                                  Physical Therapy Within The Last 6 Months              Yes - 8 weeks of PT.  Psychotherapy                                                            no  Massage Therapy                                                       no     Patient Complains Of:  Uro-Fecal Incontinence          no  Weight Gain/Loss                   no  Fever/Chills                             no  Weakness                               no           Current Outpatient Medications:   •  albuterol (ACCUNEB) 0.63 MG/3ML nebulizer solution, Take 3 mL by nebulization Every 6 (Six) Hours As Needed for Wheezing or Shortness of Air., Disp: 120 each, Rfl: 0  •  brompheniramine-pseudoephedrine-DM (Bromfed DM) 30-2-10 MG/5ML syrup, Take 5 mL by mouth 4 (Four) Times a Day As Needed for Cough., Disp: 119 mL, Rfl: 0  •  celecoxib (CeleBREX) 200 MG capsule, Take 1 capsule by mouth 2 (Two) Times a Day., Disp: 60 capsule, Rfl: 5  •  cyclobenzaprine (FLEXERIL) 10 MG tablet, Take 1 tablet by mouth 3 (Three) Times a Day As Needed for Muscle Spasms., Disp: 30 tablet, Rfl: 5  •  DULoxetine (CYMBALTA) 30 MG capsule, TAKE ONE CAPSULE BY MOUTH DAILY, Disp: 30 capsule, Rfl: 5  •  estradiol (ESTRACE) 2 MG tablet, TAKE ONE TABLET BY MOUTH DAILY, Disp: 30 tablet, Rfl: 5  •  gabapentin (NEURONTIN) 600 MG tablet, Take 1 tablet by mouth 3 (Three) Times a Day for 60 days., Disp: 90 tablet, Rfl: 1  •  levalbuterol (XOPENEX HFA) 45 MCG/ACT inhaler, Inhale 1-2 puffs Every 4 (Four) Hours As Needed for Wheezing or Shortness of Air., Disp: 15 g, Rfl: 5  •  metoprolol succinate XL (Toprol XL) 50 MG 24 hr tablet, Take 1 tablet by mouth Daily., Disp: 90 tablet, Rfl: 1  •  omeprazole (priLOSEC) 40 MG capsule, TAKE ONE CAPSULE BY MOUTH DAILY, Disp: 30 capsule, Rfl: 0  •  ondansetron ODT (ZOFRAN-ODT) 4 MG disintegrating tablet, 1 tablet orally every 6 hours as needed for nausea and vomiting, Disp: 12 tablet, Rfl: 0  •  traMADol  (ULTRAM) 50 MG tablet, Take 1 tablet by mouth Every 8 (Eight) Hours As Needed for Severe Pain., Disp: 40 tablet, Rfl: 2  •  vitamin D (ERGOCALCIFEROL) 1.25 MG (48513 UT) capsule capsule, Take 1 capsule by mouth 1 (One) Time Per Week., Disp: 12 capsule, Rfl: 1  •  zolpidem (AMBIEN) 10 MG tablet, TAKE ONE TABLET BY MOUTH EVERY NIGHT AT BEDTIME AS NEEDED FOR SLEEP, Disp: 30 tablet, Rfl: 5  •  HYDROcodone-acetaminophen (NORCO) 5-325 MG per tablet, Take 1 tablet by mouth 3 (Three) Times a Day As Needed for Severe Pain for up to 7 days., Disp: 21 tablet, Rfl: 0  •  methylPREDNISolone (MEDROL) 4 MG dose pack, Take as directed on package instructions., Disp: 21 tablet, Rfl: 0    The following portions of the patient's history were reviewed and updated as appropriate: allergies, current medications, past family history, past medical history, past social history, past surgical history, and problem list.      REVIEW OF PERTINENT MEDICAL DATA    Past Medical History:   Diagnosis Date   • ADD (attention deficit disorder)    • SARMAD positive    • Anxiety    • Arthralgia    • Asthma    • Bursitis of left hip    • DDD (degenerative disc disease), lumbar    • Depression, major    • Flu syndrome    • GERD (gastroesophageal reflux disease)    • Heart disease    • Hiatal hernia    • Hormone replacement therapy    • Hyperlipidemia    • Inflammatory arthritis    • Influenza    • Insomnia    • Knee pain, bilateral    • Lupus (HCC)    • Neck pain    • Obesity    • Osteoarthritis    • Pain, joint, shoulder, left    • Pharyngitis, acute    • Postmenopausal    • Sinus congestion    • Surgical menopause     at age 34   • Vitamin B12 deficiency    • Vitamin D deficiency      Past Surgical History:   Procedure Laterality Date   • CHOLECYSTECTOMY     • LUMBAR DISCECTOMY  2000   • TONSILLECTOMY     • TOTAL ABDOMINAL HYSTERECTOMY WITH SALPINGO OOPHORECTOMY Bilateral 2009    FOR ENDOMETRIOSIS     Family History   Problem Relation Age of Onset   •  Hypertension Mother    • Hypertension Father    • Diabetes Other    • Heart disease Other    • Hypertension Other    • Cancer Other      Social History     Socioeconomic History   • Marital status:    Tobacco Use   • Smoking status: Former     Packs/day: 0.50     Years: 3.00     Pack years: 1.50     Types: Cigarettes     Start date:      Quit date:      Years since quittin.8   • Smokeless tobacco: Never   Vaping Use   • Vaping Use: Never used   Substance and Sexual Activity   • Alcohol use: Not Currently     Alcohol/week: 0.0 standard drinks   • Drug use: Not Currently   • Sexual activity: Defer         Review of Systems   Musculoskeletal: Positive for arthralgias, back pain and neck pain.         Vitals:    10/24/22 1444   BP: 137/72   Pulse: 84   Resp: 16   SpO2: 98%   PainSc:   7         Objective   Physical Exam  Neck:     Musculoskeletal:         General: Tenderness present.        Arms:         Legs:    Neurological:      Deep Tendon Reflexes:      Reflex Scores:       Tricep reflexes are 2+ on the right side and 2+ on the left side.       Bicep reflexes are 2+ on the right side and 2+ on the left side.       Brachioradialis reflexes are 2+ on the right side and 2+ on the left side.     Comments: Motor strength 5/5 b/l UE  Sensory intact b/l UE             Imaging Reviewed:  MRI cervical spine-2022  -C2-3-degenerative facet changes bilateral  C3-4-WNL  C4-5-WNL  C5-6-mild facet changes.  Mild left neural foraminal narrowing.  C6-7-mild bilateral uncovertebral joint spurring.  C7-T1-WNL      Assessment:    1. Sacroiliac joint dysfunction    2. DDD (degenerative disc disease), cervical    3. Cervical spondylosis    4. Occipital neuralgia of left side         Plan:   1.UDS to be done as part on initial patient evaluation. Patient is on opioids and UDS should be positive for opioids prescribed and negative for medications not prescribed and any illicit drugs including marijuana to be  considered for long term opioid therapy, repeat UDS will be done randomly for compliance with the medication prescribed.  2. We discussed trying a course of formal physical therapy.  Physical therapy can help strengthen and stretch the muscles around the joints. Continue to be as active as possible.  Patient has done 8 weeks of physical therapy without much improvement.  3.. Continue Gabapepntin 600 mg TID (1 refill). Side effects discussed with the patient including but not limited to somnolence, dizziness, ataxia, headache, fatigue, blurred vision, cold or flu-like symptoms,delusions, hoarseness, lack or loss of strength, lower back or side pain, swelling of the hands, feet, or lower legs trembling or shaking. Patient understands and agrees with the plan.  4.  Patient states that she has tried taking 2 tramadol at a time without significant pain relief (prescribed by PCP).  Due to severe pain, will temporarily prescribe Norco 5-325 mg TID PRN (#21 tabs).  Advised not to take tramadol while taking Norco. Discussed with the patient regarding long-term side effects of opioids including but not limited to opioid induced hormonal suppression, hyperalgesia, fatigue, weight gain, possible opioid induced altered immune system, addiction, tolerance, dependence, risk of hearing loss and elevated risk of myocardial infarction. Proper use and potential life threatening side effects of over use discussed with patient. Patient states understanding of their use and risks.  5. Excellent relief with KENDRA lasting for about 4 months. Patient has pain in the neck with radicular pain in the arm, patient has failed conservative therapy including PT and pharmacological management for more than 6 weeks and pain interferes with activities of daily living. Spurling’s test is positive. MRI is not impressive but does show mild left neural foraminal narrowing at C5-6.  Unable to explain right-sided radicular symptoms.  Patient has tried PT,  neuropathic agents, pain medications without significant pain relief. She had good relief with KENDRA previously.  Discussed repeat cervical JAYE C7-T1 (left). Discussed the possibility of infection, bleeding, nerve damage, post dural puncture headache, increased pain, paraplegia. Patient understands and agrees. Scheduled.   6.  Patient has severe left SI joint tenderness with MELE positive and intermittent radiculopathy.  Due to recent onset, will prescribe Medrol Dosepak for 5 days to see if that improves pain.  We will consider left SI joint injection in future if pain does not improve.  Patient understands and agrees with the plan.  7. Start Medrol dose pack for 5 days.        RTC for injection.      Rome Springer DO  Pain Management   [facility]            INSPECT REPORT    As part of the patient's treatment plan, I may be prescribing controlled substances. The patient has been made aware of appropriate use of such medications, including potential risk of somnolence, limited ability to drive and/or work safely, and the potential for dependence or overdose. It has also been made clear that these medications are for use by this patient only, without concomitant use of alcohol or other substances unless prescribed.     Patient has completed prescribing agreement detailing terms of continued prescribing of controlled substances, including monitoring INSPECT reports, urine drug screening, and pill counts if necessary. The patient is aware that inappropriate use will results in cessation of prescribing such medications.    INSPECT report has been reviewed and scanned into the patient's chart.

## 2022-10-24 NOTE — TELEPHONE ENCOUNTER
Caller: Alexis, April R    Relationship to patient: Self    Best call back number:[phone]    Chief complaint: LOWER BACK - PATIENT STATED SHE CAN BARELY WALK     Type of visit: NEW PROBLEM    Requested date: ASAP - TODAY IF POSSIBLE     Additional notes: PATIENT STATED DR PEÑA KNOWS ABOUT LOWER BACK BUT HE HASNT TREATED IT. SHE WANTS TO GET IN TO SEE HIM ASAP-- IS TAKING GABAPENTIN (FROM DR PEÑA ) AND TRAMADOL FROM PCP BUT SHE THINKS SHE MAY NEED SOMETHING ELSE AND POSSIBLY A STEROID PACK.    PLEASE CALL AND ADVISE- TY

## 2022-10-25 DIAGNOSIS — E55.9 VITAMIN D DEFICIENCY: ICD-10-CM

## 2022-10-25 RX ORDER — ERGOCALCIFEROL 1.25 MG/1
CAPSULE ORAL
Qty: 4 CAPSULE | Refills: 5 | Status: SHIPPED | OUTPATIENT
Start: 2022-10-25 | End: 2023-03-08 | Stop reason: SDUPTHER

## 2022-10-31 RX ORDER — GABAPENTIN 600 MG/1
TABLET ORAL
Qty: 90 TABLET | Refills: 1 | Status: SHIPPED | OUTPATIENT
Start: 2022-10-31 | End: 2022-11-30 | Stop reason: SDUPTHER

## 2022-11-07 RX ORDER — OMEPRAZOLE 40 MG/1
CAPSULE, DELAYED RELEASE ORAL
Qty: 30 CAPSULE | Refills: 0 | Status: SHIPPED | OUTPATIENT
Start: 2022-11-07 | End: 2022-12-05

## 2022-11-08 ENCOUNTER — HOSPITAL ENCOUNTER (OUTPATIENT)
Dept: PAIN MEDICINE | Facility: HOSPITAL | Age: 47
Discharge: HOME | End: 2022-11-08

## 2022-11-08 ENCOUNTER — TELEPHONE (OUTPATIENT)
Dept: PAIN MEDICINE | Facility: HOSPITAL | Age: 47
End: 2022-11-08

## 2022-11-08 VITALS
OXYGEN SATURATION: 99 % | WEIGHT: 217 LBS | HEART RATE: 67 BPM | RESPIRATION RATE: 16 BRPM | DIASTOLIC BLOOD PRESSURE: 76 MMHG | TEMPERATURE: 97.7 F | HEIGHT: 61 IN | SYSTOLIC BLOOD PRESSURE: 112 MMHG | BODY MASS INDEX: 40.97 KG/M2

## 2022-11-08 DIAGNOSIS — M50.30 DDD (DEGENERATIVE DISC DISEASE), CERVICAL: Primary | ICD-10-CM

## 2022-11-08 DIAGNOSIS — R52 PAIN: ICD-10-CM

## 2022-11-08 PROCEDURE — 25010000002 DEXAMETHASONE SODIUM PHOSPHATE 10 MG/ML SOLUTION: Performed by: STUDENT IN AN ORGANIZED HEALTH CARE EDUCATION/TRAINING PROGRAM

## 2022-11-08 PROCEDURE — 0 IOPAMIDOL 41 % SOLUTION: Performed by: STUDENT IN AN ORGANIZED HEALTH CARE EDUCATION/TRAINING PROGRAM

## 2022-11-08 PROCEDURE — 77003 FLUOROGUIDE FOR SPINE INJECT: CPT

## 2022-11-08 PROCEDURE — 62321 NJX INTERLAMINAR CRV/THRC: CPT | Performed by: STUDENT IN AN ORGANIZED HEALTH CARE EDUCATION/TRAINING PROGRAM

## 2022-11-08 RX ORDER — DEXAMETHASONE SODIUM PHOSPHATE 10 MG/ML
10 INJECTION, SOLUTION INTRAMUSCULAR; INTRAVENOUS ONCE
Status: COMPLETED | OUTPATIENT
Start: 2022-11-08 | End: 2022-11-08

## 2022-11-08 RX ADMIN — IOPAMIDOL 3 ML: 408 INJECTION, SOLUTION INTRATHECAL at 14:52

## 2022-11-08 RX ADMIN — DEXAMETHASONE SODIUM PHOSPHATE 10 MG: 10 INJECTION, SOLUTION INTRAMUSCULAR; INTRAVENOUS at 15:00

## 2022-11-08 NOTE — DISCHARGE INSTRUCTIONS

## 2022-11-08 NOTE — H&P
H and P reviewed from previous visit and no changes to patient's clinical presentation. Will proceed with procedure as planned. Patient denies history of DM and being on blood thinners.    Tramadol brought in by patient and destroyed after counting by RN.    Rome Springer DO  Pain Management   [facility]

## 2022-11-08 NOTE — PROCEDURES
PREOPERATIVE DIAGNOSIS:    1. Cervical DDD    POSTOPERATIVE DIAGNOSIS:  Same.    PROCEDURE PERFORMED: Cervical JAYE C 7-T1     PROCEDURE IN DETAIL:    Written informed consent was taken from the patient. Pre-procedure blood pressure and pulse were stable and recorded in patients clinic chart. The patient was brought to the procedure room and placed in the prone position on fluoroscopy table. The patient’s neck was prepped with chloraprep and draped in the usual sterile fashion.   The skin over the C 7-T1 space was identified under fluoroscopic guidance and infiltrated with 1% lidocaine for local anesthesia via 25 gauge needle.  A 17 gauge Tuohy needle was inserted through the skin under fluoroscopic guidance until we got to the epidural space with loss of resistance technique.  Negative for CSF and heme.  2 ml of omnipaque contrast was injected under continuous fluoroscopic guidance and good spread was noted from C5-7 space bilaterally. 10 mg of dexamethasone mixed with 4 ml of preservative free normal saline was injected with minimal pressure. The needle was cleared with preservative free normal saline and removed. Band aid was applied.  Following the procedure the patient's vital signs were stable. The patient was discharged home in good condition after being given discharge instructions.    COMPLICATIONS: None    Rome Springer DO  Pain Management   [facility]

## 2022-11-09 ENCOUNTER — TELEPHONE (OUTPATIENT)
Dept: PAIN MEDICINE | Facility: HOSPITAL | Age: 47
End: 2022-11-09

## 2022-11-09 DIAGNOSIS — M50.30 DDD (DEGENERATIVE DISC DISEASE), CERVICAL: Primary | ICD-10-CM

## 2022-11-09 RX ORDER — HYDROCODONE BITARTRATE AND ACETAMINOPHEN 5; 325 MG/1; MG/1
1 TABLET ORAL 3 TIMES DAILY PRN
Qty: 21 TABLET | Refills: 0 | Status: SHIPPED | OUTPATIENT
Start: 2022-11-09 | End: 2022-11-20 | Stop reason: SDUPTHER

## 2022-11-09 NOTE — TELEPHONE ENCOUNTER
Pt sts she is sore.  Reminded to utilize ice today, heat tomorrow as desired.  Told it may take a few days for the steroid to begin working, but that she may call with any questions or concerns.

## 2022-11-14 DIAGNOSIS — Z12.31 BREAST CANCER SCREENING BY MAMMOGRAM: Primary | ICD-10-CM

## 2022-11-20 DIAGNOSIS — M50.30 DDD (DEGENERATIVE DISC DISEASE), CERVICAL: ICD-10-CM

## 2022-11-21 RX ORDER — HYDROCODONE BITARTRATE AND ACETAMINOPHEN 5; 325 MG/1; MG/1
1 TABLET ORAL 3 TIMES DAILY PRN
Qty: 21 TABLET | Refills: 0 | Status: SHIPPED | OUTPATIENT
Start: 2022-11-21 | End: 2022-11-30 | Stop reason: SDUPTHER

## 2022-11-24 DIAGNOSIS — E55.9 VITAMIN D DEFICIENCY: ICD-10-CM

## 2022-11-28 RX ORDER — ERGOCALCIFEROL 1.25 MG/1
CAPSULE ORAL
Qty: 4 CAPSULE | Refills: 5 | OUTPATIENT
Start: 2022-11-28

## 2022-11-29 NOTE — PROGRESS NOTES
Subjective   Kelly Espinoza is a 47 y.o. female is here for follow-up for neck pain.  Last seen on 11/8/2022 for KENDRA C7-T1 with improved headache and some pain relief. She has aggravated pain when she was moving 150 lbs dog at work. Lower back pain is improved since last visit.    Will be getting b/l EMG with Dr. Berumen-12/5/2022.     On last visit: Started Norco- helps    Neck pain is 4/10 on VAS, and maximum 6/10.  Pain is aching, burning, throbbing, tingling, weakness, numbness in nature.  Pain is referred to left neck and shoulder.  Pain is constant.  Improved by prednisone, tramadol, gabapentin.  Worse with bending, lying down, lifting.  Also complains of severe headache in occipital region and numbness and tingling in both hands. - mostly in 3,4, 5th digits  Gripping issues in left arm.  Her main complaint is numbness and severe intermittent headaches.     Lower back pain is 7/10 on VAS, at maximum is 8/10. Pain is sharp, shooting and stabbing in nature. Pain is referred left buttock, left anterior thigh, left anterior shin and left foot. The pain is constnat. The pain is improved by nothing. The pain is worse with bending.      Previous Injection:   11/8/2022-KENDRA C7-T1- improved headache, still has some pain.   3/8/2022-KENDRA C6/7- 75% pain relief- pain relief, improved headaches and improved flexibility - 4 months     Hx: Referred by Vivian Fontanez APRN  for neck pain.  Her pain started about 3 years ago without any significant injuries. 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   She has been seen by neurosurgery and was told she was non surgical candidate.        PHQ-9- 9  SOAPP-2      PMH:   Lupus, GERD, anxiety, ADD, s/p L5-S1 dissectomy 2000.      Current Medications:   Norco 5-325 mg BID PRN  Celebrex 200 mg BID PRN  Cymbalta 30 mg daily  Gabapentin 600 mg TID   Ambien        Past Medications:   Tramadol 50 mg q8h PRN-1/16/2022 - didn't help     Past Modalities:  TENS:                                                                           no                                                  Physical Therapy Within The Last 6 Months              Yes - 8 weeks of PT.  Psychotherapy                                                            no  Massage Therapy                                                       no     Patient Complains Of:  Uro-Fecal Incontinence          no  Weight Gain/Loss                   no  Fever/Chills                             no  Weakness                               no           Current Outpatient Medications:   •  albuterol (ACCUNEB) 0.63 MG/3ML nebulizer solution, Take 3 mL by nebulization Every 6 (Six) Hours As Needed for Wheezing or Shortness of Air., Disp: 120 each, Rfl: 0  •  brompheniramine-pseudoephedrine-DM (Bromfed DM) 30-2-10 MG/5ML syrup, Take 5 mL by mouth 4 (Four) Times a Day As Needed for Cough., Disp: 119 mL, Rfl: 0  •  celecoxib (CeleBREX) 200 MG capsule, Take 1 capsule by mouth 2 (Two) Times a Day., Disp: 60 capsule, Rfl: 5  •  cyclobenzaprine (FLEXERIL) 10 MG tablet, Take 1 tablet by mouth 3 (Three) Times a Day As Needed for Muscle Spasms., Disp: 30 tablet, Rfl: 5  •  DULoxetine (CYMBALTA) 30 MG capsule, TAKE ONE CAPSULE BY MOUTH DAILY, Disp: 30 capsule, Rfl: 5  •  estradiol (ESTRACE) 2 MG tablet, TAKE ONE TABLET BY MOUTH DAILY, Disp: 30 tablet, Rfl: 5  •  gabapentin (NEURONTIN) 600 MG tablet, Take 1 tablet by mouth 3 (Three) Times a Day., Disp: 90 tablet, Rfl: 1  •  HYDROcodone-acetaminophen (NORCO) 5-325 MG per tablet, Take 1 tablet by mouth 2 (Two) Times a Day As Needed for Severe Pain for up to 30 days., Disp: 60 tablet, Rfl: 0  •  levalbuterol (XOPENEX HFA) 45 MCG/ACT inhaler, Inhale 1-2 puffs Every 4 (Four) Hours As Needed for Wheezing or Shortness of Air., Disp: 15 g, Rfl: 5  •  metoprolol succinate XL (Toprol XL) 50 MG 24 hr tablet, Take 1 tablet by mouth Daily., Disp: 90 tablet, Rfl: 1  •  omeprazole (priLOSEC) 40 MG capsule,  TAKE ONE CAPSULE BY MOUTH DAILY, Disp: 30 capsule, Rfl: 0  •  ondansetron ODT (ZOFRAN-ODT) 4 MG disintegrating tablet, 1 tablet orally every 6 hours as needed for nausea and vomiting, Disp: 12 tablet, Rfl: 0  •  traMADol (ULTRAM) 50 MG tablet, Take 1 tablet by mouth Every 8 (Eight) Hours As Needed for Severe Pain., Disp: 40 tablet, Rfl: 2  •  vitamin D (ERGOCALCIFEROL) 1.25 MG (17418 UT) capsule capsule, TAKE ONE CAPSULE BY MOUTH ONCE WEEKLY, Disp: 4 capsule, Rfl: 5  •  zolpidem (AMBIEN) 10 MG tablet, TAKE ONE TABLET BY MOUTH EVERY NIGHT AT BEDTIME AS NEEDED FOR SLEEP, Disp: 30 tablet, Rfl: 5  •  [START ON 12/30/2022] HYDROcodone-acetaminophen (NORCO) 5-325 MG per tablet, Take 1 tablet by mouth 2 (Two) Times a Day As Needed for Severe Pain for up to 30 days., Disp: 60 tablet, Rfl: 0    The following portions of the patient's history were reviewed and updated as appropriate: allergies, current medications, past family history, past medical history, past social history, past surgical history, and problem list.      REVIEW OF PERTINENT MEDICAL DATA    Past Medical History:   Diagnosis Date   • ADD (attention deficit disorder)    • SARMAD positive    • Anxiety    • Arthralgia    • Asthma    • Bursitis of left hip    • DDD (degenerative disc disease), lumbar    • Depression, major    • Flu syndrome    • GERD (gastroesophageal reflux disease)    • Heart disease    • Hiatal hernia    • Hormone replacement therapy    • Hyperlipidemia    • Inflammatory arthritis    • Influenza    • Insomnia    • Knee pain, bilateral    • Lupus (HCC)    • Neck pain    • Obesity    • Osteoarthritis    • Pain, joint, shoulder, left    • Pharyngitis, acute    • Postmenopausal    • Sinus congestion    • Surgical menopause     at age 34   • Vitamin B12 deficiency    • Vitamin D deficiency      Past Surgical History:   Procedure Laterality Date   • CHOLECYSTECTOMY     • LUMBAR DISCECTOMY  2000   • TONSILLECTOMY     • TOTAL ABDOMINAL HYSTERECTOMY WITH  SALPINGO OOPHORECTOMY Bilateral     FOR ENDOMETRIOSIS     Family History   Problem Relation Age of Onset   • Hypertension Mother    • Hypertension Father    • Diabetes Other    • Heart disease Other    • Hypertension Other    • Cancer Other      Social History     Socioeconomic History   • Marital status:    Tobacco Use   • Smoking status: Former     Packs/day: 0.50     Years: 3.00     Pack years: 1.50     Types: Cigarettes     Start date:      Quit date:      Years since quittin.9   • Smokeless tobacco: Never   Vaping Use   • Vaping Use: Never used   Substance and Sexual Activity   • Alcohol use: Not Currently     Alcohol/week: 0.0 standard drinks   • Drug use: Not Currently   • Sexual activity: Defer         Review of Systems   Musculoskeletal: Positive for arthralgias, back pain and neck pain.         Vitals:    22 1443   BP: 131/68   Pulse: 74   Resp: 16   SpO2: 96%   PainSc:   6         Objective   Physical Exam  Neck:     Musculoskeletal:         General: Tenderness present.        Arms:         Legs:    Neurological:      Deep Tendon Reflexes:      Reflex Scores:       Tricep reflexes are 2+ on the right side and 2+ on the left side.       Bicep reflexes are 2+ on the right side and 2+ on the left side.       Brachioradialis reflexes are 2+ on the right side and 2+ on the left side.     Comments: Motor strength 5/5 b/l UE  Sensory intact b/l UE             Imaging Reviewed:  MRI cervical spine-2022  -C2-3-degenerative facet changes bilateral  C3-4-WNL  C4-5-WNL  C5-6-mild facet changes.  Mild left neural foraminal narrowing.  C6-7-mild bilateral uncovertebral joint spurring.  C7-T1-WNL      Assessment:    1. Cervical spondylosis    2. DDD (degenerative disc disease), cervical         Plan:   1. UDS consistent with patient interview on 10/24/22. Narcotic contract signed - 22.   2. We discussed trying a course of formal physical therapy.  Physical therapy can help  strengthen and stretch the muscles around the joints. Continue to be as active as possible.  Patient has done 8 weeks of physical therapy without much improvement.  3.. Continue Gabapepntin 600 mg TID (1 refill). Side effects discussed with the patient including but not limited to somnolence, dizziness, ataxia, headache, fatigue, blurred vision, cold or flu-like symptoms,delusions, hoarseness, lack or loss of strength, lower back or side pain, swelling of the hands, feet, or lower legs trembling or shaking. Patient understands and agrees with the plan.  4.  Continue Norco 5-325 mg BID PRN (11/30; 12/30).  Discussed with the patient regarding long-term side effects of opioids including but not limited to opioid induced hormonal suppression, hyperalgesia, fatigue, weight gain, possible opioid induced altered immune system, addiction, tolerance, dependence, risk of hearing loss and elevated risk of myocardial infarction. Proper use and potential life threatening side effects of over use discussed with patient. Patient states understanding of their use and risks.  5. Excellent relief with KENDRA lasting for about 4 months. Patient has pain in the neck with radicular pain in the arm, patient has failed conservative therapy including PT and pharmacological management for more than 6 weeks and pain interferes with activities of daily living. Spurling’s test is positive. MRI is not impressive but does show mild left neural foraminal narrowing at C5-6.  Unable to explain right-sided radicular symptoms.  Patient has tried PT, neuropathic agents, pain medications without significant pain relief. She had good relief with KENDRA previously.  Some relief with KENDRA C7-T1. Will reevaluate after EMG.   6.  Patient has severe left SI joint tenderness with MELE positive and intermittent radiculopathy.  Due to recent onset, will prescribe Medrol Dosepak for 5 days to see if that improves pain.  We will consider left SI joint injection in  future if pain worsens in lower back.        RTC in 2 months.     Rome Springer DO  Pain Management   [facility]            INSPECT REPORT    As part of the patient's treatment plan, I may be prescribing controlled substances. The patient has been made aware of appropriate use of such medications, including potential risk of somnolence, limited ability to drive and/or work safely, and the potential for dependence or overdose. It has also been made clear that these medications are for use by this patient only, without concomitant use of alcohol or other substances unless prescribed.     Patient has completed prescribing agreement detailing terms of continued prescribing of controlled substances, including monitoring INSPECT reports, urine drug screening, and pill counts if necessary. The patient is aware that inappropriate use will results in cessation of prescribing such medications.    INSPECT report has been reviewed and scanned into the patient's chart.

## 2022-11-30 ENCOUNTER — OFFICE VISIT (OUTPATIENT)
Dept: PAIN MEDICINE | Facility: CLINIC | Age: 47
End: 2022-11-30

## 2022-11-30 VITALS
HEART RATE: 74 BPM | RESPIRATION RATE: 16 BRPM | OXYGEN SATURATION: 96 % | SYSTOLIC BLOOD PRESSURE: 131 MMHG | DIASTOLIC BLOOD PRESSURE: 68 MMHG

## 2022-11-30 DIAGNOSIS — M50.30 DDD (DEGENERATIVE DISC DISEASE), CERVICAL: ICD-10-CM

## 2022-11-30 DIAGNOSIS — M47.812 CERVICAL SPONDYLOSIS: Primary | ICD-10-CM

## 2022-11-30 PROCEDURE — 99214 OFFICE O/P EST MOD 30 MIN: CPT | Performed by: STUDENT IN AN ORGANIZED HEALTH CARE EDUCATION/TRAINING PROGRAM

## 2022-11-30 RX ORDER — GABAPENTIN 600 MG/1
600 TABLET ORAL 3 TIMES DAILY
Qty: 90 TABLET | Refills: 1 | Status: SHIPPED | OUTPATIENT
Start: 2022-11-30 | End: 2023-01-24 | Stop reason: SDUPTHER

## 2022-11-30 RX ORDER — HYDROCODONE BITARTRATE AND ACETAMINOPHEN 5; 325 MG/1; MG/1
1 TABLET ORAL 2 TIMES DAILY PRN
Qty: 60 TABLET | Refills: 0 | Status: SHIPPED | OUTPATIENT
Start: 2022-11-30 | End: 2022-12-30

## 2022-11-30 RX ORDER — HYDROCODONE BITARTRATE AND ACETAMINOPHEN 5; 325 MG/1; MG/1
1 TABLET ORAL 2 TIMES DAILY PRN
Qty: 60 TABLET | Refills: 0 | Status: SHIPPED | OUTPATIENT
Start: 2022-12-30 | End: 2023-01-24 | Stop reason: SDUPTHER

## 2022-12-05 ENCOUNTER — PROCEDURE VISIT (OUTPATIENT)
Dept: NEUROLOGY | Facility: CLINIC | Age: 47
End: 2022-12-05

## 2022-12-05 VITALS
SYSTOLIC BLOOD PRESSURE: 96 MMHG | WEIGHT: 217 LBS | TEMPERATURE: 98 F | DIASTOLIC BLOOD PRESSURE: 69 MMHG | HEIGHT: 61 IN | BODY MASS INDEX: 40.97 KG/M2 | HEART RATE: 76 BPM

## 2022-12-05 DIAGNOSIS — G56.03 BILATERAL CARPAL TUNNEL SYNDROME: Primary | ICD-10-CM

## 2022-12-05 PROCEDURE — 95910 NRV CNDJ TEST 7-8 STUDIES: CPT | Performed by: PSYCHIATRY & NEUROLOGY

## 2022-12-05 PROCEDURE — 95886 MUSC TEST DONE W/N TEST COMP: CPT | Performed by: PSYCHIATRY & NEUROLOGY

## 2022-12-05 RX ORDER — OMEPRAZOLE 40 MG/1
CAPSULE, DELAYED RELEASE ORAL
Qty: 30 CAPSULE | Refills: 0 | Status: SHIPPED | OUTPATIENT
Start: 2022-12-05 | End: 2023-01-03

## 2022-12-05 NOTE — PROGRESS NOTES
EMG and Nerve Conduction Studies    The complete report includes the data sheets.     Date of Study: 12/5/22    Referring Provider:SEKOU PUCKETT        History: BUE-PARESTHESIA    Results:    Prior to starting the procedure, the patient's identity was verified, pertinent available records were reviewed, the nature of the procedure was explained, the appropriate site of the exam were confirmed directly with the patient, and a pre-procedure pause was performed for final verification of all the above.    1.  The right median sensory and motor distal latencies are prolonged with forearm conduction velocity in the normal range.    2.  The left median sensory distal latency was prolonged the motor distal latency was normal with normal forearm conduction velocity.    3.  The right and left ulnar sensory and motor nerve conduction studies were normal.    4.  EMG of the muscles examined in the bilateral C5-T1 myotomes were normal except for minor neurogenic changes in the right abductor pollicis brevis muscle    Impression:    This is an abnormal study.  There is evidence of moderate right and mild left median neuropathy at the wrist.      Electronically signed by :    Joseph Seipel, M.D.  December 5, 2022

## 2022-12-06 ENCOUNTER — TELEPHONE (OUTPATIENT)
Dept: NEUROLOGY | Facility: CLINIC | Age: 47
End: 2022-12-06

## 2022-12-06 DIAGNOSIS — G56.03 BILATERAL CARPAL TUNNEL SYNDROME: Primary | ICD-10-CM

## 2022-12-06 NOTE — PROGRESS NOTES
Please call the patient regarding her abnormal result.  Bilateral Carpal tunnel and I will refer to Hand Surgeons

## 2022-12-06 NOTE — TELEPHONE ENCOUNTER
----- Message from ITALO Toribio sent at 12/6/2022 12:23 PM EST -----  Please call the patient regarding her abnormal result.  Bilateral Carpal tunnel and I will refer to Hand Surgeons

## 2022-12-12 ENCOUNTER — TELEPHONE (OUTPATIENT)
Dept: NEUROLOGY | Facility: CLINIC | Age: 47
End: 2022-12-12

## 2022-12-17 DIAGNOSIS — M15.9 PRIMARY OSTEOARTHRITIS INVOLVING MULTIPLE JOINTS: ICD-10-CM

## 2022-12-19 RX ORDER — CELECOXIB 200 MG/1
CAPSULE ORAL
Qty: 60 CAPSULE | Refills: 3 | Status: SHIPPED | OUTPATIENT
Start: 2022-12-19

## 2023-01-03 RX ORDER — OMEPRAZOLE 40 MG/1
CAPSULE, DELAYED RELEASE ORAL
Qty: 30 CAPSULE | Refills: 0 | Status: SHIPPED | OUTPATIENT
Start: 2023-01-03 | End: 2023-01-30

## 2023-01-24 NOTE — PROGRESS NOTES
Subjective   Kelly Espinoza is a 47 y.o. female is here for follow-up for neck pain.  Last seen on 11/30/2022.  EMG has been done since last visit and was found to have bilateral carpal tunnel syndrome and has been referred to hand surgery and had b/l carpal tunnel injection with 50% pain relief and was also diagnosed with cubital tunnel syndrome. Her main complaint is Left sided headaches.     On last visit:     Neck pain is 4/10 on VAS, and maximum 6/10.  Pain is aching, burning, throbbing, tingling, weakness, numbness in nature.  Pain is referred to left neck and shoulder.  Pain is constant.  Improved by prednisone, tramadol, gabapentin.  Worse with bending, lying down, lifting.  Also complains of severe headache in occipital region and numbness and tingling in both hands. - mostly in 3,4, 5th digits  Gripping issues in left arm.  Her main complaint is numbness and severe intermittent headaches - left sided headache starting in occipital region radiating to left temporal and frontal region.     Lower back pain is 7/10 on VAS, at maximum is 8/10. Pain is sharp, shooting and stabbing in nature. Pain is referred left buttock, left anterior thigh, left anterior shin and left foot. The pain is constnat. The pain is improved by nothing. The pain is worse with bending.      Previous Injection:   11/8/2022-KENDRA C7-T1- improved headache, still has some pain.   3/8/2022-KENDRA C6/7- 75% pain relief- pain relief, improved headaches and improved flexibility - 4 months     Hx: Referred by Vivian Fontanez APRN  for neck pain.  Her pain started about 3 years ago without any significant injuries. 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   She has been seen by neurosurgery and was told she was non surgical candidate.        PHQ-9- 9  SOAPP-2      PMH:   Lupus, GERD, anxiety, ADD, s/p L5-S1 dissectomy 2000.      Current Medications:   Norco 5-325 mg BID PRN  Celebrex 200 mg BID PRN  Cymbalta 30 mg  daily  Gabapentin 600 mg TID   Ambien        Past Medications:   Tramadol 50 mg q8h PRN-1/16/2022 - didn't help     Past Modalities:  TENS:                                                                          no                                                  Physical Therapy Within The Last 6 Months              Yes - 8 weeks of PT.  Psychotherapy                                                            no  Massage Therapy                                                       no     Patient Complains Of:  Uro-Fecal Incontinence          no  Weight Gain/Loss                   no  Fever/Chills                             no  Weakness                               no           Current Outpatient Medications:   •  albuterol (ACCUNEB) 0.63 MG/3ML nebulizer solution, Take 3 mL by nebulization Every 6 (Six) Hours As Needed for Wheezing or Shortness of Air., Disp: 120 each, Rfl: 0  •  brompheniramine-pseudoephedrine-DM (Bromfed DM) 30-2-10 MG/5ML syrup, Take 5 mL by mouth 4 (Four) Times a Day As Needed for Cough., Disp: 119 mL, Rfl: 0  •  celecoxib (CeleBREX) 200 MG capsule, TAKE ONE CAPSULE BY MOUTH TWICE A DAY, Disp: 60 capsule, Rfl: 3  •  cyclobenzaprine (FLEXERIL) 10 MG tablet, Take 1 tablet by mouth 3 (Three) Times a Day As Needed for Muscle Spasms., Disp: 30 tablet, Rfl: 5  •  DULoxetine (CYMBALTA) 30 MG capsule, TAKE ONE CAPSULE BY MOUTH DAILY, Disp: 30 capsule, Rfl: 5  •  estradiol (ESTRACE) 2 MG tablet, TAKE ONE TABLET BY MOUTH DAILY, Disp: 30 tablet, Rfl: 5  •  gabapentin (NEURONTIN) 600 MG tablet, Take 1 tablet by mouth 3 (Three) Times a Day., Disp: 90 tablet, Rfl: 1  •  [START ON 1/29/2023] HYDROcodone-acetaminophen (NORCO) 5-325 MG per tablet, Take 1 tablet by mouth 2 (Two) Times a Day As Needed for Severe Pain for up to 30 days., Disp: 60 tablet, Rfl: 0  •  [START ON 2/28/2023] HYDROcodone-acetaminophen (NORCO) 5-325 MG per tablet, Take 1 tablet by mouth 2 (Two) Times a Day As Needed for Severe Pain  for up to 30 days., Disp: 60 tablet, Rfl: 0  •  levalbuterol (XOPENEX HFA) 45 MCG/ACT inhaler, Inhale 1-2 puffs Every 4 (Four) Hours As Needed for Wheezing or Shortness of Air., Disp: 15 g, Rfl: 5  •  metoprolol succinate XL (Toprol XL) 50 MG 24 hr tablet, Take 1 tablet by mouth Daily., Disp: 90 tablet, Rfl: 1  •  omeprazole (priLOSEC) 40 MG capsule, TAKE ONE CAPSULE BY MOUTH DAILY, Disp: 30 capsule, Rfl: 0  •  ondansetron ODT (ZOFRAN-ODT) 4 MG disintegrating tablet, 1 tablet orally every 6 hours as needed for nausea and vomiting, Disp: 12 tablet, Rfl: 0  •  traMADol (ULTRAM) 50 MG tablet, Take 1 tablet by mouth Every 8 (Eight) Hours As Needed for Severe Pain., Disp: 40 tablet, Rfl: 2  •  vitamin D (ERGOCALCIFEROL) 1.25 MG (79959 UT) capsule capsule, TAKE ONE CAPSULE BY MOUTH ONCE WEEKLY, Disp: 4 capsule, Rfl: 5  •  zolpidem (AMBIEN) 10 MG tablet, TAKE ONE TABLET BY MOUTH EVERY NIGHT AT BEDTIME AS NEEDED FOR SLEEP, Disp: 30 tablet, Rfl: 5    The following portions of the patient's history were reviewed and updated as appropriate: allergies, current medications, past family history, past medical history, past social history, past surgical history, and problem list.      REVIEW OF PERTINENT MEDICAL DATA    Past Medical History:   Diagnosis Date   • ADD (attention deficit disorder)    • SARMAD positive    • Anxiety    • Arthralgia    • Asthma    • Bursitis of left hip    • Carpal tunnel syndrome     Roberto.   • DDD (degenerative disc disease), lumbar    • Depression, major    • Flu syndrome    • GERD (gastroesophageal reflux disease)    • Heart disease    • Hiatal hernia    • Hormone replacement therapy    • Hyperlipidemia    • Inflammatory arthritis    • Influenza    • Insomnia    • Knee pain, bilateral    • Lupus (HCC)    • Neck pain    • Obesity    • Osteoarthritis    • Pain, joint, shoulder, left    • Pharyngitis, acute    • Postmenopausal    • Sinus congestion    • Surgical menopause     at age 34   • Vitamin B12  deficiency    • Vitamin D deficiency      Past Surgical History:   Procedure Laterality Date   • CHOLECYSTECTOMY     • LUMBAR DISCECTOMY     • TONSILLECTOMY     • TOTAL ABDOMINAL HYSTERECTOMY WITH SALPINGO OOPHORECTOMY Bilateral     FOR ENDOMETRIOSIS     Family History   Problem Relation Age of Onset   • Hypertension Mother    • Hypertension Father    • Diabetes Other    • Heart disease Other    • Hypertension Other    • Cancer Other      Social History     Socioeconomic History   • Marital status:    Tobacco Use   • Smoking status: Former     Packs/day: 0.50     Years: 3.00     Pack years: 1.50     Types: Cigarettes     Start date:      Quit date:      Years since quittin.0   • Smokeless tobacco: Never   Vaping Use   • Vaping Use: Never used   Substance and Sexual Activity   • Alcohol use: Not Currently     Alcohol/week: 0.0 standard drinks   • Drug use: Not Currently   • Sexual activity: Defer         Review of Systems   Musculoskeletal: Positive for arthralgias, back pain and neck pain.         Vitals:    23 1354   BP: 123/78   Pulse: 90   Resp: 16   SpO2: 98%   PainSc:   5         Objective   Physical Exam  Neck:     Musculoskeletal:         General: Tenderness present.        Arms:         Legs:    Neurological:      Deep Tendon Reflexes:      Reflex Scores:       Tricep reflexes are 2+ on the right side and 2+ on the left side.       Bicep reflexes are 2+ on the right side and 2+ on the left side.       Brachioradialis reflexes are 2+ on the right side and 2+ on the left side.     Comments: Motor strength 5/5 b/l UE  Sensory intact b/l UE             Imaging Reviewed:  MRI cervical spine-2022  -C2-3-degenerative facet changes bilateral  C3-4-WNL  C4-5-WNL  C5-6-mild facet changes.  Mild left neural foraminal narrowing.  C6-7-mild bilateral uncovertebral joint spurring.  C7-T1-WNL    EMG upper extremity-2022  - Abnormal study.  Evidence of moderate right and mild left  median neuropathy at the wrist consistent with carpal tunnel syndrome    Assessment:    1. Occipital neuralgia of left side    2. DDD (degenerative disc disease), cervical    3. Cervical spondylosis         Plan:   1. UDS consistent with patient interview on 10/24/22. Narcotic contract signed - 11/30/22.   2. We discussed trying a course of formal physical therapy.  Physical therapy can help strengthen and stretch the muscles around the joints. Continue to be as active as possible.  Patient has done 8 weeks of physical therapy without much improvement.  3.. Continue Gabapepntin 600 mg TID (1 refill). Side effects discussed with the patient including but not limited to somnolence, dizziness, ataxia, headache, fatigue, blurred vision, cold or flu-like symptoms,delusions, hoarseness, lack or loss of strength, lower back or side pain, swelling of the hands, feet, or lower legs trembling or shaking. Patient understands and agrees with the plan.  4.  Continue Norco 5-325 mg BID PRN (1/29; 2/28).  Discussed with the patient regarding long-term side effects of opioids including but not limited to opioid induced hormonal suppression, hyperalgesia, fatigue, weight gain, possible opioid induced altered immune system, addiction, tolerance, dependence, risk of hearing loss and elevated risk of myocardial infarction. Proper use and potential life threatening side effects of over use discussed with patient. Patient states understanding of their use and risks.  5. Patient has pain in the head with referred pain on the top of the head. Left Occipital tenderness present. Discussed left greater and lesser occipital nerve block. Discussed the possibility of infection, bleeding, nerve damage, headache, increased pain. Patient understands and agrees.  6.  Patient has severe left SI joint tenderness with MELE positive and intermittent radiculopathy.  Due to recent onset, will prescribe Medrol Dosepak for 5 days to see if that improves  pain.  We will consider left SI joint injection in future if pain worsens in lower back.        RTC for injection and then 3 weeks follow up.     Rome Springer DO  Pain Management   [facility]            INSPECT REPORT    As part of the patient's treatment plan, I may be prescribing controlled substances. The patient has been made aware of appropriate use of such medications, including potential risk of somnolence, limited ability to drive and/or work safely, and the potential for dependence or overdose. It has also been made clear that these medications are for use by this patient only, without concomitant use of alcohol or other substances unless prescribed.     Patient has completed prescribing agreement detailing terms of continued prescribing of controlled substances, including monitoring INSPECT reports, urine drug screening, and pill counts if necessary. The patient is aware that inappropriate use will results in cessation of prescribing such medications.    INSPECT report has been reviewed and scanned into the patient's chart.

## 2023-01-25 ENCOUNTER — OFFICE VISIT (OUTPATIENT)
Dept: PAIN MEDICINE | Facility: CLINIC | Age: 48
End: 2023-01-25
Payer: COMMERCIAL

## 2023-01-25 ENCOUNTER — TELEPHONE (OUTPATIENT)
Dept: PAIN MEDICINE | Facility: CLINIC | Age: 48
End: 2023-01-25
Payer: COMMERCIAL

## 2023-01-25 VITALS
HEART RATE: 90 BPM | OXYGEN SATURATION: 98 % | RESPIRATION RATE: 16 BRPM | SYSTOLIC BLOOD PRESSURE: 123 MMHG | DIASTOLIC BLOOD PRESSURE: 78 MMHG

## 2023-01-25 DIAGNOSIS — M47.812 CERVICAL SPONDYLOSIS: ICD-10-CM

## 2023-01-25 DIAGNOSIS — M50.30 DDD (DEGENERATIVE DISC DISEASE), CERVICAL: ICD-10-CM

## 2023-01-25 DIAGNOSIS — M54.81 OCCIPITAL NEURALGIA OF LEFT SIDE: Primary | ICD-10-CM

## 2023-01-25 PROCEDURE — 99214 OFFICE O/P EST MOD 30 MIN: CPT | Performed by: STUDENT IN AN ORGANIZED HEALTH CARE EDUCATION/TRAINING PROGRAM

## 2023-01-25 RX ORDER — HYDROCODONE BITARTRATE AND ACETAMINOPHEN 5; 325 MG/1; MG/1
1 TABLET ORAL 2 TIMES DAILY PRN
Qty: 60 TABLET | Refills: 0 | Status: SHIPPED | OUTPATIENT
Start: 2023-02-28 | End: 2023-03-07 | Stop reason: SDUPTHER

## 2023-01-25 RX ORDER — HYDROCODONE BITARTRATE AND ACETAMINOPHEN 5; 325 MG/1; MG/1
1 TABLET ORAL 2 TIMES DAILY PRN
Qty: 60 TABLET | Refills: 0 | Status: SHIPPED | OUTPATIENT
Start: 2023-01-29 | End: 2023-02-28

## 2023-01-25 RX ORDER — GABAPENTIN 600 MG/1
600 TABLET ORAL 3 TIMES DAILY
Qty: 90 TABLET | Refills: 1 | Status: SHIPPED | OUTPATIENT
Start: 2023-01-25 | End: 2023-03-07 | Stop reason: SDUPTHER

## 2023-01-30 RX ORDER — OMEPRAZOLE 40 MG/1
CAPSULE, DELAYED RELEASE ORAL
Qty: 30 CAPSULE | Refills: 0 | Status: SHIPPED | OUTPATIENT
Start: 2023-01-30 | End: 2023-02-27

## 2023-02-02 RX ORDER — ESTRADIOL 2 MG/1
TABLET ORAL
Qty: 30 TABLET | Refills: 5 | Status: SHIPPED | OUTPATIENT
Start: 2023-02-02

## 2023-02-03 RX ORDER — AZITHROMYCIN 250 MG/1
TABLET, FILM COATED ORAL
Qty: 6 TABLET | Refills: 0 | Status: SHIPPED | OUTPATIENT
Start: 2023-02-03 | End: 2023-03-08

## 2023-02-15 ENCOUNTER — PROCEDURE VISIT (OUTPATIENT)
Dept: PAIN MEDICINE | Facility: CLINIC | Age: 48
End: 2023-02-15
Payer: COMMERCIAL

## 2023-02-15 VITALS
DIASTOLIC BLOOD PRESSURE: 73 MMHG | RESPIRATION RATE: 16 BRPM | SYSTOLIC BLOOD PRESSURE: 119 MMHG | HEART RATE: 84 BPM | OXYGEN SATURATION: 96 %

## 2023-02-15 DIAGNOSIS — M54.81 OCCIPITAL NEURALGIA OF LEFT SIDE: Primary | ICD-10-CM

## 2023-02-15 PROCEDURE — 64450 NJX AA&/STRD OTHER PN/BRANCH: CPT | Performed by: STUDENT IN AN ORGANIZED HEALTH CARE EDUCATION/TRAINING PROGRAM

## 2023-02-15 PROCEDURE — 64405 NJX AA&/STRD GR OCPL NRV: CPT | Performed by: STUDENT IN AN ORGANIZED HEALTH CARE EDUCATION/TRAINING PROGRAM

## 2023-02-15 RX ORDER — METHYLPREDNISOLONE ACETATE 40 MG/ML
40 INJECTION, SUSPENSION INTRA-ARTICULAR; INTRALESIONAL; INTRAMUSCULAR; SOFT TISSUE ONCE
Status: COMPLETED | OUTPATIENT
Start: 2023-02-15 | End: 2023-02-15

## 2023-02-15 RX ORDER — BUPIVACAINE HYDROCHLORIDE 2.5 MG/ML
5 INJECTION, SOLUTION INFILTRATION; PERINEURAL ONCE
Status: COMPLETED | OUTPATIENT
Start: 2023-02-15 | End: 2023-02-15

## 2023-02-15 RX ADMIN — BUPIVACAINE HYDROCHLORIDE 5 ML: 2.5 INJECTION, SOLUTION INFILTRATION; PERINEURAL at 14:27

## 2023-02-15 RX ADMIN — METHYLPREDNISOLONE ACETATE 40 MG: 40 INJECTION, SUSPENSION INTRA-ARTICULAR; INTRALESIONAL; INTRAMUSCULAR; SOFT TISSUE at 14:25

## 2023-02-15 NOTE — PROGRESS NOTES
H and P reviewed from previous visit and no changes to patient's clinical presentation. Will proceed with procedure as planned. Patient denies history of DM and being on blood thinners.    Dx: Left occipital neuralgia.   Procedure: Left greater and lesser occipital nerve block  The patient's chart was reviewed.  After obtaining written informed consent, the patient was placed in a sitting position with the cervical spine flexed and the forehead on a padded bedside table.  The left occipital artery was palpated at the level of the superior nuchal ridge.  The area was prepped with antiseptic solution.  A 27 gauge 1 1/2 inch needle is inserted just medial to the artery and advanced perpendicular until the needle approached the periostium of the underlying occipital bone.  Then, the needle was redirected superiorly.  After gentle aspiration, 2 ml of the solution was injected in a fanlike manner.  The left lesser occipital nerve was blocked by redirecting the needle laterally and slightly inferiorly, 2 ml of the solution was injected in a fanlike manner.  A mixture of 0.25% marcaine with 40 mg of depo-medrol was injected slowly.  The patient tolerated the procedure well.  The needle was flushed and withdrawn, and a Band-Aid was applied.    Rome Springer DO  Pain Management   [facility]

## 2023-02-27 RX ORDER — OMEPRAZOLE 40 MG/1
CAPSULE, DELAYED RELEASE ORAL
Qty: 30 CAPSULE | Refills: 0 | Status: SHIPPED | OUTPATIENT
Start: 2023-02-27 | End: 2023-03-27

## 2023-03-02 ENCOUNTER — CLINICAL SUPPORT (OUTPATIENT)
Dept: FAMILY MEDICINE CLINIC | Facility: CLINIC | Age: 48
End: 2023-03-02
Payer: COMMERCIAL

## 2023-03-02 DIAGNOSIS — E55.9 VITAMIN D DEFICIENCY: Primary | ICD-10-CM

## 2023-03-02 DIAGNOSIS — I10 PRIMARY HYPERTENSION: ICD-10-CM

## 2023-03-02 DIAGNOSIS — E53.8 VITAMIN B12 DEFICIENCY: ICD-10-CM

## 2023-03-02 DIAGNOSIS — E78.2 MIXED HYPERLIPIDEMIA: ICD-10-CM

## 2023-03-02 PROCEDURE — 36415 COLL VENOUS BLD VENIPUNCTURE: CPT | Performed by: NURSE PRACTITIONER

## 2023-03-02 PROCEDURE — 82607 VITAMIN B-12: CPT | Performed by: NURSE PRACTITIONER

## 2023-03-02 PROCEDURE — 82306 VITAMIN D 25 HYDROXY: CPT | Performed by: NURSE PRACTITIONER

## 2023-03-02 PROCEDURE — 80050 GENERAL HEALTH PANEL: CPT | Performed by: NURSE PRACTITIONER

## 2023-03-02 PROCEDURE — 80061 LIPID PANEL: CPT | Performed by: NURSE PRACTITIONER

## 2023-03-02 RX ORDER — METOPROLOL SUCCINATE 50 MG/1
TABLET, EXTENDED RELEASE ORAL
Qty: 90 TABLET | Refills: 1 | Status: SHIPPED | OUTPATIENT
Start: 2023-03-02

## 2023-03-02 NOTE — PROGRESS NOTES
Venipuncture Blood Specimen Collection  Venipuncture performed in the right arm by Mer Grubbs MA with good hemostasis. Patient tolerated the procedure well without complications.   03/02/23   Mer Grubbs MA

## 2023-03-03 LAB
25(OH)D3 SERPL-MCNC: 24.5 NG/ML (ref 30–100)
ALBUMIN SERPL-MCNC: 4.5 G/DL (ref 3.5–5.2)
ALBUMIN/GLOB SERPL: 1.6 G/DL
ALP SERPL-CCNC: 89 U/L (ref 39–117)
ALT SERPL W P-5'-P-CCNC: 24 U/L (ref 1–33)
ANION GAP SERPL CALCULATED.3IONS-SCNC: 6.7 MMOL/L (ref 5–15)
AST SERPL-CCNC: 24 U/L (ref 1–32)
BILIRUB SERPL-MCNC: 0.3 MG/DL (ref 0–1.2)
BUN SERPL-MCNC: 20 MG/DL (ref 6–20)
BUN/CREAT SERPL: 31.3 (ref 7–25)
CALCIUM SPEC-SCNC: 9.6 MG/DL (ref 8.6–10.5)
CHLORIDE SERPL-SCNC: 104 MMOL/L (ref 98–107)
CHOLEST SERPL-MCNC: 226 MG/DL (ref 0–200)
CO2 SERPL-SCNC: 29.3 MMOL/L (ref 22–29)
CREAT SERPL-MCNC: 0.64 MG/DL (ref 0.57–1)
DEPRECATED RDW RBC AUTO: 38 FL (ref 37–54)
EGFRCR SERPLBLD CKD-EPI 2021: 109.2 ML/MIN/1.73
ERYTHROCYTE [DISTWIDTH] IN BLOOD BY AUTOMATED COUNT: 12.2 % (ref 12.3–15.4)
GLOBULIN UR ELPH-MCNC: 2.9 GM/DL
GLUCOSE SERPL-MCNC: 93 MG/DL (ref 65–99)
HCT VFR BLD AUTO: 37.4 % (ref 34–46.6)
HDLC SERPL-MCNC: 67 MG/DL (ref 40–60)
HGB BLD-MCNC: 12.6 G/DL (ref 12–15.9)
LDLC SERPL CALC-MCNC: 130 MG/DL (ref 0–100)
LDLC/HDLC SERPL: 1.87 {RATIO}
MCH RBC QN AUTO: 28.7 PG (ref 26.6–33)
MCHC RBC AUTO-ENTMCNC: 33.7 G/DL (ref 31.5–35.7)
MCV RBC AUTO: 85.2 FL (ref 79–97)
PLATELET # BLD AUTO: 258 10*3/MM3 (ref 140–450)
PMV BLD AUTO: 10.2 FL (ref 6–12)
POTASSIUM SERPL-SCNC: 4.2 MMOL/L (ref 3.5–5.2)
PROT SERPL-MCNC: 7.4 G/DL (ref 6–8.5)
RBC # BLD AUTO: 4.39 10*6/MM3 (ref 3.77–5.28)
SODIUM SERPL-SCNC: 140 MMOL/L (ref 136–145)
TRIGL SERPL-MCNC: 167 MG/DL (ref 0–150)
TSH SERPL DL<=0.05 MIU/L-ACNC: 1.15 UIU/ML (ref 0.27–4.2)
VIT B12 BLD-MCNC: 522 PG/ML (ref 211–946)
VLDLC SERPL-MCNC: 29 MG/DL (ref 5–40)
WBC NRBC COR # BLD: 5.2 10*3/MM3 (ref 3.4–10.8)

## 2023-03-07 NOTE — PROGRESS NOTES
Subjective   Kelly Espinoza is a 48 y.o. female is here for follow-up for neck pain.  Last seen on 2/15/2020 for left occipital nerve block with excellent relief with sharp, shooting pain. She has increased headaches for last 3 days due to allergies and has been on Zyrtec. Feels like her neck pain has increased as well.      On last visit:     Neck pain is 7/10 on VAS, and maximum 8/10.  Pain is aching, burning, throbbing, tingling, weakness, numbness in nature.  Pain is referred to left neck and shoulder.  Pain is constant.  Improved by prednisone, tramadol, gabapentin.  Worse with bending, lying down, lifting.  Also complains of severe headache in occipital region and numbness and tingling in both hands. - mostly in 3,4, 5th digits  Gripping issues in left arm.  Her main complaint is numbness and severe intermittent headaches - left sided headache starting in occipital region radiating to left temporal and frontal region.     Lower back pain is 7/10 on VAS, at maximum is 8/10. Pain is sharp, shooting and stabbing in nature. Pain is referred left buttock, left anterior thigh, left anterior shin and left foot. The pain is constnat. The pain is improved by nothing. The pain is worse with bending.      Previous Injection:   2/15/2023- left occipital nerve block- good relief.   11/8/2022-KENDRA C7-T1- improved headache, still has some pain.   3/8/2022-KENDRA C6/7- 75% pain relief- pain relief, improved headaches and improved flexibility - 4 months     Hx: Referred by Vivian Fontanez APRN  for neck pain.  Her pain started about 3 years ago without any significant injuries. 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   She has been seen by neurosurgery and was told she was non surgical candidate.  EMG showed bilateral carpal tunnel syndrome and cubital tunnel syndrome.  S/p bilateral carpal tunnel injection with 50% relief.       PHQ-9- 9  SOAPP-2      PMH:   Lupus, GERD, anxiety, ADD, s/p L5-S1  dissectomy 2000.      Current Medications:   Norco 5-325 mg BID PRN  Celebrex 200 mg BID PRN  Cymbalta 30 mg daily  Gabapentin 600 mg TID   Ambien        Past Medications:   Tramadol 50 mg q8h PRN-1/16/2022 - didn't help     Past Modalities:  TENS:                                                                          no                                                  Physical Therapy Within The Last 6 Months              Yes - 8 weeks of PT.  Psychotherapy                                                            no  Massage Therapy                                                       no     Patient Complains Of:  Uro-Fecal Incontinence          no  Weight Gain/Loss                   no  Fever/Chills                             no  Weakness                               no           Current Outpatient Medications:   •  albuterol (ACCUNEB) 0.63 MG/3ML nebulizer solution, Take 3 mL by nebulization Every 6 (Six) Hours As Needed for Wheezing or Shortness of Air., Disp: 120 each, Rfl: 0  •  celecoxib (CeleBREX) 200 MG capsule, TAKE ONE CAPSULE BY MOUTH TWICE A DAY, Disp: 60 capsule, Rfl: 3  •  cyclobenzaprine (FLEXERIL) 10 MG tablet, Take 1 tablet by mouth 3 (Three) Times a Day As Needed for Muscle Spasms., Disp: 30 tablet, Rfl: 5  •  DULoxetine (CYMBALTA) 30 MG capsule, Take 1 capsule by mouth Daily., Disp: 30 capsule, Rfl: 5  •  estradiol (ESTRACE) 2 MG tablet, TAKE ONE TABLET BY MOUTH DAILY, Disp: 30 tablet, Rfl: 5  •  gabapentin (NEURONTIN) 600 MG tablet, Take 1 tablet by mouth 3 (Three) Times a Day., Disp: 90 tablet, Rfl: 1  •  [START ON 3/30/2023] HYDROcodone-acetaminophen (NORCO) 5-325 MG per tablet, Take 1 tablet by mouth 2 (Two) Times a Day As Needed for Severe Pain for up to 30 days., Disp: 60 tablet, Rfl: 0  •  [START ON 4/29/2023] HYDROcodone-acetaminophen (NORCO) 5-325 MG per tablet, Take 1 tablet by mouth 2 (Two) Times a Day As Needed for Severe Pain for up to 30 days., Disp: 60 tablet, Rfl: 0  •   levalbuterol (XOPENEX HFA) 45 MCG/ACT inhaler, Inhale 1-2 puffs Every 4 (Four) Hours As Needed for Wheezing or Shortness of Air., Disp: 15 g, Rfl: 5  •  metoprolol succinate XL (TOPROL-XL) 50 MG 24 hr tablet, TAKE ONE TABLET BY MOUTH DAILY, Disp: 90 tablet, Rfl: 1  •  omeprazole (priLOSEC) 40 MG capsule, TAKE ONE CAPSULE BY MOUTH DAILY, Disp: 30 capsule, Rfl: 0  •  ondansetron ODT (ZOFRAN-ODT) 4 MG disintegrating tablet, 1 tablet orally every 6 hours as needed for nausea and vomiting, Disp: 12 tablet, Rfl: 0  •  vitamin D (ERGOCALCIFEROL) 1.25 MG (53057 UT) capsule capsule, Take 1 capsule by mouth 1 (One) Time Per Week., Disp: 4 capsule, Rfl: 5  •  zolpidem (AMBIEN) 10 MG tablet, Take 1 tablet by mouth At Night As Needed for Sleep., Disp: 30 tablet, Rfl: 5    The following portions of the patient's history were reviewed and updated as appropriate: allergies, current medications, past family history, past medical history, past social history, past surgical history, and problem list.      REVIEW OF PERTINENT MEDICAL DATA    Past Medical History:   Diagnosis Date   • ADD (attention deficit disorder)    • SARMAD positive    • Anxiety    • Arthralgia    • Asthma    • Bursitis of left hip    • Carpal tunnel syndrome     Roberto.   • DDD (degenerative disc disease), lumbar    • Depression, major    • Flu syndrome    • GERD (gastroesophageal reflux disease)    • Heart disease    • Hiatal hernia    • Hormone replacement therapy    • Hyperlipidemia    • Inflammatory arthritis    • Influenza    • Insomnia    • Knee pain, bilateral    • Lupus (HCC)    • Migraine    • Neck pain    • Obesity    • Osteoarthritis    • Pain, joint, shoulder, left    • Pharyngitis, acute    • Postmenopausal    • Sinus congestion    • Surgical menopause     at age 34   • Vitamin B12 deficiency    • Vitamin D deficiency      Past Surgical History:   Procedure Laterality Date   • CHOLECYSTECTOMY     • LUMBAR DISCECTOMY  2000   • TONSILLECTOMY     • TOTAL  ABDOMINAL HYSTERECTOMY WITH SALPINGO OOPHORECTOMY Bilateral     FOR ENDOMETRIOSIS     Family History   Problem Relation Age of Onset   • Hypertension Mother    • Hypertension Father    • Diabetes Other    • Heart disease Other    • Hypertension Other    • Cancer Other      Social History     Socioeconomic History   • Marital status:    Tobacco Use   • Smoking status: Former     Packs/day: 0.50     Years: 3.00     Pack years: 1.50     Types: Cigarettes     Start date:      Quit date:      Years since quittin.1     Passive exposure: Never   • Smokeless tobacco: Never   Vaping Use   • Vaping Use: Never used   Substance and Sexual Activity   • Alcohol use: Not Currently     Alcohol/week: 0.0 standard drinks   • Drug use: Not Currently   • Sexual activity: Defer         Review of Systems   Musculoskeletal: Positive for arthralgias, back pain and neck pain.         Vitals:    23 1442   BP: 116/75   Pulse: 95   Resp: 16   SpO2: 97%   PainSc:   7         Objective   Physical Exam  Neck:     Musculoskeletal:         General: Tenderness present.        Arms:         Legs:    Neurological:      Deep Tendon Reflexes:      Reflex Scores:       Tricep reflexes are 2+ on the right side and 2+ on the left side.       Bicep reflexes are 2+ on the right side and 2+ on the left side.       Brachioradialis reflexes are 2+ on the right side and 2+ on the left side.     Comments: Motor strength 5/5 b/l UE  Sensory intact b/l UE             Imaging Reviewed:  MRI cervical spine-2022  -C2-3-degenerative facet changes bilateral  C3-4-WNL  C4-5-WNL  C5-6-mild facet changes.  Mild left neural foraminal narrowing.  C6-7-mild bilateral uncovertebral joint spurring.  C7-T1-WNL    EMG upper extremity-2022  - Abnormal study.  Evidence of moderate right and mild left median neuropathy at the wrist consistent with carpal tunnel syndrome    Assessment:    1. DDD (degenerative disc disease), cervical    2.  Cervical spondylosis    3. Occipital neuralgia of left side    4. High risk medication use         Plan:   1. UDS consistent with patient interview on 10/24/22. Narcotic contract signed - 11/30/22.   2. We discussed trying a course of formal physical therapy.  Physical therapy can help strengthen and stretch the muscles around the joints. Continue to be as active as possible.  Patient has done 8 weeks of physical therapy without much improvement.  3.. Continue Gabapepntin 600 mg TID (1 refill). Side effects discussed with the patient including but not limited to somnolence, dizziness, ataxia, headache, fatigue, blurred vision, cold or flu-like symptoms,delusions, hoarseness, lack or loss of strength, lower back or side pain, swelling of the hands, feet, or lower legs trembling or shaking. Patient understands and agrees with the plan.  4.  Continue Norco 5-325 mg BID PRN (3/30; 4/29).  Discussed with the patient regarding long-term side effects of opioids including but not limited to opioid induced hormonal suppression, hyperalgesia, fatigue, weight gain, possible opioid induced altered immune system, addiction, tolerance, dependence, risk of hearing loss and elevated risk of myocardial infarction. Proper use and potential life threatening side effects of over use discussed with patient. Patient states understanding of their use and risks.  5. .  Patient has severe left SI joint tenderness with MELE positive and intermittent radiculopathy.  Due to recent onset, will prescribe Medrol Dosepak for 5 days to see if that improves pain.  We will consider left SI joint injection in future if pain worsens in lower back.   6. Some of her neck pain is worsening. She also has worsening of headaches due to allergies. We will wait to see if treating allergies improve her pain. If she conitues to have increased neck pain and radicular pain, will repeat KENDRA C7-T1. Patient will give us a call to schedule if needed.     RTC in 2  months.     Rome Springer DO  Pain Management   [facility]            INSPECT REPORT    As part of the patient's treatment plan, I may be prescribing controlled substances. The patient has been made aware of appropriate use of such medications, including potential risk of somnolence, limited ability to drive and/or work safely, and the potential for dependence or overdose. It has also been made clear that these medications are for use by this patient only, without concomitant use of alcohol or other substances unless prescribed.     Patient has completed prescribing agreement detailing terms of continued prescribing of controlled substances, including monitoring INSPECT reports, urine drug screening, and pill counts if necessary. The patient is aware that inappropriate use will results in cessation of prescribing such medications.    INSPECT report has been reviewed and scanned into the patient's chart.

## 2023-03-08 ENCOUNTER — OFFICE VISIT (OUTPATIENT)
Dept: FAMILY MEDICINE CLINIC | Facility: CLINIC | Age: 48
End: 2023-03-08
Payer: COMMERCIAL

## 2023-03-08 VITALS
DIASTOLIC BLOOD PRESSURE: 82 MMHG | BODY MASS INDEX: 41.61 KG/M2 | SYSTOLIC BLOOD PRESSURE: 118 MMHG | WEIGHT: 220.4 LBS | OXYGEN SATURATION: 98 % | HEIGHT: 61 IN | HEART RATE: 73 BPM

## 2023-03-08 DIAGNOSIS — I10 PRIMARY HYPERTENSION: ICD-10-CM

## 2023-03-08 DIAGNOSIS — Z00.00 PREVENTATIVE HEALTH CARE: ICD-10-CM

## 2023-03-08 DIAGNOSIS — G56.03 BILATERAL CARPAL TUNNEL SYNDROME: ICD-10-CM

## 2023-03-08 DIAGNOSIS — G89.29 CHRONIC PAIN OF RIGHT KNEE: Primary | ICD-10-CM

## 2023-03-08 DIAGNOSIS — M15.9 PRIMARY OSTEOARTHRITIS INVOLVING MULTIPLE JOINTS: ICD-10-CM

## 2023-03-08 DIAGNOSIS — F51.01 PRIMARY INSOMNIA: ICD-10-CM

## 2023-03-08 DIAGNOSIS — M54.2 CERVICALGIA: ICD-10-CM

## 2023-03-08 DIAGNOSIS — E78.2 MIXED HYPERLIPIDEMIA: ICD-10-CM

## 2023-03-08 DIAGNOSIS — M25.561 CHRONIC PAIN OF RIGHT KNEE: Primary | ICD-10-CM

## 2023-03-08 DIAGNOSIS — E66.01 CLASS 3 SEVERE OBESITY WITHOUT SERIOUS COMORBIDITY WITH BODY MASS INDEX (BMI) OF 40.0 TO 44.9 IN ADULT, UNSPECIFIED OBESITY TYPE: ICD-10-CM

## 2023-03-08 DIAGNOSIS — E55.9 VITAMIN D DEFICIENCY: ICD-10-CM

## 2023-03-08 PROCEDURE — 99396 PREV VISIT EST AGE 40-64: CPT | Performed by: NURSE PRACTITIONER

## 2023-03-08 RX ORDER — DULOXETIN HYDROCHLORIDE 30 MG/1
30 CAPSULE, DELAYED RELEASE ORAL DAILY
Qty: 30 CAPSULE | Refills: 5 | Status: SHIPPED | OUTPATIENT
Start: 2023-03-08

## 2023-03-08 RX ORDER — ERGOCALCIFEROL 1.25 MG/1
50000 CAPSULE ORAL WEEKLY
Qty: 4 CAPSULE | Refills: 5 | Status: SHIPPED | OUTPATIENT
Start: 2023-03-08

## 2023-03-08 RX ORDER — ZOLPIDEM TARTRATE 10 MG/1
10 TABLET ORAL NIGHTLY PRN
Qty: 30 TABLET | Refills: 5 | Status: SHIPPED | OUTPATIENT
Start: 2023-03-08

## 2023-03-08 NOTE — PROGRESS NOTES
Chief Complaint  Chief Complaint   Patient presents with   • Annual Exam     Discuss blood work from last week.    • Weight Loss     Pt having trouble losing weight, pt states she does not exercise regularly and has poor eating habits. Pt would like to discuss medication to help her lose weight    • Knee Pain     Pt has arthritis and is noticing increased pain in right knee            Subjective          Kelly Espinoza presents to [facility] for   History of Present Illness     Patient presents for annual exam and to discuss labs collected 3/3/2023    Chronic right knee pain, xray last done 2018 and normal, on celebrex    Obesity, poor diet, does not exercise, craves sugar, drinks full sugar sodas-2 per day, coffees, [employer], works 10 hour shifts and doesn't want to walk/exercise when gets home.     Cervical spine pain and h/a's, following with pain management and neurosurgery, MRI showed multilevel degenerative facet changes with mild neuroforaminal narrowing on the left at C5-6 level. Received cervical epidural spinal injections and recent occipital steroid injections per Dr. Springer. She uses norco now, tramadol d/c'd and Flexeril as needed, Celebrex and gabapentin 600 mg 3 times daily, taking all medications as directed.   -has FMLA for flareups for max of 1-3 days/week.      CTS and cubital tunnel syndrome, EMG completed per neuro, received injections and some imrpovement.      Cystitis, s/s improved, she does take baths, took tagamet briefly, but all s/s resolved now.        Cardiac murmur, started metoprolol, bp/hr improved, she does report fatigue, denies cp, reports occasional palpitations.     GERD, stable on medication, denies nausea, vomiting, constipation, abdominal pain and diarrhea.     Insomnia, stable with ambien nightly    Here to review labs      The following portions of the patient's history were reviewed and updated as appropriate: allergies, current medications,  past family history, past medical history, past social history, past surgical history and problem list.    Past Medical History:   Diagnosis Date   • ADD (attention deficit disorder)    • SARMAD positive    • Anxiety    • Arthralgia    • Asthma    • Bursitis of left hip    • Carpal tunnel syndrome    • DDD (degenerative disc disease), lumbar    • Depression, major    • Flu syndrome    • GERD (gastroesophageal reflux disease)    • Heart disease    • Hiatal hernia    • Hormone replacement therapy    • Hyperlipidemia    • Inflammatory arthritis    • Influenza    • Insomnia    • Knee pain, bilateral    • Lupus    • Migraine    • Neck pain    • Obesity    • Osteoarthritis    • Pain, joint, shoulder, left    • Pharyngitis, acute    • Postmenopausal    • Sinus congestion    • Surgical menopause    • Vitamin B12 deficiency    • Vitamin D deficiency      Past Surgical History:   Procedure Laterality Date   • CHOLECYSTECTOMY     • LUMBAR DISCECTOMY     • TONSILLECTOMY     • TOTAL ABDOMINAL HYSTERECTOMY WITH SALPINGO OOPHORECTOMY Bilateral     FOR ENDOMETRIOSIS     Family History   Problem Relation Age of Onset   • Hypertension Mother    • Hypertension Father    • Diabetes Other    • Heart disease Other    • Hypertension Other    • Cancer Other      Social History     Tobacco Use   • Smoking status: Former     Packs/day: 0.50     Years: 3.00     Pack years: 1.50     Types: Cigarettes     Start date:      Quit date:      Years since quittin.2     Passive exposure: Never   • Smokeless tobacco: Never   Substance Use Topics   • Alcohol use: Not Currently     Alcohol/week: 0.0 standard drinks       Current Outpatient Medications:   •  albuterol (ACCUNEB) 0.63 MG/3ML nebulizer solution, Take 3 mL by nebulization Every 6 (Six) Hours As Needed for Wheezing or Shortness of Air., Disp: 120 each, Rfl: 0  •  celecoxib (CeleBREX) 200 MG capsule, TAKE ONE CAPSULE BY MOUTH TWICE A DAY, Disp: 60 capsule, Rfl: 3  •   "cyclobenzaprine (FLEXERIL) 10 MG tablet, Take 1 tablet by mouth 3 (Three) Times a Day As Needed for Muscle Spasms., Disp: 30 tablet, Rfl: 5  •  DULoxetine (CYMBALTA) 30 MG capsule, Take 1 capsule by mouth Daily., Disp: 30 capsule, Rfl: 5  •  estradiol (ESTRACE) 2 MG tablet, TAKE ONE TABLET BY MOUTH DAILY, Disp: 30 tablet, Rfl: 5  •  levalbuterol (XOPENEX HFA) 45 MCG/ACT inhaler, Inhale 1-2 puffs Every 4 (Four) Hours As Needed for Wheezing or Shortness of Air., Disp: 15 g, Rfl: 5  •  metoprolol succinate XL (TOPROL-XL) 50 MG 24 hr tablet, TAKE ONE TABLET BY MOUTH DAILY, Disp: 90 tablet, Rfl: 1  •  ondansetron ODT (ZOFRAN-ODT) 4 MG disintegrating tablet, 1 tablet orally every 6 hours as needed for nausea and vomiting, Disp: 12 tablet, Rfl: 0  •  vitamin D (ERGOCALCIFEROL) 1.25 MG (71001 UT) capsule capsule, Take 1 capsule by mouth 1 (One) Time Per Week., Disp: 4 capsule, Rfl: 5  •  zolpidem (AMBIEN) 10 MG tablet, Take 1 tablet by mouth At Night As Needed for Sleep., Disp: 30 tablet, Rfl: 5  •  gabapentin (NEURONTIN) 600 MG tablet, Take 1 tablet by mouth 3 (Three) Times a Day., Disp: 90 tablet, Rfl: 1  •  HYDROcodone-acetaminophen (NORCO) 5-325 MG per tablet, Take 1 tablet by mouth 2 (Two) Times a Day As Needed for Severe Pain for up to 30 days., Disp: 60 tablet, Rfl: 0  •  [START ON 4/29/2023] HYDROcodone-acetaminophen (NORCO) 5-325 MG per tablet, Take 1 tablet by mouth 2 (Two) Times a Day As Needed for Severe Pain for up to 30 days., Disp: 60 tablet, Rfl: 0  •  omeprazole (priLOSEC) 40 MG capsule, TAKE ONE CAPSULE BY MOUTH DAILY, Disp: 30 capsule, Rfl: 0    Objective   Vital Signs:   /82 (BP Location: Right arm, Patient Position: Sitting, Cuff Size: Large Adult)   Pulse 73   Ht 154.9 cm (60.98\")   Wt 100 kg (220 lb 6.4 oz)   SpO2 98%   BMI 41.67 kg/m²           Physical Exam  Vitals and nursing note reviewed.   Constitutional:       General: She is not in acute distress.     Appearance: Normal appearance. " She is well-developed. She is obese. She is not ill-appearing or diaphoretic.   Eyes:      Pupils: Pupils are equal, round, and reactive to light.   Neck:      Thyroid: No thyromegaly.      Vascular: No JVD.   Cardiovascular:      Rate and Rhythm: Normal rate and regular rhythm.      Heart sounds: Normal heart sounds. No murmur heard.  Pulmonary:      Effort: Pulmonary effort is normal. No respiratory distress.      Breath sounds: Normal breath sounds. No wheezing or rhonchi.   Abdominal:      General: Bowel sounds are normal. There is no distension.      Palpations: Abdomen is soft.      Tenderness: There is no abdominal tenderness.   Musculoskeletal:         General: Tenderness (chronic multi joint pain, C-spine and R knee pain, good rom) present. No swelling. Normal range of motion.      Cervical back: Normal range of motion and neck supple. No rigidity.   Skin:     General: Skin is warm and dry.      Coloration: Skin is not jaundiced.   Neurological:      General: No focal deficit present.      Mental Status: She is alert and oriented to person, place, and time. Mental status is at baseline.      Sensory: Sensory deficit (B CTS and cubital tunnel syndrome) present.   Psychiatric:         Mood and Affect: Mood normal.         Behavior: Behavior normal.         Thought Content: Thought content normal.         Judgment: Judgment normal.            Result Review :     Clinical Support on 03/02/2023   Component Date Value Ref Range Status   • WBC 03/02/2023 5.20  3.40 - 10.80 10*3/mm3 Final   • RBC 03/02/2023 4.39  3.77 - 5.28 10*6/mm3 Final   • Hemoglobin 03/02/2023 12.6  12.0 - 15.9 g/dL Final   • Hematocrit 03/02/2023 37.4  34.0 - 46.6 % Final   • MCV 03/02/2023 85.2  79.0 - 97.0 fL Final   • MCH 03/02/2023 28.7  26.6 - 33.0 pg Final   • MCHC 03/02/2023 33.7  31.5 - 35.7 g/dL Final   • RDW 03/02/2023 12.2 (L)  12.3 - 15.4 % Final   • RDW-SD 03/02/2023 38.0  37.0 - 54.0 fl Final   • MPV 03/02/2023 10.2  6.0 - 12.0  fL Final   • Platelets 03/02/2023 258  140 - 450 10*3/mm3 Final   • Glucose 03/02/2023 93  65 - 99 mg/dL Final   • BUN 03/02/2023 20  6 - 20 mg/dL Final   • Creatinine 03/02/2023 0.64  0.57 - 1.00 mg/dL Final   • Sodium 03/02/2023 140  136 - 145 mmol/L Final   • Potassium 03/02/2023 4.2  3.5 - 5.2 mmol/L Final   • Chloride 03/02/2023 104  98 - 107 mmol/L Final   • CO2 03/02/2023 29.3 (H)  22.0 - 29.0 mmol/L Final   • Calcium 03/02/2023 9.6  8.6 - 10.5 mg/dL Final   • Total Protein 03/02/2023 7.4  6.0 - 8.5 g/dL Final   • Albumin 03/02/2023 4.5  3.5 - 5.2 g/dL Final   • ALT (SGPT) 03/02/2023 24  1 - 33 U/L Final   • AST (SGOT) 03/02/2023 24  1 - 32 U/L Final   • Alkaline Phosphatase 03/02/2023 89  39 - 117 U/L Final   • Total Bilirubin 03/02/2023 0.3  0.0 - 1.2 mg/dL Final   • Globulin 03/02/2023 2.9  gm/dL Final   • A/G Ratio 03/02/2023 1.6  g/dL Final   • BUN/Creatinine Ratio 03/02/2023 31.3 (H)  7.0 - 25.0 Final   • Anion Gap 03/02/2023 6.7  5.0 - 15.0 mmol/L Final   • eGFR 03/02/2023 109.2  >60.0 mL/min/1.73 Final   • Total Cholesterol 03/02/2023 226 (H)  0 - 200 mg/dL Final   • Triglycerides 03/02/2023 167 (H)  0 - 150 mg/dL Final   • HDL Cholesterol 03/02/2023 67 (H)  40 - 60 mg/dL Final   • LDL Cholesterol  03/02/2023 130 (H)  0 - 100 mg/dL Final   • VLDL Cholesterol 03/02/2023 29  5 - 40 mg/dL Final   • LDL/HDL Ratio 03/02/2023 1.87   Final   • TSH 03/02/2023 1.150  0.270 - 4.200 uIU/mL Final   • 25 Hydroxy, Vitamin D 03/02/2023 24.5 (L)  30.0 - 100.0 ng/ml Final   • Vitamin B-12 03/02/2023 522  211 - 946 pg/mL Final                  Class 3 Severe Obesity (BMI >=40). Obesity-related health conditions include the following: hypertension, dyslipidemias and osteoarthritis. Obesity is worsening. BMI is is above average; BMI management plan is completed. We discussed low calorie, low carb based diet program, portion control and increasing exercise.           Assessment and Plan    Diagnoses and all orders for  this visit:    1. Chronic pain of right knee (Primary)  Comments:  limit use and knealing on right knee, cont celebrex    2. Primary osteoarthritis involving multiple joints    3. Mixed hyperlipidemia  Comments:  lipids worse, rec HHD, weight loss, exercise    4. Primary hypertension  Comments:  stable, cont metop    5. Class 3 severe obesity without serious comorbidity with body mass index (BMI) of 40.0 to 44.9 in adult, unspecified obesity type  Comments:  rec <1600 hafsa diet  cut out sodas/coffees/fast food  get on myfitness pal hortencia  start walking      6. Cervicalgia  Comments:  cont with pain mgmt, cont celebrex, gabapentin, norco prn    7. Vitamin D deficiency  Comments:  We will plan to check hypertension panel, vitamin D and B12 prior to appointment in 6 months fasting  Orders:  -     vitamin D (ERGOCALCIFEROL) 1.25 MG (41189 UT) capsule capsule; Take 1 capsule by mouth 1 (One) Time Per Week.  Dispense: 4 capsule; Refill: 5    8. Primary insomnia  Comments:  continue and refill ambien for nightly use  Orders:  -     zolpidem (AMBIEN) 10 MG tablet; Take 1 tablet by mouth At Night As Needed for Sleep.  Dispense: 30 tablet; Refill: 5    9. Bilateral carpal tunnel syndrome  Comments:  s/s improved, cotn with hand surgery team prn    10. Preventative health care    Other orders  -     DULoxetine (CYMBALTA) 30 MG capsule; Take 1 capsule by mouth Daily.  Dispense: 30 capsule; Refill: 5      Age appropriate preventative counseling provided, including healthy lifestyle modifications and exercise      I spent 30 minutes caring for Kelly Espinoza on this date of service. This time includes time spent by me in the following activities: preparing for the visit, reviewing tests, performing a medically appropriate examination and/or evaluation , counseling and educating the patient/family/caregiver, ordering medications, tests, or procedures and documenting information in the medical record        Follow Up     Return in  about 6 months (around 9/8/2023) for Recheck. HTN panel and vitamin D prior to appt.  Patient was given instructions and counseling regarding her condition or for health maintenance advice. Please see specific information pulled into the AVS if appropriate.        Part of this note may be an electronic transcription/translation of spoken language to printed text using the Dragon Dictation System

## 2023-03-09 ENCOUNTER — OFFICE VISIT (OUTPATIENT)
Dept: PAIN MEDICINE | Facility: CLINIC | Age: 48
End: 2023-03-09
Payer: COMMERCIAL

## 2023-03-09 VITALS
HEART RATE: 95 BPM | OXYGEN SATURATION: 97 % | SYSTOLIC BLOOD PRESSURE: 116 MMHG | RESPIRATION RATE: 16 BRPM | DIASTOLIC BLOOD PRESSURE: 75 MMHG

## 2023-03-09 DIAGNOSIS — Z79.899 HIGH RISK MEDICATION USE: ICD-10-CM

## 2023-03-09 DIAGNOSIS — M54.81 OCCIPITAL NEURALGIA OF LEFT SIDE: ICD-10-CM

## 2023-03-09 DIAGNOSIS — M47.812 CERVICAL SPONDYLOSIS: ICD-10-CM

## 2023-03-09 DIAGNOSIS — M50.30 DDD (DEGENERATIVE DISC DISEASE), CERVICAL: Primary | ICD-10-CM

## 2023-03-09 PROCEDURE — 99214 OFFICE O/P EST MOD 30 MIN: CPT | Performed by: STUDENT IN AN ORGANIZED HEALTH CARE EDUCATION/TRAINING PROGRAM

## 2023-03-09 RX ORDER — HYDROCODONE BITARTRATE AND ACETAMINOPHEN 5; 325 MG/1; MG/1
1 TABLET ORAL 2 TIMES DAILY PRN
Qty: 60 TABLET | Refills: 0 | Status: SHIPPED | OUTPATIENT
Start: 2023-03-30 | End: 2023-04-29

## 2023-03-09 RX ORDER — GABAPENTIN 600 MG/1
600 TABLET ORAL 3 TIMES DAILY
Qty: 90 TABLET | Refills: 1 | Status: SHIPPED | OUTPATIENT
Start: 2023-03-09

## 2023-03-09 RX ORDER — HYDROCODONE BITARTRATE AND ACETAMINOPHEN 5; 325 MG/1; MG/1
1 TABLET ORAL 2 TIMES DAILY PRN
Qty: 60 TABLET | Refills: 0 | Status: SHIPPED | OUTPATIENT
Start: 2023-04-29 | End: 2023-05-29

## 2023-03-27 RX ORDER — OMEPRAZOLE 40 MG/1
CAPSULE, DELAYED RELEASE ORAL
Qty: 30 CAPSULE | Refills: 0 | Status: SHIPPED | OUTPATIENT
Start: 2023-03-27

## 2023-04-17 DIAGNOSIS — M15.9 PRIMARY OSTEOARTHRITIS INVOLVING MULTIPLE JOINTS: ICD-10-CM

## 2023-04-17 RX ORDER — CELECOXIB 200 MG/1
CAPSULE ORAL
Qty: 60 CAPSULE | Refills: 3 | Status: SHIPPED | OUTPATIENT
Start: 2023-04-17

## 2023-04-24 RX ORDER — DULOXETIN HYDROCHLORIDE 30 MG/1
CAPSULE, DELAYED RELEASE ORAL
Qty: 30 CAPSULE | Refills: 5 | OUTPATIENT
Start: 2023-04-24

## 2023-04-24 RX ORDER — OMEPRAZOLE 40 MG/1
CAPSULE, DELAYED RELEASE ORAL
Qty: 30 CAPSULE | Refills: 0 | Status: SHIPPED | OUTPATIENT
Start: 2023-04-24

## 2023-05-09 RX ORDER — HYDROCODONE BITARTRATE AND ACETAMINOPHEN 5; 325 MG/1; MG/1
1 TABLET ORAL 2 TIMES DAILY PRN
Qty: 60 TABLET | Refills: 0 | Status: CANCELLED | OUTPATIENT
Start: 2023-05-09 | End: 2023-06-08

## 2023-05-09 NOTE — PROGRESS NOTES
Subjective   Kelly Espinoza is a 48 y.o. female is here for follow-up for neck pain.  She is s/p hand and arm surgery with Dr. Faulkner on 4/21/2023 with improvement in numbness in R hand.  She will be scheduled for left wrist as well. Last seen on 3/9/2023. She has some increased pain during the evening time and Norco 5 mg is not helping significantly.       On last visit:     Neck pain is 7/10 on VAS, and maximum 8/10.  Pain is aching, burning, throbbing, tingling, weakness, numbness in nature.  Pain is referred to left neck and shoulder.  Pain is constant.  Improved by prednisone, tramadol, gabapentin.  Worse with bending, lying down, lifting.  Also complains of severe headache in occipital region and numbness and tingling in both hands. - mostly in 3,4, 5th digits  Gripping issues in left arm.  Her main complaint is numbness and severe intermittent headaches - left sided headache starting in occipital region radiating to left temporal and frontal region.     Lower back pain is 7/10 on VAS, at maximum is 8/10. Pain is sharp, shooting and stabbing in nature. Pain is referred left buttock, left anterior thigh, left anterior shin and left foot. The pain is constnat. The pain is improved by nothing. The pain is worse with bending.      Previous Injection:   2/15/2023- left occipital nerve block- good relief with sharp shooting pain.   11/8/2022-KENDRA C7-T1- improved headache, still has some pain.   3/8/2022-KENDRA C6/7- 75% pain relief- pain relief, improved headaches and improved flexibility - 4 months     Hx: Referred by Vivian Fontanez APRN  for neck pain.  Her pain started about 3 years ago without any significant injuries. 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   She has been seen by neurosurgery and was told she was non surgical candidate.  EMG showed bilateral carpal tunnel syndrome and cubital tunnel syndrome.  S/p bilateral carpal tunnel injection with 50% relief.       PHQ-9-  9  SOAPP-2      PMH:   Lupus, GERD, anxiety, ADD, s/p L5-S1 dissectomy 2000.      Current Medications:   Norco 5-325 mg BID PRN  Celebrex 200 mg BID PRN  Cymbalta 30 mg daily  Gabapentin 600 mg TID   Ambien        Past Medications:   Tramadol 50 mg q8h PRN-1/16/2022 - didn't help     Past Modalities:  TENS:                                                                          no                                                  Physical Therapy Within The Last 6 Months              Yes - 8 weeks of PT.  Psychotherapy                                                            no  Massage Therapy                                                       no     Patient Complains Of:  Uro-Fecal Incontinence          no  Weight Gain/Loss                   no  Fever/Chills                             no  Weakness                               no           Current Outpatient Medications:   •  albuterol (ACCUNEB) 0.63 MG/3ML nebulizer solution, Take 3 mL by nebulization Every 6 (Six) Hours As Needed for Wheezing or Shortness of Air., Disp: 120 each, Rfl: 0  •  celecoxib (CeleBREX) 200 MG capsule, TAKE ONE CAPSULE BY MOUTH TWICE A DAY, Disp: 60 capsule, Rfl: 3  •  cyclobenzaprine (FLEXERIL) 10 MG tablet, Take 1 tablet by mouth 3 (Three) Times a Day As Needed for Muscle Spasms., Disp: 30 tablet, Rfl: 5  •  DULoxetine (CYMBALTA) 30 MG capsule, Take 1 capsule by mouth Daily., Disp: 30 capsule, Rfl: 5  •  estradiol (ESTRACE) 2 MG tablet, TAKE ONE TABLET BY MOUTH DAILY, Disp: 30 tablet, Rfl: 5  •  [START ON 5/29/2023] gabapentin (NEURONTIN) 600 MG tablet, Take 1 tablet by mouth 3 (Three) Times a Day., Disp: 90 tablet, Rfl: 1  •  levalbuterol (XOPENEX HFA) 45 MCG/ACT inhaler, Inhale 1-2 puffs Every 4 (Four) Hours As Needed for Wheezing or Shortness of Air., Disp: 15 g, Rfl: 5  •  metoprolol succinate XL (TOPROL-XL) 50 MG 24 hr tablet, TAKE ONE TABLET BY MOUTH DAILY, Disp: 90 tablet, Rfl: 1  •  omeprazole (priLOSEC) 40 MG capsule,  TAKE ONE CAPSULE BY MOUTH DAILY, Disp: 30 capsule, Rfl: 0  •  ondansetron ODT (ZOFRAN-ODT) 4 MG disintegrating tablet, 1 tablet orally every 6 hours as needed for nausea and vomiting, Disp: 12 tablet, Rfl: 0  •  vitamin D (ERGOCALCIFEROL) 1.25 MG (08113 UT) capsule capsule, Take 1 capsule by mouth 1 (One) Time Per Week., Disp: 4 capsule, Rfl: 5  •  zolpidem (AMBIEN) 10 MG tablet, Take 1 tablet by mouth At Night As Needed for Sleep., Disp: 30 tablet, Rfl: 5  •  [START ON 5/29/2023] HYDROcodone-acetaminophen (NORCO) 7.5-325 MG per tablet, Take 1 tablet by mouth 2 (Two) Times a Day As Needed for Moderate Pain for up to 30 days., Disp: 60 tablet, Rfl: 0    The following portions of the patient's history were reviewed and updated as appropriate: allergies, current medications, past family history, past medical history, past social history, past surgical history, and problem list.      REVIEW OF PERTINENT MEDICAL DATA    Past Medical History:   Diagnosis Date   • ADD (attention deficit disorder)    • SARMAD positive    • Anxiety    • Arthralgia    • Asthma    • Bursitis of left hip    • Carpal tunnel syndrome     Roberto.   • DDD (degenerative disc disease), lumbar    • Depression, major    • Flu syndrome    • GERD (gastroesophageal reflux disease)    • Heart disease    • Hiatal hernia    • Hormone replacement therapy    • Hyperlipidemia    • Inflammatory arthritis    • Influenza    • Insomnia    • Knee pain, bilateral    • Lupus    • Migraine    • Neck pain    • Obesity    • Osteoarthritis    • Pain, joint, shoulder, left    • Pharyngitis, acute    • Postmenopausal    • Sinus congestion    • Surgical menopause     at age 34   • Vitamin B12 deficiency    • Vitamin D deficiency      Past Surgical History:   Procedure Laterality Date   • CHOLECYSTECTOMY     • LUMBAR DISCECTOMY  2000   • TONSILLECTOMY     • TOTAL ABDOMINAL HYSTERECTOMY WITH SALPINGO OOPHORECTOMY Bilateral 2009    FOR ENDOMETRIOSIS     Family History   Problem  Relation Age of Onset   • Hypertension Mother    • Hypertension Father    • Diabetes Other    • Heart disease Other    • Hypertension Other    • Cancer Other      Social History     Socioeconomic History   • Marital status:    Tobacco Use   • Smoking status: Former     Packs/day: 0.50     Years: 3.00     Pack years: 1.50     Types: Cigarettes     Start date:      Quit date:      Years since quittin.3     Passive exposure: Never   • Smokeless tobacco: Never   Vaping Use   • Vaping Use: Never used   Substance and Sexual Activity   • Alcohol use: Not Currently     Alcohol/week: 0.0 standard drinks   • Drug use: Not Currently   • Sexual activity: Defer         Review of Systems   Musculoskeletal: Positive for arthralgias, back pain and neck pain.         Vitals:    05/10/23 1305   BP: 135/80   Pulse: 100   Resp: 16   SpO2: 97%   PainSc:   6         Objective   Physical Exam  Neck:     Musculoskeletal:         General: Tenderness present.        Legs:    Neurological:      Deep Tendon Reflexes:      Reflex Scores:       Tricep reflexes are 2+ on the right side and 2+ on the left side.       Bicep reflexes are 2+ on the right side and 2+ on the left side.       Brachioradialis reflexes are 2+ on the right side and 2+ on the left side.     Comments: Motor strength 5/5 b/l UE  Sensory intact b/l UE             Imaging Reviewed:  MRI cervical spine-2022  -C2-3-degenerative facet changes bilateral  C3-4-WNL  C4-5-WNL  C5-6-mild facet changes.  Mild left neural foraminal narrowing.  C6-7-mild bilateral uncovertebral joint spurring.  C7-T1-WNL    EMG upper extremity-2022  - Abnormal study.  Evidence of moderate right and mild left median neuropathy at the wrist consistent with carpal tunnel syndrome    Assessment:    1. Occipital neuralgia of left side    2. DDD (degenerative disc disease), cervical    3. Cervical spondylosis    4. Bilateral carpal tunnel syndrome    5. High risk medication use          Plan:   1.  Repeat UDS-5/10/2023.  UDS consistent with patient interview on 10/24/22. Narcotic contract signed - 11/30/22.   2. We discussed trying a course of formal physical therapy.  Physical therapy can help strengthen and stretch the muscles around the joints. Continue to be as active as possible.  Patient has done 8 weeks of physical therapy without much improvement.  3.. Continue Gabapepntin 600 mg TID (1 refill). Side effects discussed with the patient including but not limited to somnolence, dizziness, ataxia, headache, fatigue, blurred vision, cold or flu-like symptoms,delusions, hoarseness, lack or loss of strength, lower back or side pain, swelling of the hands, feet, or lower legs trembling or shaking. Patient understands and agrees with the plan.  4.  Due to increased pain, increase Norco 7.5-325 mg BID PRN (3/30; 4/29).  Discussed with the patient regarding long-term side effects of opioids including but not limited to opioid induced hormonal suppression, hyperalgesia, fatigue, weight gain, possible opioid induced altered immune system, addiction, tolerance, dependence, risk of hearing loss and elevated risk of myocardial infarction. Proper use and potential life threatening side effects of over use discussed with patient. Patient states understanding of their use and risks.  5. .  Patient has severe left SI joint tenderness with MELE positive and intermittent radiculopathy.  Due to recent onset, will prescribe Medrol Dosepak for 5 days to see if that improves pain.  We will consider left SI joint injection in future if pain worsens in lower back.   6. Some of her neck pain is worsening. She also has worsening of headaches due to allergies. We will wait to see if treating allergies improve her pain. If she conitues to have increased neck pain and radicular pain, will repeat KENDRA C7-T1. Patient will give us a call to schedule if needed.     RTC in 2 months.     Rome Springer DO  Pain Management    [facility]            INSPECT REPORT    As part of the patient's treatment plan, I may be prescribing controlled substances. The patient has been made aware of appropriate use of such medications, including potential risk of somnolence, limited ability to drive and/or work safely, and the potential for dependence or overdose. It has also been made clear that these medications are for use by this patient only, without concomitant use of alcohol or other substances unless prescribed.     Patient has completed prescribing agreement detailing terms of continued prescribing of controlled substances, including monitoring INSPECT reports, urine drug screening, and pill counts if necessary. The patient is aware that inappropriate use will results in cessation of prescribing such medications.    INSPECT report has been reviewed and scanned into the patient's chart.

## 2023-05-10 ENCOUNTER — OFFICE VISIT (OUTPATIENT)
Dept: PAIN MEDICINE | Facility: CLINIC | Age: 48
End: 2023-05-10
Payer: COMMERCIAL

## 2023-05-10 VITALS
RESPIRATION RATE: 16 BRPM | DIASTOLIC BLOOD PRESSURE: 80 MMHG | SYSTOLIC BLOOD PRESSURE: 135 MMHG | HEART RATE: 100 BPM | OXYGEN SATURATION: 97 %

## 2023-05-10 DIAGNOSIS — G56.03 BILATERAL CARPAL TUNNEL SYNDROME: ICD-10-CM

## 2023-05-10 DIAGNOSIS — M50.30 DDD (DEGENERATIVE DISC DISEASE), CERVICAL: ICD-10-CM

## 2023-05-10 DIAGNOSIS — M54.81 OCCIPITAL NEURALGIA OF LEFT SIDE: ICD-10-CM

## 2023-05-10 DIAGNOSIS — Z79.899 HIGH RISK MEDICATION USE: Primary | ICD-10-CM

## 2023-05-10 DIAGNOSIS — M47.812 CERVICAL SPONDYLOSIS: ICD-10-CM

## 2023-05-10 RX ORDER — HYDROCODONE BITARTRATE AND ACETAMINOPHEN 7.5; 325 MG/1; MG/1
1 TABLET ORAL 2 TIMES DAILY PRN
Qty: 60 TABLET | Refills: 0 | Status: SHIPPED | OUTPATIENT
Start: 2023-05-29 | End: 2023-06-28

## 2023-05-10 RX ORDER — GABAPENTIN 600 MG/1
600 TABLET ORAL 3 TIMES DAILY
Qty: 90 TABLET | Refills: 1 | Status: SHIPPED | OUTPATIENT
Start: 2023-05-29

## 2023-05-25 RX ORDER — OMEPRAZOLE 40 MG/1
CAPSULE, DELAYED RELEASE ORAL
Qty: 30 CAPSULE | Refills: 0 | Status: SHIPPED | OUTPATIENT
Start: 2023-05-25

## 2023-07-24 RX ORDER — OMEPRAZOLE 40 MG/1
CAPSULE, DELAYED RELEASE ORAL
Qty: 30 CAPSULE | Refills: 0 | Status: SHIPPED | OUTPATIENT
Start: 2023-07-24

## 2023-07-31 RX ORDER — ESTRADIOL 2 MG/1
TABLET ORAL
Qty: 30 TABLET | Refills: 5 | Status: SHIPPED | OUTPATIENT
Start: 2023-07-31

## 2023-08-11 RX ORDER — AZITHROMYCIN 250 MG/1
TABLET, FILM COATED ORAL
Qty: 6 TABLET | Refills: 0 | Status: SHIPPED | OUTPATIENT
Start: 2023-08-11

## 2023-08-11 RX ORDER — CELECOXIB 200 MG/1
CAPSULE ORAL
Qty: 60 CAPSULE | OUTPATIENT
Start: 2023-08-11

## 2023-08-15 ENCOUNTER — TELEPHONE (OUTPATIENT)
Dept: FAMILY MEDICINE CLINIC | Facility: CLINIC | Age: 48
End: 2023-08-15
Payer: COMMERCIAL

## 2023-08-15 DIAGNOSIS — J45.21 MILD INTERMITTENT ASTHMA WITH ACUTE EXACERBATION: Primary | ICD-10-CM

## 2023-08-15 RX ORDER — PREDNISONE 10 MG/1
TABLET ORAL
Qty: 33 TABLET | Refills: 0 | Status: SHIPPED | OUTPATIENT
Start: 2023-08-15

## 2023-08-15 RX ORDER — TIOTROPIUM BROMIDE INHALATION SPRAY 3.12 UG/1
2 SPRAY, METERED RESPIRATORY (INHALATION)
Qty: 4 G | Refills: 0 | Status: SHIPPED | OUTPATIENT
Start: 2023-08-15

## 2023-08-15 NOTE — TELEPHONE ENCOUNTER
PATIENT CALLED AND STATES SHE HAS FINISHED THE ANTIBIOTIC AND WILL TAKE THE LAST STEROID TONIGHT.   SHE STATES HER CHEST FEELS TIGHT AND VERY WINDED.    SHE IS TAKING HER INHALER AND ALBUTEROL     NO APPOINTMENTS TODAY    PLEASE CALL AND ADVISE [phone]      [facility] [id number] - [place], [place] - [address] - [phone]  - [phone]  [phone]

## 2023-08-16 RX ORDER — ESTRADIOL 2 MG/1
2 TABLET ORAL DAILY
Qty: 30 TABLET | Refills: 5 | OUTPATIENT
Start: 2023-08-16

## 2023-08-21 RX ORDER — OMEPRAZOLE 40 MG/1
CAPSULE, DELAYED RELEASE ORAL
Qty: 30 CAPSULE | Refills: 0 | Status: SHIPPED | OUTPATIENT
Start: 2023-08-21

## 2023-08-21 NOTE — PROGRESS NOTES
Subjective   Kelly Espinoza is a 48 y.o. female is here for follow-up for neck pain.  Last seen on 7/10/2023 for left occipital nerve block. Seen by rheumatology who r/o lupus and has been diagnosed with Fibromyalgia. She is on easy job duty now which has helped as well.       On last visit:     Neck pain is 6/10 on VAS, maximum 8/10.  Pain is aching, throbbing, tingling, weakness and numbness in nature.  Pain is referred to left neck and shoulder.  Constant pain.  Improved by prednisone, norco, gabapentin.  Worse with bending and laying down.  Severe headaches in occipital region and numbness and tingling in both hands-mostly in third, fourth, fifth digit.  Trouble with gripping on left arm.  Main complaint is numbness and severe intermittent headache.  Left-sided occipital region headache radiating to left temporal and frontal region.    Lower back pain is 7/10 on VAS, at maximum is 8/10. Pain is sharp, shooting and stabbing in nature. Pain is referred left buttock, left anterior thigh, left anterior shin and left foot. The pain is constnat. The pain is improved by nothing. The pain is worse with bending.      Previous Injection:   7/10/2023 - left occipital nerve block; left trapezius and rhomboids TPI- improvement  with sharp, shooting pain.   2/15/2023- left occipital nerve block- good relief with sharp shooting pain- 4 months   11/8/2022-KENDRA C7-T1- improved headache, still has some pain.   3/8/2022-KENDRA C6/7- 75% pain relief- pain relief, improved headaches and improved flexibility - 4 months     Hx: Referred by Vivian Fontanez APRN  for neck pain.  Her pain started about 3 years ago without any significant injuries. 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   She has been seen by neurosurgery and was told she was non surgical candidate.  EMG showed bilateral carpal tunnel syndrome and cubital tunnel syndrome.  S/p bilateral carpal tunnel injection with 50% relief. She is s/p R  carpal tunnel release. Seen by rheumatology who r/o lupus and has been diagnosed with Fibromyalgia       PHQ-9- 9  SOAPP-2      PMH:   Lupus, GERD, anxiety, ADD, s/p L5-S1 dissectomy 2000, s/p hand and arm surgery with Dr. Faulkner on 4/21/2023 with improvement in numbness in R hand.    Current Medications:   Norco 5-325 mg BID PRN  Celebrex 200 mg BID PRN  Cymbalta 30 mg daily  Gabapentin 600 mg TID   Ambien        Past Medications:   Tramadol 50 mg q8h PRN-1/16/2022 - didn't help     Past Modalities:  TENS:                                                                          no                                                  Physical Therapy Within The Last 6 Months              Yes - 8 weeks of PT.  Psychotherapy                                                            no  Massage Therapy                                                       no     Patient Complains Of:  Uro-Fecal Incontinence          no  Weight Gain/Loss                   no  Fever/Chills                             no  Weakness                               no           Current Outpatient Medications:     albuterol (ACCUNEB) 0.63 MG/3ML nebulizer solution, Take 3 mL by nebulization Every 6 (Six) Hours As Needed for Wheezing or Shortness of Air., Disp: 120 each, Rfl: 0    azithromycin (Zithromax) 250 MG tablet, Take 2 tablets the first day, then 1 tablet daily for 4 days., Disp: 6 tablet, Rfl: 0    DULoxetine (CYMBALTA) 30 MG capsule, Take 1 capsule by mouth Daily., Disp: 30 capsule, Rfl: 5    estradiol (ESTRACE) 2 MG tablet, TAKE ONE TABLET BY MOUTH DAILY, Disp: 30 tablet, Rfl: 5    [START ON 8/28/2023] gabapentin (NEURONTIN) 600 MG tablet, Take 1 tablet by mouth 3 (Three) Times a Day for 60 days., Disp: 90 tablet, Rfl: 1    [START ON 8/28/2023] HYDROcodone-acetaminophen (NORCO) 5-325 MG per tablet, Take 1 tablet by mouth 2 (Two) Times a Day As Needed for Moderate Pain for up to 30 days., Disp: 60 tablet, Rfl: 0    [START ON 9/27/2023]  HYDROcodone-acetaminophen (NORCO) 5-325 MG per tablet, Take 1 tablet by mouth 2 (Two) Times a Day As Needed for Moderate Pain for up to 30 days., Disp: 60 tablet, Rfl: 0    levalbuterol (XOPENEX HFA) 45 MCG/ACT inhaler, Inhale 1-2 puffs Every 4 (Four) Hours As Needed for Wheezing or Shortness of Air., Disp: 15 g, Rfl: 5    levocetirizine (XYZAL) 5 MG tablet, Take 1 tablet by mouth Every Evening for 14 days., Disp: 14 tablet, Rfl: 0    metoprolol succinate XL (TOPROL-XL) 50 MG 24 hr tablet, TAKE ONE TABLET BY MOUTH DAILY, Disp: 90 tablet, Rfl: 1    montelukast (SINGULAIR) 10 MG tablet, Take 1 tablet by mouth Every Night for 30 days., Disp: 30 tablet, Rfl: 0    omeprazole (priLOSEC) 40 MG capsule, TAKE 1 CAPSULE BY MOUTH DAILY, Disp: 30 capsule, Rfl: 0    predniSONE (DELTASONE) 10 MG tablet, Take 5 po for 2 days, Take 4 for 2 days, then 3 for 2 days, then 2 for 2 days, then 1 for 5 days, then stop, Disp: 33 tablet, Rfl: 0    tiotropium bromide monohydrate (Spiriva Respimat) 2.5 MCG/ACT aerosol solution inhaler, Inhale 2 puffs Daily., Disp: 4 g, Rfl: 0    vitamin D (ERGOCALCIFEROL) 1.25 MG (17909 UT) capsule capsule, Take 1 capsule by mouth 1 (One) Time Per Week., Disp: 4 capsule, Rfl: 5    zinc sulfate (ZINCATE) 220 (50 Zn) MG capsule, Take 1 capsule by mouth Daily., Disp: , Rfl:     zolpidem (AMBIEN) 10 MG tablet, Take 1 tablet by mouth At Night As Needed for Sleep., Disp: 30 tablet, Rfl: 5    fluticasone (FLONASE) 50 MCG/ACT nasal spray, 1 spray into the nostril(s) as directed by provider 2 (Two) Times a Day for 30 days., Disp: 18.2 mL, Rfl: 1    The following portions of the patient's history were reviewed and updated as appropriate: allergies, current medications, past family history, past medical history, past social history, past surgical history, and problem list.      REVIEW OF PERTINENT MEDICAL DATA    Past Medical History:   Diagnosis Date    ADD (attention deficit disorder)     SARMAD positive     Anxiety      Arthralgia     Asthma     Bursitis of left hip     Carpal tunnel syndrome     Roberto.    DDD (degenerative disc disease), lumbar     Depression, major     Fibromyalgia     Flu syndrome     GERD (gastroesophageal reflux disease)     Heart disease     Hiatal hernia     Hormone replacement therapy     Hyperlipidemia     Inflammatory arthritis     Influenza     Insomnia     Knee pain, bilateral     Lupus     Migraine     Neck pain     Obesity     Osteoarthritis     Pain, joint, shoulder, left     Pharyngitis, acute     Postmenopausal     Sinus congestion     Surgical menopause     at age 34    Vitamin B12 deficiency     Vitamin D deficiency      Past Surgical History:   Procedure Laterality Date    CARPAL TUNNEL RELEASE WITH CUBITAL TUNNEL RELEASE  2023    CHOLECYSTECTOMY      LUMBAR DISCECTOMY      TONSILLECTOMY      TOTAL ABDOMINAL HYSTERECTOMY WITH SALPINGO OOPHORECTOMY Bilateral 2009    FOR ENDOMETRIOSIS     Family History   Problem Relation Age of Onset    Hypertension Mother     Hypertension Father     Diabetes Other     Heart disease Other     Hypertension Other     Cancer Other      Social History     Socioeconomic History    Marital status:    Tobacco Use    Smoking status: Former     Packs/day: 0.50     Years: 3.00     Pack years: 1.50     Types: Cigarettes     Start date:      Quit date:      Years since quittin.6     Passive exposure: Never    Smokeless tobacco: Never   Vaping Use    Vaping Use: Never used   Substance and Sexual Activity    Alcohol use: Not Currently     Alcohol/week: 0.0 standard drinks    Drug use: Not Currently    Sexual activity: Defer         Review of Systems   Musculoskeletal:  Positive for arthralgias, back pain and neck pain.       Vitals:    23 0844   BP: 134/83   Pulse: 73   Resp: 16   SpO2: 97%   PainSc:   6         Objective   Physical Exam  Neck:     Musculoskeletal:         General: Tenderness present.        Legs:    Neurological:      Deep  Tendon Reflexes:      Reflex Scores:       Tricep reflexes are 2+ on the right side and 2+ on the left side.       Bicep reflexes are 2+ on the right side and 2+ on the left side.       Brachioradialis reflexes are 2+ on the right side and 2+ on the left side.     Comments: Motor strength 5/5 b/l UE  Sensory intact b/l UE           Imaging Reviewed:  MRI cervical spine-1/31/2022  -C2-3-degenerative facet changes bilateral  C3-4-WNL  C4-5-WNL  C5-6-mild facet changes.  Mild left neural foraminal narrowing.  C6-7-mild bilateral uncovertebral joint spurring.  C7-T1-WNL    EMG upper extremity-12/5/2022  - Abnormal study.  Evidence of moderate right and mild left median neuropathy at the wrist consistent with carpal tunnel syndrome    Assessment:    1. Cervical spondylosis    2. DDD (degenerative disc disease), cervical    3. Occipital neuralgia of left side    4. Pain    5. Fibromyalgia    6. High risk medication use         Plan:   1.  UDS consistent with patient interview on  5/10/2023 narcotic contract signed - 11/30/22.   2. We discussed trying a course of formal physical therapy.  Physical therapy can help strengthen and stretch the muscles around the joints. Continue to be as active as possible.  Patient has done 8 weeks of physical therapy without much improvement.  3.. Continue Gabapepntin 600 mg TID (1 refill). Side effects discussed with the patient including but not limited to somnolence, dizziness, ataxia, headache, fatigue, blurred vision, cold or flu-like symptoms,delusions, hoarseness, lack or loss of strength, lower back or side pain, swelling of the hands, feet, or lower legs trembling or shaking. Patient understands and agrees with the plan.  4.  Due to increased pain, continue Norco 7.5-325 mg BID PRN (8/28; 9/27).  Discussed with the patient regarding long-term side effects of opioids including but not limited to opioid induced hormonal suppression, hyperalgesia, fatigue, weight gain, possible opioid  induced altered immune system, addiction, tolerance, dependence, risk of hearing loss and elevated risk of myocardial infarction. Proper use and potential life threatening side effects of over use discussed with patient. Patient states understanding of their use and risks.  5. .  Patient has severe left SI joint tenderness with MELE positive and intermittent radiculopathy.  Due to recent onset, will prescribe Medrol Dosepak for 5 days to see if that improves pain.  We will consider left SI joint injection in future if pain worsens in lower back.   6. Agree with Rheum regarding increasing Cymbalta. She will talk to PCP.  7. Recommend TENS unit 15-20 mins 2-3 times daily for left trapezius pain.      RTC in 2 months.    Rome Springer DO  Pain Management   [facility]            INSPECT REPORT    As part of the patient's treatment plan, I may be prescribing controlled substances. The patient has been made aware of appropriate use of such medications, including potential risk of somnolence, limited ability to drive and/or work safely, and the potential for dependence or overdose. It has also been made clear that these medications are for use by this patient only, without concomitant use of alcohol or other substances unless prescribed.     Patient has completed prescribing agreement detailing terms of continued prescribing of controlled substances, including monitoring INSPECT reports, urine drug screening, and pill counts if necessary. The patient is aware that inappropriate use will results in cessation of prescribing such medications.    INSPECT report has been reviewed and scanned into the patient's chart.

## 2023-08-21 NOTE — TELEPHONE ENCOUNTER
Rx Refill Note  Requested Prescriptions     Pending Prescriptions Disp Refills    omeprazole (priLOSEC) 40 MG capsule [Pharmacy Med Name: OMEPRAZOLE DR 40MG CAPSULE, UU] 30 capsule      Sig: TAKE 1 CAPSULE BY MOUTH DAILY      Last office visit with prescribing clinician: 3/8/2023   Last telemedicine visit with prescribing clinician: Visit date not found   Next office visit with prescribing clinician: 9/13/2023                         Would you like a call back once the refill request has been completed: [] Yes [] No    If the office needs to give you a call back, can they leave a voicemail: [] Yes [] No    Skinny Bonds MA  08/21/23, 10:01 EDT

## 2023-08-23 ENCOUNTER — OFFICE VISIT (OUTPATIENT)
Dept: PAIN MEDICINE | Facility: CLINIC | Age: 48
End: 2023-08-23
Payer: COMMERCIAL

## 2023-08-23 VITALS
RESPIRATION RATE: 16 BRPM | OXYGEN SATURATION: 97 % | DIASTOLIC BLOOD PRESSURE: 83 MMHG | SYSTOLIC BLOOD PRESSURE: 134 MMHG | HEART RATE: 73 BPM

## 2023-08-23 DIAGNOSIS — R52 PAIN: ICD-10-CM

## 2023-08-23 DIAGNOSIS — M54.81 OCCIPITAL NEURALGIA OF LEFT SIDE: ICD-10-CM

## 2023-08-23 DIAGNOSIS — Z79.899 HIGH RISK MEDICATION USE: ICD-10-CM

## 2023-08-23 DIAGNOSIS — M47.812 CERVICAL SPONDYLOSIS: Primary | ICD-10-CM

## 2023-08-23 DIAGNOSIS — M79.7 FIBROMYALGIA: ICD-10-CM

## 2023-08-23 DIAGNOSIS — M50.30 DDD (DEGENERATIVE DISC DISEASE), CERVICAL: ICD-10-CM

## 2023-08-23 RX ORDER — HYDROCODONE BITARTRATE AND ACETAMINOPHEN 5; 325 MG/1; MG/1
TABLET ORAL
COMMUNITY
Start: 2023-07-28 | End: 2023-08-23 | Stop reason: SDUPTHER

## 2023-08-23 RX ORDER — HYDROCODONE BITARTRATE AND ACETAMINOPHEN 5; 325 MG/1; MG/1
1 TABLET ORAL 2 TIMES DAILY PRN
Qty: 60 TABLET | Refills: 0 | Status: SHIPPED | OUTPATIENT
Start: 2023-09-27 | End: 2023-10-27

## 2023-08-23 RX ORDER — HYDROCODONE BITARTRATE AND ACETAMINOPHEN 5; 325 MG/1; MG/1
1 TABLET ORAL 2 TIMES DAILY PRN
Qty: 60 TABLET | Refills: 0 | Status: SHIPPED | OUTPATIENT
Start: 2023-08-28 | End: 2023-09-27

## 2023-08-23 RX ORDER — GABAPENTIN 600 MG/1
600 TABLET ORAL 3 TIMES DAILY
Qty: 90 TABLET | Refills: 1 | Status: SHIPPED | OUTPATIENT
Start: 2023-08-28 | End: 2023-10-27

## 2023-08-29 DIAGNOSIS — M50.30 DDD (DEGENERATIVE DISC DISEASE), CERVICAL: ICD-10-CM

## 2023-08-29 DIAGNOSIS — M47.812 CERVICAL SPONDYLOSIS: Primary | ICD-10-CM

## 2023-08-29 RX ORDER — HYDROCODONE BITARTRATE AND ACETAMINOPHEN 7.5; 325 MG/1; MG/1
1 TABLET ORAL 2 TIMES DAILY PRN
Qty: 60 TABLET | Refills: 0 | Status: SHIPPED | OUTPATIENT
Start: 2023-09-27 | End: 2023-10-27

## 2023-08-29 RX ORDER — OMEPRAZOLE 40 MG/1
CAPSULE, DELAYED RELEASE ORAL
Qty: 30 CAPSULE | Refills: 0 | Status: SHIPPED | OUTPATIENT
Start: 2023-08-29

## 2023-08-30 DIAGNOSIS — E55.9 VITAMIN D DEFICIENCY: Primary | ICD-10-CM

## 2023-08-30 DIAGNOSIS — E78.2 MIXED HYPERLIPIDEMIA: ICD-10-CM

## 2023-08-30 RX ORDER — METOPROLOL SUCCINATE 50 MG/1
TABLET, EXTENDED RELEASE ORAL
Qty: 30 TABLET | Refills: 0 | Status: SHIPPED | OUTPATIENT
Start: 2023-08-30

## 2023-09-01 DIAGNOSIS — E55.9 VITAMIN D DEFICIENCY: ICD-10-CM

## 2023-09-05 RX ORDER — ERGOCALCIFEROL 1.25 MG/1
CAPSULE ORAL
Qty: 4 CAPSULE | Refills: 5 | Status: SHIPPED | OUTPATIENT
Start: 2023-09-05

## 2023-09-06 ENCOUNTER — CLINICAL SUPPORT (OUTPATIENT)
Dept: FAMILY MEDICINE CLINIC | Facility: CLINIC | Age: 48
End: 2023-09-06
Payer: COMMERCIAL

## 2023-09-06 DIAGNOSIS — E78.2 MIXED HYPERLIPIDEMIA: Primary | ICD-10-CM

## 2023-09-06 PROCEDURE — 80061 LIPID PANEL: CPT | Performed by: NURSE PRACTITIONER

## 2023-09-06 PROCEDURE — 36415 COLL VENOUS BLD VENIPUNCTURE: CPT | Performed by: NURSE PRACTITIONER

## 2023-09-06 PROCEDURE — 82306 VITAMIN D 25 HYDROXY: CPT | Performed by: NURSE PRACTITIONER

## 2023-09-06 PROCEDURE — 80050 GENERAL HEALTH PANEL: CPT | Performed by: NURSE PRACTITIONER

## 2023-09-06 NOTE — PROGRESS NOTES
Venipuncture Blood Specimen Collection  Venipuncture performed in the right arm by Mer Grubbs MA with good hemostasis. Patient tolerated the procedure well without complications.   09/06/23   Mer Grubbs MA

## 2023-09-07 LAB
25(OH)D3 SERPL-MCNC: 23.3 NG/ML (ref 30–100)
ALBUMIN SERPL-MCNC: 3.9 G/DL (ref 3.5–5.2)
ALBUMIN/GLOB SERPL: 1.3 G/DL
ALP SERPL-CCNC: 75 U/L (ref 39–117)
ALT SERPL W P-5'-P-CCNC: 23 U/L (ref 1–33)
ANION GAP SERPL CALCULATED.3IONS-SCNC: 9.5 MMOL/L (ref 5–15)
AST SERPL-CCNC: 18 U/L (ref 1–32)
BILIRUB SERPL-MCNC: 0.3 MG/DL (ref 0–1.2)
BUN SERPL-MCNC: 14 MG/DL (ref 6–20)
BUN/CREAT SERPL: 23.7 (ref 7–25)
CALCIUM SPEC-SCNC: 9.2 MG/DL (ref 8.6–10.5)
CHLORIDE SERPL-SCNC: 106 MMOL/L (ref 98–107)
CHOLEST SERPL-MCNC: 228 MG/DL (ref 0–200)
CO2 SERPL-SCNC: 26.5 MMOL/L (ref 22–29)
CREAT SERPL-MCNC: 0.59 MG/DL (ref 0.57–1)
DEPRECATED RDW RBC AUTO: 43 FL (ref 37–54)
EGFRCR SERPLBLD CKD-EPI 2021: 111.3 ML/MIN/1.73
ERYTHROCYTE [DISTWIDTH] IN BLOOD BY AUTOMATED COUNT: 13.1 % (ref 12.3–15.4)
GLOBULIN UR ELPH-MCNC: 2.9 GM/DL
GLUCOSE SERPL-MCNC: 88 MG/DL (ref 65–99)
HCT VFR BLD AUTO: 35.4 % (ref 34–46.6)
HDLC SERPL-MCNC: 54 MG/DL (ref 40–60)
HGB BLD-MCNC: 11.6 G/DL (ref 12–15.9)
LDLC SERPL CALC-MCNC: 140 MG/DL (ref 0–100)
LDLC/HDLC SERPL: 2.53 {RATIO}
MCH RBC QN AUTO: 29.1 PG (ref 26.6–33)
MCHC RBC AUTO-ENTMCNC: 32.8 G/DL (ref 31.5–35.7)
MCV RBC AUTO: 88.7 FL (ref 79–97)
PLATELET # BLD AUTO: 195 10*3/MM3 (ref 140–450)
PMV BLD AUTO: 9.9 FL (ref 6–12)
POTASSIUM SERPL-SCNC: 4.4 MMOL/L (ref 3.5–5.2)
PROT SERPL-MCNC: 6.8 G/DL (ref 6–8.5)
RBC # BLD AUTO: 3.99 10*6/MM3 (ref 3.77–5.28)
SODIUM SERPL-SCNC: 142 MMOL/L (ref 136–145)
TRIGL SERPL-MCNC: 188 MG/DL (ref 0–150)
TSH SERPL DL<=0.05 MIU/L-ACNC: 0.84 UIU/ML (ref 0.27–4.2)
VLDLC SERPL-MCNC: 34 MG/DL (ref 5–40)
WBC NRBC COR # BLD: 5.6 10*3/MM3 (ref 3.4–10.8)

## 2023-09-10 DIAGNOSIS — J45.21 MILD INTERMITTENT ASTHMA WITH ACUTE EXACERBATION: ICD-10-CM

## 2023-09-11 RX ORDER — TIOTROPIUM BROMIDE INHALATION SPRAY 3.12 UG/1
SPRAY, METERED RESPIRATORY (INHALATION)
Qty: 4 G | Refills: 0 | Status: SHIPPED | OUTPATIENT
Start: 2023-09-11

## 2023-09-13 ENCOUNTER — OFFICE VISIT (OUTPATIENT)
Dept: FAMILY MEDICINE CLINIC | Facility: CLINIC | Age: 48
End: 2023-09-13
Payer: COMMERCIAL

## 2023-09-13 VITALS
HEIGHT: 62 IN | DIASTOLIC BLOOD PRESSURE: 80 MMHG | HEART RATE: 94 BPM | BODY MASS INDEX: 40.56 KG/M2 | WEIGHT: 220.4 LBS | SYSTOLIC BLOOD PRESSURE: 120 MMHG | OXYGEN SATURATION: 97 %

## 2023-09-13 DIAGNOSIS — K21.9 GASTROESOPHAGEAL REFLUX DISEASE WITHOUT ESOPHAGITIS: ICD-10-CM

## 2023-09-13 DIAGNOSIS — E66.01 CLASS 3 SEVERE OBESITY WITHOUT SERIOUS COMORBIDITY WITH BODY MASS INDEX (BMI) OF 40.0 TO 44.9 IN ADULT, UNSPECIFIED OBESITY TYPE: ICD-10-CM

## 2023-09-13 DIAGNOSIS — J45.21 MILD INTERMITTENT ASTHMA WITH ACUTE EXACERBATION: ICD-10-CM

## 2023-09-13 DIAGNOSIS — E55.9 VITAMIN D DEFICIENCY: ICD-10-CM

## 2023-09-13 DIAGNOSIS — E78.2 MIXED HYPERLIPIDEMIA: Primary | ICD-10-CM

## 2023-09-13 DIAGNOSIS — M15.9 PRIMARY OSTEOARTHRITIS INVOLVING MULTIPLE JOINTS: ICD-10-CM

## 2023-09-13 DIAGNOSIS — L30.9 ECZEMA OF TIP OF FINGER: ICD-10-CM

## 2023-09-13 DIAGNOSIS — I10 PRIMARY HYPERTENSION: ICD-10-CM

## 2023-09-13 DIAGNOSIS — F51.01 PRIMARY INSOMNIA: ICD-10-CM

## 2023-09-13 DIAGNOSIS — M54.2 CERVICALGIA: ICD-10-CM

## 2023-09-13 PROCEDURE — 99214 OFFICE O/P EST MOD 30 MIN: CPT | Performed by: NURSE PRACTITIONER

## 2023-09-13 RX ORDER — DULOXETIN HYDROCHLORIDE 60 MG/1
60 CAPSULE, DELAYED RELEASE ORAL DAILY
Qty: 30 CAPSULE | Refills: 5 | Status: SHIPPED | OUTPATIENT
Start: 2023-09-13

## 2023-09-13 RX ORDER — ALBUTEROL SULFATE 0.63 MG/3ML
1 SOLUTION RESPIRATORY (INHALATION) EVERY 6 HOURS PRN
Qty: 120 EACH | Refills: 0 | Status: SHIPPED | OUTPATIENT
Start: 2023-09-13

## 2023-09-13 RX ORDER — CLOBETASOL PROPIONATE 0.5 MG/G
1 CREAM TOPICAL 2 TIMES DAILY
Qty: 15 G | Refills: 0 | Status: SHIPPED | OUTPATIENT
Start: 2023-09-13

## 2023-09-13 RX ORDER — ZOLPIDEM TARTRATE 10 MG/1
10 TABLET ORAL NIGHTLY PRN
Qty: 30 TABLET | Refills: 5 | Status: SHIPPED | OUTPATIENT
Start: 2023-09-13

## 2023-09-13 RX ORDER — METOPROLOL SUCCINATE 50 MG/1
50 TABLET, EXTENDED RELEASE ORAL DAILY
Qty: 30 TABLET | Refills: 5 | Status: SHIPPED | OUTPATIENT
Start: 2023-09-13

## 2023-09-13 NOTE — PROGRESS NOTES
"Chief Complaint  Chief Complaint   Patient presents with    Follow-up     6 month           Subjective          April SABA Espinoza presents to [facility] for   History of Present Illness    Patient with history of asthma, URI in August, started on steroid, cough syrup and Singulair per urgent care called ofc on 8/11/2023 with progression of symptoms, took Z-Imtiaz and call the office 8/15/2023 with tightness in the chest exam, she has been using albuterol inhaler, she was prescribed Spiriva and prednisone taper, reports symptoms have now resolved    Chronic right knee pain, xray last done 2018 and normal, on celebrex     Obesity, diet improved, drinking more water, lost 10 lbs until was on prednisone for URI.  She exercises minimally, she stress eats and has situation with her dtr.  Has cut back on coffee intake, now trying to drink \"skinny\" coffee and has decreased sodas and fast food.      Cervical spine pain and h/a's, following with pain management and neurosurgery, MRI showed multilevel degenerative facet changes with mild neuroforaminal narrowing on the left at C5-6 level. Received cervical epidural spinal injections and occipital steroid injections per Dr. Springer. She uses norco prn, flexeril as needed, Celebrex and gabapentin 600 mg 3 times daily, taking all medications as directed.   -has FMLA for flareups for max of 1-3 days/week.    -She is now seeing [facility]ology, possible fibromyalgia     CTS and cubital tunnel syndrome, EMG completed per neuro, received injections with significant improvement.         Cardiac murmur, started metoprolol, bp/hr improved, she does report fatigue, denies cp, reports occasional palpitations.     GERD, stopped omep, stable with prn use of pepcid, denies nausea, vomiting, constipation, abdominal pain and diarrhea. Upper/lower GI planned for end of September with Dr. Crowe. Has fam hx of esophageal cancer     Insomnia, stable with ambien " nightly    Anxiety, typically occurred only when driving, she is on cymbalta, she reports having more panic attacks and depression.  Her daughter is an alcoholic and has left the pt with grandchild for about 5mo now. Patient denies significant weight loss/gain, insomnia, hypersomnia, psychomotor agitation, psychomotor retardation, fatigue (loss of energy), feelings of worthlessness (guilt), impaired concentration (indecisiveness), thoughts of death or suicide.       Here to review labs      The following portions of the patient's history were reviewed and updated as appropriate: allergies, current medications, past family history, past medical history, past social history, past surgical history and problem list.    Past Medical History:   Diagnosis Date    ADD (attention deficit disorder)     SARMAD positive     Anxiety     Arthralgia     Asthma     Bursitis of left hip     Carpal tunnel syndrome     DDD (degenerative disc disease), lumbar     Depression, major     Fibromyalgia     Flu syndrome     GERD (gastroesophageal reflux disease)     Heart disease     Hiatal hernia     Hormone replacement therapy     Hyperlipidemia     Inflammatory arthritis     Influenza     Insomnia     Knee pain, bilateral     Lupus     Migraine     Neck pain     Obesity     Osteoarthritis     Pain, joint, shoulder, left     Pharyngitis, acute     Postmenopausal     Sinus congestion     Surgical menopause     Vitamin B12 deficiency     Vitamin D deficiency      Past Surgical History:   Procedure Laterality Date    CARPAL TUNNEL RELEASE WITH CUBITAL TUNNEL RELEASE  08/02/2023    CHOLECYSTECTOMY      LUMBAR DISCECTOMY  2000    TONSILLECTOMY      TOTAL ABDOMINAL HYSTERECTOMY WITH SALPINGO OOPHORECTOMY Bilateral 2009    FOR ENDOMETRIOSIS     Family History   Problem Relation Age of Onset    Hypertension Mother     Hypertension Father     Diabetes Other     Heart disease Other     Hypertension Other     Cancer Other      Social History     Tobacco  Use    Smoking status: Former     Packs/day: 0.50     Years: 3.00     Pack years: 1.50     Types: Cigarettes     Start date:      Quit date:      Years since quittin.7     Passive exposure: Never    Smokeless tobacco: Never   Substance Use Topics    Alcohol use: Not Currently     Alcohol/week: 0.0 standard drinks       Current Outpatient Medications:     albuterol (ACCUNEB) 0.63 MG/3ML nebulizer solution, Take 3 mL by nebulization Every 6 (Six) Hours As Needed for Wheezing or Shortness of Air., Disp: 120 each, Rfl: 0    DULoxetine (CYMBALTA) 60 MG capsule, Take 1 capsule by mouth Daily., Disp: 30 capsule, Rfl: 5    estradiol (ESTRACE) 2 MG tablet, TAKE ONE TABLET BY MOUTH DAILY, Disp: 30 tablet, Rfl: 5    gabapentin (NEURONTIN) 600 MG tablet, Take 1 tablet by mouth 3 (Three) Times a Day for 60 days., Disp: 90 tablet, Rfl: 1    [START ON 2023] HYDROcodone-acetaminophen (NORCO) 7.5-325 MG per tablet, Take 1 tablet by mouth 2 (Two) Times a Day As Needed for Moderate Pain for up to 30 days., Disp: 60 tablet, Rfl: 0    levalbuterol (XOPENEX HFA) 45 MCG/ACT inhaler, Inhale 1-2 puffs Every 4 (Four) Hours As Needed for Wheezing or Shortness of Air., Disp: 15 g, Rfl: 5    metoprolol succinate XL (TOPROL-XL) 50 MG 24 hr tablet, Take 1 tablet by mouth Daily., Disp: 30 tablet, Rfl: 5    vitamin D (ERGOCALCIFEROL) 1.25 MG (06731 UT) capsule capsule, TAKE ONE CAPSULE BY MOUTH ONCE WEEKLY, Disp: 4 capsule, Rfl: 5    zinc sulfate (ZINCATE) 220 (50 Zn) MG capsule, Take 1 capsule by mouth Daily., Disp: , Rfl:     zolpidem (AMBIEN) 10 MG tablet, Take 1 tablet by mouth At Night As Needed for Sleep., Disp: 30 tablet, Rfl: 5    clobetasol (TEMOVATE) 0.05 % cream, Apply 1 application  topically to the appropriate area as directed 2 (Two) Times a Day., Disp: 15 g, Rfl: 0    fluticasone (FLONASE) 50 MCG/ACT nasal spray, 1 spray into the nostril(s) as directed by provider 2 (Two) Times a Day for 30 days., Disp: 18.2 mL,  "Rfl: 1    montelukast (SINGULAIR) 10 MG tablet, Take 1 tablet by mouth Every Night for 30 days., Disp: 30 tablet, Rfl: 0    Spiriva Respimat 2.5 MCG/ACT aerosol solution inhaler, INHALE 2 PUFFS BY MOUTH DAILY (Patient not taking: Reported on 9/13/2023), Disp: 4 g, Rfl: 0    Objective   Vital Signs:   /80   Pulse 94   Ht 157.5 cm (62\")   Wt 100 kg (220 lb 6.4 oz)   SpO2 97%   BMI 40.31 kg/m²           Physical Exam  Constitutional:       General: She is not in acute distress.     Appearance: Normal appearance. She is well-developed. She is not ill-appearing or diaphoretic.   HENT:      Head: Normocephalic.   Eyes:      Conjunctiva/sclera: Conjunctivae normal.      Pupils: Pupils are equal, round, and reactive to light.   Neck:      Thyroid: No thyromegaly.      Vascular: No JVD.   Cardiovascular:      Rate and Rhythm: Normal rate and regular rhythm.      Heart sounds: Normal heart sounds. Murmur heard.   Pulmonary:      Effort: Pulmonary effort is normal. No respiratory distress.      Breath sounds: Normal breath sounds. No wheezing or rhonchi.   Abdominal:      General: Bowel sounds are normal. There is no distension.      Palpations: Abdomen is soft.      Tenderness: There is no abdominal tenderness.   Musculoskeletal:         General: Tenderness (chronic pain C-spine, multi joints, >12 sites pain daily) present. No swelling. Normal range of motion.      Cervical back: Normal range of motion and neck supple. No tenderness.   Lymphadenopathy:      Cervical: No cervical adenopathy.   Skin:     General: Skin is warm and dry.      Coloration: Skin is not jaundiced.      Findings: Rash (right 4th finger eczema surrounding the nailbed. RLE dermatitis-chronic) present. No erythema.   Neurological:      General: No focal deficit present.      Mental Status: She is alert and oriented to person, place, and time. Mental status is at baseline.      Sensory: No sensory deficit.      Motor: No weakness.      Gait: " Gait normal.   Psychiatric:         Mood and Affect: Mood normal.         Behavior: Behavior normal.         Thought Content: Thought content normal.         Judgment: Judgment normal.        Result Review :     No visits with results within 7 Day(s) from this visit.   Latest known visit with results is:   Orders Only on 08/30/2023   Component Date Value Ref Range Status    WBC 09/06/2023 5.60  3.40 - 10.80 10*3/mm3 Final    RBC 09/06/2023 3.99  3.77 - 5.28 10*6/mm3 Final    Hemoglobin 09/06/2023 11.6 (L)  12.0 - 15.9 g/dL Final    Hematocrit 09/06/2023 35.4  34.0 - 46.6 % Final    MCV 09/06/2023 88.7  79.0 - 97.0 fL Final    MCH 09/06/2023 29.1  26.6 - 33.0 pg Final    MCHC 09/06/2023 32.8  31.5 - 35.7 g/dL Final    RDW 09/06/2023 13.1  12.3 - 15.4 % Final    RDW-SD 09/06/2023 43.0  37.0 - 54.0 fl Final    MPV 09/06/2023 9.9  6.0 - 12.0 fL Final    Platelets 09/06/2023 195  140 - 450 10*3/mm3 Final    Glucose 09/06/2023 88  65 - 99 mg/dL Final    BUN 09/06/2023 14  6 - 20 mg/dL Final    Creatinine 09/06/2023 0.59  0.57 - 1.00 mg/dL Final    Sodium 09/06/2023 142  136 - 145 mmol/L Final    Potassium 09/06/2023 4.4  3.5 - 5.2 mmol/L Final    Chloride 09/06/2023 106  98 - 107 mmol/L Final    CO2 09/06/2023 26.5  22.0 - 29.0 mmol/L Final    Calcium 09/06/2023 9.2  8.6 - 10.5 mg/dL Final    Total Protein 09/06/2023 6.8  6.0 - 8.5 g/dL Final    Albumin 09/06/2023 3.9  3.5 - 5.2 g/dL Final    ALT (SGPT) 09/06/2023 23  1 - 33 U/L Final    AST (SGOT) 09/06/2023 18  1 - 32 U/L Final    Alkaline Phosphatase 09/06/2023 75  39 - 117 U/L Final    Total Bilirubin 09/06/2023 0.3  0.0 - 1.2 mg/dL Final    Globulin 09/06/2023 2.9  gm/dL Final    A/G Ratio 09/06/2023 1.3  g/dL Final    BUN/Creatinine Ratio 09/06/2023 23.7  7.0 - 25.0 Final    Anion Gap 09/06/2023 9.5  5.0 - 15.0 mmol/L Final    eGFR 09/06/2023 111.3  >60.0 mL/min/1.73 Final    Total Cholesterol 09/06/2023 228 (H)  0 - 200 mg/dL Final    Triglycerides 09/06/2023 188  (H)  0 - 150 mg/dL Final    HDL Cholesterol 09/06/2023 54  40 - 60 mg/dL Final    LDL Cholesterol  09/06/2023 140 (H)  0 - 100 mg/dL Final    VLDL Cholesterol 09/06/2023 34  5 - 40 mg/dL Final    LDL/HDL Ratio 09/06/2023 2.53   Final    TSH 09/06/2023 0.845  0.270 - 4.200 uIU/mL Final    25 Hydroxy, Vitamin D 09/06/2023 23.3 (L)  30.0 - 100.0 ng/ml Final                              Assessment and Plan    Diagnoses and all orders for this visit:    1. Mixed hyperlipidemia (Primary)    2. Mild intermittent asthma with acute exacerbation  Comments:  cont/refill albuterol nebs  Also recommend OTC allergy meds prn  Orders:  -     albuterol (ACCUNEB) 0.63 MG/3ML nebulizer solution; Take 3 mL by nebulization Every 6 (Six) Hours As Needed for Wheezing or Shortness of Air.  Dispense: 120 each; Refill: 0    3. Vitamin D deficiency  Comments:  We will plan to check hypertension panel, vitamin D and B12 prior to appointment in 6 months fasting    4. Primary insomnia  Comments:  continue and refill ambien for nightly use  Orders:  -     zolpidem (AMBIEN) 10 MG tablet; Take 1 tablet by mouth At Night As Needed for Sleep.  Dispense: 30 tablet; Refill: 5    5. Primary hypertension    6. Primary osteoarthritis involving multiple joints    7. Gastroesophageal reflux disease without esophagitis    8. Cervicalgia    9. Eczema of tip of finger    10. Class 3 severe obesity without serious comorbidity with body mass index (BMI) of 40.0 to 44.9 in adult, unspecified obesity type    Other orders  -     clobetasol (TEMOVATE) 0.05 % cream; Apply 1 application  topically to the appropriate area as directed 2 (Two) Times a Day.  Dispense: 15 g; Refill: 0  -     DULoxetine (CYMBALTA) 60 MG capsule; Take 1 capsule by mouth Daily.  Dispense: 30 capsule; Refill: 5  -     metoprolol succinate XL (TOPROL-XL) 50 MG 24 hr tablet; Take 1 tablet by mouth Daily.  Dispense: 30 tablet; Refill: 5        Patient to start working on weight loss after return  from vacation next week.  Decrease carbohydrates and calories, continue increased water intake.  Try to exercise/walk 150 minutes/week.  If still unable to lose weight recommend she schedule appointment to discuss weight loss options  Labs reviewed  Resume vitamin D  Pain stable, anxiety/depression worse, recommend increase Cymbalta to 60 mg daily  Lipids are worse, discussed diet moments as above and weight loss  Otherwise continue current medication regimen  Follow-up with pain management and rheumatology as directed      I spent 30 minutes caring for Kelly Espinoza on this date of service. This time includes time spent by me in the following activities: preparing for the visit, reviewing tests, performing a medically appropriate examination and/or evaluation , counseling and educating the patient/family/caregiver, ordering medications, tests, or procedures and documenting information in the medical record        Follow Up     Return in about 6 months (around 3/13/2024) for Recheck, Annual physical, pain, HTN, insomnia. HTN panel and vitamin D prior to appt.  Patient was given instructions and counseling regarding her condition or for health maintenance advice. Please see specific information pulled into the AVS if appropriate.        Part of this note may be an electronic transcription/translation of spoken language to printed text using the Dragon Dictation System

## 2023-09-26 RX ORDER — DULOXETIN HYDROCHLORIDE 30 MG/1
CAPSULE, DELAYED RELEASE ORAL
Qty: 90 CAPSULE | OUTPATIENT
Start: 2023-09-26

## 2023-09-29 ENCOUNTER — OFFICE (OUTPATIENT)
Dept: URBAN - METROPOLITAN AREA PATHOLOGY 4 | Facility: PATHOLOGY | Age: 48
End: 2023-09-29
Payer: COMMERCIAL

## 2023-09-29 ENCOUNTER — ON CAMPUS - OUTPATIENT (OUTPATIENT)
Dept: URBAN - METROPOLITAN AREA HOSPITAL 2 | Facility: HOSPITAL | Age: 48
End: 2023-09-29
Payer: COMMERCIAL

## 2023-09-29 VITALS
OXYGEN SATURATION: 97 % | RESPIRATION RATE: 16 BRPM | RESPIRATION RATE: 19 BRPM | DIASTOLIC BLOOD PRESSURE: 69 MMHG | SYSTOLIC BLOOD PRESSURE: 117 MMHG | RESPIRATION RATE: 20 BRPM | HEART RATE: 96 BPM | HEART RATE: 102 BPM | SYSTOLIC BLOOD PRESSURE: 126 MMHG | HEART RATE: 104 BPM | WEIGHT: 214 LBS | DIASTOLIC BLOOD PRESSURE: 71 MMHG | HEIGHT: 61 IN | RESPIRATION RATE: 22 BRPM | SYSTOLIC BLOOD PRESSURE: 106 MMHG | DIASTOLIC BLOOD PRESSURE: 63 MMHG | SYSTOLIC BLOOD PRESSURE: 105 MMHG | TEMPERATURE: 97.5 F | DIASTOLIC BLOOD PRESSURE: 85 MMHG | RESPIRATION RATE: 15 BRPM | DIASTOLIC BLOOD PRESSURE: 83 MMHG | OXYGEN SATURATION: 100 % | HEART RATE: 97 BPM | HEART RATE: 90 BPM | SYSTOLIC BLOOD PRESSURE: 119 MMHG | HEART RATE: 101 BPM | OXYGEN SATURATION: 98 % | DIASTOLIC BLOOD PRESSURE: 84 MMHG | HEART RATE: 86 BPM | SYSTOLIC BLOOD PRESSURE: 100 MMHG | HEART RATE: 94 BPM | DIASTOLIC BLOOD PRESSURE: 86 MMHG | SYSTOLIC BLOOD PRESSURE: 102 MMHG | OXYGEN SATURATION: 96 % | DIASTOLIC BLOOD PRESSURE: 65 MMHG | SYSTOLIC BLOOD PRESSURE: 120 MMHG | HEART RATE: 98 BPM | OXYGEN SATURATION: 99 % | RESPIRATION RATE: 17 BRPM | DIASTOLIC BLOOD PRESSURE: 68 MMHG

## 2023-09-29 DIAGNOSIS — K31.89 OTHER DISEASES OF STOMACH AND DUODENUM: ICD-10-CM

## 2023-09-29 DIAGNOSIS — R13.10 DYSPHAGIA, UNSPECIFIED: ICD-10-CM

## 2023-09-29 DIAGNOSIS — Z12.11 ENCOUNTER FOR SCREENING FOR MALIGNANT NEOPLASM OF COLON: ICD-10-CM

## 2023-09-29 DIAGNOSIS — D12.2 BENIGN NEOPLASM OF ASCENDING COLON: ICD-10-CM

## 2023-09-29 DIAGNOSIS — K31.9 DISEASE OF STOMACH AND DUODENUM, UNSPECIFIED: ICD-10-CM

## 2023-09-29 DIAGNOSIS — K21.9 GASTRO-ESOPHAGEAL REFLUX DISEASE WITHOUT ESOPHAGITIS: ICD-10-CM

## 2023-09-29 DIAGNOSIS — K64.1 SECOND DEGREE HEMORRHOIDS: ICD-10-CM

## 2023-09-29 PROBLEM — K63.5 POLYP OF COLON: Status: ACTIVE | Noted: 2023-09-29

## 2023-09-29 PROBLEM — K29.70 GASTRITIS, UNSPECIFIED, WITHOUT BLEEDING: Status: ACTIVE | Noted: 2023-09-29

## 2023-09-29 LAB
GI HISTOLOGY: A. UNSPECIFIED: (no result)
GI HISTOLOGY: B. SELECT: (no result)
GI HISTOLOGY: C. UNSPECIFIED: (no result)
GI HISTOLOGY: D. UNSPECIFIED: (no result)
GI HISTOLOGY: E. UNSPECIFIED: (no result)
GI HISTOLOGY: PDF REPORT: (no result)

## 2023-09-29 PROCEDURE — 88342 IMHCHEM/IMCYTCHM 1ST ANTB: CPT | Performed by: INTERNAL MEDICINE

## 2023-09-29 PROCEDURE — 45385 COLONOSCOPY W/LESION REMOVAL: CPT | Mod: 33 | Performed by: INTERNAL MEDICINE

## 2023-09-29 PROCEDURE — 43450 DILATE ESOPHAGUS 1/MULT PASS: CPT | Performed by: INTERNAL MEDICINE

## 2023-09-29 PROCEDURE — 43239 EGD BIOPSY SINGLE/MULTIPLE: CPT | Performed by: INTERNAL MEDICINE

## 2023-09-29 PROCEDURE — 88305 TISSUE EXAM BY PATHOLOGIST: CPT | Performed by: INTERNAL MEDICINE

## 2023-09-29 PROCEDURE — 45380 COLONOSCOPY AND BIOPSY: CPT | Mod: 33,59 | Performed by: INTERNAL MEDICINE

## 2023-09-29 RX ORDER — PANTOPRAZOLE SODIUM 40 MG/1
40 TABLET, DELAYED RELEASE ORAL
Qty: 30 | Refills: 11 | Status: ACTIVE
Start: 2023-09-29

## 2023-09-29 RX ORDER — COLESEVELAM HYDROCHLORIDE 625 MG/1
1250 TABLET, COATED ORAL
Qty: 60 | Refills: 6 | Status: ACTIVE
Start: 2023-09-29

## 2023-10-16 NOTE — PROGRESS NOTES
Subjective   Kelly Espinoza is a 48 y.o. female is here for follow-up for neck pain.  Patient was last seen on 8/23/2023.  Increased pain is left sided neck after a long car ride.       On last visit:     Neck pain is 6/10 on VAS, maximum 6/10.  Pain is aching, throbbing, tingling and sometimes weakness and numbness in nature.  Referred to left neck and shoulder.  Constant pain but improved by prednisone, Norco, gabapentin.  Worse with bending or laying down.  Severe headaches in occipital region with numbness and tingling in both hands mostly in third fourth and fifth digit.  Trouble with gripping with the left arm.  Left-sided occipital region headache radiating to left temporal and frontal region.    Left shoulder pain is 6/10 on VAS. Trouble with flexion of shoulder.     Lower back pain is 7/10 on VAS, at maximum is 8/10. Pain is sharp, shooting and stabbing in nature. Pain is referred left buttock, left anterior thigh, left anterior shin and left foot. The pain is constnat. The pain is improved by nothing. The pain is worse with bending.      Previous Injection:   7/10/2023 - left occipital nerve block; left trapezius and rhomboids TPI- improvement  with sharp, shooting pain; didn't much relief with TPI.   2/15/2023- left occipital nerve block- good relief with sharp shooting pain- 4 months   11/8/2022-KENDRA C7-T1- improved headache, still has some pain.   3/8/2022-KENDRA C6/7- 75% pain relief- pain relief, improved headaches and improved flexibility - 4 months     Hx: Referred by Vivian Fontanez APRN  for neck pain.  Her pain started about 3 years ago without any significant injuries. 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   She has been seen by neurosurgery and was told she was non surgical candidate.  EMG showed bilateral carpal tunnel syndrome and cubital tunnel syndrome.  S/p bilateral carpal tunnel injection with 50% relief. She is s/p R carpal tunnel release. Seen by  rheumatology who r/o lupus and has been diagnosed with Fibromyalgia       PHQ-9- 9  SOAPP-2      PMH:   Lupus, GERD, anxiety, ADD, s/p L5-S1 dissectomy 2000, s/p hand and arm surgery with Dr. Faulkner on 4/21/2023 with improvement in numbness in R hand.      Current Medications:   Norco 5-325 mg BID PRN  Celebrex 200 mg BID PRN  Cymbalta 30 mg daily  Gabapentin 600 mg TID   Ambien        Past Medications:   Tramadol 50 mg q8h PRN-1/16/2022 - didn't help     Past Modalities:  TENS:                                                                          no                                                  Physical Therapy Within The Last 6 Months              Yes - 8 weeks of PT.  Psychotherapy                                                            no  Massage Therapy                                                       no     Patient Complains Of:  Uro-Fecal Incontinence          no  Weight Gain/Loss                   no  Fever/Chills                             no  Weakness                               no           Current Outpatient Medications:     albuterol (ACCUNEB) 0.63 MG/3ML nebulizer solution, Take 3 mL by nebulization Every 6 (Six) Hours As Needed for Wheezing or Shortness of Air., Disp: 120 each, Rfl: 0    clobetasol (TEMOVATE) 0.05 % cream, Apply 1 application  topically to the appropriate area as directed 2 (Two) Times a Day., Disp: 15 g, Rfl: 0    DULoxetine (CYMBALTA) 60 MG capsule, Take 1 capsule by mouth Daily., Disp: 30 capsule, Rfl: 5    estradiol (ESTRACE) 2 MG tablet, TAKE ONE TABLET BY MOUTH DAILY, Disp: 30 tablet, Rfl: 5    gabapentin (NEURONTIN) 600 MG tablet, Take 1 tablet by mouth 3 (Three) Times a Day for 60 days., Disp: 90 tablet, Rfl: 1    [START ON 10/27/2023] HYDROcodone-acetaminophen (NORCO) 7.5-325 MG per tablet, Take 1 tablet by mouth 2 (Two) Times a Day As Needed for Moderate Pain for up to 30 days., Disp: 60 tablet, Rfl: 0    [START ON 11/26/2023] HYDROcodone-acetaminophen  (NORCO) 7.5-325 MG per tablet, Take 1 tablet by mouth 2 (Two) Times a Day As Needed for Moderate Pain for up to 30 days., Disp: 60 tablet, Rfl: 0    levalbuterol (XOPENEX HFA) 45 MCG/ACT inhaler, Inhale 1-2 puffs Every 4 (Four) Hours As Needed for Wheezing or Shortness of Air., Disp: 15 g, Rfl: 5    metoprolol succinate XL (TOPROL-XL) 50 MG 24 hr tablet, Take 1 tablet by mouth Daily., Disp: 30 tablet, Rfl: 5    Spiriva Respimat 2.5 MCG/ACT aerosol solution inhaler, INHALE 2 PUFFS BY MOUTH DAILY, Disp: 4 g, Rfl: 0    vitamin D (ERGOCALCIFEROL) 1.25 MG (37318 UT) capsule capsule, TAKE ONE CAPSULE BY MOUTH ONCE WEEKLY, Disp: 4 capsule, Rfl: 5    zinc sulfate (ZINCATE) 220 (50 Zn) MG capsule, Take 1 capsule by mouth Daily., Disp: , Rfl:     zolpidem (AMBIEN) 10 MG tablet, Take 1 tablet by mouth At Night As Needed for Sleep., Disp: 30 tablet, Rfl: 5    fluticasone (FLONASE) 50 MCG/ACT nasal spray, 1 spray into the nostril(s) as directed by provider 2 (Two) Times a Day for 30 days., Disp: 18.2 mL, Rfl: 1    montelukast (SINGULAIR) 10 MG tablet, Take 1 tablet by mouth Every Night for 30 days., Disp: 30 tablet, Rfl: 0    The following portions of the patient's history were reviewed and updated as appropriate: allergies, current medications, past family history, past medical history, past social history, past surgical history, and problem list.      REVIEW OF PERTINENT MEDICAL DATA    Past Medical History:   Diagnosis Date    ADD (attention deficit disorder)     SARMAD positive     Anxiety     Arthralgia     Asthma     Bursitis of left hip     Carpal tunnel syndrome     Roberto.    DDD (degenerative disc disease), lumbar     Depression, major     Fibromyalgia     Flu syndrome     GERD (gastroesophageal reflux disease)     Heart disease     Hiatal hernia     Hormone replacement therapy     Hyperlipidemia     Inflammatory arthritis     Influenza     Insomnia     Knee pain, bilateral     Lupus     Migraine     Neck pain     Obesity      Osteoarthritis     Pain, joint, shoulder, left     Pharyngitis, acute     Postmenopausal     Sinus congestion     Surgical menopause     at age 34    Vitamin B12 deficiency     Vitamin D deficiency      Past Surgical History:   Procedure Laterality Date    CARPAL TUNNEL RELEASE WITH CUBITAL TUNNEL RELEASE  2023    CHOLECYSTECTOMY      LUMBAR DISCECTOMY  2000    TONSILLECTOMY      TOTAL ABDOMINAL HYSTERECTOMY WITH SALPINGO OOPHORECTOMY Bilateral 2009    FOR ENDOMETRIOSIS     Family History   Problem Relation Age of Onset    Hypertension Mother     Hypertension Father     Diabetes Other     Heart disease Other     Hypertension Other     Cancer Other      Social History     Socioeconomic History    Marital status:    Tobacco Use    Smoking status: Former     Packs/day: 0.50     Years: 3.00     Additional pack years: 0.00     Total pack years: 1.50     Types: Cigarettes     Start date:      Quit date:      Years since quittin.8     Passive exposure: Never    Smokeless tobacco: Never   Vaping Use    Vaping Use: Never used   Substance and Sexual Activity    Alcohol use: Not Currently     Alcohol/week: 0.0 standard drinks of alcohol    Drug use: Not Currently    Sexual activity: Defer         Review of Systems   Musculoskeletal:  Positive for arthralgias, back pain and neck pain.         Vitals:    10/18/23 0850   BP: 132/69   Pulse: 81   Resp: 16   SpO2: 98%   PainSc:   6           Objective   Physical Exam  Neck:     Musculoskeletal:         General: Tenderness present.        Legs:    Neurological:      Deep Tendon Reflexes:      Reflex Scores:       Tricep reflexes are 2+ on the right side and 2+ on the left side.       Bicep reflexes are 2+ on the right side and 2+ on the left side.       Brachioradialis reflexes are 2+ on the right side and 2+ on the left side.     Comments: Motor strength 5/5 b/l UE  Sensory intact b/l UE             Imaging Reviewed:  MRI cervical  spine-1/31/2022  -C2-3-degenerative facet changes bilateral  C3-4-WNL  C4-5-WNL  C5-6-mild facet changes.  Mild left neural foraminal narrowing.  C6-7-mild bilateral uncovertebral joint spurring.  C7-T1-WNL    EMG upper extremity-12/5/2022  - Abnormal study.  Evidence of moderate right and mild left median neuropathy at the wrist consistent with carpal tunnel syndrome    Assessment:    1. High risk medication use    2. Cervical spondylosis    3. DDD (degenerative disc disease), cervical    4. Chronic left shoulder pain           Plan:   1.  UDS consistent with patient interview on  5/10/2023 narcotic contract signed - 11/30/22.   2. We discussed trying a course of formal physical therapy.  Physical therapy can help strengthen and stretch the muscles around the joints. Continue to be as active as possible.  Patient has done 8 weeks of physical therapy without much improvement.  3.. Continue Gabapepntin 600 mg TID (12/17/23. Side effects discussed with the patient including but not limited to somnolence, dizziness, ataxia, headache, fatigue, blurred vision, cold or flu-like symptoms,delusions, hoarseness, lack or loss of strength, lower back or side pain, swelling of the hands, feet, or lower legs trembling or shaking. Patient understands and agrees with the plan.  4.  Due to increased pain, continue Norco 7.5-325 mg BID PRN (10/27; 11/26).  Discussed with the patient regarding long-term side effects of opioids including but not limited to opioid induced hormonal suppression, hyperalgesia, fatigue, weight gain, possible opioid induced altered immune system, addiction, tolerance, dependence, risk of hearing loss and elevated risk of myocardial infarction. Proper use and potential life threatening side effects of over use discussed with patient. Patient states understanding of their use and risks.  5. .  Patient has severe left SI joint tenderness with MELE positive and intermittent radiculopathy.  Due to recent onset,  will prescribe Medrol Dosepak for 5 days to see if that improves pain.  We will consider left SI joint injection in future if pain worsens in lower back.   6. Agree with Rheum regarding increasing Cymbalta. She will talk to PCP.  7. Recommend TENS unit 15-20 mins 2-3 times daily for left trapezius pain.  8. Continues to have L trapezius pain. Cervical MRI is non revealing. Mild ROM issues with L shoulder. Will obtain L shoulder MRI to r/o shoulder issues as a cause of pain.       RTC in 6 weeks.     Rome Springer DO  Pain Management   [facility]            INSPECT REPORT    As part of the patient's treatment plan, I may be prescribing controlled substances. The patient has been made aware of appropriate use of such medications, including potential risk of somnolence, limited ability to drive and/or work safely, and the potential for dependence or overdose. It has also been made clear that these medications are for use by this patient only, without concomitant use of alcohol or other substances unless prescribed.     Patient has completed prescribing agreement detailing terms of continued prescribing of controlled substances, including monitoring INSPECT reports, urine drug screening, and pill counts if necessary. The patient is aware that inappropriate use will results in cessation of prescribing such medications.    INSPECT report has been reviewed and scanned into the patient's chart.

## 2023-10-18 ENCOUNTER — OFFICE VISIT (OUTPATIENT)
Dept: PAIN MEDICINE | Facility: CLINIC | Age: 48
End: 2023-10-18
Payer: COMMERCIAL

## 2023-10-18 VITALS
DIASTOLIC BLOOD PRESSURE: 69 MMHG | RESPIRATION RATE: 16 BRPM | HEART RATE: 81 BPM | SYSTOLIC BLOOD PRESSURE: 132 MMHG | OXYGEN SATURATION: 98 %

## 2023-10-18 DIAGNOSIS — G89.29 CHRONIC LEFT SHOULDER PAIN: ICD-10-CM

## 2023-10-18 DIAGNOSIS — M50.30 DDD (DEGENERATIVE DISC DISEASE), CERVICAL: ICD-10-CM

## 2023-10-18 DIAGNOSIS — M25.512 CHRONIC LEFT SHOULDER PAIN: ICD-10-CM

## 2023-10-18 DIAGNOSIS — Z79.899 HIGH RISK MEDICATION USE: Primary | ICD-10-CM

## 2023-10-18 DIAGNOSIS — M47.812 CERVICAL SPONDYLOSIS: ICD-10-CM

## 2023-10-18 RX ORDER — GABAPENTIN 600 MG/1
600 TABLET ORAL 3 TIMES DAILY
Qty: 90 TABLET | Refills: 1 | Status: SHIPPED | OUTPATIENT
Start: 2023-10-18 | End: 2023-12-17

## 2023-10-18 RX ORDER — HYDROCODONE BITARTRATE AND ACETAMINOPHEN 7.5; 325 MG/1; MG/1
1 TABLET ORAL 2 TIMES DAILY PRN
Qty: 60 TABLET | Refills: 0 | Status: SHIPPED | OUTPATIENT
Start: 2023-10-27 | End: 2023-11-26

## 2023-10-18 RX ORDER — HYDROCODONE BITARTRATE AND ACETAMINOPHEN 7.5; 325 MG/1; MG/1
1 TABLET ORAL 2 TIMES DAILY PRN
Qty: 60 TABLET | Refills: 0 | Status: SHIPPED | OUTPATIENT
Start: 2023-11-26 | End: 2023-12-26

## 2023-11-03 RX ORDER — HYDROXYZINE HYDROCHLORIDE 10 MG/1
10 TABLET, FILM COATED ORAL 3 TIMES DAILY PRN
Qty: 30 TABLET | Refills: 0 | Status: SHIPPED | OUTPATIENT
Start: 2023-11-03

## 2023-11-14 ENCOUNTER — APPOINTMENT (OUTPATIENT)
Dept: CT IMAGING | Facility: HOSPITAL | Age: 48
End: 2023-11-14
Payer: COMMERCIAL

## 2023-11-14 ENCOUNTER — HOSPITAL ENCOUNTER (EMERGENCY)
Facility: HOSPITAL | Age: 48
Discharge: HOME | End: 2023-11-14
Attending: EMERGENCY MEDICINE
Payer: COMMERCIAL

## 2023-11-14 VITALS
RESPIRATION RATE: 18 BRPM | SYSTOLIC BLOOD PRESSURE: 116 MMHG | BODY MASS INDEX: 40.48 KG/M2 | WEIGHT: 220 LBS | DIASTOLIC BLOOD PRESSURE: 64 MMHG | HEIGHT: 62 IN | OXYGEN SATURATION: 100 % | HEART RATE: 80 BPM | TEMPERATURE: 98.1 F

## 2023-11-14 DIAGNOSIS — R10.84 GENERALIZED ABDOMINAL PAIN: Primary | ICD-10-CM

## 2023-11-14 LAB
ALBUMIN SERPL-MCNC: 3.9 G/DL (ref 3.5–5.2)
ALBUMIN/GLOB SERPL: 1.3 G/DL
ALP SERPL-CCNC: 82 U/L (ref 39–117)
ALT SERPL W P-5'-P-CCNC: 15 U/L (ref 1–33)
ANION GAP SERPL CALCULATED.3IONS-SCNC: 9 MMOL/L (ref 5–15)
AST SERPL-CCNC: 15 U/L (ref 1–32)
BACTERIA UR QL AUTO: ABNORMAL /HPF
BASOPHILS # BLD AUTO: 0 10*3/MM3 (ref 0–0.2)
BASOPHILS NFR BLD AUTO: 0.6 % (ref 0–1.5)
BILIRUB SERPL-MCNC: 0.3 MG/DL (ref 0–1.2)
BILIRUB UR QL STRIP: NEGATIVE
BUN SERPL-MCNC: 17 MG/DL (ref 6–20)
BUN/CREAT SERPL: 33.3 (ref 7–25)
CALCIUM SPEC-SCNC: 9.1 MG/DL (ref 8.6–10.5)
CHLORIDE SERPL-SCNC: 105 MMOL/L (ref 98–107)
CLARITY UR: ABNORMAL
CO2 SERPL-SCNC: 27 MMOL/L (ref 22–29)
COLOR UR: YELLOW
CREAT SERPL-MCNC: 0.51 MG/DL (ref 0.57–1)
DEPRECATED RDW RBC AUTO: 41.6 FL (ref 37–54)
EGFRCR SERPLBLD CKD-EPI 2021: 115.3 ML/MIN/1.73
EOSINOPHIL # BLD AUTO: 0.2 10*3/MM3 (ref 0–0.4)
EOSINOPHIL NFR BLD AUTO: 2.6 % (ref 0.3–6.2)
ERYTHROCYTE [DISTWIDTH] IN BLOOD BY AUTOMATED COUNT: 13 % (ref 12.3–15.4)
GLOBULIN UR ELPH-MCNC: 2.9 GM/DL
GLUCOSE SERPL-MCNC: 104 MG/DL (ref 65–99)
GLUCOSE UR STRIP-MCNC: NEGATIVE MG/DL
HCT VFR BLD AUTO: 35.5 % (ref 34–46.6)
HGB BLD-MCNC: 11.8 G/DL (ref 12–15.9)
HGB UR QL STRIP.AUTO: ABNORMAL
HYALINE CASTS UR QL AUTO: ABNORMAL /LPF
KETONES UR QL STRIP: NEGATIVE
LEUKOCYTE ESTERASE UR QL STRIP.AUTO: NEGATIVE
LIPASE SERPL-CCNC: 24 U/L (ref 13–60)
LYMPHOCYTES # BLD AUTO: 2 10*3/MM3 (ref 0.7–3.1)
LYMPHOCYTES NFR BLD AUTO: 27.7 % (ref 19.6–45.3)
MCH RBC QN AUTO: 29 PG (ref 26.6–33)
MCHC RBC AUTO-ENTMCNC: 33.3 G/DL (ref 31.5–35.7)
MCV RBC AUTO: 87.1 FL (ref 79–97)
MONOCYTES # BLD AUTO: 0.6 10*3/MM3 (ref 0.1–0.9)
MONOCYTES NFR BLD AUTO: 9.1 % (ref 5–12)
NEUTROPHILS NFR BLD AUTO: 4.3 10*3/MM3 (ref 1.7–7)
NEUTROPHILS NFR BLD AUTO: 60 % (ref 42.7–76)
NITRITE UR QL STRIP: NEGATIVE
NRBC BLD AUTO-RTO: 0 /100 WBC (ref 0–0.2)
PH UR STRIP.AUTO: <=5 [PH] (ref 5–8)
PLATELET # BLD AUTO: 228 10*3/MM3 (ref 140–450)
PMV BLD AUTO: 7.7 FL (ref 6–12)
POTASSIUM SERPL-SCNC: 3.7 MMOL/L (ref 3.5–5.2)
PROT SERPL-MCNC: 6.8 G/DL (ref 6–8.5)
PROT UR QL STRIP: NEGATIVE
RBC # BLD AUTO: 4.07 10*6/MM3 (ref 3.77–5.28)
RBC # UR STRIP: ABNORMAL /HPF
REF LAB TEST METHOD: ABNORMAL
SODIUM SERPL-SCNC: 141 MMOL/L (ref 136–145)
SP GR UR STRIP: 1.02 (ref 1–1.03)
SQUAMOUS #/AREA URNS HPF: ABNORMAL /HPF
UROBILINOGEN UR QL STRIP: ABNORMAL
WBC # UR STRIP: ABNORMAL /HPF
WBC NRBC COR # BLD: 7.1 10*3/MM3 (ref 3.4–10.8)

## 2023-11-14 PROCEDURE — 74177 CT ABD & PELVIS W/CONTRAST: CPT

## 2023-11-14 PROCEDURE — 25510000001 IOPAMIDOL PER 1 ML: Performed by: EMERGENCY MEDICINE

## 2023-11-14 PROCEDURE — 96375 TX/PRO/DX INJ NEW DRUG ADDON: CPT

## 2023-11-14 PROCEDURE — 25010000002 KETOROLAC TROMETHAMINE PER 15 MG: Performed by: NURSE PRACTITIONER

## 2023-11-14 PROCEDURE — 80053 COMPREHEN METABOLIC PANEL: CPT | Performed by: NURSE PRACTITIONER

## 2023-11-14 PROCEDURE — 81001 URINALYSIS AUTO W/SCOPE: CPT | Performed by: NURSE PRACTITIONER

## 2023-11-14 PROCEDURE — 25010000002 ONDANSETRON PER 1 MG: Performed by: NURSE PRACTITIONER

## 2023-11-14 PROCEDURE — 96374 THER/PROPH/DIAG INJ IV PUSH: CPT

## 2023-11-14 PROCEDURE — 85025 COMPLETE CBC W/AUTO DIFF WBC: CPT | Performed by: NURSE PRACTITIONER

## 2023-11-14 PROCEDURE — 83690 ASSAY OF LIPASE: CPT | Performed by: NURSE PRACTITIONER

## 2023-11-14 PROCEDURE — 99285 EMERGENCY DEPT VISIT HI MDM: CPT

## 2023-11-14 PROCEDURE — 25810000003 LACTATED RINGERS SOLUTION: Performed by: NURSE PRACTITIONER

## 2023-11-14 RX ORDER — KETOROLAC TROMETHAMINE 15 MG/ML
15 INJECTION, SOLUTION INTRAMUSCULAR; INTRAVENOUS ONCE
Status: COMPLETED | OUTPATIENT
Start: 2023-11-14 | End: 2023-11-14

## 2023-11-14 RX ORDER — SODIUM CHLORIDE 0.9 % (FLUSH) 0.9 %
10 SYRINGE (ML) INJECTION AS NEEDED
Status: DISCONTINUED | OUTPATIENT
Start: 2023-11-14 | End: 2023-11-14 | Stop reason: HOSPADM

## 2023-11-14 RX ORDER — HYDROCODONE BITARTRATE AND ACETAMINOPHEN 7.5; 325 MG/1; MG/1
1 TABLET ORAL ONCE
Status: COMPLETED | OUTPATIENT
Start: 2023-11-14 | End: 2023-11-14

## 2023-11-14 RX ORDER — ONDANSETRON 4 MG/1
4 TABLET, ORALLY DISINTEGRATING ORAL 4 TIMES DAILY PRN
Qty: 10 TABLET | Refills: 0 | Status: SHIPPED | OUTPATIENT
Start: 2023-11-14

## 2023-11-14 RX ORDER — ONDANSETRON 2 MG/ML
4 INJECTION INTRAMUSCULAR; INTRAVENOUS ONCE
Status: COMPLETED | OUTPATIENT
Start: 2023-11-14 | End: 2023-11-14

## 2023-11-14 RX ADMIN — KETOROLAC TROMETHAMINE 15 MG: 15 INJECTION, SOLUTION INTRAMUSCULAR; INTRAVENOUS at 15:30

## 2023-11-14 RX ADMIN — HYDROCODONE BITARTRATE AND ACETAMINOPHEN 1 TABLET: 7.5; 325 TABLET ORAL at 17:48

## 2023-11-14 RX ADMIN — ONDANSETRON 4 MG: 2 INJECTION INTRAMUSCULAR; INTRAVENOUS at 15:30

## 2023-11-14 RX ADMIN — IOPAMIDOL 100 ML: 755 INJECTION, SOLUTION INTRAVENOUS at 17:16

## 2023-11-14 RX ADMIN — SODIUM CHLORIDE, POTASSIUM CHLORIDE, SODIUM LACTATE AND CALCIUM CHLORIDE 1000 ML: 600; 310; 30; 20 INJECTION, SOLUTION INTRAVENOUS at 15:30

## 2023-11-14 NOTE — ED PROVIDER NOTES
Subjective   History of Present Illness  Patient is a 48-year-old obese female who states that she began having pain in her right flank and right lower quadrant yesterday at 2:30 AM she states that she has a history of urinary tract infections and she has had a hysterectomy she states that she has had some nausea she tried to work today but the pain was too great and brought her to the emergency room she rates her pain as a 7/10.      Review of Systems   Constitutional:  Negative for chills, fatigue and fever.   HENT:  Negative for congestion, tinnitus and trouble swallowing.    Eyes:  Negative for photophobia, discharge and redness.   Respiratory:  Negative for cough and shortness of breath.    Cardiovascular:  Negative for chest pain and palpitations.   Gastrointestinal:  Positive for abdominal pain and nausea. Negative for diarrhea and vomiting.   Genitourinary:  Negative for dysuria, frequency and urgency.   Musculoskeletal:  Negative for back pain, joint swelling and myalgias.   Skin:  Negative for rash.   Neurological:  Negative for dizziness and headaches.   Psychiatric/Behavioral:  Negative for confusion.    All other systems reviewed and are negative.      Past Medical History:   Diagnosis Date   • ADD (attention deficit disorder)    • SARMAD positive    • Anxiety    • Arthralgia    • Asthma    • Bursitis of left hip    • Carpal tunnel syndrome     Roberto.   • DDD (degenerative disc disease), lumbar    • Depression, major    • Fibromyalgia    • Flu syndrome    • GERD (gastroesophageal reflux disease)    • Heart disease    • Hiatal hernia    • Hormone replacement therapy    • Hyperlipidemia    • Inflammatory arthritis    • Influenza    • Insomnia    • Knee pain, bilateral    • Lupus    • Migraine    • Neck pain    • Obesity    • Osteoarthritis    • Pain, joint, shoulder, left    • Pharyngitis, acute    • Postmenopausal    • Sinus congestion    • Surgical menopause     at age 34   • Vitamin B12 deficiency    •  Vitamin D deficiency        Allergies   Allergen Reactions   • Compazine [Prochlorperazine] Irritability   • Sulfa Antibiotics Hives   • Benadryl [Diphenhydramine] Anxiety       Past Surgical History:   Procedure Laterality Date   • CARPAL TUNNEL RELEASE WITH CUBITAL TUNNEL RELEASE  2023   • CHOLECYSTECTOMY     • LUMBAR DISCECTOMY     • TONSILLECTOMY     • TOTAL ABDOMINAL HYSTERECTOMY WITH SALPINGO OOPHORECTOMY Bilateral     FOR ENDOMETRIOSIS       Family History   Problem Relation Age of Onset   • Hypertension Mother    • Hypertension Father    • Diabetes Other    • Heart disease Other    • Hypertension Other    • Cancer Other        Social History     Socioeconomic History   • Marital status:    Tobacco Use   • Smoking status: Former     Packs/day: 0.50     Years: 3.00     Additional pack years: 0.00     Total pack years: 1.50     Types: Cigarettes     Start date:      Quit date:      Years since quittin.8     Passive exposure: Never   • Smokeless tobacco: Never   Vaping Use   • Vaping Use: Never used   Substance and Sexual Activity   • Alcohol use: Not Currently     Alcohol/week: 0.0 standard drinks of alcohol   • Drug use: Not Currently   • Sexual activity: Defer           Objective   Physical Exam  Vitals reviewed.   Constitutional:       Appearance: She is well-developed. She is obese.   HENT:      Head: Normocephalic and atraumatic.   Eyes:      Conjunctiva/sclera: Conjunctivae normal.      Pupils: Pupils are equal, round, and reactive to light.   Cardiovascular:      Rate and Rhythm: Normal rate and regular rhythm.      Heart sounds: Normal heart sounds.   Pulmonary:      Effort: Pulmonary effort is normal. No respiratory distress.      Breath sounds: Normal breath sounds. No wheezing.   Abdominal:      General: Abdomen is protuberant. Bowel sounds are normal. There is no distension.      Palpations: Abdomen is soft. There is no mass.      Tenderness: There is abdominal  "tenderness in the right lower quadrant. There is right CVA tenderness. There is no guarding or rebound.   Musculoskeletal:         General: No deformity. Normal range of motion.      Cervical back: Normal range of motion and neck supple.   Skin:     General: Skin is warm and dry.      Capillary Refill: Capillary refill takes less than 2 seconds.   Neurological:      Mental Status: She is alert and oriented to person, place, and time.      GCS: GCS eye subscore is 4. GCS verbal subscore is 5. GCS motor subscore is 6.      Deep Tendon Reflexes: Reflexes normal.   Psychiatric:         Mood and Affect: Mood normal.         Behavior: Behavior normal.       Procedures           ED Course  ED Course as of 11/14/23 2333   Tue Nov 14, 2023   1615 To go to CAT scan marked ready at this time [KW]   1625 The urinalysis was interpreted as being contaminated [KW]   1625 Patient waiting to go to CAT scan [KW]   1653 Patient waiting to go to CAT scan [KW]      ED Course User Index  [KW] Adriana Parikh, APRN      /64   Pulse 80   Temp 98.1 °F (36.7 °C)   Resp 18   Ht 157.5 cm (62\")   Wt 99.8 kg (220 lb)   SpO2 100%   BMI 40.24 kg/m²   Labs Reviewed   COMPREHENSIVE METABOLIC PANEL - Abnormal; Notable for the following components:       Result Value    Glucose 104 (*)     Creatinine 0.51 (*)     BUN/Creatinine Ratio 33.3 (*)     All other components within normal limits    Narrative:     GFR Normal >60  Chronic Kidney Disease <60  Kidney Failure <15     URINALYSIS W/ CULTURE IF INDICATED - Abnormal; Notable for the following components:    Appearance, UA Cloudy (*)     Blood, UA Small (1+) (*)     All other components within normal limits    Narrative:     In absence of clinical symptoms, the presence of pyuria, bacteria, and/or nitrites on the urinalysis result does not correlate with infection.   CBC WITH AUTO DIFFERENTIAL - Abnormal; Notable for the following components:    Hemoglobin 11.8 (*)     All other " components within normal limits   URINALYSIS, MICROSCOPIC ONLY - Abnormal; Notable for the following components:    RBC, UA 6-10 (*)     WBC, UA 3-5 (*)     Bacteria, UA 1+ (*)     Squamous Epithelial Cells, UA 21-30 (*)     All other components within normal limits   LIPASE - Normal   CBC AND DIFFERENTIAL    Narrative:     The following orders were created for panel order CBC & Differential.  Procedure                               Abnormality         Status                     ---------                               -----------         ------                     CBC Auto Differential[[id number]]        Abnormal            Final result                 Please view results for these tests on the individual orders.     Medications   ketorolac (TORADOL) injection 15 mg (15 mg Intravenous Given 11/14/23 1530)   ondansetron (ZOFRAN) injection 4 mg (4 mg Intravenous Given 11/14/23 1530)   lactated ringers bolus 1,000 mL (0 mL Intravenous Stopped 11/14/23 1737)   iopamidol (ISOVUE-370) 76 % injection 100 mL (100 mL Intravenous Given 11/14/23 1716)   HYDROcodone-acetaminophen (NORCO) 7.5-325 MG per tablet 1 tablet (1 tablet Oral Given 11/14/23 0438)     CT Abdomen Pelvis With Contrast   Final Result   No acute process demonstrated                           Electronically Signed: Josue Mac     11/14/2023 5:27 PM EST     Workstation ID: [id number]                                             Medical Decision Making  Patient is a 48 year old with flank pain and history of stones in the kidney in the past- she denies for fever chills. IV established and labs interpreted by myself- and found to be negative patient had CT which was read by myself and radiologist as negative patient treated with morphine for pain. And had improvement of pain    Patient verbalized and understood -        Problems Addressed:  Generalized abdominal pain: complicated acute illness or injury    Amount and/or Complexity of Data Reviewed  Labs:  ordered.  Radiology: ordered.    Risk  Prescription drug management.        Final diagnoses:   Generalized abdominal pain       ED Disposition  ED Disposition       ED Disposition   Discharge    Condition   Stable    Comment   --               Roya Dickinson MD  [address] [place] [address] [phone]    In 3 days  As needed, If symptoms worsen         Medication List        New Prescriptions      ondansetron ODT 4 MG disintegrating tablet  Commonly known as: ZOFRAN-ODT  Place 1 tablet under the tongue 4 (Four) Times a Day As Needed for Vomiting or Nausea.               Where to Get Your Medications        These medications were sent to [facility] [id number] - [place], [place] - [address] - [phone]  - [phone] FX  [address] [place] [address]      Phone: [phone]   ondansetron ODT 4 MG disintegrating tablet            Adriana Parikh, APRN  11/18/23 4104

## 2023-11-14 NOTE — Clinical Note
[facility] EMERGENCY DEPARTMENT  [address] [place] [address]  Phone: [phone]    April Alexis was seen and treated in our emergency department on 11/14/2023.  She may return to work on 11/16/2023.         Thank you for choosing [facility].    Jyoti Tiwari RN

## 2023-11-14 NOTE — Clinical Note
[facility] EMERGENCY DEPARTMENT  [address] [place] [address]  Phone: [phone]    April Alexis was seen and treated in our emergency department on 11/14/2023.  She may return to work on 11/16/2023.         Thank you for choosing [facility].    Jyoti Tiwari RN

## 2023-11-14 NOTE — DISCHARGE INSTRUCTIONS
Push clear liquids    Use your home pain medication as well as Zofran for pain and nausea    See GI if your pain is persistent    Return if worse

## 2023-11-21 ENCOUNTER — HOSPITAL ENCOUNTER (OUTPATIENT)
Dept: MRI IMAGING | Facility: HOSPITAL | Age: 48
Discharge: HOME | End: 2023-11-21
Admitting: STUDENT IN AN ORGANIZED HEALTH CARE EDUCATION/TRAINING PROGRAM
Payer: COMMERCIAL

## 2023-11-21 DIAGNOSIS — G89.29 CHRONIC LEFT SHOULDER PAIN: ICD-10-CM

## 2023-11-21 DIAGNOSIS — M25.512 CHRONIC LEFT SHOULDER PAIN: ICD-10-CM

## 2023-11-21 PROCEDURE — 73221 MRI JOINT UPR EXTREM W/O DYE: CPT

## 2023-11-22 RX ORDER — PREDNISONE 10 MG/1
TABLET ORAL
Qty: 33 TABLET | Refills: 0 | Status: SHIPPED | OUTPATIENT
Start: 2023-11-22

## 2023-11-27 RX ORDER — AZITHROMYCIN 250 MG/1
TABLET, FILM COATED ORAL
Qty: 6 TABLET | Refills: 0 | Status: SHIPPED | OUTPATIENT
Start: 2023-11-27

## 2023-11-28 NOTE — PROGRESS NOTES
Subjective   Kelly Espinoza is a 48 y.o. female is here for follow-up for neck pain.  Last seen 10/18/2023.  Left shoulder MRI done since last visit.      On last visit:     Neck pain is 6/10 on VAS, maximum 6/10.  Pain is aching, throbbing, tingling and sometimes weakness and numbness in nature.  Referred to left neck and shoulder.  Constant pain but improved by prednisone, Norco, gabapentin.  Worse with bending or laying down.  Severe headaches in occipital region with numbness and tingling in both hands mostly in third fourth and fifth digit.  Trouble with gripping with the left arm.  Left-sided occipital region headache radiating to left temporal and frontal region.    Left shoulder pain is 5/10 on VAS. Trouble with flexion of shoulder.     Lower back pain is 7/10 on VAS, at maximum is 8/10. Pain is sharp, shooting and stabbing in nature. Pain is referred left buttock, left anterior thigh, left anterior shin and left foot. The pain is constnat. The pain is improved by nothing. The pain is worse with bending.      Previous Injection:   7/10/2023 - left occipital nerve block; left trapezius and rhomboids TPI- improvement  with sharp, shooting pain; didn't much relief with TPI.   2/15/2023- left occipital nerve block- good relief with sharp shooting pain- 4 months   11/8/2022-KENDRA C7-T1- improved headache, still has some pain.   3/8/2022-KENDRA C6/7- 75% pain relief- pain relief, improved headaches and improved flexibility - 4 months     Hx: Referred by Vivian Fontanez APRN  for neck pain.  Her pain started about 3 years ago without any significant injuries. 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   She has been seen by neurosurgery and was told she was non surgical candidate.  EMG showed bilateral carpal tunnel syndrome and cubital tunnel syndrome.  S/p bilateral carpal tunnel injection with 50% relief. She is s/p R carpal tunnel release. Seen by rheumatology who r/o lupus and has been  diagnosed with Fibromyalgia       PHQ-9- 9  SOAPP-2      PMH:   Lupus, GERD, anxiety, ADD, s/p L5-S1 dissectomy 2000, s/p hand and arm surgery with Dr. Faulkner on 4/21/2023 with improvement in numbness in R hand.      Current Medications:   Norco 5-325 mg BID PRN  Celebrex 200 mg BID PRN  Cymbalta 30 mg daily  Gabapentin 600 mg TID   Ambien        Past Medications:   Tramadol 50 mg q8h PRN-1/16/2022 - didn't help     Past Modalities:  TENS:                                                                          no                                                  Physical Therapy Within The Last 6 Months              Yes - 8 weeks of PT.  Psychotherapy                                                            no  Massage Therapy                                                       no     Patient Complains Of:  Uro-Fecal Incontinence          no  Weight Gain/Loss                   no  Fever/Chills                             no  Weakness                               no           Current Outpatient Medications:     albuterol (ACCUNEB) 0.63 MG/3ML nebulizer solution, Take 3 mL by nebulization Every 6 (Six) Hours As Needed for Wheezing or Shortness of Air., Disp: 120 each, Rfl: 0    azithromycin (Zithromax) 250 MG tablet, Take 2 tablets the first day, then 1 tablet daily for 4 days., Disp: 6 tablet, Rfl: 0    clobetasol (TEMOVATE) 0.05 % cream, Apply 1 application  topically to the appropriate area as directed 2 (Two) Times a Day., Disp: 15 g, Rfl: 0    colesevelam (WELCHOL) 625 MG tablet, , Disp: , Rfl:     colesevelam (WELCHOL) 625 MG tablet, , Disp: , Rfl:     DULoxetine (CYMBALTA) 60 MG capsule, Take 1 capsule by mouth Daily., Disp: 30 capsule, Rfl: 5    estradiol (ESTRACE) 2 MG tablet, TAKE ONE TABLET BY MOUTH DAILY, Disp: 30 tablet, Rfl: 5    [START ON 12/15/2023] gabapentin (NEURONTIN) 600 MG tablet, Take 1 tablet by mouth 3 (Three) Times a Day for 90 days., Disp: 90 tablet, Rfl: 2    [START ON 12/26/2023]  HYDROcodone-acetaminophen (NORCO) 7.5-325 MG per tablet, Take 1 tablet by mouth 2 (Two) Times a Day As Needed for Moderate Pain for up to 30 days., Disp: 60 tablet, Rfl: 0    [START ON 1/25/2024] HYDROcodone-acetaminophen (NORCO) 7.5-325 MG per tablet, Take 1 tablet by mouth 2 (Two) Times a Day As Needed for Moderate Pain for up to 30 days., Disp: 60 tablet, Rfl: 0    hydrOXYzine (ATARAX) 10 MG tablet, Take 1 tablet by mouth 3 (Three) Times a Day As Needed for Anxiety., Disp: 30 tablet, Rfl: 0    levalbuterol (XOPENEX HFA) 45 MCG/ACT inhaler, Inhale 1-2 puffs Every 4 (Four) Hours As Needed for Wheezing or Shortness of Air., Disp: 15 g, Rfl: 5    metoprolol succinate XL (TOPROL-XL) 50 MG 24 hr tablet, Take 1 tablet by mouth Daily., Disp: 30 tablet, Rfl: 5    ondansetron ODT (ZOFRAN-ODT) 4 MG disintegrating tablet, Place 1 tablet under the tongue 4 (Four) Times a Day As Needed for Vomiting or Nausea., Disp: 10 tablet, Rfl: 0    pantoprazole (PROTONIX) 40 MG EC tablet, , Disp: , Rfl:     predniSONE (DELTASONE) 10 MG tablet, TAKE 5 TABLETS BY MOUTH DAILY FOR 2 DAYS THEN 4 DAILY FOR 2 DAYS THEN 3 DAILY FOR 2 DAYS THEN 2 DAILY FOR 2 DAYS THEN 1 DAILY FOR 5 DAYS. THEN STOP USING THIS MEDICINE., Disp: 33 tablet, Rfl: 0    Spiriva Respimat 2.5 MCG/ACT aerosol solution inhaler, INHALE 2 PUFFS BY MOUTH DAILY, Disp: 4 g, Rfl: 0    vitamin D (ERGOCALCIFEROL) 1.25 MG (74205 UT) capsule capsule, TAKE ONE CAPSULE BY MOUTH ONCE WEEKLY, Disp: 4 capsule, Rfl: 5    zinc sulfate (ZINCATE) 220 (50 Zn) MG capsule, Take 1 capsule by mouth Daily., Disp: , Rfl:     zolpidem (AMBIEN) 10 MG tablet, Take 1 tablet by mouth At Night As Needed for Sleep., Disp: 30 tablet, Rfl: 5    fluticasone (FLONASE) 50 MCG/ACT nasal spray, 1 spray into the nostril(s) as directed by provider 2 (Two) Times a Day for 30 days., Disp: 18.2 mL, Rfl: 1    montelukast (SINGULAIR) 10 MG tablet, Take 1 tablet by mouth Every Night for 30 days., Disp: 30 tablet, Rfl:  0    The following portions of the patient's history were reviewed and updated as appropriate: allergies, current medications, past family history, past medical history, past social history, past surgical history, and problem list.      REVIEW OF PERTINENT MEDICAL DATA    Past Medical History:   Diagnosis Date    ADD (attention deficit disorder)     SARMAD positive     Anxiety     Arthralgia     Asthma     Bursitis of left hip     Carpal tunnel syndrome     Roberto.    DDD (degenerative disc disease), lumbar     Depression, major     Fibromyalgia     Flu syndrome     GERD (gastroesophageal reflux disease)     Heart disease     Hiatal hernia     Hormone replacement therapy     Hyperlipidemia     Inflammatory arthritis     Influenza     Insomnia     Knee pain, bilateral     Lupus     Migraine     Neck pain     Obesity     Osteoarthritis     Pain, joint, shoulder, left     Pharyngitis, acute     Postmenopausal     Sinus congestion     Surgical menopause     at age 34    Vitamin B12 deficiency     Vitamin D deficiency      Past Surgical History:   Procedure Laterality Date    CARPAL TUNNEL RELEASE WITH CUBITAL TUNNEL RELEASE  2023    CHOLECYSTECTOMY      LUMBAR DISCECTOMY      TONSILLECTOMY      TOTAL ABDOMINAL HYSTERECTOMY WITH SALPINGO OOPHORECTOMY Bilateral     FOR ENDOMETRIOSIS     Family History   Problem Relation Age of Onset    Hypertension Mother     Hypertension Father     Diabetes Other     Heart disease Other     Hypertension Other     Cancer Other      Social History     Socioeconomic History    Marital status:    Tobacco Use    Smoking status: Former     Packs/day: 0.50     Years: 3.00     Additional pack years: 0.00     Total pack years: 1.50     Types: Cigarettes     Start date:      Quit date:      Years since quittin.9     Passive exposure: Never    Smokeless tobacco: Never   Vaping Use    Vaping Use: Never used   Substance and Sexual Activity    Alcohol use: Not Currently      Alcohol/week: 0.0 standard drinks of alcohol    Drug use: Not Currently    Sexual activity: Defer         Review of Systems   Musculoskeletal:  Positive for arthralgias, back pain and neck pain.         Vitals:    11/29/23 1440   BP: 116/66   Pulse: 76   Resp: 16   SpO2: 98%   PainSc:   5             Objective   Physical Exam  Neck:     Musculoskeletal:         General: Tenderness present.        Legs:    Neurological:      Deep Tendon Reflexes:      Reflex Scores:       Tricep reflexes are 2+ on the right side and 2+ on the left side.       Bicep reflexes are 2+ on the right side and 2+ on the left side.       Brachioradialis reflexes are 2+ on the right side and 2+ on the left side.     Comments: Motor strength 5/5 b/l UE  Sensory intact b/l UE             Imaging Reviewed:  Left shoulder MRI-11/21/2023  - Moderate to severe distal tendinosis supraspinatus tendon and subscapularis tendon.  Moderate distal tendinosis infraspinatus tendon.  - Tendinosis and probable partial-thickness interstitial tearing of the intra-articular long head biceps tendon.  - Nondisplaced tear of anterior labrum suspected  - Mild age-appropriate degenerative changes of AC joint.    MRI cervical spine-1/31/2022  -C2-3-degenerative facet changes bilateral  C3-4-WNL  C4-5-WNL  C5-6-mild facet changes.  Mild left neural foraminal narrowing.  C6-7-mild bilateral uncovertebral joint spurring.  C7-T1-WNL    EMG upper extremity-12/5/2022  - Abnormal study.  Evidence of moderate right and mild left median neuropathy at the wrist consistent with carpal tunnel syndrome    Assessment:    1. Tendinosis of left shoulder    2. Cervical spondylosis    3. DDD (degenerative disc disease), cervical          Plan:   1.  Repeat UDS-11/29/2023.  UDS consistent with patient interview on  5/10/2023 narcotic contract signed - 11/30/22.   2. We discussed trying a course of formal physical therapy.  Physical therapy can help strengthen and stretch the muscles  around the joints. Continue to be as active as possible.  Patient has done 8 weeks of physical therapy without much improvement.  3.. Continue Gabapepntin 600 mg TID (good till March 2024). Side effects discussed with the patient including but not limited to somnolence, dizziness, ataxia, headache, fatigue, blurred vision, cold or flu-like symptoms,delusions, hoarseness, lack or loss of strength, lower back or side pain, swelling of the hands, feet, or lower legs trembling or shaking. Patient understands and agrees with the plan.  4.  Due to increased pain, continue Norco 7.5-325 mg BID PRN (12/26, 125).  Discussed with the patient regarding long-term side effects of opioids including but not limited to opioid induced hormonal suppression, hyperalgesia, fatigue, weight gain, possible opioid induced altered immune system, addiction, tolerance, dependence, risk of hearing loss and elevated risk of myocardial infarction. Proper use and potential life threatening side effects of over use discussed with patient. Patient states understanding of their use and risks.  5. .  Patient has severe left SI joint tenderness with MELE positive and intermittent radiculopathy.  Due to recent onset, will prescribe Medrol Dosepak for 5 days to see if that improves pain.  We will consider left SI joint injection in future if pain worsens in lower back.   6. Agree with Rheum regarding increasing Cymbalta. She will talk to PCP.  7. Recommend TENS unit 15-20 mins 2-3 times daily for left trapezius pain.  8.  Discussed left shoulder MRI with patient showing tendinosis.  Patient was lifting significantly heavy and most before but now has moved to lab which has improved some of her pain.  Recommend avoiding activities that increases the pain.  Also recommend starting physical therapy dedicated for left shoulder.  We may consider left AC joint injection if she continues to have pain as she has significant tenderness over her AC joint as  well.      RTC before 2/22/2024.    Rome Springer DO  Pain Management   [facility]            INSPECT REPORT    As part of the patient's treatment plan, I may be prescribing controlled substances. The patient has been made aware of appropriate use of such medications, including potential risk of somnolence, limited ability to drive and/or work safely, and the potential for dependence or overdose. It has also been made clear that these medications are for use by this patient only, without concomitant use of alcohol or other substances unless prescribed.     Patient has completed prescribing agreement detailing terms of continued prescribing of controlled substances, including monitoring INSPECT reports, urine drug screening, and pill counts if necessary. The patient is aware that inappropriate use will results in cessation of prescribing such medications.    INSPECT report has been reviewed and scanned into the patient's chart.

## 2023-11-29 ENCOUNTER — OFFICE VISIT (OUTPATIENT)
Dept: PAIN MEDICINE | Facility: CLINIC | Age: 48
End: 2023-11-29
Payer: COMMERCIAL

## 2023-11-29 VITALS
OXYGEN SATURATION: 98 % | RESPIRATION RATE: 16 BRPM | DIASTOLIC BLOOD PRESSURE: 66 MMHG | SYSTOLIC BLOOD PRESSURE: 116 MMHG | HEART RATE: 76 BPM

## 2023-11-29 DIAGNOSIS — M47.812 CERVICAL SPONDYLOSIS: ICD-10-CM

## 2023-11-29 DIAGNOSIS — Z79.899 HIGH RISK MEDICATION USE: Primary | ICD-10-CM

## 2023-11-29 DIAGNOSIS — M67.814 TENDINOSIS OF LEFT SHOULDER: Primary | ICD-10-CM

## 2023-11-29 DIAGNOSIS — M50.30 DDD (DEGENERATIVE DISC DISEASE), CERVICAL: ICD-10-CM

## 2023-11-29 RX ORDER — HYDROCODONE BITARTRATE AND ACETAMINOPHEN 7.5; 325 MG/1; MG/1
1 TABLET ORAL 2 TIMES DAILY PRN
Qty: 60 TABLET | Refills: 0 | Status: SHIPPED | OUTPATIENT
Start: 2023-12-26 | End: 2024-01-25

## 2023-11-29 RX ORDER — HYDROCODONE BITARTRATE AND ACETAMINOPHEN 7.5; 325 MG/1; MG/1
1 TABLET ORAL 2 TIMES DAILY PRN
Qty: 60 TABLET | Refills: 0 | Status: SHIPPED | OUTPATIENT
Start: 2024-01-25 | End: 2024-02-24

## 2023-11-29 RX ORDER — PANTOPRAZOLE SODIUM 40 MG/1
TABLET, DELAYED RELEASE ORAL
COMMUNITY
Start: 2023-10-27

## 2023-11-29 RX ORDER — GABAPENTIN 600 MG/1
600 TABLET ORAL 3 TIMES DAILY
Qty: 90 TABLET | Refills: 2 | Status: SHIPPED | OUTPATIENT
Start: 2023-12-15 | End: 2024-03-14

## 2024-01-18 ENCOUNTER — TREATMENT (OUTPATIENT)
Dept: PHYSICAL THERAPY | Facility: CLINIC | Age: 49
End: 2024-01-18
Payer: COMMERCIAL

## 2024-01-18 DIAGNOSIS — M67.814 TENDINOSIS OF LEFT SHOULDER: Primary | ICD-10-CM

## 2024-01-18 PROCEDURE — G0283 ELEC STIM OTHER THAN WOUND: HCPCS | Performed by: PHYSICAL THERAPIST

## 2024-01-18 PROCEDURE — 97163 PT EVAL HIGH COMPLEX 45 MIN: CPT | Performed by: PHYSICAL THERAPIST

## 2024-01-18 PROCEDURE — 97110 THERAPEUTIC EXERCISES: CPT | Performed by: PHYSICAL THERAPIST

## 2024-01-18 NOTE — PROGRESS NOTES
Physical Therapy Initial Evaluation and Plan of Care    [address] [place] [address]    Patient: Kelly Espinoza   : 1975  Diagnosis/ICD-10 Code:  Tendinosis of left shoulder [M67.814]  Referring practitioner: Rome Springer DO  Date of Initial Visit: 2024  Today's Date: 2024  Patient seen for 1 sessions           Subjective Questionnaire: QuickDASH: 525 disability; work sub-scale 31%      Subjective Evaluation    History of Present Illness  Onset date: several years.  Mechanism of injury: 49 y/o female w/ c/o pain in L shoulder and upper arm, on side of neck with occasional HA's  (Hx of OA in neck and prior PT treatment without benefit) - states MD thinks it may be both neck and shoulder? Weakness in L UE; N/T at elbow to lateral hand - prior cubital and CT surgeries. Difficulty lifting over-head or using at low-level for functional activities.     Sleeping: interrupts sleep ~7-8 hrs - wakes up every 2 hours.    Prior PT for cervical spine - not helpful. Pain turning head Turning to right creates a pulling sensation on L shoulder area.      MRI IMPRESSION:  1.Moderate to severe distal tendinosis supraspinatus tendon and subscapularis tendon. Moderate distal tendinosis infraspinatus tendon.  2.Tendinosis and probable partial-thickness interstitial tearing of the intra-articular long head biceps tendon.  3.Nondisplaced tear anterior labrum suspected.  4.Mild, age-appropriate degenerative change acromioclavicular joint.    Cervical MRI  showed multi-level degeneration.    PMH and pertinent information reviewed in Epic.             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 Quality of life: fair    Pain  Current pain ratin  At best pain ratin  At worst pain rating: 10  Location: L shouder and upper neck  Quality: discomfort, dull ache, radiating and sharp  Relieving factors: medications, rest and heat  Aggravating factors: overhead activity, lifting, sleeping and  movement  Progression: worsening    Social Support  Lives in: multiple-level home  Lives with: spouse and adult children (and grandchild)    Diagnostic Tests  MRI studies: abnormal    Treatments  Previous treatment: physical therapy and injection treatment (neck)  Current treatment: injection treatment and medication  Patient Goals  Patient goals for therapy: decreased pain, increased motion, increased strength and return to sport/leisure activities             Objective          Postural Observations  Seated posture: poor  Standing posture: poor  Correction of posture: makes symptoms better      Tenderness     Additional Tenderness Details  Lateral shoulder at cuff.  L Upper trap/soft tissue and transverse processes of mid-cervical spine.    Neurological Testing     Sensation     Shoulder   Left Shoulder   Intact: light touch    Right Shoulder   Intact: Light touch    Reflexes   Left   Biceps (C5/C6): normal (2+)  Brachioradialis (C6): normal (2+)  Triceps (C7): normal (2+)    Right   Biceps (C5/C6): normal (2+)  Brachioradialis (C6): normal (2+)  Triceps (C7): normal (2+)    Active Range of Motion   Cervical/Thoracic Spine   Cervical  Subcranial protraction: restricted and with pain   Flexion: Neck active flexion: min to mod. with pain  Extension: Neck active extension: min. with pain  Left rotation: Neck active rotation left: min to mod.   Right rotation: Neck active rotation right: min to mod - pain L UT/neck. with pain  Left Shoulder   Flexion: 110 degrees with pain  Abduction: 100 degrees with pain  External rotation 0°: 55 degrees with pain  Internal rotation BTB: sacrum with pain    Right Shoulder   Flexion: 159 degrees   Abduction: 148 degrees   External rotation 0°: 68 degrees   Internal rotation BTB: L3     Tests   Cervical     Left   Positive active compression ([place]), cervical distraction and Spurling's sign.     Left Shoulder   Positive belly press, empty can and Hawkin's.       Access Code:  [email or web address]  URL: [email or web address]  Date: 01/18/2024  Prepared by: Gabriele Dotson    Exercises  - Supine Shoulder External Rotation in 45 Degrees Abduction AAROM with Dowel  - 1 x daily - 7 x weekly - 1 sets - 10 reps  - Supine Shoulder Flexion AAROM with Dowel  - 1 x daily - 7 x weekly - 1 sets - 10 reps  - Supine Shoulder Press AAROM in Abduction with Dowel  - 1 x daily - 7 x weekly - 1 sets - 10 reps  - Seated Scapular Retraction  - 1 x daily - 7 x weekly - 3 sets - 10 reps    Assessment & Plan       Assessment  Impairments: abnormal or restricted ROM, activity intolerance, impaired physical strength and lacks appropriate home exercise program   Functional limitations: carrying objects, lifting, pulling, pushing, uncomfortable because of pain, reaching behind back, reaching overhead and unable to perform repetitive tasks   Assessment details: Patient presents with L shoulder impairments consistent with RC dysfunction consistent with MRI findings; additionally, she has neck pain with some loss of motion and dysfunction  - will initiate therapeutic interventions to reduce pain and restore function: she has the potential to benefit from therapy and appears motivated.      Barriers to therapy: complex medical history: lupus; anxiety; SARMAD positive; ADD; depresison - see Epic for additional details.  Prognosis: good    Goals  Plan Goals: SHORT TERM GOALS: Time for goal achievement: 4 weeks  1. Patient to be compliant with her initial HEP and able to tolerate progression.  2. Patient Independent with AAROM shoulder ex. Pulley or cane.  3. Patient has improved A/PROM L Shoulder in all planes >10 degrees.  4. Patient to report 25% decrease in pain with use at work, while sleeping and with use in home.    LONG TERM GOALS: Time for goal achievement: 3 months  1. Pt score < 15% perceived disability on DASH.  2. Pt has functional AROM shoulder flexion/abduction without pain.  3. Pt has improved L UE strength for chores  around the house or at work.  4. Pt reports she is ready for DC to [facility] for self management of her condition.      Plan  Therapy options: will be seen for skilled therapy services  Planned modality interventions: cryotherapy, electrical stimulation/Zimbabwean stimulation, ultrasound and thermotherapy (hydrocollator packs)  Planned therapy interventions: manual therapy, joint mobilization, home exercise program, functional ROM exercises, flexibility, neuromuscular re-education, soft tissue mobilization, strengthening, stretching, therapeutic activities, postural training and body mechanics training  Frequency: 2x week  Duration in visits: 12  Duration in weeks: 12  Treatment plan discussed with: patient        History # of Personal Factors and/or Comorbidities: HIGH (3+)  Examination of Body System(s): # of elements: HIGH (4+)  Clinical Presentation: UNSTABLE   Clinical Decision Making: HIGH      Timed:         Manual Therapy:         mins  38958;     Therapeutic Exercise:   15      mins  30525;     Neuromuscular Ivett:       mins  85422;    Therapeutic Activity:          mins  28379;     Gait Training:           mins  75312;     Ultrasound:         mins  04147;    Ionto                                   mins   88062  Self Care                           mins   87329  Aquatic                               mins 85455      Un-Timed:  Electrical Stimulation:    12     mins  20125 ( );  Dry Needling          mins self-pay  Traction         mins 72213  Low Eval          Mins  36206  Mod Eval          Mins  87624  High Eval                      30     Mins  81339  Re-Eval                              mins  32959        Timed Treatment:  15    mins   Total Treatment:     57   mins    PT SIGNATURE: Clay Dotson PT, DPT   [place] License#: [id number]       Electronically signed by Clay Dotson PT, 01/18/24, 12:38 PM EST      Initial Certification  Certification Period:  1/18/2024 thru 4/16/2024  I certify that the  therapy services are furnished while this patient is under my care.  The services outlined above are required by this patient, and will be reviewed every 90 days.       Physician Signature:__________________________________________________    PHYSICIAN: Rome Springer, DO      DATE:     Please sign and return via fax to [phone].. Thank you, [facility].

## 2024-01-22 RX ORDER — DULOXETIN HYDROCHLORIDE 60 MG/1
60 CAPSULE, DELAYED RELEASE ORAL DAILY
Qty: 90 CAPSULE | Refills: 0 | Status: SHIPPED | OUTPATIENT
Start: 2024-01-22 | End: 2024-04-21

## 2024-01-22 RX ORDER — METOPROLOL SUCCINATE 50 MG/1
50 TABLET, EXTENDED RELEASE ORAL DAILY
Qty: 90 TABLET | Refills: 0 | Status: SHIPPED | OUTPATIENT
Start: 2024-01-22 | End: 2024-04-21

## 2024-01-24 ENCOUNTER — TREATMENT (OUTPATIENT)
Dept: PHYSICAL THERAPY | Facility: CLINIC | Age: 49
End: 2024-01-24
Payer: COMMERCIAL

## 2024-01-24 DIAGNOSIS — M67.814 TENDINOSIS OF LEFT SHOULDER: Primary | ICD-10-CM

## 2024-01-24 DIAGNOSIS — R00.2 PALPITATIONS: ICD-10-CM

## 2024-01-24 DIAGNOSIS — I34.0 MODERATE MITRAL REGURGITATION BY PRIOR ECHOCARDIOGRAM: ICD-10-CM

## 2024-01-24 DIAGNOSIS — R01.1 CARDIAC MURMUR: Primary | ICD-10-CM

## 2024-01-24 PROCEDURE — 97140 MANUAL THERAPY 1/> REGIONS: CPT | Performed by: PHYSICAL THERAPIST

## 2024-01-24 PROCEDURE — 97110 THERAPEUTIC EXERCISES: CPT | Performed by: PHYSICAL THERAPIST

## 2024-01-24 PROCEDURE — G0283 ELEC STIM OTHER THAN WOUND: HCPCS | Performed by: PHYSICAL THERAPIST

## 2024-01-24 NOTE — PROGRESS NOTES
Physical Therapy Daily Note  [address] [place] [address]     Diagnosis Plan   1. Tendinosis of left shoulder            VISIT#: 2  Referring practitioner: DO Dolores Braun reports: pain level about 5-6/10. Increase in pain after evaluation and had to decrease reps on HEP. Had to perform CPR on dog at work which flared shoulder.      Objective     See Exercise, Manual, and Modality Logs for complete treatment.     Patient Education: advised to discontinue ABD with Cane.    Assessment/Plan  - poor tolerance to exerice - seems to have aggravated with HEP and CPR using shoulder.  - decreased intensity of HEP.  - unable to progress.    Progress strengthening /stabilization /functional activity            Timed:         Manual Therapy:     10    mins  83283;     Therapeutic Exercise:    20     mins  34521;     Neuromuscular Ivett:       mins  10215;    Therapeutic Activity:          mins  71729;     Gait Training:           mins  91396;     Ultrasound:          mins  76231;    Ionto                                   mins   74546  Self Care                           mins   92010  Canalith Repos                   mins  4209  Aquatic                               mins 01256    Un-Timed:  Electrical Stimulation:    15     mins  11947 ( );  Dry Needling          mins self-pay  Traction          mins 65894  Low Eval         Mins  92792  Mod Eval          Mins  35152  High Eval                            Mins  86708  Re-Eval                               mins  74187    Timed Treatment:   30   mins   Total Treatment:     45   mins    Clay Dotson PT PT, DPT, [place] License #: [id number]

## 2024-01-31 ENCOUNTER — TREATMENT (OUTPATIENT)
Dept: PHYSICAL THERAPY | Facility: CLINIC | Age: 49
End: 2024-01-31
Payer: COMMERCIAL

## 2024-01-31 DIAGNOSIS — M67.814 TENDINOSIS OF LEFT SHOULDER: Primary | ICD-10-CM

## 2024-01-31 PROCEDURE — 97110 THERAPEUTIC EXERCISES: CPT | Performed by: PHYSICAL THERAPIST

## 2024-01-31 PROCEDURE — 97140 MANUAL THERAPY 1/> REGIONS: CPT | Performed by: PHYSICAL THERAPIST

## 2024-01-31 PROCEDURE — G0283 ELEC STIM OTHER THAN WOUND: HCPCS | Performed by: PHYSICAL THERAPIST

## 2024-02-05 ENCOUNTER — HOSPITAL ENCOUNTER (OUTPATIENT)
Dept: CARDIOLOGY | Facility: HOSPITAL | Age: 49
Discharge: HOME | End: 2024-02-05
Admitting: NURSE PRACTITIONER
Payer: COMMERCIAL

## 2024-02-05 ENCOUNTER — DOCUMENTATION (OUTPATIENT)
Dept: PHYSICAL THERAPY | Facility: CLINIC | Age: 49
End: 2024-02-05
Payer: COMMERCIAL

## 2024-02-05 VITALS
SYSTOLIC BLOOD PRESSURE: 128 MMHG | HEIGHT: 61 IN | WEIGHT: 215 LBS | DIASTOLIC BLOOD PRESSURE: 66 MMHG | BODY MASS INDEX: 40.59 KG/M2 | HEART RATE: 62 BPM

## 2024-02-05 DIAGNOSIS — I34.0 MODERATE MITRAL REGURGITATION BY PRIOR ECHOCARDIOGRAM: ICD-10-CM

## 2024-02-05 DIAGNOSIS — R01.1 CARDIAC MURMUR: ICD-10-CM

## 2024-02-05 DIAGNOSIS — R00.2 PALPITATIONS: ICD-10-CM

## 2024-02-05 LAB
BH CV ECHO MEAS - ACS: 2.05 CM
BH CV ECHO MEAS - AO MAX PG: 11.7 MMHG
BH CV ECHO MEAS - AO MEAN PG: 6 MMHG
BH CV ECHO MEAS - AO ROOT DIAM: 3 CM
BH CV ECHO MEAS - AO V2 MAX: 170.7 CM/SEC
BH CV ECHO MEAS - AO V2 VTI: 33.9 CM
BH CV ECHO MEAS - AVA(I,D): 2.8 CM2
BH CV ECHO MEAS - EDV(CUBED): 121.3 ML
BH CV ECHO MEAS - EDV(MOD-SP2): 100.4 ML
BH CV ECHO MEAS - EDV(MOD-SP4): 104.6 ML
BH CV ECHO MEAS - EF(MOD-BP): 65 %
BH CV ECHO MEAS - EF(MOD-SP2): 66.1 %
BH CV ECHO MEAS - EF(MOD-SP4): 65 %
BH CV ECHO MEAS - ESV(CUBED): 25.3 ML
BH CV ECHO MEAS - ESV(MOD-SP2): 34.1 ML
BH CV ECHO MEAS - ESV(MOD-SP4): 30.2 ML
BH CV ECHO MEAS - FS: 40.6 %
BH CV ECHO MEAS - IVS/LVPW: 0.89 CM
BH CV ECHO MEAS - IVSD: 0.97 CM
BH CV ECHO MEAS - LA DIMENSION: 4 CM
BH CV ECHO MEAS - LV DIASTOLIC VOL/BSA (35-75): 53.7 CM2
BH CV ECHO MEAS - LV MASS(C)D: 187.1 GRAMS
BH CV ECHO MEAS - LV MAX PG: 7 MMHG
BH CV ECHO MEAS - LV MEAN PG: 4.2 MMHG
BH CV ECHO MEAS - LV SYSTOLIC VOL/BSA (12-30): 15.5 CM2
BH CV ECHO MEAS - LV V1 MAX: 132.4 CM/SEC
BH CV ECHO MEAS - LV V1 VTI: 31.3 CM
BH CV ECHO MEAS - LVIDD: 4.9 CM
BH CV ECHO MEAS - LVIDS: 2.9 CM
BH CV ECHO MEAS - LVOT AREA: 3 CM2
BH CV ECHO MEAS - LVOT DIAM: 1.95 CM
BH CV ECHO MEAS - LVPWD: 1.09 CM
BH CV ECHO MEAS - MR MAX PG: 110.9 MMHG
BH CV ECHO MEAS - MR MAX VEL: 526.4 CM/SEC
BH CV ECHO MEAS - MV A MAX VEL: 69.5 CM/SEC
BH CV ECHO MEAS - MV DEC SLOPE: 632.6 CM/SEC2
BH CV ECHO MEAS - MV DEC TIME: 0.14 SEC
BH CV ECHO MEAS - MV E MAX VEL: 87.8 CM/SEC
BH CV ECHO MEAS - MV E/A: 1.26
BH CV ECHO MEAS - PA ACC TIME: 0.09 SEC
BH CV ECHO MEAS - PA V2 MAX: 115.1 CM/SEC
BH CV ECHO MEAS - PI END-D VEL: 81.9 CM/SEC
BH CV ECHO MEAS - PULM A REVS DUR: 0.14 SEC
BH CV ECHO MEAS - PULM A REVS VEL: 33.5 CM/SEC
BH CV ECHO MEAS - PULM DIAS VEL: 37.1 CM/SEC
BH CV ECHO MEAS - PULM S/D: 1.49
BH CV ECHO MEAS - PULM SYS VEL: 55.4 CM/SEC
BH CV ECHO MEAS - QP/QS: 0.88
BH CV ECHO MEAS - RAP SYSTOLE: 3 MMHG
BH CV ECHO MEAS - RV MAX PG: 2.41 MMHG
BH CV ECHO MEAS - RV V1 MAX: 77.6 CM/SEC
BH CV ECHO MEAS - RV V1 VTI: 19.8 CM
BH CV ECHO MEAS - RVDD: 2.6 CM
BH CV ECHO MEAS - RVOT DIAM: 2.3 CM
BH CV ECHO MEAS - RVSP: 30 MMHG
BH CV ECHO MEAS - SI(MOD-SP2): 34.1 ML/M2
BH CV ECHO MEAS - SI(MOD-SP4): 38.2 ML/M2
BH CV ECHO MEAS - SV(LVOT): 93.9 ML
BH CV ECHO MEAS - SV(MOD-SP2): 66.3 ML
BH CV ECHO MEAS - SV(MOD-SP4): 74.4 ML
BH CV ECHO MEAS - SV(RVOT): 82.6 ML
BH CV ECHO MEAS - TR MAX PG: 26.9 MMHG
BH CV ECHO MEAS - TR MAX VEL: 259.4 CM/SEC

## 2024-02-05 PROCEDURE — 93306 TTE W/DOPPLER COMPLETE: CPT

## 2024-02-05 PROCEDURE — 93356 MYOCRD STRAIN IMG SPCKL TRCK: CPT

## 2024-02-05 NOTE — PROGRESS NOTES
"Discharge Summary  Discharge Summary from Physical Therapy Report    Patient: Kelly Espinoza   : 1975  Diagnosis/ICD-10 Code:  Tendinosis of left shoulder [M67.814]  Referring practitioner: Rome Springer DO  Date of Initial Visit: 2024      Dates  PT visit: 24 - 24  Number of Visits: 3     Discharge Status of Patient: patient not responding to conservative treatment: called to request discharge: states it is \"Too painful. Will be talking to Dr. Springer about seeing an ortho surgeon. Thank u.\"     Goals: Not Met    Discharge Plan: Patient to return to referring/providing physician  Refer to other services (specify): consider referral to orthopedic MD      Date of Discharge 24        Clay Dotson, PT, DPT  Physical Therapist                    "

## 2024-02-07 ENCOUNTER — OFFICE VISIT (OUTPATIENT)
Dept: CARDIOLOGY | Facility: CLINIC | Age: 49
End: 2024-02-07
Payer: COMMERCIAL

## 2024-02-07 VITALS — RESPIRATION RATE: 18 BRPM | WEIGHT: 221 LBS | BODY MASS INDEX: 41.72 KG/M2 | HEIGHT: 61 IN

## 2024-02-07 DIAGNOSIS — I34.1 MITRAL PROLAPSE: ICD-10-CM

## 2024-02-07 DIAGNOSIS — R00.2 PALPITATIONS: Primary | ICD-10-CM

## 2024-02-07 DIAGNOSIS — I34.0 NONRHEUMATIC MITRAL VALVE REGURGITATION: ICD-10-CM

## 2024-02-07 NOTE — PROGRESS NOTES
"      Subjective:     Encounter Date:02/07/2024      Patient ID: Kelly Espinoza is a 48 y.o. female.    Chief Complaint:  Chief Complaint   Patient presents with    Consult     Heart Murmur       HPI:    48-year-old female with a past medical history of morbid obesity-BMI 41, mild mitral valve prolapse with mild mitral regurgitation presents to establish care.    Patient overall feels well, mentions occasional episodes of palpitations however was started on metoprolol by her primary care physician with improvement in her symptoms.  She is aware that she is overweight and is trying to lose weight with regular exercise/dietary changes.  She is concerned about her diagnosis of mitral regurgitation and mitral valve prolapse, I showed her her echocardiogram explained pathophysiology        Objective:         Resp 18   Ht 154.9 cm (61\")   Wt 100 kg (221 lb)   BMI 41.76 kg/m²     Physical exam  General-no acute distress  CVS-S1-S2 normal, no murmur  Respiratory-clear breath sounds  GI-soft nontender abdomen  No pedal edema  Alert oriented X3    ROS:  14 point review of systems negative except as mentioned above    Current Outpatient Medications:     albuterol (ACCUNEB) 0.63 MG/3ML nebulizer solution, Take 3 mL by nebulization Every 6 (Six) Hours As Needed for Wheezing or Shortness of Air., Disp: 120 each, Rfl: 0    clobetasol (TEMOVATE) 0.05 % cream, Apply 1 application  topically to the appropriate area as directed 2 (Two) Times a Day., Disp: 15 g, Rfl: 0    DULoxetine (CYMBALTA) 60 MG capsule, Take 1 capsule by mouth Daily for 90 days., Disp: 90 capsule, Rfl: 0    estradiol (ESTRACE) 2 MG tablet, TAKE ONE TABLET BY MOUTH DAILY, Disp: 30 tablet, Rfl: 5    fluticasone (FLONASE) 50 MCG/ACT nasal spray, 1 spray into the nostril(s) as directed by provider 2 (Two) Times a Day for 30 days., Disp: 18.2 mL, Rfl: 1    gabapentin (NEURONTIN) 600 MG tablet, Take 1 tablet by mouth 3 (Three) Times a Day for 90 days., Disp: 90 " tablet, Rfl: 2    HYDROcodone-acetaminophen (NORCO) 7.5-325 MG per tablet, Take 1 tablet by mouth 2 (Two) Times a Day As Needed for Moderate Pain for up to 30 days., Disp: 60 tablet, Rfl: 0    levalbuterol (XOPENEX HFA) 45 MCG/ACT inhaler, Inhale 1-2 puffs Every 4 (Four) Hours As Needed for Wheezing or Shortness of Air., Disp: 15 g, Rfl: 5    metoprolol succinate XL (TOPROL-XL) 50 MG 24 hr tablet, Take 1 tablet by mouth Daily for 90 days., Disp: 90 tablet, Rfl: 0    ondansetron ODT (ZOFRAN-ODT) 4 MG disintegrating tablet, Place 1 tablet under the tongue 4 (Four) Times a Day As Needed for Vomiting or Nausea., Disp: 10 tablet, Rfl: 0    pantoprazole (PROTONIX) 40 MG EC tablet, , Disp: , Rfl:     vitamin D (ERGOCALCIFEROL) 1.25 MG (35513 UT) capsule capsule, TAKE ONE CAPSULE BY MOUTH ONCE WEEKLY, Disp: 4 capsule, Rfl: 5    zinc sulfate (ZINCATE) 220 (50 Zn) MG capsule, Take 1 capsule by mouth Daily., Disp: , Rfl:     zolpidem (AMBIEN) 10 MG tablet, Take 1 tablet by mouth At Night As Needed for Sleep., Disp: 30 tablet, Rfl: 5    Problem List:  Patient Active Problem List   Diagnosis    Anxiety    Arthralgia    Degeneration of intervertebral disc of lumbar region    Elevated antinuclear antibody (SARMAD) level    Visit for screening mammogram    Gastroesophageal reflux disease    Hormone replacement therapy    Hyperlipidemia    Insomnia    Lupus    Obesity    Osteoarthritis    Pain in joint of left shoulder    Postsurgical menopause    Vitamin B12 deficiency    Vitamin D deficiency    Encounter for screening for COVID-19    Acute non-recurrent frontal sinusitis    Fever     Past Medical History:  Past Medical History:   Diagnosis Date    ADD (attention deficit disorder)     SARMAD positive     Anxiety     Arthralgia     Asthma     Bursitis of left hip     Carpal tunnel syndrome     Roberto.    DDD (degenerative disc disease), lumbar     Depression, major     Fibromyalgia     Flu syndrome     GERD (gastroesophageal reflux disease)      Heart disease     Hiatal hernia     Hormone replacement therapy     Hyperlipidemia     Inflammatory arthritis     Influenza     Insomnia     Knee pain, bilateral     Lupus     Migraine     Neck pain     Obesity     Osteoarthritis     Pain, joint, shoulder, left     Pharyngitis, acute     Postmenopausal     Sinus congestion     Surgical menopause     at age 34    Vitamin B12 deficiency     Vitamin D deficiency      Past Surgical History:  Past Surgical History:   Procedure Laterality Date    CARPAL TUNNEL RELEASE WITH CUBITAL TUNNEL RELEASE  2023    CHOLECYSTECTOMY      LUMBAR DISCECTOMY      TONSILLECTOMY      TOTAL ABDOMINAL HYSTERECTOMY WITH SALPINGO OOPHORECTOMY Bilateral 2009    FOR ENDOMETRIOSIS     Social History:  Social History     Socioeconomic History    Marital status:    Tobacco Use    Smoking status: Former     Packs/day: 0.50     Years: 3.00     Additional pack years: 0.00     Total pack years: 1.50     Types: Cigarettes     Start date:      Quit date:      Years since quittin.1     Passive exposure: Never    Smokeless tobacco: Never   Vaping Use    Vaping Use: Never used   Substance and Sexual Activity    Alcohol use: Not Currently     Alcohol/week: 0.0 standard drinks of alcohol    Drug use: Not Currently    Sexual activity: Defer     Allergies:  Allergies   Allergen Reactions    Compazine [Prochlorperazine] Irritability    Sulfa Antibiotics Hives    Benadryl [Diphenhydramine] Anxiety     Immunizations:  Immunization History   Administered Date(s) Administered    Influenza, Unspecified 2020    Tdap 2022    flucelvax quad pfs =>4 YRS 2020            In-Office Procedure(s):  No orders to display        ASCVD RIsk Score::  The 10-year ASCVD risk score (Jean-Pierre HELSM, et al., 2019) is: 1.7%    Values used to calculate the score:      Age: 48 years      Sex: Female      Is Non- : No      Diabetic: No      Tobacco smoker: No       Systolic Blood Pressure: 128 mmHg      Is BP treated: Yes      HDL Cholesterol: 54 mg/dL      Total Cholesterol: 228 mg/dL    Imaging:    Results for orders placed in visit on 01/31/22    SCANNED - IMAGING       Results for orders placed during the hospital encounter of 11/14/23    CT Abdomen Pelvis With Contrast    Narrative  CT ABDOMEN PELVIS W CONTRAST    Date of Exam: 11/14/2023 5:12 PM EST    Indication: Right flank RLQ pain.    Comparison: 8/10/2022    Technique: Axial CT images were obtained of the abdomen and pelvis following the uneventful intravenous administration of iodinated contrast. Sagittal and coronal reconstructions were performed.  Automated exposure control and iterative reconstruction  methods were used.        Findings:    Lower Chest: Subsegmental atelectasis in the lung bases    Organs: No urolithiasis or hydronephrosis. Normal-appearing nephrograms with no perinephric stranding. Small left renal cysts. Liver, spleen, pancreas, adrenal glands unremarkable. Gallbladder surgically absent    Gastrointestinal: No acute abnormality demonstrated. Appendix surgically absent    Pelvis: No abnormal fluid collection. Status post hysterectomy and bilateral oophorectomy. Urinary bladder unremarkable    Peritoneum/Retroperitoneum: No ascites or pneumoperitoneum. Normal caliber aorta    Bones/Soft Tissues: No acute bony abnormality    Impression  No acute process demonstrated                  Electronically Signed: Josue Mac  11/14/2023 5:27 PM EST  Workstation ID: [id number]      Results for orders placed during the hospital encounter of 11/14/23    CT Abdomen Pelvis With Contrast    Narrative  CT ABDOMEN PELVIS W CONTRAST    Date of Exam: 11/14/2023 5:12 PM EST    Indication: Right flank RLQ pain.    Comparison: 8/10/2022    Technique: Axial CT images were obtained of the abdomen and pelvis following the uneventful intravenous administration of iodinated contrast. Sagittal and coronal reconstructions  were performed.  Automated exposure control and iterative reconstruction  methods were used.        Findings:    Lower Chest: Subsegmental atelectasis in the lung bases    Organs: No urolithiasis or hydronephrosis. Normal-appearing nephrograms with no perinephric stranding. Small left renal cysts. Liver, spleen, pancreas, adrenal glands unremarkable. Gallbladder surgically absent    Gastrointestinal: No acute abnormality demonstrated. Appendix surgically absent    Pelvis: No abnormal fluid collection. Status post hysterectomy and bilateral oophorectomy. Urinary bladder unremarkable    Peritoneum/Retroperitoneum: No ascites or pneumoperitoneum. Normal caliber aorta    Bones/Soft Tissues: No acute bony abnormality    Impression  No acute process demonstrated                  Electronically Signed: Josue Bubba  11/14/2023 5:27 PM EST  Workstation ID: [id number]        Most recent EKG as reviewed and interpreted by me:    ECG 12 Lead    Date/Time: 2/7/2024 12:07 PM  Performed by: Prudecnio Zelaya MD    Authorized by: Prudencio Zelaya MD  Comparison: not compared with previous ECG   Previous ECG: no previous ECG available  Rhythm: sinus rhythm  Other findings: non-specific ST-T wave changes           Most recent echo as reviewed and interpreted by me:  Results for orders placed during the hospital encounter of 02/05/24    Adult Transthoracic Echo Complete W/ Cont if Necessary Per Protocol    Interpretation Summary  Normal right and left ventricular size and systolic function.  Normal left ventricular diastolic function.  Borderline dilated left atrium.  Mild restriction of posterior mitral valve leaflet with mild to moderate posterior mitral regurgitation.       Assessment & Plan :       Palpitations  Denies jerardo syncope  Mentions palpitations are better with metoprolol 50 mg daily XL, will hold off on event monitor, follow-up in 6 months    Mild mitral valve prolapse with mild posterior mitral  regurgitation  Echocardiographic surveillance  Blood pressure is well-controlled    Morbid obesity-BMI 41  Extensive counseling on weight loss, dietary modifications and regular aerobic exercise.          Part of this note may be an electronic transcription/translation of spoken language to printed text using the Dragon Dictation System.    Prudencio Zelaya MD  02/07/24  .

## 2024-02-08 ENCOUNTER — PATIENT ROUNDING (BHMG ONLY) (OUTPATIENT)
Dept: CARDIOLOGY | Facility: CLINIC | Age: 49
End: 2024-02-08
Payer: COMMERCIAL

## 2024-02-08 NOTE — PROGRESS NOTES
A My-Chart message has been sent to the patient for PATIENT ROUNDING with [facility].

## 2024-02-13 RX ORDER — ESTRADIOL 2 MG/1
2 TABLET ORAL DAILY
Qty: 30 TABLET | Refills: 5 | Status: SHIPPED | OUTPATIENT
Start: 2024-02-13

## 2024-02-20 NOTE — PROGRESS NOTES
Subjective   Kelly Espinoza is a 49 y.o. female is here for follow-up for neck pain.  Last seen 11/29/2023.  No significant changes in physical exam since last visit. She has done PT since last visit without much improvement in pain.       On last visit:     Neck pain is 6/10 on VAS, maximum 6/10.  Pain is aching, throbbing, tingling and sometimes weakness and numbness in nature.  Referred to left neck and shoulder.  Constant pain but improved by prednisone, Norco, gabapentin.  Worse with bending or laying down.  Severe headaches in occipital region with numbness and tingling in both hands mostly in third fourth and fifth digit.  Trouble with gripping with the left arm.  Left-sided occipital region headache radiating to left temporal and frontal region.    Left shoulder pain is 5/10 on VAS. Trouble with flexion of shoulder.     Lower back pain is 7/10 on VAS, at maximum is 8/10. Pain is sharp, shooting and stabbing in nature. Pain is referred left buttock, left anterior thigh, left anterior shin and left foot. The pain is constnat. The pain is improved by nothing. The pain is worse with bending.      Previous Injection:   7/10/2023 - left occipital nerve block; left trapezius and rhomboids TPI- improvement  with sharp, shooting pain; didn't much relief with TPI.   2/15/2023- left occipital nerve block- good relief with sharp shooting pain- 4 months   11/8/2022-KENDRA C7-T1- improved headache, still has some pain.   3/8/2022-KENDRA C6/7- 75% pain relief- pain relief, improved headaches and improved flexibility - 4 months     Hx: Referred by Vivian Fontanez APRN  for neck pain.  Her pain started about 3 years ago without any significant injuries. 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   She has been seen by neurosurgery and was told she was non surgical candidate.  EMG showed bilateral carpal tunnel syndrome and cubital tunnel syndrome.  S/p bilateral carpal tunnel injection with 50% relief.  She is s/p R carpal tunnel release. Seen by rheumatology who r/o lupus and has been diagnosed with Fibromyalgia       PHQ-9- 9  SOAPP-2      PMH:   Lupus, GERD, anxiety, ADD, s/p L5-S1 dissectomy 2000, s/p hand and arm surgery with Dr. Faulkner on 4/21/2023 with improvement in numbness in R hand.      Current Medications:   Norco 5-325 mg BID PRN  Celebrex 200 mg BID PRN  Cymbalta 30 mg daily  Gabapentin 600 mg TID   Ambien        Past Medications:   Tramadol 50 mg q8h PRN-1/16/2022 - didn't help     Past Modalities:  TENS:                                                                          no                                                  Physical Therapy Within The Last 6 Months              Yes - 8 weeks of PT for shoulder without much improvement.  Psychotherapy                                                            no  Massage Therapy                                                       no     Patient Complains Of:  Uro-Fecal Incontinence          no  Weight Gain/Loss                   no  Fever/Chills                             no  Weakness                               no           Current Outpatient Medications:     albuterol (ACCUNEB) 0.63 MG/3ML nebulizer solution, Take 3 mL by nebulization Every 6 (Six) Hours As Needed for Wheezing or Shortness of Air., Disp: 120 each, Rfl: 0    clobetasol (TEMOVATE) 0.05 % cream, Apply 1 application  topically to the appropriate area as directed 2 (Two) Times a Day., Disp: 15 g, Rfl: 0    DULoxetine (CYMBALTA) 60 MG capsule, Take 1 capsule by mouth Daily for 90 days., Disp: 90 capsule, Rfl: 0    estradiol (ESTRACE) 2 MG tablet, TAKE 1 TABLET BY MOUTH DAILY, Disp: 30 tablet, Rfl: 5    gabapentin (NEURONTIN) 600 MG tablet, Take 1 tablet by mouth 3 (Three) Times a Day for 90 days., Disp: 90 tablet, Rfl: 2    [START ON 2/24/2024] HYDROcodone-acetaminophen (NORCO) 7.5-325 MG per tablet, Take 1 tablet by mouth 2 (Two) Times a Day As Needed for Moderate Pain for up  to 30 days., Disp: 60 tablet, Rfl: 0    [START ON 3/25/2024] HYDROcodone-acetaminophen (NORCO) 7.5-325 MG per tablet, Take 1 tablet by mouth 2 (Two) Times a Day As Needed for Moderate Pain for up to 30 days., Disp: 60 tablet, Rfl: 0    levalbuterol (XOPENEX HFA) 45 MCG/ACT inhaler, Inhale 1-2 puffs Every 4 (Four) Hours As Needed for Wheezing or Shortness of Air., Disp: 15 g, Rfl: 5    metoprolol succinate XL (TOPROL-XL) 50 MG 24 hr tablet, Take 1 tablet by mouth Daily for 90 days., Disp: 90 tablet, Rfl: 0    ondansetron ODT (ZOFRAN-ODT) 4 MG disintegrating tablet, Place 1 tablet under the tongue 4 (Four) Times a Day As Needed for Vomiting or Nausea., Disp: 10 tablet, Rfl: 0    pantoprazole (PROTONIX) 40 MG EC tablet, , Disp: , Rfl:     vitamin D (ERGOCALCIFEROL) 1.25 MG (92736 UT) capsule capsule, TAKE ONE CAPSULE BY MOUTH ONCE WEEKLY, Disp: 4 capsule, Rfl: 5    zinc sulfate (ZINCATE) 220 (50 Zn) MG capsule, Take 1 capsule by mouth Daily., Disp: , Rfl:     zolpidem (AMBIEN) 10 MG tablet, Take 1 tablet by mouth At Night As Needed for Sleep., Disp: 30 tablet, Rfl: 5    fluticasone (FLONASE) 50 MCG/ACT nasal spray, 1 spray into the nostril(s) as directed by provider 2 (Two) Times a Day for 30 days., Disp: 18.2 mL, Rfl: 1    The following portions of the patient's history were reviewed and updated as appropriate: allergies, current medications, past family history, past medical history, past social history, past surgical history, and problem list.      REVIEW OF PERTINENT MEDICAL DATA    Past Medical History:   Diagnosis Date    ADD (attention deficit disorder)     SARMAD positive     Anxiety     Arthralgia     Asthma     Bursitis of left hip     Carpal tunnel syndrome     Roberto.    DDD (degenerative disc disease), lumbar     Depression, major     Fibromyalgia     Flu syndrome     GERD (gastroesophageal reflux disease)     Heart disease     Hiatal hernia     Hormone replacement therapy     Hyperlipidemia     Inflammatory  arthritis     Influenza     Insomnia     Knee pain, bilateral     Lupus     Migraine     Neck pain     Obesity     Osteoarthritis     Pain, joint, shoulder, left     Pharyngitis, acute     Postmenopausal     Sinus congestion     Surgical menopause     at age 34    Vitamin B12 deficiency     Vitamin D deficiency      Past Surgical History:   Procedure Laterality Date    CARPAL TUNNEL RELEASE WITH CUBITAL TUNNEL RELEASE  2023    CHOLECYSTECTOMY      LUMBAR DISCECTOMY  2000    TONSILLECTOMY      TOTAL ABDOMINAL HYSTERECTOMY WITH SALPINGO OOPHORECTOMY Bilateral 2009    FOR ENDOMETRIOSIS     Family History   Problem Relation Age of Onset    Hypertension Mother     Hypertension Father     Diabetes Other     Heart disease Other     Hypertension Other     Cancer Other      Social History     Socioeconomic History    Marital status:    Tobacco Use    Smoking status: Former     Packs/day: 0.50     Years: 3.00     Additional pack years: 0.00     Total pack years: 1.50     Types: Cigarettes     Start date:      Quit date:      Years since quittin.1     Passive exposure: Never    Smokeless tobacco: Never   Vaping Use    Vaping Use: Never used   Substance and Sexual Activity    Alcohol use: Not Currently     Alcohol/week: 0.0 standard drinks of alcohol    Drug use: Not Currently    Sexual activity: Defer         Review of Systems   Musculoskeletal:  Positive for arthralgias, back pain and neck pain.         Vitals:    24 1319   BP: 120/84   Pulse: 79   Resp: 16   SpO2: 96%   Weight: 100 kg (221 lb)   PainSc:   5               Objective   Physical Exam  Neck:     Musculoskeletal:         General: Tenderness present.        Arms:         Legs:    Neurological:      Deep Tendon Reflexes:      Reflex Scores:       Tricep reflexes are 2+ on the right side and 2+ on the left side.       Bicep reflexes are 2+ on the right side and 2+ on the left side.       Brachioradialis reflexes are 2+ on the right  side and 2+ on the left side.     Comments: Motor strength 5/5 b/l UE  Sensory intact b/l UE             Imaging Reviewed:  Left shoulder MRI-11/21/2023  - Moderate to severe distal tendinosis supraspinatus tendon and subscapularis tendon.  Moderate distal tendinosis infraspinatus tendon.  - Tendinosis and probable partial-thickness interstitial tearing of the intra-articular long head biceps tendon.  - Nondisplaced tear of anterior labrum suspected  - Mild age-appropriate degenerative changes of AC joint.    MRI cervical spine-1/31/2022  -C2-3-degenerative facet changes bilateral  C3-4-WNL  C4-5-WNL  C5-6-mild facet changes.  Mild left neural foraminal narrowing.  C6-7-mild bilateral uncovertebral joint spurring.  C7-T1-WNL    EMG upper extremity-12/5/2022  - Abnormal study.  Evidence of moderate right and mild left median neuropathy at the wrist consistent with carpal tunnel syndrome    Assessment:    1. Tendinosis of left shoulder    2. Cervical spondylosis    3. DDD (degenerative disc disease), cervical      Plan:   1. UDS consistent with patient interview on 11/29/23. narcotic contract signed - 11/30/22.   2. We discussed trying a course of formal physical therapy.  Physical therapy can help strengthen and stretch the muscles around the joints. Continue to be as active as possible.  Patient has done 8 weeks of physical therapy without much improvement.  3.. Continue Gabapepntin 600 mg TID (good till March 2024). Side effects discussed with the patient including but not limited to somnolence, dizziness, ataxia, headache, fatigue, blurred vision, cold or flu-like symptoms,delusions, hoarseness, lack or loss of strength, lower back or side pain, swelling of the hands, feet, or lower legs trembling or shaking. Patient understands and agrees with the plan.  4.  Due to increased pain, continue Norco 7.5-325 mg BID PRN (2/24; 3/25).  Discussed with the patient regarding long-term side effects of opioids including but  not limited to opioid induced hormonal suppression, hyperalgesia, fatigue, weight gain, possible opioid induced altered immune system, addiction, tolerance, dependence, risk of hearing loss and elevated risk of myocardial infarction. Proper use and potential life threatening side effects of over use discussed with patient. Patient states understanding of their use and risks.  5. .  Patient has severe left SI joint tenderness with MELE positive and intermittent radiculopathy.  Due to recent onset, will prescribe Medrol Dosepak for 5 days to see if that improves pain.  We will consider left SI joint injection in future if pain worsens in lower back.   6. Agree with Rheum regarding increasing Cymbalta. She will talk to PCP.  7. Recommend TENS unit 15-20 mins 2-3 times daily for left trapezius pain.  8.  Patient has pain in the left shoulder, discussed left shoulder injection. Discussed the possibility of infection, bleeding, nerve damage, headache, increased pain, paraplegia. Patient understands and agrees.     RTC for left shoulder injection and then 4 weeks follow up.    Rome Springer DO  Pain Management   [facility]            INSPECT REPORT    As part of the patient's treatment plan, I may be prescribing controlled substances. The patient has been made aware of appropriate use of such medications, including potential risk of somnolence, limited ability to drive and/or work safely, and the potential for dependence or overdose. It has also been made clear that these medications are for use by this patient only, without concomitant use of alcohol or other substances unless prescribed.     Patient has completed prescribing agreement detailing terms of continued prescribing of controlled substances, including monitoring INSPECT reports, urine drug screening, and pill counts if necessary. The patient is aware that inappropriate use will results in cessation of prescribing such medications.    INSPECT report has been  reviewed and scanned into the patient's chart.

## 2024-02-21 ENCOUNTER — OFFICE VISIT (OUTPATIENT)
Dept: PAIN MEDICINE | Facility: CLINIC | Age: 49
End: 2024-02-21
Payer: COMMERCIAL

## 2024-02-21 VITALS
BODY MASS INDEX: 41.76 KG/M2 | WEIGHT: 221 LBS | DIASTOLIC BLOOD PRESSURE: 84 MMHG | RESPIRATION RATE: 16 BRPM | OXYGEN SATURATION: 96 % | SYSTOLIC BLOOD PRESSURE: 120 MMHG | HEART RATE: 79 BPM

## 2024-02-21 DIAGNOSIS — M47.812 CERVICAL SPONDYLOSIS: ICD-10-CM

## 2024-02-21 DIAGNOSIS — M67.814 TENDINOSIS OF LEFT SHOULDER: Primary | ICD-10-CM

## 2024-02-21 DIAGNOSIS — M50.30 DDD (DEGENERATIVE DISC DISEASE), CERVICAL: ICD-10-CM

## 2024-02-21 RX ORDER — HYDROCODONE BITARTRATE AND ACETAMINOPHEN 7.5; 325 MG/1; MG/1
1 TABLET ORAL 2 TIMES DAILY PRN
Qty: 60 TABLET | Refills: 0 | Status: SHIPPED | OUTPATIENT
Start: 2024-02-24 | End: 2024-03-25

## 2024-02-21 RX ORDER — HYDROCODONE BITARTRATE AND ACETAMINOPHEN 7.5; 325 MG/1; MG/1
1 TABLET ORAL 2 TIMES DAILY PRN
Qty: 60 TABLET | Refills: 0 | Status: SHIPPED | OUTPATIENT
Start: 2024-03-25 | End: 2024-04-24

## 2024-02-28 DIAGNOSIS — E55.9 VITAMIN D DEFICIENCY: Primary | ICD-10-CM

## 2024-02-28 DIAGNOSIS — I10 PRIMARY HYPERTENSION: ICD-10-CM

## 2024-02-28 DIAGNOSIS — E78.2 MIXED HYPERLIPIDEMIA: ICD-10-CM

## 2024-03-02 ENCOUNTER — PATIENT ROUNDING (BHMG ONLY) (OUTPATIENT)
Dept: URGENT CARE | Facility: CLINIC | Age: 49
End: 2024-03-02
Payer: COMMERCIAL

## 2024-03-02 NOTE — ED NOTES
Thank you for letting us care for you in your recent visit to our urgent care center. We would love to hear about your experience with us. Was this the first time you have visited our location?    We’re always looking for ways to make our patients’ experiences even better. Do you have any recommendations on ways we may improve?     I appreciate you taking the time to respond. Please be on the lookout for a survey about your recent visit from [employer] via text or email. We would greatly appreciate if you could fill that out and turn it back in. We want your voice to be heard and we value your feedback.   Thank you for choosing [facility] for your healthcare needs.     [facility] Practice Manager

## 2024-03-05 ENCOUNTER — HOSPITAL ENCOUNTER (OUTPATIENT)
Dept: PAIN MEDICINE | Facility: HOSPITAL | Age: 49
Discharge: HOME | End: 2024-03-05
Payer: COMMERCIAL

## 2024-03-05 ENCOUNTER — HOSPITAL ENCOUNTER (OUTPATIENT)
Dept: ULTRASOUND IMAGING | Facility: HOSPITAL | Age: 49
Discharge: HOME | End: 2024-03-05
Payer: COMMERCIAL

## 2024-03-05 VITALS
DIASTOLIC BLOOD PRESSURE: 66 MMHG | HEART RATE: 71 BPM | HEIGHT: 61 IN | TEMPERATURE: 97.3 F | RESPIRATION RATE: 16 BRPM | OXYGEN SATURATION: 95 % | BODY MASS INDEX: 41.54 KG/M2 | WEIGHT: 220 LBS | SYSTOLIC BLOOD PRESSURE: 106 MMHG

## 2024-03-05 DIAGNOSIS — R52 PAIN: ICD-10-CM

## 2024-03-05 DIAGNOSIS — M67.814 TENDINOSIS OF LEFT SHOULDER: Primary | ICD-10-CM

## 2024-03-05 PROCEDURE — 25010000002 METHYLPREDNISOLONE PER 40 MG: Performed by: STUDENT IN AN ORGANIZED HEALTH CARE EDUCATION/TRAINING PROGRAM

## 2024-03-05 PROCEDURE — 20611 DRAIN/INJ JOINT/BURSA W/US: CPT | Performed by: STUDENT IN AN ORGANIZED HEALTH CARE EDUCATION/TRAINING PROGRAM

## 2024-03-05 PROCEDURE — 76998 US GUIDE INTRAOP: CPT

## 2024-03-05 PROCEDURE — 25010000002 BUPIVACAINE (PF) 0.25 % SOLUTION: Performed by: STUDENT IN AN ORGANIZED HEALTH CARE EDUCATION/TRAINING PROGRAM

## 2024-03-05 RX ORDER — BUPIVACAINE HYDROCHLORIDE 2.5 MG/ML
10 INJECTION, SOLUTION EPIDURAL; INFILTRATION; INTRACAUDAL ONCE
Status: COMPLETED | OUTPATIENT
Start: 2024-03-05 | End: 2024-03-05

## 2024-03-05 RX ORDER — LIDOCAINE HYDROCHLORIDE 10 MG/ML
5 INJECTION, SOLUTION EPIDURAL; INFILTRATION; INTRACAUDAL; PERINEURAL ONCE
Status: COMPLETED | OUTPATIENT
Start: 2024-03-05 | End: 2024-03-05

## 2024-03-05 RX ORDER — METHYLPREDNISOLONE ACETATE 40 MG/ML
40 INJECTION, SUSPENSION INTRA-ARTICULAR; INTRALESIONAL; INTRAMUSCULAR; SOFT TISSUE ONCE
Status: COMPLETED | OUTPATIENT
Start: 2024-03-05 | End: 2024-03-05

## 2024-03-05 RX ADMIN — BUPIVACAINE HYDROCHLORIDE 10 ML: 2.5 INJECTION, SOLUTION EPIDURAL; INFILTRATION; INTRACAUDAL; PERINEURAL at 14:58

## 2024-03-05 RX ADMIN — METHYLPREDNISOLONE ACETATE 40 MG: 40 INJECTION, SUSPENSION INTRA-ARTICULAR; INTRALESIONAL; INTRAMUSCULAR; INTRASYNOVIAL; SOFT TISSUE at 15:05

## 2024-03-05 RX ADMIN — LIDOCAINE HYDROCHLORIDE 5 ML: 10 INJECTION, SOLUTION EPIDURAL; INFILTRATION; INTRACAUDAL; PERINEURAL at 14:58

## 2024-03-05 NOTE — H&P
H and P reviewed from previous visit and no changes to patient's clinical presentation. Will proceed with procedure as planned. Patient denies history of DM and being on blood thinners.    Rome Springer DO  Pain Management   [facility]

## 2024-03-05 NOTE — PROCEDURES
PREOPERATIVE DIAGNOSIS:    1.  Shoulder Pain- LEFT   POSTOPERATIVE DIAGNOSIS:  Same    PROCEDURE: LEFT Shoulder subacromial bursa and acromioclavicular injection with steroids under Ultra Sound    PROCEDURE NOTE:  After obtaining written informed consent patient was taken to the procedure room. Pre-procedure blood pressure and pulse were stable and recorded in patients clinic chart.       Patient was placed in chair in sitting position.  LEFT shoulder was prepped with ChloraPrep and draped in usual sterile fashion.  Ultrasound guidance with sterile cover was used to identify left  shoulder bursa.  Skin was anesthetized with 2 mL of 1% lidocaine.  22-gauge 3.5 inch spinal needle was inserted through the skin under the ultrasound guidance until needle was in subacromial bursa.  After confirming needle position and negative aspiration, 5 mL of 0.25% bupivacaine with 40 mg of Depo-Medrol was injected without any complications.    Similar procedure was performed using 2 ml of 0.25% bupivacaine and 40 mg depo medrol at AC joint on left side under US guidance.     After the procedure the needle was removed and sterile band-aid was placed.    Following the procedure the patient's vital signs were stable. The patient was discharged home in good condition after being given discharge instructions.      COMPLICATIONS: None    Rome Springer DO  Pain Management

## 2024-03-06 ENCOUNTER — TELEPHONE (OUTPATIENT)
Dept: PAIN MEDICINE | Facility: HOSPITAL | Age: 49
End: 2024-03-06
Payer: COMMERCIAL

## 2024-03-07 ENCOUNTER — CLINICAL SUPPORT (OUTPATIENT)
Dept: FAMILY MEDICINE CLINIC | Facility: CLINIC | Age: 49
End: 2024-03-07
Payer: COMMERCIAL

## 2024-03-07 DIAGNOSIS — E78.2 MIXED HYPERLIPIDEMIA: Primary | ICD-10-CM

## 2024-03-07 PROCEDURE — 80053 COMPREHEN METABOLIC PANEL: CPT | Performed by: NURSE PRACTITIONER

## 2024-03-07 PROCEDURE — 36415 COLL VENOUS BLD VENIPUNCTURE: CPT | Performed by: NURSE PRACTITIONER

## 2024-03-07 PROCEDURE — 85027 COMPLETE CBC AUTOMATED: CPT | Performed by: NURSE PRACTITIONER

## 2024-03-07 PROCEDURE — 82306 VITAMIN D 25 HYDROXY: CPT | Performed by: NURSE PRACTITIONER

## 2024-03-07 PROCEDURE — 80061 LIPID PANEL: CPT | Performed by: NURSE PRACTITIONER

## 2024-03-07 PROCEDURE — 84443 ASSAY THYROID STIM HORMONE: CPT | Performed by: NURSE PRACTITIONER

## 2024-03-07 NOTE — PROGRESS NOTES
Venipuncture Blood Specimen Collection  Venipuncture performed in the right arm by Mer Grubbs MA with good hemostasis. Patient tolerated the procedure well without complications.   03/07/24   Mer Grubbs MA

## 2024-03-08 LAB
25(OH)D3 SERPL-MCNC: 38.1 NG/ML (ref 30–100)
ALBUMIN SERPL-MCNC: 4.3 G/DL (ref 3.5–5.2)
ALBUMIN/GLOB SERPL: 1.3 G/DL
ALP SERPL-CCNC: 84 U/L (ref 39–117)
ALT SERPL W P-5'-P-CCNC: 17 U/L (ref 1–33)
ANION GAP SERPL CALCULATED.3IONS-SCNC: 8.9 MMOL/L (ref 5–15)
AST SERPL-CCNC: 15 U/L (ref 1–32)
BILIRUB SERPL-MCNC: 0.2 MG/DL (ref 0–1.2)
BUN SERPL-MCNC: 23 MG/DL (ref 6–20)
BUN/CREAT SERPL: 43.4 (ref 7–25)
CALCIUM SPEC-SCNC: 9.1 MG/DL (ref 8.6–10.5)
CHLORIDE SERPL-SCNC: 103 MMOL/L (ref 98–107)
CHOLEST SERPL-MCNC: 245 MG/DL (ref 0–200)
CO2 SERPL-SCNC: 25.1 MMOL/L (ref 22–29)
CREAT SERPL-MCNC: 0.53 MG/DL (ref 0.57–1)
DEPRECATED RDW RBC AUTO: 39.8 FL (ref 37–54)
EGFRCR SERPLBLD CKD-EPI 2021: 113.5 ML/MIN/1.73
ERYTHROCYTE [DISTWIDTH] IN BLOOD BY AUTOMATED COUNT: 13 % (ref 12.3–15.4)
GLOBULIN UR ELPH-MCNC: 3.2 GM/DL
GLUCOSE SERPL-MCNC: 89 MG/DL (ref 65–99)
HCT VFR BLD AUTO: 37.1 % (ref 34–46.6)
HDLC SERPL-MCNC: 64 MG/DL (ref 40–60)
HGB BLD-MCNC: 12.7 G/DL (ref 12–15.9)
LDLC SERPL CALC-MCNC: 160 MG/DL (ref 0–100)
LDLC/HDLC SERPL: 2.46 {RATIO}
MCH RBC QN AUTO: 29.1 PG (ref 26.6–33)
MCHC RBC AUTO-ENTMCNC: 34.2 G/DL (ref 31.5–35.7)
MCV RBC AUTO: 84.9 FL (ref 79–97)
PLATELET # BLD AUTO: 265 10*3/MM3 (ref 140–450)
PMV BLD AUTO: 10.3 FL (ref 6–12)
POTASSIUM SERPL-SCNC: 3.9 MMOL/L (ref 3.5–5.2)
PROT SERPL-MCNC: 7.5 G/DL (ref 6–8.5)
RBC # BLD AUTO: 4.37 10*6/MM3 (ref 3.77–5.28)
SODIUM SERPL-SCNC: 137 MMOL/L (ref 136–145)
TRIGL SERPL-MCNC: 118 MG/DL (ref 0–150)
TSH SERPL DL<=0.05 MIU/L-ACNC: 1.81 UIU/ML (ref 0.27–4.2)
VLDLC SERPL-MCNC: 21 MG/DL (ref 5–40)
WBC NRBC COR # BLD AUTO: 7.59 10*3/MM3 (ref 3.4–10.8)

## 2024-03-11 DIAGNOSIS — E55.9 VITAMIN D DEFICIENCY: ICD-10-CM

## 2024-03-12 RX ORDER — ERGOCALCIFEROL 1.25 MG/1
50000 CAPSULE ORAL WEEKLY
Qty: 4 CAPSULE | Refills: 5 | Status: SHIPPED | OUTPATIENT
Start: 2024-03-12

## 2024-03-13 ENCOUNTER — OFFICE VISIT (OUTPATIENT)
Dept: FAMILY MEDICINE CLINIC | Facility: CLINIC | Age: 49
End: 2024-03-13
Payer: COMMERCIAL

## 2024-03-13 VITALS
OXYGEN SATURATION: 98 % | BODY MASS INDEX: 42.29 KG/M2 | TEMPERATURE: 98.3 F | HEIGHT: 61 IN | HEART RATE: 87 BPM | SYSTOLIC BLOOD PRESSURE: 114 MMHG | DIASTOLIC BLOOD PRESSURE: 76 MMHG | WEIGHT: 224 LBS | RESPIRATION RATE: 18 BRPM

## 2024-03-13 DIAGNOSIS — F51.01 PRIMARY INSOMNIA: ICD-10-CM

## 2024-03-13 DIAGNOSIS — E55.9 VITAMIN D DEFICIENCY: Primary | ICD-10-CM

## 2024-03-13 DIAGNOSIS — M15.9 PRIMARY OSTEOARTHRITIS INVOLVING MULTIPLE JOINTS: ICD-10-CM

## 2024-03-13 DIAGNOSIS — E66.01 CLASS 3 SEVERE OBESITY WITHOUT SERIOUS COMORBIDITY WITH BODY MASS INDEX (BMI) OF 40.0 TO 44.9 IN ADULT, UNSPECIFIED OBESITY TYPE: ICD-10-CM

## 2024-03-13 DIAGNOSIS — J45.21 MILD INTERMITTENT ASTHMA WITH ACUTE EXACERBATION: ICD-10-CM

## 2024-03-13 DIAGNOSIS — H92.01 RIGHT EAR PAIN: ICD-10-CM

## 2024-03-13 DIAGNOSIS — E78.2 MIXED HYPERLIPIDEMIA: ICD-10-CM

## 2024-03-13 DIAGNOSIS — F41.9 ANXIETY: ICD-10-CM

## 2024-03-13 DIAGNOSIS — M47.812 FACET ARTHRITIS, DEGENERATIVE, CERVICAL SPINE: ICD-10-CM

## 2024-03-13 DIAGNOSIS — K21.9 GASTROESOPHAGEAL REFLUX DISEASE WITHOUT ESOPHAGITIS: ICD-10-CM

## 2024-03-13 DIAGNOSIS — M79.7 FIBROMYALGIA: ICD-10-CM

## 2024-03-13 DIAGNOSIS — M54.2 CERVICALGIA: ICD-10-CM

## 2024-03-13 RX ORDER — BUSPIRONE HYDROCHLORIDE 5 MG/1
5 TABLET ORAL 2 TIMES DAILY PRN
Qty: 60 TABLET | Refills: 1 | Status: SHIPPED | OUTPATIENT
Start: 2024-03-13

## 2024-03-13 RX ORDER — DULOXETIN HYDROCHLORIDE 60 MG/1
60 CAPSULE, DELAYED RELEASE ORAL DAILY
Qty: 90 CAPSULE | Refills: 1 | Status: SHIPPED | OUTPATIENT
Start: 2024-03-13 | End: 2024-06-11

## 2024-03-13 RX ORDER — METOPROLOL SUCCINATE 50 MG/1
50 TABLET, EXTENDED RELEASE ORAL DAILY
Qty: 90 TABLET | Refills: 1 | Status: SHIPPED | OUTPATIENT
Start: 2024-03-13 | End: 2024-06-11

## 2024-03-13 NOTE — PROGRESS NOTES
Chief Complaint  Chief Complaint   Patient presents with    Annual Exam     Follow up 6 month pain ,HTN, insomnia. Mental Health Referral for anxiety.     Earache     R earache/fluid past 2-3 weeks            Subjective          April SABA Espinoza presents to [facility] for   History of Present Illness    Patient presents for annual exam, she complains of an earache    Chronic right knee pain, xray last done 2018 and normal, on celebrex     Obesity, diet improved, drinking more water, she exercises minimally, she stress eats and has situation with her dtr.  Has cut back on coffee and sugary drinks, has decreased sodas and fast food.      Cervical spine pain and h/a's, following with pain management and neurosurgery, MRI showed multilevel degenerative facet changes with mild neuroforaminal narrowing on the left at C5-6 level. Received cervical epidural spinal injections and occipital steroid injections per Dr. Springer. She uses norco prn, flexeril as needed, Celebrex and gabapentin 600 mg 3 times daily, taking all medications as directed.   -has FMLA for flareups for max of 1-3 days/week.    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     CTS and cubital tunnel syndrome, EMG completed per neuro, received injections with significant improvement.         Cardiac murmur, she is on metoprolol, bp/hr improved, she does report fatigue, denies cp, reports occasional palpitations.     GERD, she is now on pantoprazole per gastro. Denies nausea, vomiting, constipation, abdominal pain and diarrhea. She underwent colonoscopy/EGD 9/29/2023.  Showed friability, erosions and erythema compatible with erosive gastritis, the esophagus was dilated.  Multiple polyps were removed, recommended recall colonoscopy in 3 years.      Insomnia, stable with ambien  "nightly     Anxiety, \"off the charts\", daughter is back and clean, she states she can't sit still, mind is racing. typically occurred only when driving, she is on cymbalta, she reports having more panic attacks and depression. Patient denies significant weight loss/gain, insomnia, hypersomnia, psychomotor agitation, psychomotor retardation, fatigue (loss of energy), feelings of worthlessness (guilt), impaired concentration (indecisiveness), thoughts of death or suicide.       Here to review labs      The following portions of the patient's history were reviewed and updated as appropriate: allergies, current medications, past family history, past medical history, past social history, past surgical history and problem list.    Past Medical History:   Diagnosis Date    ADD (attention deficit disorder)     SARMAD positive     Anxiety     Arthralgia     Asthma     Bursitis of left hip     Carpal tunnel syndrome     DDD (degenerative disc disease), lumbar     Depression, major     Fibromyalgia     Flu syndrome     GERD (gastroesophageal reflux disease)     Heart disease     Hiatal hernia     Hormone replacement therapy     Hyperlipidemia     Inflammatory arthritis     Influenza     Insomnia     Knee pain, bilateral     Lupus     Migraine     Neck pain     Obesity     Osteoarthritis     Pain, joint, shoulder, left     Pharyngitis, acute     Postmenopausal     Sinus congestion     Surgical menopause     Vitamin B12 deficiency     Vitamin D deficiency      Past Surgical History:   Procedure Laterality Date    CARPAL TUNNEL RELEASE WITH CUBITAL TUNNEL RELEASE  08/02/2023    CHOLECYSTECTOMY      LUMBAR DISCECTOMY  2000    TONSILLECTOMY      TOTAL ABDOMINAL HYSTERECTOMY WITH SALPINGO OOPHORECTOMY Bilateral 2009    FOR ENDOMETRIOSIS     Family History   Problem Relation Age of Onset    Hypertension Mother     Hypertension Father     Diabetes Other     Heart disease Other     Hypertension Other     Cancer Other      Social History "     Tobacco Use    Smoking status: Former     Current packs/day: 0.00     Average packs/day: 0.5 packs/day for 3.0 years (1.5 ttl pk-yrs)     Types: Cigarettes     Start date:      Quit date:      Years since quittin.2     Passive exposure: Past    Smokeless tobacco: Never   Substance Use Topics    Alcohol use: Not Currently     Alcohol/week: 0.0 standard drinks of alcohol       Current Outpatient Medications:     albuterol (ACCUNEB) 0.63 MG/3ML nebulizer solution, Take 3 mL by nebulization Every 6 (Six) Hours As Needed for Wheezing or Shortness of Air., Disp: 120 each, Rfl: 0    busPIRone (BUSPAR) 5 MG tablet, Take 1 tablet by mouth 2 (Two) Times a Day As Needed (anxiety)., Disp: 60 tablet, Rfl: 1    DULoxetine (CYMBALTA) 60 MG capsule, Take 1 capsule by mouth Daily for 90 days., Disp: 90 capsule, Rfl: 1    estradiol (ESTRACE) 2 MG tablet, TAKE 1 TABLET BY MOUTH DAILY, Disp: 30 tablet, Rfl: 5    gabapentin (NEURONTIN) 600 MG tablet, Take 1 tablet by mouth 3 (Three) Times a Day for 90 days., Disp: 90 tablet, Rfl: 2    [START ON 3/25/2024] HYDROcodone-acetaminophen (NORCO) 7.5-325 MG per tablet, Take 1 tablet by mouth 2 (Two) Times a Day As Needed for Moderate Pain for up to 30 days., Disp: 60 tablet, Rfl: 0    levalbuterol (XOPENEX HFA) 45 MCG/ACT inhaler, Inhale 1-2 puffs Every 4 (Four) Hours As Needed for Wheezing or Shortness of Air., Disp: 15 g, Rfl: 5    metoprolol succinate XL (TOPROL-XL) 50 MG 24 hr tablet, Take 1 tablet by mouth Daily for 90 days., Disp: 90 tablet, Rfl: 1    pantoprazole (PROTONIX) 40 MG EC tablet, , Disp: , Rfl:     vitamin D (ERGOCALCIFEROL) 1.25 MG (30999 UT) capsule capsule, TAKE 1 CAPSULE BY MOUTH ONCE WEEKLY, Disp: 4 capsule, Rfl: 5    zinc sulfate (ZINCATE) 220 (50 Zn) MG capsule, Take 1 capsule by mouth Daily., Disp: , Rfl:     Chlorcyclizine-Pseudoephed 25-60 MG tablet, Take 1 tablet by mouth 2 (Two) Times a Day As Needed (congestion/sinus drainage)., Disp: 42 tablet,  "Rfl: 0    oseltamivir (Tamiflu) 75 MG capsule, Take 1 capsule by mouth 2 (Two) Times a Day for 5 days., Disp: 10 capsule, Rfl: 0    predniSONE (DELTASONE) 10 MG (21) dose pack, Take  by mouth Daily. Use as directed on package, Disp: 21 each, Rfl: 0    zolpidem (AMBIEN) 10 MG tablet, Take 1 tablet by mouth At Night As Needed for Sleep., Disp: 30 tablet, Rfl: 5    Objective   Vital Signs:   /76 (BP Location: Left arm, Patient Position: Sitting, Cuff Size: Large Adult)   Pulse 87   Temp 98.3 °F (36.8 °C) (Oral)   Resp 18   Ht 154.9 cm (61\")   Wt 102 kg (224 lb)   SpO2 98%   BMI 42.32 kg/m²           Physical Exam  Constitutional:       General: She is not in acute distress.     Appearance: Normal appearance. She is well-developed. She is not ill-appearing or diaphoretic.   HENT:      Head: Normocephalic.      Right Ear: Tympanic membrane and ear canal normal.      Left Ear: Tympanic membrane and ear canal normal.      Nose: No rhinorrhea.      Mouth/Throat:      Pharynx: No posterior oropharyngeal erythema.   Eyes:      Conjunctiva/sclera: Conjunctivae normal.      Pupils: Pupils are equal, round, and reactive to light.   Neck:      Thyroid: No thyromegaly.      Vascular: No JVD.   Cardiovascular:      Rate and Rhythm: Normal rate and regular rhythm.      Heart sounds: Murmur heard.   Pulmonary:      Effort: Pulmonary effort is normal. No respiratory distress.      Breath sounds: Normal breath sounds. No wheezing or rhonchi.   Abdominal:      General: Bowel sounds are normal. There is no distension.      Palpations: Abdomen is soft.      Tenderness: There is no abdominal tenderness.   Musculoskeletal:         General: Tenderness (chronic pain C-spine, multi joints, >12 sites pain daily) present. No swelling. Normal range of motion.      Cervical back: Normal range of motion and neck supple. No tenderness.   Lymphadenopathy:      Cervical: No cervical adenopathy.   Skin:     General: Skin is warm and dry. "      Coloration: Skin is not jaundiced.      Findings: No erythema or rash.   Neurological:      General: No focal deficit present.      Mental Status: She is alert and oriented to person, place, and time. Mental status is at baseline.      Sensory: No sensory deficit.      Motor: No weakness.      Gait: Gait normal.   Psychiatric:         Mood and Affect: Mood normal.         Behavior: Behavior normal.         Thought Content: Thought content normal.         Judgment: Judgment normal.          Result Review :     No visits with results within 7 Day(s) from this visit.   Latest known visit with results is:   Admission on 02/29/2024, Discharged on 02/29/2024   Component Date Value Ref Range Status    SARS Antigen 02/29/2024 Not Detected  Not Detected, Presumptive Negative Final    Influenza A Antigen HOOD 02/29/2024 Not Detected  Not Detected Final    Influenza B Antigen HOOD 02/29/2024 Not Detected  Not Detected Final    Internal Control 02/29/2024 Passed  Passed Final    Lot Number 02/29/2024 [id number]   Final    Expiration Date 02/29/2024 01/16/25   Final    Rapid Strep A Screen 02/29/2024 Negative   Final    Internal Control 02/29/2024 Passed   Final    Lot Number 02/29/2024 [id number]   Final    Expiration Date 02/29/2024 07/09/25   Final                              Assessment and Plan    Diagnoses and all orders for this visit:    1. Vitamin D deficiency (Primary)    2. Anxiety  -     busPIRone (BUSPAR) 5 MG tablet; Take 1 tablet by mouth 2 (Two) Times a Day As Needed (anxiety).  Dispense: 60 tablet; Refill: 1    3. Primary insomnia  Comments:  continue and refill ambien for nightly use    4. Mixed hyperlipidemia    5. Class 3 severe obesity without serious comorbidity with body mass index (BMI) of 40.0 to 44.9 in adult, unspecified obesity type    6. Cervicalgia    7. Mild intermittent asthma with acute exacerbation    8. Facet arthritis, degenerative, cervical spine    9. Right ear pain    10.  Gastroesophageal reflux disease without esophagitis    11. Primary osteoarthritis involving multiple joints    12. Fibromyalgia    Other orders  -     DULoxetine (CYMBALTA) 60 MG capsule; Take 1 capsule by mouth Daily for 90 days.  Dispense: 90 capsule; Refill: 1  -     metoprolol succinate XL (TOPROL-XL) 50 MG 24 hr tablet; Take 1 tablet by mouth Daily for 90 days.  Dispense: 90 tablet; Refill: 1      Continue cymbalta, rec trial of buspirone  Provided list of local therapists to call and schedule   Cont all other meds same  Rec otc zyrtec/flonase for ear pain, likely allergy induced  Age appropriate preventative counseling provided, including healthy lifestyle modifications and exercise      I spent 30 minutes caring for Kelly Espinoza on this date of service. This time includes time spent by me in the following activities: preparing for the visit, reviewing tests, performing a medically appropriate examination and/or evaluation , counseling and educating the patient/family/caregiver, ordering medications, tests, or procedures and documenting information in the medical record        Follow Up     Return in about 6 months (around 9/13/2024) for Recheck pain, obesity, HTN panel, hgba1c and insulin level prior to appt.  Patient was given instructions and counseling regarding her condition or for health maintenance advice. Please see specific information pulled into the AVS if appropriate.        Part of this note may be an electronic transcription/translation of spoken language to printed text using the Dragon Dictation System

## 2024-03-20 DIAGNOSIS — F51.01 PRIMARY INSOMNIA: ICD-10-CM

## 2024-03-20 RX ORDER — ZOLPIDEM TARTRATE 10 MG/1
10 TABLET ORAL NIGHTLY PRN
Qty: 30 TABLET | Refills: 5 | Status: SHIPPED | OUTPATIENT
Start: 2024-03-20

## 2024-04-02 NOTE — PROGRESS NOTES
Subjective   Kelly Espinoza is a 49 y.o. female is here for follow-up for neck pain.  Last seen for left shoulder injection under ultrasound guidance with short term relief. Increased headaches and neck pain.     On last visit:     Neck pain is 6/10 on VAS, maximum 6/10.  Pain is aching, throbbing, tingling and sometimes weakness and numbness in nature.  Referred to left neck and shoulder.  Constant pain but improved by prednisone, Norco, gabapentin.  Worse with bending or laying down.  Severe headaches in occipital region with numbness and tingling in both hands mostly in third fourth and fifth digit.  Trouble with gripping with the left arm.  Left-sided occipital region headache radiating to left temporal and frontal region.    Left shoulder pain is 6/10 on VAS. Trouble with flexion of shoulder.     Lower back pain is 7/10 on VAS, at maximum is 8/10. Pain is sharp, shooting and stabbing in nature. Pain is referred left buttock, left anterior thigh, left anterior shin and left foot. The pain is constnat. The pain is improved by nothing. The pain is worse with bending.      Previous Injection:   3/5/2024-left shoulder injection with ultrasound- some relief for 1-2  weeks.  7/10/2023 - left occipital nerve block; left trapezius and rhomboids TPI- improvement  with sharp, shooting pain; didn't much relief with TPI.   2/15/2023- left occipital nerve block- good relief with sharp shooting pain- 4 months   11/8/2022-KENDRA C7-T1- improved headache, still has some pain.   3/8/2022-KENDRA C6/7- 75% pain relief- pain relief, improved headaches and improved flexibility - 4 months     Hx: Referred by Vivian Fontanez APRN  for neck pain.  Her pain started about 3 years ago without any significant injuries. 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   She has been seen by neurosurgery and was told she was non surgical candidate.  EMG showed bilateral carpal tunnel syndrome and cubital tunnel syndrome.  S/p  bilateral carpal tunnel injection with 50% relief. She is s/p R carpal tunnel release. Seen by rheumatology who r/o lupus and has been diagnosed with Fibromyalgia       PHQ-9- 9  SOAPP-2      PMH:   Lupus, GERD, anxiety, ADD, s/p L5-S1 dissectomy 2000, s/p hand and arm surgery with Dr. Faulkner on 4/21/2023 with improvement in numbness in R hand.      Current Medications:   Norco 5-325 mg BID PRN  Celebrex 200 mg BID PRN  Cymbalta 30 mg daily  Gabapentin 600 mg TID   Ambien        Past Medications:   Tramadol 50 mg q8h PRN-1/16/2022 - didn't help     Past Modalities:  TENS:                                                                          no                                                  Physical Therapy Within The Last 6 Months              Yes - 8 weeks of PT for shoulder without much improvement.  Psychotherapy                                                            no  Massage Therapy                                                       no     Patient Complains Of:  Uro-Fecal Incontinence          no  Weight Gain/Loss                   no  Fever/Chills                             no  Weakness                               no           Current Outpatient Medications:     albuterol (ACCUNEB) 0.63 MG/3ML nebulizer solution, Take 3 mL by nebulization Every 6 (Six) Hours As Needed for Wheezing or Shortness of Air., Disp: 120 each, Rfl: 0    busPIRone (BUSPAR) 5 MG tablet, Take 1 tablet by mouth 2 (Two) Times a Day As Needed (anxiety)., Disp: 60 tablet, Rfl: 1    Chlorcyclizine-Pseudoephed 25-60 MG tablet, Take 1 tablet by mouth 2 (Two) Times a Day As Needed (congestion/sinus drainage)., Disp: 42 tablet, Rfl: 0    DULoxetine (CYMBALTA) 60 MG capsule, Take 1 capsule by mouth Daily for 90 days., Disp: 90 capsule, Rfl: 1    estradiol (ESTRACE) 2 MG tablet, TAKE 1 TABLET BY MOUTH DAILY, Disp: 30 tablet, Rfl: 5    HYDROcodone-acetaminophen (NORCO) 7.5-325 MG per tablet, Take 1 tablet by mouth 2 (Two) Times a Day  As Needed for Moderate Pain for up to 30 days., Disp: 60 tablet, Rfl: 0    levalbuterol (XOPENEX HFA) 45 MCG/ACT inhaler, Inhale 1-2 puffs Every 4 (Four) Hours As Needed for Wheezing or Shortness of Air., Disp: 15 g, Rfl: 5    metoprolol succinate XL (TOPROL-XL) 50 MG 24 hr tablet, Take 1 tablet by mouth Daily for 90 days., Disp: 90 tablet, Rfl: 1    pantoprazole (PROTONIX) 40 MG EC tablet, , Disp: , Rfl:     predniSONE (DELTASONE) 10 MG (21) dose pack, Take  by mouth Daily. Use as directed on package, Disp: 21 each, Rfl: 0    vitamin D (ERGOCALCIFEROL) 1.25 MG (34732 UT) capsule capsule, TAKE 1 CAPSULE BY MOUTH ONCE WEEKLY, Disp: 4 capsule, Rfl: 5    zinc sulfate (ZINCATE) 220 (50 Zn) MG capsule, Take 1 capsule by mouth Daily., Disp: , Rfl:     zolpidem (AMBIEN) 10 MG tablet, Take 1 tablet by mouth At Night As Needed for Sleep., Disp: 30 tablet, Rfl: 5    gabapentin (NEURONTIN) 600 MG tablet, Take 1 tablet by mouth 3 (Three) Times a Day for 90 days., Disp: 90 tablet, Rfl: 2    The following portions of the patient's history were reviewed and updated as appropriate: allergies, current medications, past family history, past medical history, past social history, past surgical history, and problem list.      REVIEW OF PERTINENT MEDICAL DATA    Past Medical History:   Diagnosis Date    ADD (attention deficit disorder)     SARMAD positive     Anxiety     Arthralgia     Asthma     Bursitis of left hip     Carpal tunnel syndrome     Roberto.    DDD (degenerative disc disease), lumbar     Depression, major     Fibromyalgia     Flu syndrome     GERD (gastroesophageal reflux disease)     Heart disease     Hiatal hernia     Hormone replacement therapy     Hyperlipidemia     Inflammatory arthritis     Influenza     Insomnia     Knee pain, bilateral     Lupus     Migraine     Neck pain     Obesity     Osteoarthritis     Pain, joint, shoulder, left     Pharyngitis, acute     Postmenopausal     Sinus congestion     Surgical menopause      at age 34    Vitamin B12 deficiency     Vitamin D deficiency      Past Surgical History:   Procedure Laterality Date    CARPAL TUNNEL RELEASE WITH CUBITAL TUNNEL RELEASE  2023    CHOLECYSTECTOMY      LUMBAR DISCECTOMY  2000    TONSILLECTOMY      TOTAL ABDOMINAL HYSTERECTOMY WITH SALPINGO OOPHORECTOMY Bilateral 2009    FOR ENDOMETRIOSIS     Family History   Problem Relation Age of Onset    Hypertension Mother     Hypertension Father     Diabetes Other     Heart disease Other     Hypertension Other     Cancer Other      Social History     Socioeconomic History    Marital status:    Tobacco Use    Smoking status: Former     Current packs/day: 0.00     Average packs/day: 0.5 packs/day for 3.0 years (1.5 ttl pk-yrs)     Types: Cigarettes     Start date:      Quit date:      Years since quittin.2     Passive exposure: Past    Smokeless tobacco: Never   Vaping Use    Vaping status: Never Used   Substance and Sexual Activity    Alcohol use: Not Currently     Alcohol/week: 0.0 standard drinks of alcohol    Drug use: Not Currently    Sexual activity: Defer         Review of Systems   Musculoskeletal:  Positive for arthralgias, back pain and neck pain.         Vitals:    24 1315   BP: 126/85   Pulse: 71   Resp: 16   SpO2: 98%   Weight: 102 kg (225 lb)   PainSc:   6                 Objective   Physical Exam  Neck:     Musculoskeletal:         General: Tenderness present.        Arms:         Legs:    Neurological:      Deep Tendon Reflexes:      Reflex Scores:       Tricep reflexes are 2+ on the right side and 2+ on the left side.       Bicep reflexes are 2+ on the right side and 2+ on the left side.       Brachioradialis reflexes are 2+ on the right side and 2+ on the left side.     Comments: Motor strength 5/5 b/l UE  Sensory intact b/l UE             Imaging Reviewed:  Left shoulder MRI-2023  - Moderate to severe distal tendinosis supraspinatus tendon and subscapularis tendon.  Moderate  distal tendinosis infraspinatus tendon.  - Tendinosis and probable partial-thickness interstitial tearing of the intra-articular long head biceps tendon.  - Nondisplaced tear of anterior labrum suspected  - Mild age-appropriate degenerative changes of AC joint.    MRI cervical spine-1/31/2022  -C2-3-degenerative facet changes bilateral  C3-4-WNL  C4-5-WNL  C5-6-mild facet changes.  Mild left neural foraminal narrowing.  C6-7-mild bilateral uncovertebral joint spurring.  C7-T1-WNL    EMG upper extremity-12/5/2022  - Abnormal study.  Evidence of moderate right and mild left median neuropathy at the wrist consistent with carpal tunnel syndrome    Assessment:    1. Tendinosis of left shoulder    2. Cervical spondylosis    3. DDD (degenerative disc disease), cervical    4. High risk medication use    5. Occipital neuralgia, unspecified laterality        Plan:   1. UDS consistent with patient interview on 11/29/23. narcotic contract signed - 11/30/22.   2. We discussed trying a course of formal physical therapy.  Physical therapy can help strengthen and stretch the muscles around the joints. Continue to be as active as possible.  Patient has done 8 weeks of physical therapy without much improvement.  3.. Continue Gabapepntin 600 mg TID (good till March 2024). Side effects discussed with the patient including but not limited to somnolence, dizziness, ataxia, headache, fatigue, blurred vision, cold or flu-like symptoms,delusions, hoarseness, lack or loss of strength, lower back or side pain, swelling of the hands, feet, or lower legs trembling or shaking. Patient understands and agrees with the plan.  4.  Due to increased pain, continue Norco 7.5-325 mg BID PRN (4/24; 5/24).  Discussed with the patient regarding long-term side effects of opioids including but not limited to opioid induced hormonal suppression, hyperalgesia, fatigue, weight gain, possible opioid induced altered immune system, addiction, tolerance,  dependence, risk of hearing loss and elevated risk of myocardial infarction. Proper use and potential life threatening side effects of over use discussed with patient. Patient states understanding of their use and risks.  5. .  Patient has severe left SI joint tenderness with MELE positive and intermittent radiculopathy.  Due to recent onset, will prescribe Medrol Dosepak for 5 days to see if that improves pain.  We will consider left SI joint injection in future if pain worsens in lower back.   6. Agree with Rheum regarding increasing Cymbalta. She will talk to PCP.  7. Recommend TENS unit 15-20 mins 2-3 times daily for left trapezius pain.  8. Recommend following with orthopedics for L shoulder pain.  9. If occipital headaches worsen, recommend b/l greater and lesser occipital nerve blocks.      RTC in 2 months.     Rome Springer DO  Pain Management   [facility]            INSPECT REPORT    As part of the patient's treatment plan, I may be prescribing controlled substances. The patient has been made aware of appropriate use of such medications, including potential risk of somnolence, limited ability to drive and/or work safely, and the potential for dependence or overdose. It has also been made clear that these medications are for use by this patient only, without concomitant use of alcohol or other substances unless prescribed.     Patient has completed prescribing agreement detailing terms of continued prescribing of controlled substances, including monitoring INSPECT reports, urine drug screening, and pill counts if necessary. The patient is aware that inappropriate use will results in cessation of prescribing such medications.    INSPECT report has been reviewed and scanned into the patient's chart.

## 2024-04-03 ENCOUNTER — OFFICE VISIT (OUTPATIENT)
Dept: PAIN MEDICINE | Facility: CLINIC | Age: 49
End: 2024-04-03
Payer: COMMERCIAL

## 2024-04-03 VITALS
DIASTOLIC BLOOD PRESSURE: 85 MMHG | RESPIRATION RATE: 16 BRPM | WEIGHT: 225 LBS | SYSTOLIC BLOOD PRESSURE: 126 MMHG | HEART RATE: 71 BPM | OXYGEN SATURATION: 98 % | BODY MASS INDEX: 41.82 KG/M2

## 2024-04-03 DIAGNOSIS — Z79.899 HIGH RISK MEDICATION USE: ICD-10-CM

## 2024-04-03 DIAGNOSIS — M47.812 CERVICAL SPONDYLOSIS: ICD-10-CM

## 2024-04-03 DIAGNOSIS — M50.30 DDD (DEGENERATIVE DISC DISEASE), CERVICAL: ICD-10-CM

## 2024-04-03 DIAGNOSIS — M67.814 TENDINOSIS OF LEFT SHOULDER: Primary | ICD-10-CM

## 2024-04-03 DIAGNOSIS — M54.81 OCCIPITAL NEURALGIA, UNSPECIFIED LATERALITY: ICD-10-CM

## 2024-04-03 PROCEDURE — 99214 OFFICE O/P EST MOD 30 MIN: CPT | Performed by: STUDENT IN AN ORGANIZED HEALTH CARE EDUCATION/TRAINING PROGRAM

## 2024-04-03 RX ORDER — HYDROCODONE BITARTRATE AND ACETAMINOPHEN 7.5; 325 MG/1; MG/1
1 TABLET ORAL 2 TIMES DAILY PRN
Qty: 60 TABLET | Refills: 0 | Status: SHIPPED | OUTPATIENT
Start: 2024-05-24 | End: 2024-06-23

## 2024-04-03 RX ORDER — HYDROCODONE BITARTRATE AND ACETAMINOPHEN 7.5; 325 MG/1; MG/1
1 TABLET ORAL 2 TIMES DAILY PRN
Qty: 60 TABLET | Refills: 0 | Status: SHIPPED | OUTPATIENT
Start: 2024-04-24 | End: 2024-05-24

## 2024-04-03 RX ORDER — GABAPENTIN 600 MG/1
600 TABLET ORAL 3 TIMES DAILY
Qty: 90 TABLET | Refills: 2 | Status: SHIPPED | OUTPATIENT
Start: 2024-04-03 | End: 2024-07-02

## 2024-04-10 ENCOUNTER — OFFICE VISIT (OUTPATIENT)
Dept: ORTHOPEDIC SURGERY | Facility: CLINIC | Age: 49
End: 2024-04-10
Payer: COMMERCIAL

## 2024-04-10 VITALS — HEIGHT: 62 IN | BODY MASS INDEX: 41.41 KG/M2 | RESPIRATION RATE: 20 BRPM | OXYGEN SATURATION: 98 % | WEIGHT: 225 LBS

## 2024-04-10 DIAGNOSIS — S43.432A TEAR OF LEFT GLENOID LABRUM, INITIAL ENCOUNTER: ICD-10-CM

## 2024-04-10 DIAGNOSIS — M77.8 SHOULDER TENDINITIS, LEFT: ICD-10-CM

## 2024-04-10 DIAGNOSIS — G89.29 CHRONIC LEFT SHOULDER PAIN: Primary | ICD-10-CM

## 2024-04-10 DIAGNOSIS — M25.512 CHRONIC LEFT SHOULDER PAIN: Primary | ICD-10-CM

## 2024-04-10 NOTE — PROGRESS NOTES
[facility]        Patient Name: Kelly Espinoza  : 1975  Primary Care Physician: Vivian Fontanez APRN        Chief Complaint:    Chief Complaint   Patient presents with    Left Shoulder - Pain, Initial Evaluation      -pain management   Pain for years           HPI:   Kelly Espinoza is a 49 y.o. year old who presents today for evaluation of left shoulder pain.    Ongoing for several years but symptoms are increasing over the last several months.   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 She also has difficulty with daily tasks such as carrying groceries, hooking her bra.     Pain location is anterior and superior shoulder. At times, the clavicle will ache. Pain described as deep ache. She has tried several conservative treatment options including:     Norco 7.5 mg BID (prescribed by Dr. Spirnger)  Ibuprofen BID  Steroid injections to the left shoulder (Dr. Springer)  Physical therapy - made symptoms worse. PT recommended discontinuation    She has had minimal to no relief of symptoms with the above treatment and would like to discuss additional treatment options.     No prior left shoulder surgery or traumatic injury.       Past Medical/Surgical, Social and Family History:  I have reviewed and/or updated pertinent history as noted in the medical record including:  Past Medical History:   Diagnosis Date    ADD (attention deficit disorder)     SARMAD positive     Anxiety     Arthralgia     Asthma     Bursitis of left hip     Carpal tunnel syndrome     Roberto.    DDD (degenerative disc disease), lumbar     Depression, major     Fibromyalgia     Flu syndrome     GERD (gastroesophageal reflux disease)     Heart disease     Hiatal hernia     Hormone replacement therapy     Hyperlipidemia     Inflammatory arthritis     Influenza     Insomnia     Knee pain, bilateral     Lupus     Migraine     Neck pain      Obesity     Osteoarthritis     Pain, joint, shoulder, left     Pharyngitis, acute     Postmenopausal     Sinus congestion     Surgical menopause     at age 34    Vitamin B12 deficiency     Vitamin D deficiency      Past Surgical History:   Procedure Laterality Date    CARPAL TUNNEL RELEASE WITH CUBITAL TUNNEL RELEASE  2023    CHOLECYSTECTOMY      LUMBAR DISCECTOMY      TONSILLECTOMY      TOTAL ABDOMINAL HYSTERECTOMY WITH SALPINGO OOPHORECTOMY Bilateral 2009    FOR ENDOMETRIOSIS     Social History     Occupational History    Not on file   Tobacco Use    Smoking status: Former     Current packs/day: 0.00     Average packs/day: 0.5 packs/day for 3.0 years (1.5 ttl pk-yrs)     Types: Cigarettes     Start date:      Quit date:      Years since quittin.2     Passive exposure: Past    Smokeless tobacco: Never   Vaping Use    Vaping status: Never Used   Substance and Sexual Activity    Alcohol use: Not Currently     Alcohol/week: 0.0 standard drinks of alcohol    Drug use: Not Currently    Sexual activity: Defer          Allergies:   Allergies   Allergen Reactions    Compazine [Prochlorperazine] Irritability    Sulfa Antibiotics Hives    Benadryl [Diphenhydramine] Anxiety       Medications:   Home Medications:  Current Outpatient Medications on File Prior to Visit   Medication Sig    albuterol (ACCUNEB) 0.63 MG/3ML nebulizer solution Take 3 mL by nebulization Every 6 (Six) Hours As Needed for Wheezing or Shortness of Air.    busPIRone (BUSPAR) 5 MG tablet Take 1 tablet by mouth 2 (Two) Times a Day As Needed (anxiety).    Chlorcyclizine-Pseudoephed 25-60 MG tablet Take 1 tablet by mouth 2 (Two) Times a Day As Needed (congestion/sinus drainage).    DULoxetine (CYMBALTA) 60 MG capsule Take 1 capsule by mouth Daily for 90 days.    estradiol (ESTRACE) 2 MG tablet TAKE 1 TABLET BY MOUTH DAILY    gabapentin (NEURONTIN) 600 MG tablet Take 1 tablet by mouth 3 (Three) Times a Day for 90 days.    [START ON  4/24/2024] HYDROcodone-acetaminophen (NORCO) 7.5-325 MG per tablet Take 1 tablet by mouth 2 (Two) Times a Day As Needed for Moderate Pain for up to 30 days.    [START ON 5/24/2024] HYDROcodone-acetaminophen (NORCO) 7.5-325 MG per tablet Take 1 tablet by mouth 2 (Two) Times a Day As Needed for Moderate Pain for up to 30 days.    levalbuterol (XOPENEX HFA) 45 MCG/ACT inhaler Inhale 1-2 puffs Every 4 (Four) Hours As Needed for Wheezing or Shortness of Air.    metoprolol succinate XL (TOPROL-XL) 50 MG 24 hr tablet Take 1 tablet by mouth Daily for 90 days.    pantoprazole (PROTONIX) 40 MG EC tablet     predniSONE (DELTASONE) 10 MG (21) dose pack Take  by mouth Daily. Use as directed on package    vitamin D (ERGOCALCIFEROL) 1.25 MG (31768 UT) capsule capsule TAKE 1 CAPSULE BY MOUTH ONCE WEEKLY    zinc sulfate (ZINCATE) 220 (50 Zn) MG capsule Take 1 capsule by mouth Daily.    zolpidem (AMBIEN) 10 MG tablet Take 1 tablet by mouth At Night As Needed for Sleep.     No current facility-administered medications on file prior to visit.         ROS:  Negative unless listed in the HPI    Physical Exam:   49 y.o. female  Body mass index is 41.82 kg/m²., 102 kg (225 lb)  Vitals:    04/10/24 0906   Resp: 20   SpO2: 98%     General: Alert, cooperative, appears well and in no observable distress.   HEENT: Normocephalic, atraumatic on external visual inspection. No icterus.   CV: No significant peripheral edema.    Respiratory: Normal respiratory effort.   Skin: Warm & well perfused; appropriate skin turgor.  Psych: Appropriate mood & affect.  Neuro: Gross sensation and motor intact in affected extremity/extremities.  Vascular: Peripheral pulses palpable in affected extremity/extremities.     Ortho Exam   Left shoulder  On visual inspection, no obvious deformity. No erythema, bruising, abrasions, swelling  On palpation, mild to moderate tenderness with anterior shoulder joint palpation. Mild tenderness over the bicep.   Active forward  flexion 110, passive 160 with pain  Active abduction 90, 140 passive with pain  External rotation 40 without significant pain  Cross body adduction full with pain at end range  +neer, +faith, +speed, +belly press, +lift off  Negative drop arm, negative dilan, neg clicking  Neurovascular status intact    Radiology:  X-Ray Report:  Left shoulder(s) X-Ray  Indication: Evaluation of pain  AP, Lateral views  Findings: mild degenerative changes noted to the AC joint. Otherwise, no significant OA finding. No acute fracture or bone abnormality. No subluxation or dislocation.   Bony lesion: no  Soft tissues: within normal limits  Joint spaces: within normal limits  Hardware appropriately positioned: not applicable  Prior studies available for comparison: no       Narrative & Impression   MRI SHOULDER LEFT WO CONTRAST     Date of Exam: 11/21/2023 5:00 PM EST     Indication: Shoulder replacement, rotator cuff tear suspected.     Comparison: Shoulder radiograph 9/28/2017     Technique:  Routine multiplanar/multisequence images of the left shoulder were obtained without contrast administration.          Findings:  Rotator cuff:  Supraspinatus tendon: Moderate to severe distal tendinosis. No significant muscle atrophy.  Infraspinatus tendon: Moderate distal tendinosis. No significant muscle atrophy.  Subscapularis tendon: Moderate to severe tendinosis. No significant muscle atrophy.  Teres minor tendon: No tendon abnormality. No significant muscle atrophy.     Glenohumeral joint:  Humeral head is centrally located within the glenoid. Physiologic joint fluid is present. No cartilage defects are identified. The inferior joint capsule is normal thickness and normal signal intensity.     Labrum:  Abnormal appearance of the anterior labrum like related to nondisplaced tearing.. No paralabral cysts.     Biceps tendon:  Long head of the biceps tendon is appropriately located within the bicipital groove. It is enlarged and has  increased signal intensity along its intra-articular extent.      Acromioclavicular Joint:  Normal for age. Mild degenerative change. No abnormal bone marrow edema. No os acromiale. No significant lateral downsloping of the acromion.     Bone:  No fractures or aggressive osseous lesions. Bone marrow signal intensity is normal.     Miscellaneous:  No significant subacromial subdeltoid fluid. No pathologically enlarged axillary lymph nodes. Deltoid muscle has normal size and signal intensity.     IMPRESSION:  1.Moderate to severe distal tendinosis supraspinatus tendon and subscapularis tendon. Moderate distal tendinosis infraspinatus tendon.  2.Tendinosis and probable partial-thickness interstitial tearing of the intra-articular long head biceps tendon.  3.Nondisplaced tear anterior labrum suspected.  4.Mild, age-appropriate degenerative change acromioclavicular joint.     Electronically Signed: Bhavana Crawford MD    11/21/2023 6:13 PM EST    Workstation ID: [id number]             Assessment:  Left shoulder pain  Left shoulder adhesive capsulitis  Left supraspinatus/subscapularis tendinosis  Left shoulder non displaced labral tear  Tendinosis of the biceps tendon  Body mass index is 41.82 kg/m².  BMI consistent with Obese Class III extreme obesity: > or equal to 40kg/m2           Plan:  I have reviewed the above imaging and assessment with the patient in detail    She has tried several conservative treatment options in recent months without relief of symptoms    She feels her symptoms are not interfering with ADLs as well as her career    I think it's reasonable to discuss surgical interventions at this point    I have recommended she meet with Dr. Nino for discussion which she is agreeable to    In the interim, continue pain regimen, anti inflammatories and I have recommended gentle ROM and stretching of the left shoulder as tolerated to assist with mobility    Follow up with Dr. Nino - I am happy to see her in follow up  for non surgical care    Patient encouraged to call with any questions or concerns in the interim    Ann Marie Black, APRN

## 2024-04-18 ENCOUNTER — OFFICE VISIT (OUTPATIENT)
Dept: ORTHOPEDIC SURGERY | Facility: CLINIC | Age: 49
End: 2024-04-18
Payer: COMMERCIAL

## 2024-04-18 ENCOUNTER — PREP FOR SURGERY (OUTPATIENT)
Dept: OTHER | Facility: HOSPITAL | Age: 49
End: 2024-04-18
Payer: COMMERCIAL

## 2024-04-18 VITALS — WEIGHT: 225 LBS | OXYGEN SATURATION: 98 % | BODY MASS INDEX: 41.41 KG/M2 | HEART RATE: 80 BPM | HEIGHT: 62 IN

## 2024-04-18 DIAGNOSIS — G89.29 CHRONIC LEFT SHOULDER PAIN: Primary | ICD-10-CM

## 2024-04-18 DIAGNOSIS — M25.512 CHRONIC LEFT SHOULDER PAIN: Primary | ICD-10-CM

## 2024-04-18 DIAGNOSIS — M25.512 LEFT SHOULDER PAIN, UNSPECIFIED CHRONICITY: Primary | ICD-10-CM

## 2024-04-18 PROCEDURE — 99214 OFFICE O/P EST MOD 30 MIN: CPT | Performed by: ORTHOPAEDIC SURGERY

## 2024-04-18 NOTE — PROGRESS NOTES
Patient ID: Kelly Espinoza is a 49 y.o. female.    Chief Complaint:    Chief Complaint   Patient presents with    Left Shoulder - Initial Evaluation     Pain 6       HPI:  This is a 49-year-old female here with longstanding left shoulder pain.  She has some pain in the impingement area sometimes refers down to the bicep as well as up into the neck.  She has had previous treatment with formal physical therapy as well as a steroid injection in the shoulder without significant relief  Past Medical History:   Diagnosis Date    ADD (attention deficit disorder)     SARMAD positive     Anxiety     Arthralgia     Asthma     Bursitis of left hip     Carpal tunnel syndrome     Roberto.    DDD (degenerative disc disease), lumbar     Depression, major     Fibromyalgia     Flu syndrome     GERD (gastroesophageal reflux disease)     Heart disease     Hiatal hernia     Hormone replacement therapy     Hyperlipidemia     Inflammatory arthritis     Influenza     Insomnia     Knee pain, bilateral     Lupus     Migraine     Neck pain     Obesity     Osteoarthritis     Pain, joint, shoulder, left     Pharyngitis, acute     Postmenopausal     Sinus congestion     Surgical menopause     at age 34    Vitamin B12 deficiency     Vitamin D deficiency        Past Surgical History:   Procedure Laterality Date    CARPAL TUNNEL RELEASE WITH CUBITAL TUNNEL RELEASE  08/02/2023    CHOLECYSTECTOMY      LUMBAR DISCECTOMY  2000    TONSILLECTOMY      TOTAL ABDOMINAL HYSTERECTOMY WITH SALPINGO OOPHORECTOMY Bilateral 2009    FOR ENDOMETRIOSIS       Family History   Problem Relation Age of Onset    Hypertension Mother     Hypertension Father     Diabetes Other     Heart disease Other     Hypertension Other     Cancer Other           Social History     Occupational History    Not on file   Tobacco Use    Smoking status: Former     Current packs/day: 0.00     Average packs/day: 0.5 packs/day for 3.0 years (1.5 ttl pk-yrs)     Types: Cigarettes     Start date:  "     Quit date:      Years since quittin.3     Passive exposure: Past    Smokeless tobacco: Never   Vaping Use    Vaping status: Never Used   Substance and Sexual Activity    Alcohol use: Not Currently     Alcohol/week: 0.0 standard drinks of alcohol    Drug use: Not Currently    Sexual activity: Defer      Review of Systems   Cardiovascular:  Negative for chest pain.   Musculoskeletal:  Positive for arthralgias.       Objective:    Pulse 80   Ht 156.2 cm (61.5\")   Wt 102 kg (225 lb)   SpO2 98%   BMI 41.82 kg/m²     Physical Examination:  Left shoulder demonstrates intact skin diffuse paraspinal tenderness mildly reduced cervical range of motion.  She has pain over the bicep groove and impingement area passive elevation 170 abduction 150 external rotation 50 internal rotation L5.  She has mild pain and weakness on Speed [place], supraspinatus testing.  Belly press and liftoff are 5/5.  Sensory and motor exam are intact all distributions. Radial pulse is palpable and capillary refill is less than two seconds to all digits.    Imaging:  MRI and x-ray are reviewed my interpretation is of intra-articular bicep tendon split with widespread cuff tendinopathy but no discrete tear    Assessment:  Left shoulder pain possible torn bicep tendon    Plan:  Further options were discussed.  Discussed possible role of cervical disorder in her symptoms although prior MRI was quite mild.  She has had extensive shoulder therapy including injections and medication without improvement.  She would like to proceed with left shoulder diagnostic arthroscopy discussed the possible role of debridement versus bicep tenodesis and less likely scenario of a cuff repair  Risks and benefits, specifically risks of bleeding, scar, infection, fracture, stiffness, retear, nerve, tendon or artery damage, the need for further surgery, DVT, and loss of life or limb and rehab were discussed. All questions were answered and " addressed.      Procedures         Disclaimer: Part of this note may be an electronic transcription/translation of spoken language to printed text using the Dragon Dictation System

## 2024-04-18 NOTE — H&P (VIEW-ONLY)
Patient ID: Kelly Espinoza is a 49 y.o. female.    Chief Complaint:    Chief Complaint   Patient presents with    Left Shoulder - Initial Evaluation     Pain 6       HPI:  This is a 49-year-old female here with longstanding left shoulder pain.  She has some pain in the impingement area sometimes refers down to the bicep as well as up into the neck.  She has had previous treatment with formal physical therapy as well as a steroid injection in the shoulder without significant relief  Past Medical History:   Diagnosis Date    ADD (attention deficit disorder)     SARMAD positive     Anxiety     Arthralgia     Asthma     Bursitis of left hip     Carpal tunnel syndrome     Roberto.    DDD (degenerative disc disease), lumbar     Depression, major     Fibromyalgia     Flu syndrome     GERD (gastroesophageal reflux disease)     Heart disease     Hiatal hernia     Hormone replacement therapy     Hyperlipidemia     Inflammatory arthritis     Influenza     Insomnia     Knee pain, bilateral     Lupus     Migraine     Neck pain     Obesity     Osteoarthritis     Pain, joint, shoulder, left     Pharyngitis, acute     Postmenopausal     Sinus congestion     Surgical menopause     at age 34    Vitamin B12 deficiency     Vitamin D deficiency        Past Surgical History:   Procedure Laterality Date    CARPAL TUNNEL RELEASE WITH CUBITAL TUNNEL RELEASE  08/02/2023    CHOLECYSTECTOMY      LUMBAR DISCECTOMY  2000    TONSILLECTOMY      TOTAL ABDOMINAL HYSTERECTOMY WITH SALPINGO OOPHORECTOMY Bilateral 2009    FOR ENDOMETRIOSIS       Family History   Problem Relation Age of Onset    Hypertension Mother     Hypertension Father     Diabetes Other     Heart disease Other     Hypertension Other     Cancer Other           Social History     Occupational History    Not on file   Tobacco Use    Smoking status: Former     Current packs/day: 0.00     Average packs/day: 0.5 packs/day for 3.0 years (1.5 ttl pk-yrs)     Types: Cigarettes     Start date:  "     Quit date:      Years since quittin.3     Passive exposure: Past    Smokeless tobacco: Never   Vaping Use    Vaping status: Never Used   Substance and Sexual Activity    Alcohol use: Not Currently     Alcohol/week: 0.0 standard drinks of alcohol    Drug use: Not Currently    Sexual activity: Defer      Review of Systems   Cardiovascular:  Negative for chest pain.   Musculoskeletal:  Positive for arthralgias.       Objective:    Pulse 80   Ht 156.2 cm (61.5\")   Wt 102 kg (225 lb)   SpO2 98%   BMI 41.82 kg/m²     Physical Examination:  Left shoulder demonstrates intact skin diffuse paraspinal tenderness mildly reduced cervical range of motion.  She has pain over the bicep groove and impingement area passive elevation 170 abduction 150 external rotation 50 internal rotation L5.  She has mild pain and weakness on Speed [place], supraspinatus testing.  Belly press and liftoff are 5/5.  Sensory and motor exam are intact all distributions. Radial pulse is palpable and capillary refill is less than two seconds to all digits.    Imaging:  MRI and x-ray are reviewed my interpretation is of intra-articular bicep tendon split with widespread cuff tendinopathy but no discrete tear    Assessment:  Left shoulder pain possible torn bicep tendon    Plan:  Further options were discussed.  Discussed possible role of cervical disorder in her symptoms although prior MRI was quite mild.  She has had extensive shoulder therapy including injections and medication without improvement.  She would like to proceed with left shoulder diagnostic arthroscopy discussed the possible role of debridement versus bicep tenodesis and less likely scenario of a cuff repair  Risks and benefits, specifically risks of bleeding, scar, infection, fracture, stiffness, retear, nerve, tendon or artery damage, the need for further surgery, DVT, and loss of life or limb and rehab were discussed. All questions were answered and " addressed.      Procedures         Disclaimer: Part of this note may be an electronic transcription/translation of spoken language to printed text using the Dragon Dictation System

## 2024-04-19 ENCOUNTER — PATIENT ROUNDING (BHMG ONLY) (OUTPATIENT)
Dept: ORTHOPEDIC SURGERY | Facility: CLINIC | Age: 49
End: 2024-04-19
Payer: COMMERCIAL

## 2024-04-19 NOTE — PROGRESS NOTES
A my chart message has been sent to the patient for PATIENT ROUNDING with [facility] [place]

## 2024-04-25 PROBLEM — M25.512 LEFT SHOULDER PAIN: Status: ACTIVE | Noted: 2024-04-18

## 2024-04-26 ENCOUNTER — TELEPHONE (OUTPATIENT)
Dept: CARDIOLOGY | Facility: CLINIC | Age: 49
End: 2024-04-26
Payer: COMMERCIAL

## 2024-05-08 ENCOUNTER — HOSPITAL ENCOUNTER (OUTPATIENT)
Dept: CARDIOLOGY | Facility: HOSPITAL | Age: 49
Discharge: HOME | End: 2024-05-08
Payer: COMMERCIAL

## 2024-05-08 ENCOUNTER — LAB (OUTPATIENT)
Dept: LAB | Facility: HOSPITAL | Age: 49
End: 2024-05-08
Payer: COMMERCIAL

## 2024-05-08 DIAGNOSIS — M25.512 LEFT SHOULDER PAIN, UNSPECIFIED CHRONICITY: ICD-10-CM

## 2024-05-08 LAB
ANION GAP SERPL CALCULATED.3IONS-SCNC: 10.5 MMOL/L (ref 5–15)
APTT PPP: 28.5 SECONDS (ref 24–31)
BASOPHILS # BLD AUTO: 0.05 10*3/MM3 (ref 0–0.2)
BASOPHILS NFR BLD AUTO: 0.9 % (ref 0–1.5)
BUN SERPL-MCNC: 18 MG/DL (ref 6–20)
BUN/CREAT SERPL: 32.1 (ref 7–25)
CALCIUM SPEC-SCNC: 9.4 MG/DL (ref 8.6–10.5)
CHLORIDE SERPL-SCNC: 102 MMOL/L (ref 98–107)
CO2 SERPL-SCNC: 28.5 MMOL/L (ref 22–29)
CREAT SERPL-MCNC: 0.56 MG/DL (ref 0.57–1)
DEPRECATED RDW RBC AUTO: 40.5 FL (ref 37–54)
EGFRCR SERPLBLD CKD-EPI 2021: 112 ML/MIN/1.73
EOSINOPHIL # BLD AUTO: 0.21 10*3/MM3 (ref 0–0.4)
EOSINOPHIL NFR BLD AUTO: 3.7 % (ref 0.3–6.2)
ERYTHROCYTE [DISTWIDTH] IN BLOOD BY AUTOMATED COUNT: 13.1 % (ref 12.3–15.4)
GLUCOSE SERPL-MCNC: 94 MG/DL (ref 65–99)
HCT VFR BLD AUTO: 35.8 % (ref 34–46.6)
HGB BLD-MCNC: 11.8 G/DL (ref 12–15.9)
IMM GRANULOCYTES # BLD AUTO: 0.02 10*3/MM3 (ref 0–0.05)
IMM GRANULOCYTES NFR BLD AUTO: 0.4 % (ref 0–0.5)
INR PPP: 0.93 (ref 0.93–1.1)
LYMPHOCYTES # BLD AUTO: 1.55 10*3/MM3 (ref 0.7–3.1)
LYMPHOCYTES NFR BLD AUTO: 27.2 % (ref 19.6–45.3)
MCH RBC QN AUTO: 28.4 PG (ref 26.6–33)
MCHC RBC AUTO-ENTMCNC: 33 G/DL (ref 31.5–35.7)
MCV RBC AUTO: 86.3 FL (ref 79–97)
MONOCYTES # BLD AUTO: 0.55 10*3/MM3 (ref 0.1–0.9)
MONOCYTES NFR BLD AUTO: 9.6 % (ref 5–12)
NEUTROPHILS NFR BLD AUTO: 3.32 10*3/MM3 (ref 1.7–7)
NEUTROPHILS NFR BLD AUTO: 58.2 % (ref 42.7–76)
NRBC BLD AUTO-RTO: 0 /100 WBC (ref 0–0.2)
PLATELET # BLD AUTO: 232 10*3/MM3 (ref 140–450)
PMV BLD AUTO: 9.9 FL (ref 6–12)
POTASSIUM SERPL-SCNC: 3.6 MMOL/L (ref 3.5–5.2)
PROTHROMBIN TIME: 10.2 SECONDS (ref 9.6–11.7)
QT INTERVAL: 342 MS
QTC INTERVAL: 416 MS
RBC # BLD AUTO: 4.15 10*6/MM3 (ref 3.77–5.28)
SODIUM SERPL-SCNC: 141 MMOL/L (ref 136–145)
WBC NRBC COR # BLD AUTO: 5.7 10*3/MM3 (ref 3.4–10.8)

## 2024-05-08 PROCEDURE — 80048 BASIC METABOLIC PNL TOTAL CA: CPT

## 2024-05-08 PROCEDURE — 85610 PROTHROMBIN TIME: CPT

## 2024-05-08 PROCEDURE — 85025 COMPLETE CBC W/AUTO DIFF WBC: CPT

## 2024-05-08 PROCEDURE — 85730 THROMBOPLASTIN TIME PARTIAL: CPT

## 2024-05-08 PROCEDURE — 93005 ELECTROCARDIOGRAM TRACING: CPT | Performed by: ORTHOPAEDIC SURGERY

## 2024-05-08 PROCEDURE — 36415 COLL VENOUS BLD VENIPUNCTURE: CPT

## 2024-05-16 ENCOUNTER — ANESTHESIA EVENT (OUTPATIENT)
Dept: PERIOP | Facility: HOSPITAL | Age: 49
End: 2024-05-16
Payer: COMMERCIAL

## 2024-05-16 DIAGNOSIS — F41.9 ANXIETY: ICD-10-CM

## 2024-05-16 RX ORDER — ONDANSETRON 4 MG/1
4 TABLET, FILM COATED ORAL EVERY 8 HOURS PRN
Qty: 20 TABLET | Refills: 1 | Status: SHIPPED | OUTPATIENT
Start: 2024-05-16

## 2024-05-16 RX ORDER — CEPHALEXIN 500 MG/1
500 CAPSULE ORAL 4 TIMES DAILY
Qty: 4 CAPSULE | Refills: 0 | Status: SHIPPED | OUTPATIENT
Start: 2024-05-16 | End: 2024-05-17

## 2024-05-16 RX ORDER — OXYCODONE HYDROCHLORIDE AND ACETAMINOPHEN 5; 325 MG/1; MG/1
1 TABLET ORAL EVERY 6 HOURS PRN
Qty: 20 TABLET | Refills: 0 | Status: SHIPPED | OUTPATIENT
Start: 2024-05-16 | End: 2024-05-17 | Stop reason: HOSPADM

## 2024-05-16 RX ORDER — NAPROXEN 500 MG/1
500 TABLET ORAL 2 TIMES DAILY WITH MEALS
Qty: 28 TABLET | Refills: 0 | Status: SHIPPED | OUTPATIENT
Start: 2024-05-16

## 2024-05-16 NOTE — ANESTHESIA PREPROCEDURE EVALUATION
Anesthesia Evaluation     Patient summary reviewed and Nursing notes reviewed   history of anesthetic complications:  PONV  NPO Solid Status: > 8 hours  NPO Liquid Status: > 2 hours           Airway   Dental      Pulmonary    (+) a smoker Former, asthma,  Cardiovascular     ECG reviewed    (+) hypertension, valvular problems/murmurs MVP and MR, hyperlipidemia      Neuro/Psych  (+) headaches, numbness, psychiatric history Anxiety, Depression and ADD  GI/Hepatic/Renal/Endo    (+) morbid obesity, hiatal hernia, GERD    Musculoskeletal     (+) arthralgias, chronic pain, myalgias, neck pain  Abdominal    Substance History      OB/GYN          Other   arthritis, autoimmune disease lupus,     ROS/Med Hx Other: Additional History:  Palpitations, fibromyalgia    Echo:  Echocardiogram Findings    Left Ventricle Left ventricular systolic function is normal. Calculated left ventricular EF = 65% Left ventricular ejection fraction appears to be 61 - 65%.     Normal left ventricular cavity size and wall thickness noted. All left ventricular wall segments contract normally. Left ventricular diastolic function was normal.  Right Ventricle Normal right ventricular cavity size and systolic function noted.  Left Atrium The left atrial cavity is borderline dilated.  Right Atrium Normal right atrial cavity size noted.  Aortic Valve The aortic valve is structurally normal. The aortic valve appears trileaflet. No significant aortic valve regurgitation is present. No hemodynamically significant aortic valve stenosis is present.  Mitral Valve Mild to moderate mitral valve regurgitation is present with a posteriorly-directed jet noted. No significant mitral valve stenosis is present. Mild restriction of posterior leaflet with mild to moderate eccentric posterior mitral regurgitation  Tricuspid Valve The tricuspid valve is structurally normal with no significant stenosis present. Mild tricuspid valve regurgitation is present. Estimated right  ventricular systolic pressure from tricuspid regurgitation is normal (<35 mmHg). No evidence of pulmonary hypertension is present.  Pulmonic Valve The pulmonic valve is not well visualized. The pulmonic valve is structurally normal with no significant stenosis present. There is no significant pulmonic valve regurgitation present.  Greater Vessels No dilation of the aortic root is present. No dilation of the sinuses of Valsalva is present.  Pericardium There is no evidence of pericardial effusion. .          PSH:  LUMBAR DISCECTOMY TONSILLECTOMY  TOTAL ABDOMINAL HYSTERECTOMY WITH SALPINGO OOPHORECTOMY CHOLECYSTECTOMY  CARPAL TUNNEL RELEASE WITH CUBITAL TUNNEL RELEASE               Anesthesia Plan    ASA 3     general with block     (Patient identified; pre-operative vital signs, all relevant labs/studies, complete medical/surgical/anesthetic history, full medication list, full allergy list, and NPO status obtained/reviewed; physical assessment performed; anesthetic options, side effects, potential complications, risks, and benefits discussed; questions answered; written anesthesia consent obtained; patient cleared for procedure; anesthesia machine and equipment checked and functioning)  intravenous induction     Anesthetic plan, risks, benefits, and alternatives have been provided, discussed and informed consent has been obtained with: patient.    Plan discussed with CRNA and CAA.    CODE STATUS:

## 2024-05-17 ENCOUNTER — HOSPITAL ENCOUNTER (OUTPATIENT)
Facility: HOSPITAL | Age: 49
Setting detail: HOSPITAL OUTPATIENT SURGERY
Discharge: HOME | End: 2024-05-17
Attending: ORTHOPAEDIC SURGERY | Admitting: ORTHOPAEDIC SURGERY
Payer: COMMERCIAL

## 2024-05-17 ENCOUNTER — ANESTHESIA (OUTPATIENT)
Dept: PERIOP | Facility: HOSPITAL | Age: 49
End: 2024-05-17
Payer: COMMERCIAL

## 2024-05-17 VITALS
BODY MASS INDEX: 41.3 KG/M2 | HEART RATE: 72 BPM | SYSTOLIC BLOOD PRESSURE: 112 MMHG | DIASTOLIC BLOOD PRESSURE: 67 MMHG | WEIGHT: 224.4 LBS | TEMPERATURE: 97 F | OXYGEN SATURATION: 92 % | HEIGHT: 62 IN | RESPIRATION RATE: 13 BRPM

## 2024-05-17 DIAGNOSIS — M25.512 ACUTE PAIN OF LEFT SHOULDER: Primary | ICD-10-CM

## 2024-05-17 DIAGNOSIS — M25.512 LEFT SHOULDER PAIN, UNSPECIFIED CHRONICITY: ICD-10-CM

## 2024-05-17 PROCEDURE — 25010000002 CEFAZOLIN PER 500 MG: Performed by: ORTHOPAEDIC SURGERY

## 2024-05-17 PROCEDURE — 29827 SHO ARTHRS SRG RT8TR CUF RPR: CPT | Performed by: ORTHOPAEDIC SURGERY

## 2024-05-17 PROCEDURE — 29823 SHO ARTHRS SRG XTNSV DBRDMT: CPT | Performed by: PHYSICIAN ASSISTANT

## 2024-05-17 PROCEDURE — 25010000002 DEXAMETHASONE PER 1 MG: Performed by: ANESTHESIOLOGIST ASSISTANT

## 2024-05-17 PROCEDURE — 25010000002 BUPIVACAINE (PF) 0.5 % SOLUTION: Performed by: ANESTHESIOLOGY

## 2024-05-17 PROCEDURE — 25010000002 FENTANYL CITRATE (PF) 100 MCG/2ML SOLUTION: Performed by: ANESTHESIOLOGIST ASSISTANT

## 2024-05-17 PROCEDURE — 25010000002 SUGAMMADEX 200 MG/2ML SOLUTION: Performed by: ANESTHESIOLOGIST ASSISTANT

## 2024-05-17 PROCEDURE — 25010000002 BUPIVACAINE (PF) 0.25 % SOLUTION 10 ML VIAL: Performed by: ORTHOPAEDIC SURGERY

## 2024-05-17 PROCEDURE — 25010000002 LIDOCAINE 1 % SOLUTION 20 ML VIAL: Performed by: ORTHOPAEDIC SURGERY

## 2024-05-17 PROCEDURE — 25010000002 ONDANSETRON PER 1 MG: Performed by: ANESTHESIOLOGIST ASSISTANT

## 2024-05-17 PROCEDURE — 25010000002 KETOROLAC TROMETHAMINE PER 15 MG: Performed by: ORTHOPAEDIC SURGERY

## 2024-05-17 PROCEDURE — 25810000003 LACTATED RINGERS PER 1000 ML: Performed by: ANESTHESIOLOGY

## 2024-05-17 PROCEDURE — 29827 SHO ARTHRS SRG RT8TR CUF RPR: CPT | Performed by: PHYSICIAN ASSISTANT

## 2024-05-17 PROCEDURE — C1713 ANCHOR/SCREW BN/BN,TIS/BN: HCPCS | Performed by: ORTHOPAEDIC SURGERY

## 2024-05-17 PROCEDURE — 0 BUPIVACAINE LIPOSOME 1.3 % SUSPENSION: Performed by: ANESTHESIOLOGY

## 2024-05-17 PROCEDURE — C9290 INJ, BUPIVACAINE LIPOSOME: HCPCS | Performed by: ANESTHESIOLOGY

## 2024-05-17 PROCEDURE — 25010000002 MIDAZOLAM PER 1 MG: Performed by: ANESTHESIOLOGY

## 2024-05-17 PROCEDURE — 25010000002 PROPOFOL 200 MG/20ML EMULSION: Performed by: ANESTHESIOLOGIST ASSISTANT

## 2024-05-17 PROCEDURE — 29823 SHO ARTHRS SRG XTNSV DBRDMT: CPT | Performed by: ORTHOPAEDIC SURGERY

## 2024-05-17 DEVICE — 4.75 MM ARGO KNOTLESS GENESYS ANCHOR
Type: IMPLANTABLE DEVICE | Site: SHOULDER | Status: FUNCTIONAL
Brand: ARGO KNOTLESS GENESYS

## 2024-05-17 DEVICE — 2MM HI-FI® TAPE BLUE
Type: IMPLANTABLE DEVICE | Site: SHOULDER | Status: FUNCTIONAL
Brand: HI-FI

## 2024-05-17 DEVICE — HI-FI® PASSING LOOP
Type: IMPLANTABLE DEVICE | Site: SHOULDER | Status: FUNCTIONAL
Brand: HI-FI

## 2024-05-17 RX ORDER — FLUMAZENIL 0.1 MG/ML
0.5 INJECTION INTRAVENOUS AS NEEDED
Status: DISCONTINUED | OUTPATIENT
Start: 2024-05-17 | End: 2024-05-17 | Stop reason: HOSPADM

## 2024-05-17 RX ORDER — EPHEDRINE SULFATE 5 MG/ML
5 INJECTION INTRAVENOUS ONCE AS NEEDED
Status: DISCONTINUED | OUTPATIENT
Start: 2024-05-17 | End: 2024-05-17 | Stop reason: HOSPADM

## 2024-05-17 RX ORDER — METOCLOPRAMIDE HYDROCHLORIDE 5 MG/ML
10 INJECTION INTRAMUSCULAR; INTRAVENOUS ONCE AS NEEDED
Status: DISCONTINUED | OUTPATIENT
Start: 2024-05-17 | End: 2024-05-17 | Stop reason: HOSPADM

## 2024-05-17 RX ORDER — DEXAMETHASONE SODIUM PHOSPHATE 4 MG/ML
INJECTION, SOLUTION INTRA-ARTICULAR; INTRALESIONAL; INTRAMUSCULAR; INTRAVENOUS; SOFT TISSUE AS NEEDED
Status: DISCONTINUED | OUTPATIENT
Start: 2024-05-17 | End: 2024-05-17 | Stop reason: SURG

## 2024-05-17 RX ORDER — SODIUM CHLORIDE 0.9 % (FLUSH) 0.9 %
10 SYRINGE (ML) INJECTION AS NEEDED
Status: DISCONTINUED | OUTPATIENT
Start: 2024-05-17 | End: 2024-05-17 | Stop reason: HOSPADM

## 2024-05-17 RX ORDER — IPRATROPIUM BROMIDE AND ALBUTEROL SULFATE 2.5; .5 MG/3ML; MG/3ML
3 SOLUTION RESPIRATORY (INHALATION) ONCE AS NEEDED
Status: DISCONTINUED | OUTPATIENT
Start: 2024-05-17 | End: 2024-05-17 | Stop reason: HOSPADM

## 2024-05-17 RX ORDER — SODIUM CHLORIDE, SODIUM LACTATE, POTASSIUM CHLORIDE, CALCIUM CHLORIDE 600; 310; 30; 20 MG/100ML; MG/100ML; MG/100ML; MG/100ML
1000 INJECTION, SOLUTION INTRAVENOUS CONTINUOUS
Status: DISCONTINUED | OUTPATIENT
Start: 2024-05-17 | End: 2024-05-17 | Stop reason: HOSPADM

## 2024-05-17 RX ORDER — SODIUM CHLORIDE, SODIUM LACTATE, POTASSIUM CHLORIDE, CALCIUM CHLORIDE 600; 310; 30; 20 MG/100ML; MG/100ML; MG/100ML; MG/100ML
9 INJECTION, SOLUTION INTRAVENOUS CONTINUOUS PRN
Status: DISCONTINUED | OUTPATIENT
Start: 2024-05-17 | End: 2024-05-17 | Stop reason: HOSPADM

## 2024-05-17 RX ORDER — IBUPROFEN 600 MG/1
600 TABLET ORAL ONCE AS NEEDED
Status: DISCONTINUED | OUTPATIENT
Start: 2024-05-17 | End: 2024-05-17 | Stop reason: HOSPADM

## 2024-05-17 RX ORDER — PROPOFOL 10 MG/ML
INJECTION, EMULSION INTRAVENOUS AS NEEDED
Status: DISCONTINUED | OUTPATIENT
Start: 2024-05-17 | End: 2024-05-17 | Stop reason: SURG

## 2024-05-17 RX ORDER — ACETAMINOPHEN 325 MG/1
650 TABLET ORAL ONCE AS NEEDED
Status: DISCONTINUED | OUTPATIENT
Start: 2024-05-17 | End: 2024-05-17 | Stop reason: HOSPADM

## 2024-05-17 RX ORDER — LABETALOL HYDROCHLORIDE 5 MG/ML
5 INJECTION, SOLUTION INTRAVENOUS
Status: DISCONTINUED | OUTPATIENT
Start: 2024-05-17 | End: 2024-05-17 | Stop reason: HOSPADM

## 2024-05-17 RX ORDER — ONDANSETRON 2 MG/ML
INJECTION INTRAMUSCULAR; INTRAVENOUS AS NEEDED
Status: DISCONTINUED | OUTPATIENT
Start: 2024-05-17 | End: 2024-05-17 | Stop reason: SURG

## 2024-05-17 RX ORDER — ROCURONIUM BROMIDE 10 MG/ML
INJECTION, SOLUTION INTRAVENOUS AS NEEDED
Status: DISCONTINUED | OUTPATIENT
Start: 2024-05-17 | End: 2024-05-17 | Stop reason: SURG

## 2024-05-17 RX ORDER — LIDOCAINE HYDROCHLORIDE 10 MG/ML
0.5 INJECTION, SOLUTION INFILTRATION; PERINEURAL ONCE AS NEEDED
Status: DISCONTINUED | OUTPATIENT
Start: 2024-05-17 | End: 2024-05-17 | Stop reason: HOSPADM

## 2024-05-17 RX ORDER — PHENYLEPHRINE HCL IN 0.9% NACL 1 MG/10 ML
SYRINGE (ML) INTRAVENOUS AS NEEDED
Status: DISCONTINUED | OUTPATIENT
Start: 2024-05-17 | End: 2024-05-17 | Stop reason: SURG

## 2024-05-17 RX ORDER — SODIUM CHLORIDE 0.9 % (FLUSH) 0.9 %
10 SYRINGE (ML) INJECTION EVERY 12 HOURS SCHEDULED
Status: DISCONTINUED | OUTPATIENT
Start: 2024-05-17 | End: 2024-05-17 | Stop reason: HOSPADM

## 2024-05-17 RX ORDER — ONDANSETRON 2 MG/ML
4 INJECTION INTRAMUSCULAR; INTRAVENOUS ONCE AS NEEDED
Status: DISCONTINUED | OUTPATIENT
Start: 2024-05-17 | End: 2024-05-17 | Stop reason: HOSPADM

## 2024-05-17 RX ORDER — DIPHENHYDRAMINE HYDROCHLORIDE 50 MG/ML
12.5 INJECTION INTRAMUSCULAR; INTRAVENOUS ONCE AS NEEDED
Status: DISCONTINUED | OUTPATIENT
Start: 2024-05-17 | End: 2024-05-17 | Stop reason: HOSPADM

## 2024-05-17 RX ORDER — LIDOCAINE HYDROCHLORIDE 10 MG/ML
INJECTION, SOLUTION EPIDURAL; INFILTRATION; INTRACAUDAL; PERINEURAL AS NEEDED
Status: DISCONTINUED | OUTPATIENT
Start: 2024-05-17 | End: 2024-05-17 | Stop reason: SURG

## 2024-05-17 RX ORDER — ACETAMINOPHEN 500 MG
1000 TABLET ORAL ONCE
Status: COMPLETED | OUTPATIENT
Start: 2024-05-17 | End: 2024-05-17

## 2024-05-17 RX ORDER — DIPHENHYDRAMINE HCL 25 MG
25 CAPSULE ORAL
Status: DISCONTINUED | OUTPATIENT
Start: 2024-05-17 | End: 2024-05-17 | Stop reason: HOSPADM

## 2024-05-17 RX ORDER — HYDRALAZINE HYDROCHLORIDE 20 MG/ML
5 INJECTION INTRAMUSCULAR; INTRAVENOUS
Status: DISCONTINUED | OUTPATIENT
Start: 2024-05-17 | End: 2024-05-17 | Stop reason: HOSPADM

## 2024-05-17 RX ORDER — SODIUM CHLORIDE 9 MG/ML
40 INJECTION, SOLUTION INTRAVENOUS AS NEEDED
Status: DISCONTINUED | OUTPATIENT
Start: 2024-05-17 | End: 2024-05-17 | Stop reason: HOSPADM

## 2024-05-17 RX ORDER — MEPERIDINE HYDROCHLORIDE 25 MG/ML
12.5 INJECTION INTRAMUSCULAR; INTRAVENOUS; SUBCUTANEOUS
Status: DISCONTINUED | OUTPATIENT
Start: 2024-05-17 | End: 2024-05-17 | Stop reason: HOSPADM

## 2024-05-17 RX ORDER — NALOXONE HCL 0.4 MG/ML
0.4 VIAL (ML) INJECTION AS NEEDED
Status: DISCONTINUED | OUTPATIENT
Start: 2024-05-17 | End: 2024-05-17 | Stop reason: HOSPADM

## 2024-05-17 RX ORDER — FENTANYL CITRATE 50 UG/ML
INJECTION, SOLUTION INTRAMUSCULAR; INTRAVENOUS AS NEEDED
Status: DISCONTINUED | OUTPATIENT
Start: 2024-05-17 | End: 2024-05-17 | Stop reason: SURG

## 2024-05-17 RX ORDER — DEXAMETHASONE SODIUM PHOSPHATE 4 MG/ML
8 INJECTION, SOLUTION INTRA-ARTICULAR; INTRALESIONAL; INTRAMUSCULAR; INTRAVENOUS; SOFT TISSUE ONCE AS NEEDED
Status: DISCONTINUED | OUTPATIENT
Start: 2024-05-17 | End: 2024-05-17 | Stop reason: HOSPADM

## 2024-05-17 RX ORDER — BUPIVACAINE HYDROCHLORIDE 5 MG/ML
INJECTION, SOLUTION EPIDURAL; INTRACAUDAL
Status: COMPLETED | OUTPATIENT
Start: 2024-05-17 | End: 2024-05-17

## 2024-05-17 RX ORDER — MIDAZOLAM HYDROCHLORIDE 1 MG/ML
INJECTION INTRAMUSCULAR; INTRAVENOUS AS NEEDED
Status: DISCONTINUED | OUTPATIENT
Start: 2024-05-17 | End: 2024-05-17 | Stop reason: SURG

## 2024-05-17 RX ORDER — MIDAZOLAM HYDROCHLORIDE 1 MG/ML
1 INJECTION INTRAMUSCULAR; INTRAVENOUS
Status: DISCONTINUED | OUTPATIENT
Start: 2024-05-17 | End: 2024-05-17 | Stop reason: HOSPADM

## 2024-05-17 RX ORDER — OXYCODONE HYDROCHLORIDE 5 MG/1
10 TABLET ORAL ONCE AS NEEDED
Status: COMPLETED | OUTPATIENT
Start: 2024-05-17 | End: 2024-05-17

## 2024-05-17 RX ORDER — DIPHENHYDRAMINE HYDROCHLORIDE 50 MG/ML
12.5 INJECTION INTRAMUSCULAR; INTRAVENOUS
Status: DISCONTINUED | OUTPATIENT
Start: 2024-05-17 | End: 2024-05-17 | Stop reason: HOSPADM

## 2024-05-17 RX ORDER — FENTANYL CITRATE 50 UG/ML
100 INJECTION, SOLUTION INTRAMUSCULAR; INTRAVENOUS
Status: DISCONTINUED | OUTPATIENT
Start: 2024-05-17 | End: 2024-05-17 | Stop reason: HOSPADM

## 2024-05-17 RX ORDER — ACETAMINOPHEN 650 MG/1
650 SUPPOSITORY RECTAL EVERY 4 HOURS PRN
Status: DISCONTINUED | OUTPATIENT
Start: 2024-05-17 | End: 2024-05-17 | Stop reason: HOSPADM

## 2024-05-17 RX ORDER — SCOLOPAMINE TRANSDERMAL SYSTEM 1 MG/1
1 PATCH, EXTENDED RELEASE TRANSDERMAL ONCE
Status: DISCONTINUED | OUTPATIENT
Start: 2024-05-17 | End: 2024-05-17 | Stop reason: HOSPADM

## 2024-05-17 RX ADMIN — FENTANYL CITRATE 100 MCG: 50 INJECTION, SOLUTION INTRAMUSCULAR; INTRAVENOUS at 07:51

## 2024-05-17 RX ADMIN — BUPIVACAINE HYDROCHLORIDE 10 ML: 5 INJECTION, SOLUTION EPIDURAL; INTRACAUDAL; PERINEURAL at 07:24

## 2024-05-17 RX ADMIN — BUPIVACAINE 10 ML: 13.3 INJECTION, SUSPENSION, LIPOSOMAL INFILTRATION at 07:24

## 2024-05-17 RX ADMIN — ROCURONIUM BROMIDE 50 MG: 10 INJECTION, SOLUTION INTRAVENOUS at 07:54

## 2024-05-17 RX ADMIN — SUGAMMADEX 200 MG: 100 INJECTION, SOLUTION INTRAVENOUS at 08:52

## 2024-05-17 RX ADMIN — LIDOCAINE HYDROCHLORIDE 50 MG: 10 INJECTION, SOLUTION EPIDURAL; INFILTRATION; INTRACAUDAL; PERINEURAL at 07:54

## 2024-05-17 RX ADMIN — SODIUM CHLORIDE 2 G: 900 INJECTION INTRAVENOUS at 07:30

## 2024-05-17 RX ADMIN — OXYCODONE 10 MG: 5 TABLET ORAL at 09:43

## 2024-05-17 RX ADMIN — SODIUM CHLORIDE, POTASSIUM CHLORIDE, SODIUM LACTATE AND CALCIUM CHLORIDE 1000 ML: 600; 310; 30; 20 INJECTION, SOLUTION INTRAVENOUS at 06:30

## 2024-05-17 RX ADMIN — ACETAMINOPHEN 1000 MG: 500 TABLET ORAL at 06:17

## 2024-05-17 RX ADMIN — MIDAZOLAM 4 MG: 1 INJECTION INTRAMUSCULAR; INTRAVENOUS at 07:19

## 2024-05-17 RX ADMIN — Medication 100 MCG: at 08:07

## 2024-05-17 RX ADMIN — PROPOFOL 200 MG: 10 INJECTION, EMULSION INTRAVENOUS at 07:54

## 2024-05-17 RX ADMIN — ONDANSETRON 4 MG: 2 INJECTION INTRAMUSCULAR; INTRAVENOUS at 08:02

## 2024-05-17 RX ADMIN — SODIUM CHLORIDE, SODIUM LACTATE, POTASSIUM CHLORIDE, AND CALCIUM CHLORIDE: .6; .31; .03; .02 INJECTION, SOLUTION INTRAVENOUS at 07:51

## 2024-05-17 RX ADMIN — Medication 100 MCG: at 08:02

## 2024-05-17 RX ADMIN — DEXAMETHASONE SODIUM PHOSPHATE 8 MG: 4 INJECTION, SOLUTION INTRAMUSCULAR; INTRAVENOUS at 08:02

## 2024-05-17 RX ADMIN — SCOPALAMINE 1 PATCH: 1 PATCH, EXTENDED RELEASE TRANSDERMAL at 06:17

## 2024-05-17 NOTE — OP NOTE
SHOULDER ARTHROSCOPY WITH ROTATOR CUFF REPAIR  Procedure Report    Patient Name:  Kelly Espinoza  YOB: 1975    Date of Surgery:  5/17/2024     Indications: This is a 49 y.o. female with pain to the left shoulder.  Imaging demonstrated rotator cuff tear.Treatment options were discussed.  They desired to proceed with shoulder arthroscopy with rotator cuff repair after discussing the risks including bleeding, scarring, infection, stiffness, nerve damage, tendon damage, artery damage, continued pain, DVT, loss of life or limb, and a need for further surgery.      Pre-op Diagnosis:   Left shoulder pain, unspecified chronicity [M25.512]       Post-op Diagnosis:    Left shoulder partial subscapularis tear, 90% supraspinatus tear circumferential labral tear grade 2 chondral defect glenoid    Procedure/CPT® Codes: 30014, 49371    Procedure(s): Left  SHOULDER ARTHROSCOPY WITH ROTATOR CUFF REPAIR; extensive debridment    Assistant: Kvng Thacker physician assistant    was responsible for performing the following activities: Retraction, Suction, Irrigation, Suturing, Closing, and Placing Dressing and their skilled assistance was necessary for the success of this case.         Anesthesia: General with Block    IV fluids: See anesthesia record    Estimated Blood Loss: minimal    Implants:    Implant Name Type Inv. Item Serial No.  Lot No. LRB No. Used Action   SUT TPE HIFI NONABS STACEY 2MM - [id number] Implant SUT TPE HIFI NONABS STACEY 2MM  [employer] [id number] Left 1 Implanted   SUT LP NONABS CUFFLINK HIFI NMBR2 - [id number] Implant SUT LP NONABS CUFFLINK HIFI NMBR2  [employer] [id number] Left 1 Implanted   SUT/ANCH KNOTLSS ANYI GENSYS NMBR2/HI/FI/SUT 4.75MM - [id number] Implant SUT/ANCH KNOTLSS ANYI GENSYS NMBR2/HI/FI/SUT 4.75MM  [employer] [id number] Left 1 Implanted         Complications: None    Specimens:none    Description of Procedure: The patient's operative site was marked.  Regional anesthesia  was administered.  They were brought to the operating room and placed  on the operating room table.  General anesthesia was administered. Antibiotics were dosed.  A timeout was taken, confirming the correct operative site and procedure.  Exam under anesthesia indicated full range of motion and no instability.  They were placed in a semilateral position.  An axillary roll and SCDs were placed.  The left shoulder was prepped and draped in the standard surgical fashion.  The shoulder was injected with local anesthetic and was placed into traction.  A posterior portal was created.  A camera was inserted.  The glenoid chondral surface had a grade 2 flap inferiorly.  The humerus surface was intact.  The subscapularis was torn over its upper 10%.  The biceps was mildly frayed at the bicep anchor.  The labrum was torn circumferentially.  The undersurface of the cuff demonstrated high-grade supraspinatus tear. A shaver was inserted.  The labrum was debrided circumferentially including the bicep anchor and the chondral defect of the glenoid and upper subscapularis.  The biceps was pulled into the shoulder and found to be intact.  The axillary pouch was free of synovitis or loose bodies.   The undersurface of the supraspinatus was debrided tear length measured 1 cm x 8 to 9 mm in depth and was tagged with a suture    The instruments were placed in the subacromial space.  A full-thickness bursectomy was performed.  The CA ligament was intact.  No impingement was noted.  An accessory portal was created. Tear was visualized and prepared.  It was a high-grade articular tear with some partial bursal sided tear of the so the tear was completed and measured 1 cm crescent shape tear.  Tuberosity skeletonized and a microfracture performed.   Fiber tape suture as well as a fiber link suture were used to create a mattress and ripstop configuration. These were secured to a anchor off the lateral edge of the tuberosity restoring the tendon  to its footprint.   The instruments were removed.  The wounds were closed with suture and Steri-Strips.    30 mg of Toradol and lidocaine were injected into the deltoid  and a sterile dressing was applied to the rest of the shoulder.  They were placed in a sling and taken to the recovery room.  There were no complications.  I was present for all portions.  All counts were correct.  Good capillary refill was noted to the hand.      Km Nino MD     Date: 5/17/2024  Time: 08:57 EDT

## 2024-05-17 NOTE — ANESTHESIA PROCEDURE NOTES
Peripheral Block    Pre-sedation assessment completed: 5/17/2024 7:00 AM    Patient reassessed immediately prior to procedure    Patient location during procedure: pre-op  Reason for block: procedure for pain, at surgeon's request, post-op pain management and secondary anesthetic  Performed by  Anesthesiologist: Houston Moreira MD  Assisted by: Scotty Jackson RN  Preanesthetic Checklist  Completed: patient identified, IV checked, site marked, risks and benefits discussed, surgical consent, monitors and equipment checked, pre-op evaluation and timeout performed  Prep:  Pt Position: sitting  Sterile barriers:cap, gloves, mask and washed/disinfected hands  Prep: ChloraPrep  Patient monitoring: blood pressure monitoring, continuous pulse oximetry and EKG  Procedure    Sedation: yes  Performed under: local infiltration  Guidance:ultrasound guided and landmark technique    ULTRASOUND INTERPRETATION.  Using ultrasound guidance a 22 G gauge needle was placed in close proximity to the brachial plexus nerve, at which point, under ultrasound guidance anesthetic was injected in the area of the nerve and spread of the anesthesia was seen on ultrasound in close proximity thereto.  There were no abnormalities seen on ultrasound; a digital image was taken; and the patient tolerated the procedure with no complications. Images:still images obtained, printed/placed on chart    Laterality:right  Block Type:interscalene  Injection Technique:single-shot  Needle Type:echogenic  Needle Gauge:22 G  Resistance on Injection: less than 15 psi    Medications Used: bupivacaine liposome (EXPAREL) injection 1.3% - Perineural   10 mL - 5/17/2024 7:24:00 AM  bupivacaine PF (MARCAINE) injection 0.5% - Perineural   10 mL - 5/17/2024 7:24:00 AM      Post Assessment  Injection Assessment: negative aspiration for heme, no paresthesia on injection and incremental injection  Patient Tolerance:comfortable throughout  block  Complications:no  Additional Notes  Pre-procedure:  Peripheral nerve block performed preoperatively prior to the start of anesthesia time at the request of the patient and the surgeon for the management of postoperative acute surgical pain as well as for a secondary intraoperative anesthetic (general anesthesia is the primary intraoperative anesthetic); patient identified; pre-procedure vital signs, all relevant labs/studies, complete medical/surgical/anesthetic history, full medication list, full allergy list, and NPO status obtained/reviewed; physical assessment performed; anesthetic options, side effects, potential complications, risks, and benefits discussed; questions answered; patient wishes to proceed with the procedure; written anesthesia procedure consent obtained; patient cleared for procedure; time out performed; IV access in situ    Procedure:  ASA monitor placed; supplemental oxygen provided; patient positioned; hand hygiene performed; sterile technique maintained throughout the procedure; sterile prep applied; insertion site determined by anatomical landmarks, palpation, and ultrasound imaging; live ultrasound guidance throughout the procedure; target nerves/landmarks identified on live ultrasound; skin and subcutaneous tissues numbed by injection of 1% lidocaine; 2 inch 22G StimupChiasma Ultra 360 Insulated Echogenic Needle used; realtime needle advancement and placement near the target nerves witnessed on live ultrasound; negative aspiration prior to injection; correct needle placement confirmed on live ultrasound by local anesthetic spread around the target nerves; local anesthetic mixture injected with negative aspiration prior to each injection and after each 1-5 mL injected; needle withdrawn; dressing applied; ultrasound image printed and placed in the patient's permanent medical record    Post-procedure:  Peripheral nerve block placed successfully; good block; no apparent complications;  minimal estimated blood loss; vital signs stable throughout; see nurse's notes for vitals; transported to the OR, general anesthesia induced, and surgery started

## 2024-05-17 NOTE — ANESTHESIA PROCEDURE NOTES
Airway  Date/Time: 5/17/2024 7:55 AM    Indications and Patient Condition  Indications for airway management: airway protection    Preoxygenated: yes  MILS maintained throughout  Mask difficulty assessment: 0 - not attempted    Final Airway Details  Final airway type: endotracheal airway      Successful airway: ETT     Successful intubation technique: video laryngoscopy  Facilitating devices/methods: intubating stylet  Endotracheal tube insertion site: oral  Blade: Herbert  ETT size (mm): 7.5  Cormack-Lehane Classification: grade I - full view of glottis  Measured from: teeth  ETT/EBT  to teeth (cm): 22  Number of attempts at approach: 1  Assessment: lips, teeth, and gum same as pre-op and atraumatic intubation

## 2024-05-17 NOTE — ANESTHESIA POSTPROCEDURE EVALUATION
Patient: April R Alexis    Procedure Summary       Date: 05/17/24 Room / Location: [facility] OR 11 / [facility] MAIN OR    Anesthesia Start: 0749 Anesthesia Stop: 0906    Procedure: SHOULDER ARTHROSCOPY WITH ROTATOR CUFF REPAIR; extensive debridment (Left: Shoulder) Diagnosis:       Left shoulder pain, unspecified chronicity      (Left shoulder pain, unspecified chronicity [M25.512])    Surgeons: Km Nino MD Provider: Houston Moreira MD    Anesthesia Type: general with block ASA Status: 3            Anesthesia Type: general with block    Vitals  Vitals Value Taken Time   /67 05/17/24 0947   Temp 97 °F (36.1 °C) 05/17/24 0910   Pulse 74 05/17/24 0951   Resp 15 05/17/24 0940   SpO2 91 % 05/17/24 0951   Vitals shown include unfiled device data.        Post Anesthesia Care and Evaluation    Patient location during evaluation: PACU  Patient participation: complete - patient participated  Level of consciousness: awake  Pain scale: See nurse's notes for pain score.  Pain management: adequate    Airway patency: patent  Anesthetic complications: No anesthetic complications  PONV Status: none  Cardiovascular status: acceptable  Respiratory status: acceptable and spontaneous ventilation  Hydration status: acceptable    Comments: Patient seen and examined postoperatively; vital signs stable; SpO2 greater than or equal to 90%; cardiopulmonary status stable; nausea/vomiting adequately controlled; pain adequately controlled; no apparent anesthesia complications; patient discharged from anesthesia care when discharge criteria were met

## 2024-05-20 ENCOUNTER — OFFICE VISIT (OUTPATIENT)
Dept: ORTHOPEDIC SURGERY | Facility: CLINIC | Age: 49
End: 2024-05-20
Payer: COMMERCIAL

## 2024-05-20 VITALS — HEIGHT: 62 IN | WEIGHT: 224 LBS | HEART RATE: 79 BPM | BODY MASS INDEX: 41.22 KG/M2

## 2024-05-20 DIAGNOSIS — Z47.89 ORTHOPEDIC AFTERCARE: Primary | ICD-10-CM

## 2024-05-20 PROCEDURE — 99024 POSTOP FOLLOW-UP VISIT: CPT | Performed by: ORTHOPAEDIC SURGERY

## 2024-05-20 RX ORDER — BUSPIRONE HYDROCHLORIDE 5 MG/1
5 TABLET ORAL 2 TIMES DAILY PRN
Qty: 60 TABLET | Refills: 4 | Status: SHIPPED | OUTPATIENT
Start: 2024-05-20

## 2024-05-20 NOTE — PATIENT INSTRUCTIONS
Shoulder Arthroscopy: Post- Operative Visit Objectives    Review the operative findings, procedures and photos.  Make sure medications are effective and not causing problems.  Pain: Oxycodone or hydrocodone is for pain. You may take 1 tablet every 6 hours as necessary.  Some patients don’t require the use of these…in those circumstances just use Tylenol extra-strength 1 or 2 tablets every 4-6 hours.   Naproxen 500 mg. For pain and inflammation.  You should take 1 every twice a day.  Do not use Aleve, Ibuprofen, Motrin or Advil during this time.  If you have had any problems stop taking these medicines and please tell us!  Keflex (cephalexin). This is an antibiotic to be taken as a preventive medicine.  Take 1 pill every 6 hours for 1 day. If you have a penicillin allergy this will be replaced by other options.  Wound Care:  Today we will change your dressings and cover your wounds with a plastic covering called Tegaderm. This will allow you to shower immediately.  Remove the tegaderm and underlying dressings in two weeks then ok to get wet in a shower. No Baths or swimming until 4 weeks after surgery.  Keep a towel on the dressing while applying ice.  Exercises and Physical Therapy Remember the protocol is 3 phases:  Healing (6 wks)  Continue the use of the sling for 6 weeks; depending on procedure utilized this may be shorter. You can do gentle range of motion exercise of the elbow and wrist immediately with the arm at the side.  Formal physical therapy will usually start in two weeks.    Rehabilitative (6 wks) You begin a more aggressive phase of physical therapy at the 6 week ana. Light strengthening and a continued emphasis on protecting the repair are important at this stage.  Restorative (4 wks)  Back to your sports and job activities gradually   Follow Up appointments Schedule Follow up visits as directed usually in 4 weeks. Physical therapy will be ordered today and you should receive a phone call or can  schedule yourself if appropriate.  Notes  Make sure you have all necessary notes and documentation for school or work.  Issues: Remember our goal is to make this process smooth and easy if there is any thing you need please ask us or call back [phone]

## 2024-05-20 NOTE — PROGRESS NOTES
"     Patient ID: Kelly Espinoza is a 49 y.o. female.    5/17/24 left shoulder arthroscopy and supraspinatus repair  Pain moderate  Objective:    Pulse 79   Ht 157.5 cm (62\")   Wt 102 kg (224 lb)   BMI 40.97 kg/m²     Physical Examination:    Wounds are well approximated without infection.  Sensory and motor exam are intact in all distributions. Radial pulse is palpable and capillary refill is less than two seconds to all digits.    Imaging:      Assessment:  Doing well after cuff repair    Plan:  Wounds were cleaned and redressed. Restrictions discussed.  Begin therapy and see me in 4 weeks.  Remove dressings in two weeks.  "

## 2024-06-03 ENCOUNTER — TREATMENT (OUTPATIENT)
Dept: PHYSICAL THERAPY | Facility: CLINIC | Age: 49
End: 2024-06-03
Payer: COMMERCIAL

## 2024-06-03 DIAGNOSIS — Z98.890 H/O REPAIR OF LEFT ROTATOR CUFF: ICD-10-CM

## 2024-06-03 DIAGNOSIS — Z47.89 ORTHOPEDIC AFTERCARE: ICD-10-CM

## 2024-06-03 DIAGNOSIS — M25.512 LEFT SHOULDER PAIN, UNSPECIFIED CHRONICITY: Primary | ICD-10-CM

## 2024-06-03 PROCEDURE — 97110 THERAPEUTIC EXERCISES: CPT | Performed by: PHYSICAL THERAPIST

## 2024-06-03 PROCEDURE — 97161 PT EVAL LOW COMPLEX 20 MIN: CPT | Performed by: PHYSICAL THERAPIST

## 2024-06-03 NOTE — PROGRESS NOTES
Physical Therapy Initial Evaluation and Plan of Care  [address] [place] [address]     Patient: April SABA Espinoza   : 1975  Diagnosis/ICD-10 Code:  Left shoulder pain, unspecified chronicity [M25.512]  Referring practitioner: Km Nino, *  Date of Initial Visit: 6/3/2024  Today's Date: 6/3/2024  Patient seen for 1 sessions           Visit Diagnoses:    ICD-10-CM ICD-9-CM   1. Left shoulder pain, unspecified chronicity  M25.512 719.41   2. Orthopedic aftercare  Z47.89 V54.9   3. H/O repair of left rotator cuff  Z98.890 V15.29       Subjective Questionnaire: QuickDASH: 85%      Subjective 48 yo female s/p L RTC repair 24. Has difficulty sleeping but denies post op complications. Reports a long history of pain for several years without improvement. Voices understanding of activity restrictions. 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 5/10 pain now and 7/10 at worst. Has had some numbness/tingling in her hand but this is improving. R UE dominant.   Social hx: Works as a  at [employer], has help at home  MD follow up: 24      Objective          Active Range of Motion     Right Shoulder   Normal active range of motion    Passive Range of Motion   Left Shoulder   Flexion: 90 degrees   Abduction: 90 degrees   External rotation 45°: 20 degrees   Internal rotation 45°: 20 degrees       No signs of discharge  Some guarding with PROM  Further testing deferred, post op    Assessment & Plan       Assessment  Impairments: abnormal coordination, abnormal or restricted ROM, activity intolerance, impaired physical strength, lacks appropriate home exercise program and pain with function   Functional limitations: carrying objects, lifting, sleeping, pulling, pushing, uncomfortable because of pain, reaching behind back, reaching overhead and unable to perform repetitive tasks   Assessment details: 48 yo female s/p L RTC repair on 24. Pt is with a good response  to treatment this date and would benefit from further evaluation and treatment to address the above impairments.    Prognosis: good    Goals  Plan Goals: Short term goals, 1 week: Tolerate HEP progression.  Voice compliance with activity modification.  Report improvement in symptoms.    LTGs, 12 weeks: Improve quick DASH score to 10%.  AROM to be at or near wfl to allow reaching and lifting activities.  4+/5 strength throughout to allow reaching and lifting activities.  Independent with HEP.    Plan  Therapy options: will be seen for skilled therapy services  Other planned modality interventions: modalities prn  Planned therapy interventions: flexibility, functional ROM exercises, home exercise program, manual therapy, neuromuscular re-education, strengthening, stretching and therapeutic activities  Frequency: 2x week (1-2 times per week)  Duration in weeks: 12  Treatment plan discussed with: patient        History # of Personal Factors and/or Comorbidities: LOW (0)  Examination of Body System(s): # of elements: LOW (1-2)  Clinical Presentation: STABLE   Clinical Decision Making: LOW      Timed:         Manual Therapy:    10     mins  46862;     Therapeutic Exercise:    10     mins  84459;     Neuromuscular Ivett:        mins  95241;    Therapeutic Activity:          mins  60735;     Gait Training:           mins  62460;     Ultrasound:          mins  21418;    Ionto                                   mins   47635  Self Care                            mins   67032    Un-Timed:  Electrical Stimulation:         mins  53540 ( );  Traction          mins 28154  Low Eval     15     Mins  97304  Mod Eval          Mins  16464  High Eval                            Mins  12098  Re-Eval                               mins  81703        Timed Treatment:   20   mins   Total Treatment:     35   mins      PT SIGNATURE: Matt Meléndez PT, DPT, OCS  [place] license[place] [id number]  DATE TREATMENT INITIATED: 6/3/2024    Certification  Period: @TDA @thru 8/31/2024  I certify that the therapy services are furnished while this patient is under my care.  The services outlined above are required for this patient and will be reviewed every 90 days.     PHYSICIAN: _____________________________    Km Nino MD      DATE:     Please sign and return via fax to [unfilled] . Thank you, [facility].

## 2024-06-06 ENCOUNTER — TREATMENT (OUTPATIENT)
Dept: PHYSICAL THERAPY | Facility: CLINIC | Age: 49
End: 2024-06-06
Payer: COMMERCIAL

## 2024-06-06 DIAGNOSIS — M25.512 LEFT SHOULDER PAIN, UNSPECIFIED CHRONICITY: Primary | ICD-10-CM

## 2024-06-06 DIAGNOSIS — Z98.890 H/O REPAIR OF LEFT ROTATOR CUFF: ICD-10-CM

## 2024-06-06 DIAGNOSIS — Z47.89 ORTHOPEDIC AFTERCARE: ICD-10-CM

## 2024-06-06 NOTE — PROGRESS NOTES
Physical Therapy Daily Treatment Note  [facility]  [address] [place] [address]     Patient:  Alexis   : 1975   Referring practitioner: Km Nion, *  Date of initial visit: Type: THERAPY  Noted: 6/3/2024   Today's date: 2024   Patient seen for 2 visits    Visit Diagnoses:    ICD-10-CM ICD-9-CM   1. Left shoulder pain, unspecified chronicity  M25.512 719.41   2. Orthopedic aftercare  Z47.89 V54.9   3. H/O repair of left rotator cuff  Z98.890 V15.29       Subjective   Pt reports: HEP going well, compliant with restrictions.      Objective     See Exercise, Manual, and Modality Logs for complete treatment.     Patient Education: HEP    Assessment/Plan Less guarding today.      Progress per Plan of Care            Timed:         Manual Therapy:    15     mins  07457;     Therapeutic Exercise:    8     mins  62879;     Neuromuscular Ivtet:        mins  14232;    Therapeutic Activity:          mins  67216;     Gait Training:           mins  16195;     Ultrasound:          mins  99534;    Ionto                                   mins   51454  Self Care                            mins   33864    Un-Timed:  Electrical Stimulation:         mins  58393 ( );  Traction          mins 01075  Low Eval          Mins  48183  Mod Eval          Mins  84502  High Eval                            Mins  95205  Re-Eval                               mins  63533    Timed Treatment:   23   mins   Total Treatment:     23   mins      Matt Meléndez, PT, DPT, OCS  [place] license: [id number]  Physical Therapist

## 2024-06-07 ENCOUNTER — PATIENT MESSAGE (OUTPATIENT)
Dept: PAIN MEDICINE | Facility: CLINIC | Age: 49
End: 2024-06-07
Payer: COMMERCIAL

## 2024-06-07 DIAGNOSIS — M50.30 DDD (DEGENERATIVE DISC DISEASE), CERVICAL: Primary | ICD-10-CM

## 2024-06-07 RX ORDER — HYDROCODONE BITARTRATE AND ACETAMINOPHEN 7.5; 325 MG/1; MG/1
1 TABLET ORAL 3 TIMES DAILY PRN
Qty: 90 TABLET | Refills: 0 | Status: SHIPPED | OUTPATIENT
Start: 2024-06-07

## 2024-06-07 NOTE — TELEPHONE ENCOUNTER
From: April SABA Espinoza  To: Rome Springer  Sent: 6/7/2024 1:43 PM EDT  Subject: Rotator cuff surgery     Hello. I am 3 wks post op rotator cuff sx. Had a large tear in one of the rotator cuff tendons & had other repairs done. Dr Villegas only gave me 20 percocets 5mg. 1 every 6 hrs. That was not adequate for my pain. After those I have been taking my normal hydros prescribed. I have had to take 3 and sometimes 4/day bcz of my post op pain. Don't have many left. Maybe 8. I will run out. I realize I should've asked you to ok taking the 3 to 4 xs a day but the pain has been so bad I didn't know what else to do. Will I be able to get you to change my hydro script so I can get it filled earlier?   Thank you.

## 2024-06-07 NOTE — TELEPHONE ENCOUNTER
I will send it earlier this one time. Sent for 90 tabs for 1 month.     Rome Springer DO  Pain Management   [facility]

## 2024-06-10 ENCOUNTER — TREATMENT (OUTPATIENT)
Dept: PHYSICAL THERAPY | Facility: CLINIC | Age: 49
End: 2024-06-10
Payer: COMMERCIAL

## 2024-06-10 DIAGNOSIS — Z98.890 H/O REPAIR OF LEFT ROTATOR CUFF: ICD-10-CM

## 2024-06-10 DIAGNOSIS — Z47.89 ORTHOPEDIC AFTERCARE: ICD-10-CM

## 2024-06-10 DIAGNOSIS — M25.512 LEFT SHOULDER PAIN, UNSPECIFIED CHRONICITY: Primary | ICD-10-CM

## 2024-06-10 PROCEDURE — 97140 MANUAL THERAPY 1/> REGIONS: CPT | Performed by: PHYSICAL THERAPIST

## 2024-06-10 NOTE — PROGRESS NOTES
Physical Therapy Daily Treatment Note  [facility]  [address] [place] [address]     Patient:  Alexis   : 1975   Referring practitioner: Km Nino, *  Date of initial visit: Type: THERAPY  Noted: 6/3/2024   Today's date: 6/10/2024   Patient seen for 3 visits    Visit Diagnoses:    ICD-10-CM ICD-9-CM   1. Left shoulder pain, unspecified chronicity  M25.512 719.41   2. Orthopedic aftercare  Z47.89 V54.9   3. H/O repair of left rotator cuff  Z98.890 V15.29       Subjective   Pt reports: Pt notes episodes of lateral shoulder pain. Also reports some L UE use while in the immobilizer while working. HEP going well.      Objective     See Exercise, Manual, and Modality Logs for complete treatment.     Patient Education: HEP    Assessment/Plan Tolerating PROM well. Pt voices understanding of activity restrictions.      Progress per Plan of Care            Timed:         Manual Therapy:    25     mins  00590;     Therapeutic Exercise:         mins  50485;     Neuromuscular Ivett:        mins  55507;    Therapeutic Activity:          mins  39528;     Gait Training:           mins  03429;     Ultrasound:          mins  44615;    Ionto                                   mins   04830  Self Care                            mins   02475    Un-Timed:  Electrical Stimulation:         mins  49209 ( );  Traction          mins 77300  Low Eval          Mins  91033  Mod Eval          Mins  18915  High Eval                            Mins  13766  Re-Eval                               mins  53288    Timed Treatment:   25   mins   Total Treatment:     25   mins      Matt Meléndez, PT, DPT, OCS  [place] license: [id number]  Physical Therapist

## 2024-06-17 ENCOUNTER — OFFICE VISIT (OUTPATIENT)
Dept: ORTHOPEDIC SURGERY | Facility: CLINIC | Age: 49
End: 2024-06-17
Payer: COMMERCIAL

## 2024-06-17 ENCOUNTER — TREATMENT (OUTPATIENT)
Dept: PHYSICAL THERAPY | Facility: CLINIC | Age: 49
End: 2024-06-17
Payer: COMMERCIAL

## 2024-06-17 VITALS — WEIGHT: 224 LBS | HEART RATE: 93 BPM | BODY MASS INDEX: 41.22 KG/M2 | HEIGHT: 62 IN

## 2024-06-17 DIAGNOSIS — Z47.89 ORTHOPEDIC AFTERCARE: ICD-10-CM

## 2024-06-17 DIAGNOSIS — Z98.890 H/O REPAIR OF LEFT ROTATOR CUFF: ICD-10-CM

## 2024-06-17 DIAGNOSIS — M25.512 LEFT SHOULDER PAIN, UNSPECIFIED CHRONICITY: Primary | ICD-10-CM

## 2024-06-17 DIAGNOSIS — Z47.89 ORTHOPEDIC AFTERCARE: Primary | ICD-10-CM

## 2024-06-17 PROCEDURE — 99024 POSTOP FOLLOW-UP VISIT: CPT | Performed by: ORTHOPAEDIC SURGERY

## 2024-06-17 NOTE — PROGRESS NOTES
Physical Therapy Daily Progress Note      Patient: April SABA Espinoza   : 1975  Diagnosis/ICD-10 Code:  Left shoulder pain, unspecified chronicity [M25.512]  Referring practitioner: Km Nino, *  Date of Initial Visit: Type: THERAPY  Noted: 6/3/2024  Today's Date: 2024  Patient seen for 4 sessions             Subjective Pt noticed her shoulder was less sore since having a few days off from PT.    Objective   See Exercise, Manual, and Modality Logs for complete treatment.       Assessment/Plan  PROM is within protocol limits.    Progress per Plan of Care           Timed:         Manual Therapy:    25     mins  48835;         Timed Treatment:   25   mins   Total Treatment:     25   mins        Yovany Burciaga PTA  Physical Therapist Assistant

## 2024-06-17 NOTE — PROGRESS NOTES
"     Patient ID: Kelly Espinoza is a 49 y.o. female.  5/17/24 left shoulder arthroscopy and supraspinatus repair   Pain mild to moderate    Objective:    Pulse 93   Ht 157.5 cm (62\")   Wt 102 kg (224 lb)   BMI 40.97 kg/m²     Physical Examination:  Incisions are healed no sign of infection passive elevation 90 external rotation 30        Imaging:      Assessment:  Doing well after cuff repair    Plan:  Restrictions discussed. Continue physical therapy. See me in 8 weeks. Sling for 2 weeks.  "

## 2024-06-20 ENCOUNTER — TREATMENT (OUTPATIENT)
Dept: PHYSICAL THERAPY | Facility: CLINIC | Age: 49
End: 2024-06-20
Payer: COMMERCIAL

## 2024-06-20 DIAGNOSIS — M25.512 LEFT SHOULDER PAIN, UNSPECIFIED CHRONICITY: Primary | ICD-10-CM

## 2024-06-20 DIAGNOSIS — Z47.89 ORTHOPEDIC AFTERCARE: ICD-10-CM

## 2024-06-20 DIAGNOSIS — Z98.890 H/O REPAIR OF LEFT ROTATOR CUFF: ICD-10-CM

## 2024-06-20 NOTE — LETTER
Re-Assessment / Re-Certification        Patient: Kelly Espinoza   : 1975  Diagnosis/ICD-10 Code:  Left shoulder pain, unspecified chronicity [M25.512]  Referring practitioner: Km Nino, *  Date of Initial Visit: Type: THERAPY  Noted: 6/3/2024  Today's Date: 2024  Patient seen for 5 sessions      Subjective:     Clinical Progress: improved  Home Program Compliance: Yes  Treatment has included: therapeutic exercise, manual therapy, and cryotherapy Shoulder PROM only to this point    Subjective Pt has returned to work but reports compliance with activity restrictions. Pt reports exacerbation in pain along the lateral shoulder after last visit. 5-10 pain at worst, reports pain has decreased back to her baseline level.   Objective   PROM flexion 90 deg  Abduction 90 deg  ER 40 deg  IR 35 deg    Further testing deferred per protocol  Assessment/Plan  Pt is making good progress to this point and is tolerating PROM with mild guarding. She has returned to work at the [employer] but feels she is following activity restrictions. Pt's reported exacerbation has return to baseline level and she tolerated today's visit well. Will plan to continue with the POC per protocol as per initial evaluation.    Short term goals, 1 week: Tolerate HEP progression. Not completed per protocol  Voice compliance with activity modification. met  Report improvement in symptoms. progressing     LTGs, 12 weeks: Improve quick DASH score to 10%. Not met  AROM to be at or near wfl to allow reaching and lifting activities. Testing not completed per protocol  4+/5 strength throughout to allow reaching and lifting activities. Testing not completed per protocol  Independent with HEP. Progressing    Continue POC 2-3 times per week for 8 more weeks. Will plan to continue to follow protocol as per IE.    Prognosis to achieve goals: shady Meléndez, PT, DPT, OCS  [place] license: [id number]  Physical Therapist        Timed:          Manual Therapy:    25     mins  96094;     Therapeutic Exercise:         mins  53421;     Neuromuscular Ivett:        mins  44014;    Therapeutic Activity:          mins  97284;     Gait Training:           mins  89572;     Ultrasound:          mins  79225;    Ionto                                   mins   23619  Self Care                            mins   68401    Un-Timed:  Electrical Stimulation:         mins  23801 ( );  Traction          mins 37460  Low Eval          Mins  01217  Mod Eval          Mins  34113  High Eval                            Mins  76886  Re-Eval                               mins  20295      Timed Treatment:   25   mins   Total Treatment:     25   mins

## 2024-06-20 NOTE — PROGRESS NOTES
Re-Assessment / Re-Certification        Patient: Kelly Espinoza   : 1975  Diagnosis/ICD-10 Code:  Left shoulder pain, unspecified chronicity [M25.512]  Referring practitioner: Km Nino, *  Date of Initial Visit: Type: THERAPY  Noted: 6/3/2024  Today's Date: 2024  Patient seen for 5 sessions      Subjective:     Clinical Progress: improved  Home Program Compliance: Yes  Treatment has included: therapeutic exercise, manual therapy, and cryotherapy Shoulder PROM only to this point    Subjective Pt has returned to work but reports compliance with activity restrictions. Pt reports exacerbation in pain along the lateral shoulder after last visit. 5-10 pain at worst, reports pain has decreased back to her baseline level.   Objective   PROM flexion 90 deg  Abduction 90 deg  ER 40 deg  IR 35 deg    Further testing deferred per protocol  Assessment/Plan  Pt is making good progress to this point and is tolerating PROM with mild guarding. She has returned to work at the [employer] but feels she is following activity restrictions. Pt's reported exacerbation has return to baseline level and she tolerated today's visit well. Will plan to continue with the POC per protocol as per initial evaluation.    Short term goals, 1 week: Tolerate HEP progression. Not completed per protocol  Voice compliance with activity modification. met  Report improvement in symptoms. progressing     LTGs, 12 weeks: Improve quick DASH score to 10%. Not met  AROM to be at or near wfl to allow reaching and lifting activities. Testing not completed per protocol  4+/5 strength throughout to allow reaching and lifting activities. Testing not completed per protocol  Independent with HEP. Progressing    Continue POC 2-3 times per week for 8 more weeks. Will plan to continue to follow protocol as per IE.    Prognosis to achieve goals: shady Meléndez, PT, DPT, OCS  [place] license: [id number]  Physical Therapist        Timed:          Manual Therapy:    25     mins  94491;     Therapeutic Exercise:         mins  36238;     Neuromuscular Ivett:        mins  93908;    Therapeutic Activity:          mins  72703;     Gait Training:           mins  97648;     Ultrasound:          mins  93911;    Ionto                                   mins   94700  Self Care                            mins   77733    Un-Timed:  Electrical Stimulation:         mins  98958 ( );  Traction          mins 48880  Low Eval          Mins  03816  Mod Eval          Mins  90571  High Eval                            Mins  65325  Re-Eval                               mins  70307      Timed Treatment:   25   mins   Total Treatment:     25   mins

## 2024-06-25 ENCOUNTER — TREATMENT (OUTPATIENT)
Dept: PHYSICAL THERAPY | Facility: CLINIC | Age: 49
End: 2024-06-25
Payer: COMMERCIAL

## 2024-06-25 DIAGNOSIS — Z47.89 ORTHOPEDIC AFTERCARE: ICD-10-CM

## 2024-06-25 DIAGNOSIS — Z98.890 H/O REPAIR OF LEFT ROTATOR CUFF: ICD-10-CM

## 2024-06-25 DIAGNOSIS — M25.512 LEFT SHOULDER PAIN, UNSPECIFIED CHRONICITY: Primary | ICD-10-CM

## 2024-06-25 NOTE — PROGRESS NOTES
Physical Therapy Daily Treatment Note  [facility]  [address]  [place] [address]     Patient: April SABA Espinoza   : 1975   Referring practitioner: Km Nino, *  Date of initial visit: Type: THERAPY  Noted: 6/3/2024   Today's date: 2024   Patient seen for 6 visits    Visit Diagnoses:    ICD-10-CM ICD-9-CM   1. Left shoulder pain, unspecified chronicity  M25.512 719.41   2. Orthopedic aftercare  Z47.89 V54.9   3. H/O repair of left rotator cuff  Z98.890 V15.29       Subjective   Pt reports: Compliant with activity restrictions. Stiff and sore today. HEP going well.      Objective     See Exercise, Manual, and Modality Logs for complete treatment.     Patient Education: HEP    Assessment/Plan Progressing well. Continue per protocol.      Progress per Plan of Care            Timed:         Manual Therapy:    25     mins  21795;     Therapeutic Exercise:         mins  97675;     Neuromuscular Ivett:        mins  00191;    Therapeutic Activity:          mins  94597;     Gait Training:           mins  37189;     Ultrasound:          mins  83033;    Ionto                                   mins   92617  Self Care                            mins   14594    Un-Timed:  Electrical Stimulation:         mins  79398 ( );  Traction          mins 72737  Low Eval          Mins  48447  Mod Eval          Mins  40923  High Eval                            Mins  52018  Re-Eval                               mins  56455    Timed Treatment:   25   mins   Total Treatment:     25   mins      Matt Meléndez, PT, DPT, OCS  [place] license: [id number]  Physical Therapist

## 2024-06-27 ENCOUNTER — TREATMENT (OUTPATIENT)
Dept: PHYSICAL THERAPY | Facility: CLINIC | Age: 49
End: 2024-06-27
Payer: COMMERCIAL

## 2024-06-27 DIAGNOSIS — Z98.890 H/O REPAIR OF LEFT ROTATOR CUFF: ICD-10-CM

## 2024-06-27 DIAGNOSIS — Z47.89 ORTHOPEDIC AFTERCARE: ICD-10-CM

## 2024-06-27 DIAGNOSIS — M25.512 LEFT SHOULDER PAIN, UNSPECIFIED CHRONICITY: Primary | ICD-10-CM

## 2024-06-27 NOTE — PROGRESS NOTES
Physical Therapy Daily Treatment Note  [facility]  [address] [place] [address]     Patient: April SABA Espinoza   : 1975   Referring practitioner: Km Nino, *  Date of initial visit: Type: THERAPY  Noted: 6/3/2024   Today's date: 2024   Patient seen for 7 visits    Visit Diagnoses:    ICD-10-CM ICD-9-CM   1. Left shoulder pain, unspecified chronicity  M25.512 719.41   2. Orthopedic aftercare  Z47.89 V54.9   3. H/O repair of left rotator cuff  Z98.890 V15.29       Subjective   Pt reports: Pain improving. HEP going well.      Objective     See Exercise, Manual, and Modality Logs for complete treatment.     Patient Education: HEP, pt to order home pulley to begin AAROM next week per protocol    Assessment/Plan Progressing well. Begin AAROM per protocol.      Progress per Plan of Care            Timed:         Manual Therapy:    23     mins  19916;     Therapeutic Exercise:         mins  74590;     Neuromuscular Ivett:        mins  82107;    Therapeutic Activity:          mins  67844;     Gait Training:           mins  96804;     Ultrasound:          mins  62672;    Ionto                                   mins   49565  Self Care                            mins   10538    Un-Timed:  Electrical Stimulation:         mins  43041 ( );  Traction          mins 55359  Low Eval          Mins  59541  Mod Eval          Mins  69188  High Eval                            Mins  94432  Re-Eval                               mins  82969    Timed Treatment:   23   mins   Total Treatment:     23   mins      Matt Meléndez, PT, DPT, OCS  [place] license: [id number]  Physical Therapist

## 2024-07-09 ENCOUNTER — TELEPHONE (OUTPATIENT)
Dept: PHYSICAL THERAPY | Facility: CLINIC | Age: 49
End: 2024-07-09

## 2024-07-11 ENCOUNTER — OFFICE VISIT (OUTPATIENT)
Dept: PAIN MEDICINE | Facility: CLINIC | Age: 49
End: 2024-07-11
Payer: COMMERCIAL

## 2024-07-11 ENCOUNTER — TELEPHONE (OUTPATIENT)
Dept: PAIN MEDICINE | Facility: CLINIC | Age: 49
End: 2024-07-11

## 2024-07-11 VITALS
DIASTOLIC BLOOD PRESSURE: 87 MMHG | OXYGEN SATURATION: 96 % | WEIGHT: 230 LBS | RESPIRATION RATE: 16 BRPM | SYSTOLIC BLOOD PRESSURE: 130 MMHG | HEART RATE: 91 BPM | BODY MASS INDEX: 42.07 KG/M2

## 2024-07-11 DIAGNOSIS — M54.81 OCCIPITAL NEURALGIA, UNSPECIFIED LATERALITY: ICD-10-CM

## 2024-07-11 DIAGNOSIS — G89.29 CHRONIC LEFT SHOULDER PAIN: ICD-10-CM

## 2024-07-11 DIAGNOSIS — M50.30 DDD (DEGENERATIVE DISC DISEASE), CERVICAL: ICD-10-CM

## 2024-07-11 DIAGNOSIS — M25.512 CHRONIC LEFT SHOULDER PAIN: ICD-10-CM

## 2024-07-11 DIAGNOSIS — Z79.899 HIGH RISK MEDICATION USE: Primary | ICD-10-CM

## 2024-07-11 RX ORDER — HYDROCODONE BITARTRATE AND ACETAMINOPHEN 7.5; 325 MG/1; MG/1
1 TABLET ORAL 3 TIMES DAILY PRN
Qty: 90 TABLET | Refills: 0 | Status: SHIPPED | OUTPATIENT
Start: 2024-08-10 | End: 2024-09-09

## 2024-07-11 RX ORDER — HYDROCODONE BITARTRATE AND ACETAMINOPHEN 7.5; 325 MG/1; MG/1
1 TABLET ORAL 3 TIMES DAILY PRN
Qty: 90 TABLET | Refills: 0 | Status: SHIPPED | OUTPATIENT
Start: 2024-07-11 | End: 2024-07-12 | Stop reason: SDUPTHER

## 2024-07-11 NOTE — TELEPHONE ENCOUNTER
Caller: Alexis April R    Relationship: Self    Best call back number: [phone]    What is the best time to reach you: ANYTIME     Who are you requesting to speak with (clinical staff, provider,  specific staff member): N/A    Do you know the name of the person who called: PATIENT INCOMING     What was the call regarding: WANTED TO LET THE OFFICE KNOW [facility] HAS HER MEDICATION IN STOCK

## 2024-07-11 NOTE — TELEPHONE ENCOUNTER
Provider: CASEY    Caller: PATIENT    Phone Number: [phone]    Reason for Call: PATIENT STATES THAT HER PHARMACY IS OUT OF [facility]CO 7.5-325 MG. SHE IS GOING TO CALL AROUND TO OTHER PHARMACIES TO SEE WHO HAS IT IN STOCK, AND WILL CALL BACK TO LET OFFICE KNOW.

## 2024-07-11 NOTE — PROGRESS NOTES
Subjective   Kelly Espinoza is a 49 y.o. female is here for follow-up for neck pain.  Last seen on 4/3/2024.  She is s/p  left shoulder arthroscopy and supraspinatus repair with Dr. Villegas on 5/17/2024. Currently working with PT. continues to have left shoulder pain.  She also have increased left-sided occipital headaches.      On last visit:     Neck pain is 6/10 on VAS, maximum 6/10.  Pain is aching, throbbing, tingling and sometimes weakness and numbness in nature.  Referred to left neck and shoulder.  Constant pain but improved by prednisone, Norco, gabapentin.  Worse with bending or laying down.  Severe headaches in occipital region with numbness and tingling in both hands mostly in third fourth and fifth digit.  Trouble with gripping with the left arm.  Left-sided occipital region headache radiating to left temporal and frontal region.    Left shoulder pain is 6/10 on VAS.     Lower back pain is 7/10 on VAS, at maximum is 8/10. Pain is sharp, shooting and stabbing in nature. Pain is referred left buttock, left anterior thigh, left anterior shin and left foot. The pain is constnat. The pain is improved by nothing. The pain is worse with bending.      Previous Injection:   3/5/2024-left shoulder injection with ultrasound- some relief for 1-2  weeks.  7/10/2023 - left occipital nerve block; left trapezius and rhomboids TPI- improvement  with sharp, shooting pain; didn't much relief with TPI.   2/15/2023- left occipital nerve block- good relief with sharp shooting pain- 4 months   11/8/2022-KENDRA C7-T1- improved headache, still has some pain.   3/8/2022-KENDRA C6/7- 75% pain relief- pain relief, improved headaches and improved flexibility - 4 months     Hx: Referred by Vivian Fontanez APRN  for neck pain.  Her pain started about 3 years ago without any significant injuries. 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   She has been seen by neurosurgery and was told she was non surgical  candidate.  EMG showed bilateral carpal tunnel syndrome and cubital tunnel syndrome.  S/p bilateral carpal tunnel injection with 50% relief. She is s/p R carpal tunnel release. Seen by rheumatology who r/o lupus and has been diagnosed with Fibromyalgia. S/p  left shoulder arthroscopy and supraspinatus repair with Dr. Villegas on 5/17/2024       PHQ-9- 9  SOAPP-2      PMH:   Lupus, GERD, anxiety, ADD, s/p L5-S1 dissectomy 2000, s/p hand and arm surgery with Dr. Faulkner on 4/21/2023 with improvement in numbness in R hand.      Current Medications:   Norco 5-325 mg BID PRN  Celebrex 200 mg BID PRN  Cymbalta 30 mg daily  Gabapentin 600 mg TID   Ambien        Past Medications:   Tramadol 50 mg q8h PRN-1/16/2022 - didn't help     Past Modalities:  TENS:                                                                          no                                                  Physical Therapy Within The Last 6 Months              Yes - 8 weeks of PT for shoulder without much improvement.  Psychotherapy                                                            no  Massage Therapy                                                       no     Patient Complains Of:  Uro-Fecal Incontinence          no  Weight Gain/Loss                   no  Fever/Chills                             no  Weakness                               no           Current Outpatient Medications:     albuterol (ACCUNEB) 0.63 MG/3ML nebulizer solution, Take 3 mL by nebulization Every 6 (Six) Hours As Needed for Wheezing or Shortness of Air., Disp: 120 each, Rfl: 0    busPIRone (BUSPAR) 5 MG tablet, TAKE 1 TABLET BY MOUTH 2 TIMES A DAY AS NEEDED FOR ANXIETY, Disp: 60 tablet, Rfl: 4    estradiol (ESTRACE) 2 MG tablet, TAKE 1 TABLET BY MOUTH DAILY, Disp: 30 tablet, Rfl: 5    HYDROcodone-acetaminophen (NORCO) 7.5-325 MG per tablet, Take 1 tablet by mouth 3 (Three) Times a Day As Needed for Moderate Pain or Severe Pain for up to 30 days., Disp: 90 tablet, Rfl: 0     [START ON 8/10/2024] HYDROcodone-acetaminophen (NORCO) 7.5-325 MG per tablet, Take 1 tablet by mouth 3 (Three) Times a Day As Needed for Moderate Pain or Severe Pain for up to 30 days., Disp: 90 tablet, Rfl: 0    levalbuterol (XOPENEX HFA) 45 MCG/ACT inhaler, Inhale 1-2 puffs Every 4 (Four) Hours As Needed for Wheezing or Shortness of Air., Disp: 15 g, Rfl: 5    naproxen (NAPROSYN) 500 MG tablet, Take 1 tablet by mouth 2 (Two) Times a Day With Meals., Disp: 28 tablet, Rfl: 0    ondansetron (Zofran) 4 MG tablet, Take 1 tablet by mouth Every 8 (Eight) Hours As Needed for Nausea or Vomiting., Disp: 20 tablet, Rfl: 1    pantoprazole (PROTONIX) 40 MG EC tablet, Take 1 tablet by mouth Every Night., Disp: , Rfl:     vitamin D (ERGOCALCIFEROL) 1.25 MG (94559 UT) capsule capsule, TAKE 1 CAPSULE BY MOUTH ONCE WEEKLY (Patient taking differently: Take 1 capsule by mouth 1 (One) Time Per Week. Wednesday), Disp: 4 capsule, Rfl: 5    zolpidem (AMBIEN) 10 MG tablet, Take 1 tablet by mouth At Night As Needed for Sleep., Disp: 30 tablet, Rfl: 5    DULoxetine (CYMBALTA) 60 MG capsule, Take 1 capsule by mouth Daily for 90 days. (Patient taking differently: Take 1 capsule by mouth Every Night.), Disp: 90 capsule, Rfl: 1    gabapentin (NEURONTIN) 600 MG tablet, Take 1 tablet by mouth 3 (Three) Times a Day for 90 days., Disp: 90 tablet, Rfl: 2    metoprolol succinate XL (TOPROL-XL) 50 MG 24 hr tablet, Take 1 tablet by mouth Daily for 90 days., Disp: 90 tablet, Rfl: 1    The following portions of the patient's history were reviewed and updated as appropriate: allergies, current medications, past family history, past medical history, past social history, past surgical history, and problem list.      REVIEW OF PERTINENT MEDICAL DATA    Past Medical History:   Diagnosis Date    ADD (attention deficit disorder)     SARMAD positive     Anxiety     Arthralgia     Asthma     Bursitis of left hip     Carpal tunnel syndrome     Roberto.    DDD  (degenerative disc disease), lumbar     Depression, major     Fibromyalgia     Flu syndrome     GERD (gastroesophageal reflux disease)     Heart disease     Hiatal hernia     Hormone replacement therapy     Hyperlipidemia     Hypertension     Inflammatory arthritis     Influenza     Insomnia     Knee pain, bilateral     Lupus     Migraine     Mitral valve regurgitation     Neck pain     Obesity     Osteoarthritis     Pain, joint, shoulder, left     Pharyngitis, acute     PONV (postoperative nausea and vomiting)     Postmenopausal     Sinus congestion     Surgical menopause     at age 34    Urinary frequency     Vitamin B12 deficiency     Vitamin D deficiency      Past Surgical History:   Procedure Laterality Date    CARPAL TUNNEL RELEASE WITH CUBITAL TUNNEL RELEASE  2023    CHOLECYSTECTOMY      LUMBAR DISCECTOMY      SHOULDER ARTHROSCOPY W/ ROTATOR CUFF REPAIR Left 2024    Procedure: SHOULDER ARTHROSCOPY WITH ROTATOR CUFF REPAIR; extensive debridment;  Surgeon: Km Nino MD;  Location: [facility] MAIN OR;  Service: Orthopedics;  Laterality: Left;    TONSILLECTOMY      TOTAL ABDOMINAL HYSTERECTOMY WITH SALPINGO OOPHORECTOMY Bilateral     FOR ENDOMETRIOSIS     Family History   Problem Relation Age of Onset    Hypertension Mother     Hypertension Father     Diabetes Other     Heart disease Other     Hypertension Other     Cancer Other      Social History     Socioeconomic History    Marital status:    Tobacco Use    Smoking status: Former     Current packs/day: 0.00     Average packs/day: 0.5 packs/day for 3.0 years (1.5 ttl pk-yrs)     Types: Cigarettes     Start date:      Quit date:      Years since quittin.5     Passive exposure: Past    Smokeless tobacco: Never   Vaping Use    Vaping status: Never Used   Substance and Sexual Activity    Alcohol use: Never    Drug use: Never    Sexual activity: Defer         Review of Systems   Musculoskeletal:  Positive for  arthralgias, back pain and neck pain.         Vitals:    07/11/24 1501   BP: 130/87   Pulse: 91   Resp: 16   SpO2: 96%   Weight: 104 kg (230 lb)   PainSc:   6                   Objective   Physical Exam  Neck:     Musculoskeletal:         General: Tenderness present.        Arms:         Legs:    Neurological:      Deep Tendon Reflexes:      Reflex Scores:       Tricep reflexes are 2+ on the right side and 2+ on the left side.       Bicep reflexes are 2+ on the right side and 2+ on the left side.       Brachioradialis reflexes are 2+ on the right side and 2+ on the left side.     Comments: Motor strength 5/5 b/l UE  Sensory intact b/l UE             Imaging Reviewed:  Left shoulder MRI-11/21/2023  - Moderate to severe distal tendinosis supraspinatus tendon and subscapularis tendon.  Moderate distal tendinosis infraspinatus tendon.  - Tendinosis and probable partial-thickness interstitial tearing of the intra-articular long head biceps tendon.  - Nondisplaced tear of anterior labrum suspected  - Mild age-appropriate degenerative changes of AC joint.    MRI cervical spine-1/31/2022  -C2-3-degenerative facet changes bilateral  C3-4-WNL  C4-5-WNL  C5-6-mild facet changes.  Mild left neural foraminal narrowing.  C6-7-mild bilateral uncovertebral joint spurring.  C7-T1-WNL    EMG upper extremity-12/5/2022  - Abnormal study.  Evidence of moderate right and mild left median neuropathy at the wrist consistent with carpal tunnel syndrome    Assessment:    1. High risk medication use    2. DDD (degenerative disc disease), cervical    3. Occipital neuralgia, unspecified laterality    4. Chronic left shoulder pain        Plan:   1.  Repeat UDS-7/976358.  UDS consistent with patient interview on 11/29/23. narcotic contract signed - 11/30/22.   2. We discussed trying a course of formal physical therapy.  Physical therapy can help strengthen and stretch the muscles around the joints. Continue to be as active as possible.  Patient  has done 8 weeks of physical therapy without much improvement.  3.. Continue Gabapepntin 600 mg TID (good till March 2024). Side effects discussed with the patient including but not limited to somnolence, dizziness, ataxia, headache, fatigue, blurred vision, cold or flu-like symptoms,delusions, hoarseness, lack or loss of strength, lower back or side pain, swelling of the hands, feet, or lower legs trembling or shaking. Patient understands and agrees with the plan.  4.  Due to increased pain, continue Norco 7.5-325 mg BID PRN (7/11; 8/11).  Discussed with the patient regarding long-term side effects of opioids including but not limited to opioid induced hormonal suppression, hyperalgesia, fatigue, weight gain, possible opioid induced altered immune system, addiction, tolerance, dependence, risk of hearing loss and elevated risk of myocardial infarction. Proper use and potential life threatening side effects of over use discussed with patient. Patient states understanding of their use and risks.  5. .  Patient has severe left SI joint tenderness with MELE positive and intermittent radiculopathy.  Due to recent onset, will prescribe Medrol Dosepak for 5 days to see if that improves pain.  We will consider left SI joint injection in future if pain worsens in lower back.   6. Agree with Rheum regarding increasing Cymbalta. She will talk to PCP.  7. Recommend TENS unit 15-20 mins 2-3 times daily for left trapezius pain.  8. Recommend following with orthopedics for L shoulder pain.  9.Excellent relief with left-sided occipital nerve blocks in the past.  Pain is returning.  Repeat left-sided occipital nerve blocks.    RTC for occipital nerve blocks and in 4 weeks follow-up.    Rome Springer DO  Pain Management   [facility]    As part of the patient's treatment plan, I may be prescribing controlled substances. The patient has been made aware of appropriate use of such medications, including  potential risk of somnolence, limited ability to drive and/or work safely, and the potential for dependence or overdose. It has also been made clear that these medications are for use by this patient only, without concomitant use of alcohol or other substances unless prescribed.     Patient has completed prescribing agreement detailing terms of continued prescribing of controlled substances, including monitoring INSPECT reports, urine drug screening, and pill counts if necessary. The patient is aware that inappropriate use will results in cessation of prescribing such medications.    INSPECT report has been reviewed and scanned into the patient's chart.

## 2024-07-12 DIAGNOSIS — M54.81 OCCIPITAL NEURALGIA, UNSPECIFIED LATERALITY: ICD-10-CM

## 2024-07-12 DIAGNOSIS — G89.29 CHRONIC LEFT SHOULDER PAIN: ICD-10-CM

## 2024-07-12 DIAGNOSIS — M25.512 CHRONIC LEFT SHOULDER PAIN: ICD-10-CM

## 2024-07-12 DIAGNOSIS — Z79.899 HIGH RISK MEDICATION USE: ICD-10-CM

## 2024-07-12 DIAGNOSIS — M50.30 DDD (DEGENERATIVE DISC DISEASE), CERVICAL: ICD-10-CM

## 2024-07-12 RX ORDER — HYDROCODONE BITARTRATE AND ACETAMINOPHEN 7.5; 325 MG/1; MG/1
1 TABLET ORAL 3 TIMES DAILY PRN
Qty: 90 TABLET | Refills: 0 | Status: SHIPPED | OUTPATIENT
Start: 2024-07-12 | End: 2024-08-11

## 2024-07-15 RX ORDER — AZITHROMYCIN 250 MG/1
TABLET, FILM COATED ORAL
Qty: 6 TABLET | Refills: 0 | Status: SHIPPED | OUTPATIENT
Start: 2024-07-15

## 2024-07-16 ENCOUNTER — TREATMENT (OUTPATIENT)
Dept: PHYSICAL THERAPY | Facility: CLINIC | Age: 49
End: 2024-07-16
Payer: COMMERCIAL

## 2024-07-16 DIAGNOSIS — Z47.89 ORTHOPEDIC AFTERCARE: ICD-10-CM

## 2024-07-16 DIAGNOSIS — Z98.890 H/O REPAIR OF LEFT ROTATOR CUFF: ICD-10-CM

## 2024-07-16 DIAGNOSIS — M25.512 LEFT SHOULDER PAIN, UNSPECIFIED CHRONICITY: Primary | ICD-10-CM

## 2024-07-16 NOTE — PROGRESS NOTES
Physical Therapy Daily Treatment Note  [facility] Physical Therapy  [address] [place] [address]     Patient: April SABA Espinoza   : 1975   Referring practitioner: Km Nino, *  Date of initial visit: Type: THERAPY  Noted: 6/3/2024   Today's date: 2024   Patient seen for 8 visits    Visit Diagnoses:    ICD-10-CM ICD-9-CM   1. Left shoulder pain, unspecified chronicity  M25.512 719.41   2. Orthopedic aftercare  Z47.89 V54.9   3. H/O repair of left rotator cuff  Z98.890 V15.29       Subjective   Pt reports: Pt has not been seen since 24 due to URI. Was unable to begin AAROM activities per protocol due to URI.      Objective     See Exercise, Manual, and Modality Logs for complete treatment.     Patient Education: HEP    Assessment/Plan Tolerating progression to AAROM.      Progress per Plan of Care            Timed:         Manual Therapy:         mins  37235;     Therapeutic Exercise:    30     mins  18885;     Neuromuscular Ivett:        mins  33122;    Therapeutic Activity:          mins  53669;     Gait Training:           mins  65749;     Ultrasound:          mins  96786;    Ionto                                   mins   79362  Self Care                            mins   59309    Un-Timed:  Electrical Stimulation:         mins  05500 ( );  Traction          mins 64749  Low Eval          Mins  76934  Mod Eval          Mins  97953  High Eval                            Mins  93350  Re-Eval                               mins  16089    Timed Treatment:   30   mins   Total Treatment:     30   mins      Matt Meléndez, PT, DPT, OCS  [place] license: [id number]  Physical Therapist

## 2024-07-17 ENCOUNTER — PROCEDURE VISIT (OUTPATIENT)
Dept: PAIN MEDICINE | Facility: CLINIC | Age: 49
End: 2024-07-17
Payer: COMMERCIAL

## 2024-07-17 VITALS
RESPIRATION RATE: 16 BRPM | HEART RATE: 69 BPM | BODY MASS INDEX: 42.25 KG/M2 | WEIGHT: 231 LBS | OXYGEN SATURATION: 96 % | DIASTOLIC BLOOD PRESSURE: 79 MMHG | SYSTOLIC BLOOD PRESSURE: 123 MMHG

## 2024-07-17 DIAGNOSIS — M54.81 OCCIPITAL NEURALGIA, UNSPECIFIED LATERALITY: Primary | ICD-10-CM

## 2024-07-17 NOTE — PROGRESS NOTES
Procedure was canceled due to patient having current sinus infection and being on antibiotics.    Rome Springer DO  Pain Management   [facility]

## 2024-07-19 ENCOUNTER — TREATMENT (OUTPATIENT)
Dept: PHYSICAL THERAPY | Facility: CLINIC | Age: 49
End: 2024-07-19
Payer: COMMERCIAL

## 2024-07-19 DIAGNOSIS — M25.512 LEFT SHOULDER PAIN, UNSPECIFIED CHRONICITY: Primary | ICD-10-CM

## 2024-07-19 DIAGNOSIS — Z98.890 H/O REPAIR OF LEFT ROTATOR CUFF: ICD-10-CM

## 2024-07-19 DIAGNOSIS — Z47.89 ORTHOPEDIC AFTERCARE: ICD-10-CM

## 2024-07-19 PROCEDURE — 97110 THERAPEUTIC EXERCISES: CPT | Performed by: PHYSICAL THERAPIST

## 2024-07-19 PROCEDURE — 97140 MANUAL THERAPY 1/> REGIONS: CPT | Performed by: PHYSICAL THERAPIST

## 2024-07-22 ENCOUNTER — TREATMENT (OUTPATIENT)
Dept: PHYSICAL THERAPY | Facility: CLINIC | Age: 49
End: 2024-07-22
Payer: COMMERCIAL

## 2024-07-22 DIAGNOSIS — Z47.89 ORTHOPEDIC AFTERCARE: ICD-10-CM

## 2024-07-22 DIAGNOSIS — M25.512 LEFT SHOULDER PAIN, UNSPECIFIED CHRONICITY: Primary | ICD-10-CM

## 2024-07-22 DIAGNOSIS — Z98.890 H/O REPAIR OF LEFT ROTATOR CUFF: ICD-10-CM

## 2024-07-22 NOTE — PROGRESS NOTES
Physical Therapy Daily Treatment Note  [facility] Physical Therapy  [address] [place] [address]     Patient: April SABA Espinoza   : 1975   Referring practitioner: Km Nino, *  Date of initial visit: Type: THERAPY  Noted: 6/3/2024   Today's date: 2024   Patient seen for 9 visits    Visit Diagnoses:    ICD-10-CM ICD-9-CM   1. Left shoulder pain, unspecified chronicity  M25.512 719.41   2. Orthopedic aftercare  Z47.89 V54.9   3. H/O repair of left rotator cuff  Z98.890 V15.29       Subjective   Pt reports: No new complaints. Feels her HEP is going well.       Objective     See Exercise, Manual, and Modality Logs for complete treatment.     Patient Education: HEP    Assessment/Plan Tolerating treatment well but progression into AROM limited due to c/o pain.      Progress per Plan of Care            Timed:         Manual Therapy:    15     mins  78444;     Therapeutic Exercise:    10     mins  87186;     Neuromuscular Ivett:        mins  59106;    Therapeutic Activity:          mins  24158;     Gait Training:           mins  65921;     Ultrasound:          mins  59374;    Ionto                                   mins   71227  Self Care                            mins   52027    Un-Timed:  Electrical Stimulation:         mins  20366 ( );  Traction          mins 40468  Low Eval          Mins  67543  Mod Eval          Mins  05565  High Eval                            Mins  82571  Re-Eval                               mins  25818    Timed Treatment:   25   mins   Total Treatment:     25   mins      Matt Meléndez, PT, DPT, OCS  [place] license: [id number]  Physical Therapist

## 2024-07-23 NOTE — PROGRESS NOTES
Physical Therapy Daily Treatment Note  [facility] Physical Therapy  [address] [place] [address]     Patient: Kelly Espinoza   : 1975   Referring practitioner: Km Nino, *  Date of initial visit: Type: THERAPY  Noted: 6/3/2024   Today's date: 2024   Patient seen for 10 visits    Visit Diagnoses:    ICD-10-CM ICD-9-CM   1. Left shoulder pain, unspecified chronicity  M25.512 719.41   2. Orthopedic aftercare  Z47.89 V54.9   3. H/O repair of left rotator cuff  Z98.890 V15.29       Subjective   Pt reports: Still having episodes of pain. Feels she has been compliant with activity restrictions.      Objective     See Exercise, Manual, and Modality Logs for complete treatment.     Patient Education: HEP    Assessment/Plan Still guarded. Continue per protocol and as tolerated.      Progress per Plan of Care            Timed:         Manual Therapy:    15     mins  35532;     Therapeutic Exercise:    15     mins  13659;     Neuromuscular Ivett:        mins  29360;    Therapeutic Activity:          mins  77141;     Gait Training:           mins  87654;     Ultrasound:          mins  13476;    Ionto                                   mins   79150  Self Care                            mins   16607    Un-Timed:  Electrical Stimulation:         mins  00853 ( );  Traction          mins 68610  Low Eval          Mins  92386  Mod Eval          Mins  31592  High Eval                            Mins  73484  Re-Eval                               mins  26114    Timed Treatment:   30   mins   Total Treatment:     30   mins      Matt Meléndez, PT, DPT, OCS  [place] license: [id number]  Physical Therapist

## 2024-07-25 ENCOUNTER — TREATMENT (OUTPATIENT)
Dept: PHYSICAL THERAPY | Facility: CLINIC | Age: 49
End: 2024-07-25
Payer: COMMERCIAL

## 2024-07-25 DIAGNOSIS — Z98.890 H/O REPAIR OF LEFT ROTATOR CUFF: ICD-10-CM

## 2024-07-25 DIAGNOSIS — M25.512 LEFT SHOULDER PAIN, UNSPECIFIED CHRONICITY: Primary | ICD-10-CM

## 2024-07-25 DIAGNOSIS — Z47.89 ORTHOPEDIC AFTERCARE: ICD-10-CM

## 2024-07-25 NOTE — PROGRESS NOTES
Physical Therapy Daily Treatment Note  [facility] Physical Therapy  [address] [place] [address]     Patient: April SABA Espinoza   : 1975   Referring practitioner: Km Nino, *  Date of initial visit: Type: THERAPY  Noted: 6/3/2024   Today's date: 2024   Patient seen for 11 visits    Visit Diagnoses:    ICD-10-CM ICD-9-CM   1. Left shoulder pain, unspecified chronicity  M25.512 719.41   2. Orthopedic aftercare  Z47.89 V54.9   3. H/O repair of left rotator cuff  Z98.890 V15.29       Subjective   Pt reports: Having pain with AROM during daily activities. HEP going well.      Objective     See Exercise, Manual, and Modality Logs for complete treatment.     Patient Education: HEP    Assessment/Plan Reviewed activity restrictions, pt voices understanding. Continue per protocol.      Progress per Plan of Care            Timed:         Manual Therapy:    15     mins  97770;     Therapeutic Exercise:    15     mins  87017;     Neuromuscular Ivett:        mins  82932;    Therapeutic Activity:          mins  45982;     Gait Training:           mins  16504;     Ultrasound:          mins  29101;    Ionto                                   mins   27004  Self Care                            mins   16432    Un-Timed:  Electrical Stimulation:         mins  61248 ( );  Traction          mins 88261  Low Eval          Mins  74232  Mod Eval          Mins  64064  High Eval                            Mins  73522  Re-Eval                               mins  59697    Timed Treatment:   30   mins   Total Treatment:     30   mins      Matt Meléndez, PT, DPT, OCS  [place] license: [id number]  Physical Therapist

## 2024-07-31 ENCOUNTER — PROCEDURE VISIT (OUTPATIENT)
Dept: PAIN MEDICINE | Facility: CLINIC | Age: 49
End: 2024-07-31
Payer: COMMERCIAL

## 2024-07-31 VITALS
WEIGHT: 231 LBS | BODY MASS INDEX: 42.25 KG/M2 | RESPIRATION RATE: 16 BRPM | SYSTOLIC BLOOD PRESSURE: 124 MMHG | DIASTOLIC BLOOD PRESSURE: 86 MMHG | HEART RATE: 78 BPM | OXYGEN SATURATION: 96 %

## 2024-07-31 DIAGNOSIS — M54.81 OCCIPITAL NEURALGIA, UNSPECIFIED LATERALITY: Primary | ICD-10-CM

## 2024-07-31 RX ORDER — METHYLPREDNISOLONE ACETATE 40 MG/ML
40 INJECTION, SUSPENSION INTRA-ARTICULAR; INTRALESIONAL; INTRAMUSCULAR; SOFT TISSUE ONCE
Status: COMPLETED | OUTPATIENT
Start: 2024-07-31 | End: 2024-07-31

## 2024-07-31 RX ORDER — BUPIVACAINE HYDROCHLORIDE 2.5 MG/ML
5 INJECTION, SOLUTION INFILTRATION; PERINEURAL ONCE
Status: COMPLETED | OUTPATIENT
Start: 2024-07-31 | End: 2024-07-31

## 2024-07-31 RX ADMIN — BUPIVACAINE HYDROCHLORIDE 5 ML: 2.5 INJECTION, SOLUTION INFILTRATION; PERINEURAL at 10:49

## 2024-07-31 RX ADMIN — METHYLPREDNISOLONE ACETATE 40 MG: 40 INJECTION, SUSPENSION INTRA-ARTICULAR; INTRALESIONAL; INTRAMUSCULAR; SOFT TISSUE at 10:50

## 2024-07-31 NOTE — PROGRESS NOTES
H and P reviewed from previous visit and no changes to patient's clinical presentation. Will proceed with procedure as planned.     Rome Springer DO  Pain Management   [facility]     Dx: Left occipital neuralgia.   Procedure: Left greater and lesser occipital nerve block  The patient's chart was reviewed.  After obtaining written informed consent, the patient was placed in a sitting position with the cervical spine flexed and the forehead on a padded bedside table.  The left occipital artery was palpated at the level of the superior nuchal ridge.  The area was prepped with antiseptic solution.  A 27 gauge 1 1/2 inch needle is inserted just medial to the artery and advanced perpendicular until the needle approached the periostium of the underlying occipital bone.  Then, the needle was redirected superiorly.  After gentle aspiration, 2 ml of the solution was injected in a fanlike manner.  The left lesser occipital nerve was blocked by redirecting the needle laterally and slightly inferiorly, 2 ml of the solution was injected in a fanlike manner.  A mixture of 0.25% marcaine with 40 mg of depo-medrol was injected slowly.  The patient tolerated the procedure well.  The needle was flushed and withdrawn, and a Band-Aid was applied.    TPI was done to left trapezius with 5 cc solution of 0.25% bupivacaine and 40 mg of depo. Tolerated procedure without any complications.     Rome Springer DO  Pain Management   [facility]

## 2024-08-08 ENCOUNTER — TREATMENT (OUTPATIENT)
Dept: PHYSICAL THERAPY | Facility: CLINIC | Age: 49
End: 2024-08-08
Payer: COMMERCIAL

## 2024-08-08 DIAGNOSIS — Z47.89 ORTHOPEDIC AFTERCARE: ICD-10-CM

## 2024-08-08 DIAGNOSIS — M25.512 LEFT SHOULDER PAIN, UNSPECIFIED CHRONICITY: Primary | ICD-10-CM

## 2024-08-08 DIAGNOSIS — Z98.890 H/O REPAIR OF LEFT ROTATOR CUFF: ICD-10-CM

## 2024-08-08 NOTE — LETTER
Re-Assessment / Re-Certification        Patient: Kelly Espinoza   : 1975  Diagnosis/ICD-10 Code:  Left shoulder pain, unspecified chronicity [M25.512]  Referring practitioner: Km Nino, *  Date of Initial Visit: Type: THERAPY  Noted: 6/3/2024  Today's Date: 2024  Patient seen for 12 sessions      Subjective:     Subjective Questionnaire: QuickDASH: 64%  Quick DASH work subscore: 38%  Clinical Progress: unchanged  Home Program Compliance: No  Treatment has included: therapeutic exercise, neuromuscular re-education, and manual therapy    Subjective Pt has been seen for only 7 visits since the 24 progress note. POC delayed due to pt having an illness for a period of time then pt not returning for treatment after 24 until today's date. Pt reports limited HEP participation and gradual worsening in pain when performing AROM activities. Pt feels she has been avoiding heavy lifting at work and at home.   Objective          Passive Range of Motion   Left Shoulder   Flexion: 140 degrees   Abduction: 90 degrees   External rotation 45°: 45 degrees   Internal rotation 45°: WFL    AAROM:  Flexion and abduction 90 deg  ER 30 deg    Assessment/Plan    Per protocol, treatment to this point has consisted of AAROM activities, PROM, and scapular retraction activities, as well as cryotherapy.    Pt's PROM remains very limited to this point, primarily due to c/o pain and guarding. Pt would benefit from improved HEP participation and POC participation in order to meet her therapy goals. She continues to have difficulty with AROM and other functional activities that she is performing at work and home. Pending MD follow up, will proceed into AROM and strengthening here as appropriate; however, pt's c/o pain may limit her rate of improvement.    1 week: Tolerate HEP progression. Not met  Voice compliance with activity modification. met  Report improvement in symptoms. progressing     LTGs, 12 weeks: Improve  quick DASH score to 10%. Not met  AROM to be at or near wfl to allow reaching and lifting activities. Testing not completed per protocol  4+/5 strength throughout to allow reaching and lifting activities. Testing not completed per protocol  Independent with HEP. Progressing    Continue POC BIW for 6 weeks.  Begin AROM and strengthening as appropriate per protocol.    Matt Meléndez, PT, DPT, OCS  [place] license: [id number]  Physical Therapist      Timed:         Manual Therapy:  10       mins  45403;     Therapeutic Exercise:    30     mins  04604;     Neuromuscular Ivett:     mins  71823;    Therapeutic Activity:          mins  36692;     Gait Training:           mins  11886;     Ultrasound:          mins  39450;    Ionto                                   mins   84352  Self Care                            mins   90024    Un-Timed:  Electrical Stimulation:         mins  20851 ( );  Traction          mins 65098  Low Eval          Mins  77512  Mod Eval          Mins  80932  High Eval                            Mins  02296  Re-Eval                               mins  24538      Timed Treatment:   40   mins   Total Treatment:     40   mins

## 2024-08-08 NOTE — PROGRESS NOTES
Re-Assessment / Re-Certification        Patient: Kelly Espinoza   : 1975  Diagnosis/ICD-10 Code:  Left shoulder pain, unspecified chronicity [M25.512]  Referring practitioner: Km Nino, *  Date of Initial Visit: Type: THERAPY  Noted: 6/3/2024  Today's Date: 2024  Patient seen for 12 sessions      Subjective:     Subjective Questionnaire: QuickDASH: 64%  Quick DASH work subscore: 38%  Clinical Progress: unchanged  Home Program Compliance: No  Treatment has included: therapeutic exercise, neuromuscular re-education, and manual therapy    Subjective Pt has been seen for only 7 visits since the 24 progress note. POC delayed due to pt having an illness for a period of time then pt not returning for treatment after 24 until today's date. Pt reports limited HEP participation and gradual worsening in pain when performing AROM activities. Pt feels she has been avoiding heavy lifting at work and at home.   Objective          Passive Range of Motion   Left Shoulder   Flexion: 140 degrees   Abduction: 90 degrees   External rotation 45°: 45 degrees   Internal rotation 45°: WFL    AAROM:  Flexion and abduction 90 deg  ER 30 deg    Assessment/Plan    Per protocol, treatment to this point has consisted of AAROM activities, PROM, and scapular retraction activities, as well as cryotherapy.    Pt's PROM remains very limited to this point, primarily due to c/o pain and guarding. Pt would benefit from improved HEP participation and POC participation in order to meet her therapy goals. She continues to have difficulty with AROM and other functional activities that she is performing at work and home. Pending MD follow up, will proceed into AROM and strengthening here as appropriate; however, pt's c/o pain may limit her rate of improvement.    1 week: Tolerate HEP progression. Not met  Voice compliance with activity modification. met  Report improvement in symptoms. progressing     LTGs, 12 weeks: Improve  quick DASH score to 10%. Not met  AROM to be at or near wfl to allow reaching and lifting activities. Testing not completed per protocol  4+/5 strength throughout to allow reaching and lifting activities. Testing not completed per protocol  Independent with HEP. Progressing    Continue POC BIW for 6 weeks.  Begin AROM and strengthening as appropriate per protocol.    Matt Meléndez, PT, DPT, OCS  [place] license: [id number]  Physical Therapist      Timed:         Manual Therapy:  10       mins  89266;     Therapeutic Exercise:    30     mins  73345;     Neuromuscular Ivett:     mins  34987;    Therapeutic Activity:          mins  95009;     Gait Training:           mins  96560;     Ultrasound:          mins  83352;    Ionto                                   mins   40982  Self Care                            mins   09568    Un-Timed:  Electrical Stimulation:         mins  69173 ( );  Traction          mins 59314  Low Eval          Mins  52257  Mod Eval          Mins  65150  High Eval                            Mins  96353  Re-Eval                               mins  68883      Timed Treatment:   40   mins   Total Treatment:     40   mins

## 2024-08-09 DIAGNOSIS — E78.2 MIXED HYPERLIPIDEMIA: ICD-10-CM

## 2024-08-09 DIAGNOSIS — F41.9 ANXIETY: ICD-10-CM

## 2024-08-09 DIAGNOSIS — Z00.00 PREVENTATIVE HEALTH CARE: ICD-10-CM

## 2024-08-09 DIAGNOSIS — E66.01 CLASS 3 SEVERE OBESITY WITHOUT SERIOUS COMORBIDITY WITH BODY MASS INDEX (BMI) OF 40.0 TO 44.9 IN ADULT, UNSPECIFIED OBESITY TYPE: Primary | ICD-10-CM

## 2024-08-12 RX ORDER — ESTRADIOL 2 MG/1
2 TABLET ORAL DAILY
Qty: 30 TABLET | Refills: 1 | Status: SHIPPED | OUTPATIENT
Start: 2024-08-12

## 2024-08-13 ENCOUNTER — TREATMENT (OUTPATIENT)
Dept: PHYSICAL THERAPY | Facility: CLINIC | Age: 49
End: 2024-08-13
Payer: COMMERCIAL

## 2024-08-13 DIAGNOSIS — Z47.89 ORTHOPEDIC AFTERCARE: ICD-10-CM

## 2024-08-13 DIAGNOSIS — Z98.890 H/O REPAIR OF LEFT ROTATOR CUFF: ICD-10-CM

## 2024-08-13 DIAGNOSIS — M25.512 LEFT SHOULDER PAIN, UNSPECIFIED CHRONICITY: Primary | ICD-10-CM

## 2024-08-13 PROCEDURE — 97110 THERAPEUTIC EXERCISES: CPT | Performed by: PHYSICAL THERAPIST

## 2024-08-13 PROCEDURE — 97140 MANUAL THERAPY 1/> REGIONS: CPT | Performed by: PHYSICAL THERAPIST

## 2024-08-13 NOTE — PROGRESS NOTES
Physical Therapy Daily Treatment Note  [facility]  [address] [place] [address]     Patient: Kelly Espinoza   : 1975   Referring practitioner: Km Nino, *  Date of initial visit: Type: THERAPY  Noted: 6/3/2024   Today's date: 2024   Patient seen for 13 visits    Visit Diagnoses:    ICD-10-CM ICD-9-CM   1. Left shoulder pain, unspecified chronicity  M25.512 719.41   2. Orthopedic aftercare  Z47.89 V54.9   3. H/O repair of left rotator cuff  Z98.890 V15.29       Subjective   Pt reports: Pain symptoms improved vs last visit, has been trying to avoid lifting and reaching activities. AAROM HEP going well.      Objective     See Exercise, Manual, and Modality Logs for complete treatment.     Patient Education: HEP    Assessment/Plan Good tolerance toward AROM introduction. Will continue per protocol.      Progress per Plan of Care            Timed:         Manual Therapy:    8    mins  21339;     Therapeutic Exercise:    30   mins  01745;     Neuromuscular Ivett:        mins  28970;    Therapeutic Activity:          mins  32472;     Gait Training:           mins  69799;     Ultrasound:          mins  65524;    Ionto                                   mins   27357  Self Care                            mins   65268    Un-Timed:  Electrical Stimulation:         mins  98299 ( );  Traction          mins 19964  Low Eval          Mins  03796  Mod Eval          Mins  00699  High Eval                            Mins  51367  Re-Eval                               mins  94641    Timed Treatment:   38   mins   Total Treatment:     38   mins      Matt Meléndez, PT, DPT, OCS  [place] license: [id number]  Physical Therapist

## 2024-08-15 ENCOUNTER — TREATMENT (OUTPATIENT)
Dept: PHYSICAL THERAPY | Facility: CLINIC | Age: 49
End: 2024-08-15
Payer: COMMERCIAL

## 2024-08-15 DIAGNOSIS — M47.812 CERVICAL SPONDYLOSIS: ICD-10-CM

## 2024-08-15 DIAGNOSIS — Z47.89 ORTHOPEDIC AFTERCARE: ICD-10-CM

## 2024-08-15 DIAGNOSIS — M50.30 DDD (DEGENERATIVE DISC DISEASE), CERVICAL: ICD-10-CM

## 2024-08-15 DIAGNOSIS — M25.512 LEFT SHOULDER PAIN, UNSPECIFIED CHRONICITY: Primary | ICD-10-CM

## 2024-08-15 DIAGNOSIS — Z98.890 H/O REPAIR OF LEFT ROTATOR CUFF: ICD-10-CM

## 2024-08-15 PROCEDURE — 97140 MANUAL THERAPY 1/> REGIONS: CPT | Performed by: PHYSICAL THERAPIST

## 2024-08-15 PROCEDURE — 97110 THERAPEUTIC EXERCISES: CPT | Performed by: PHYSICAL THERAPIST

## 2024-08-15 RX ORDER — GABAPENTIN 600 MG/1
600 TABLET ORAL 3 TIMES DAILY
Qty: 90 TABLET | Refills: 2 | Status: SHIPPED | OUTPATIENT
Start: 2024-08-15

## 2024-08-15 NOTE — PROGRESS NOTES
Physical Therapy Daily Treatment Note  [facility] Physical Therapy  [address] [place] [place] [address]     Patient: April SABA Espinoza   : 1975   Referring practitioner: Km Nino, *  Date of initial visit: Type: THERAPY  Noted: 6/3/2024   Today's date: 8/15/2024   Patient seen for 14 visits    Visit Diagnoses:    ICD-10-CM ICD-9-CM   1. Left shoulder pain, unspecified chronicity  M25.512 719.41   2. Orthopedic aftercare  Z47.89 V54.9   3. H/O repair of left rotator cuff  Z98.890 V15.29       Subjective   Pt reports: Pain somewhat improved vs last visit, has been trying improve HEP participation.      Objective     See Exercise, Manual, and Modality Logs for complete treatment.     Patient Education: HEP    Assessment/Plan Tolerating AAROM and AROM progression well.      Progress per Plan of Care            Timed:         Manual Therapy:    8     mins  35923;     Therapeutic Exercise:    30     mins  07554;     Neuromuscular Ivett:        mins  25222;    Therapeutic Activity:          mins  17976;     Gait Training:           mins  65481;     Ultrasound:          mins  98967;    Ionto                                   mins   27003  Self Care                            mins   34766    Un-Timed:  Electrical Stimulation:         mins  66089 ( );  Traction          mins 67690  Low Eval          Mins  41386  Mod Eval          Mins  55599  High Eval                            Mins  99733  Re-Eval                               mins  90150    Timed Treatment:   38   mins   Total Treatment:     38   mins      Matt Meléndez, PT, DPT, OCS  [place] license: [id number]  Physical Therapist

## 2024-08-19 ENCOUNTER — OFFICE VISIT (OUTPATIENT)
Dept: ORTHOPEDIC SURGERY | Facility: CLINIC | Age: 49
End: 2024-08-19
Payer: COMMERCIAL

## 2024-08-19 VITALS — HEART RATE: 88 BPM | BODY MASS INDEX: 42.51 KG/M2 | HEIGHT: 62 IN | WEIGHT: 231 LBS

## 2024-08-19 DIAGNOSIS — M75.122 COMPLETE TEAR OF LEFT ROTATOR CUFF, UNSPECIFIED WHETHER TRAUMATIC: Primary | ICD-10-CM

## 2024-08-19 PROCEDURE — 99212 OFFICE O/P EST SF 10 MIN: CPT | Performed by: ORTHOPAEDIC SURGERY

## 2024-08-19 NOTE — PROGRESS NOTES
"     Patient ID: Kelly Espinoza is a 49 y.o. female.  5/17/24 left shoulder arthroscopy and supraspinatus repair   Pain somewhat improved still sore in therapy    Review of Systems:        Objective:    Pulse 88   Ht 157.5 cm (62\")   Wt 105 kg (231 lb)   BMI 42.25 kg/m²     Physical Examination:     Left shoulder passive elevation 160 abduction 130 external rotation 40 belly press and liftoff are 5/5 but still pain and weakness on [place] and supraspinatus testing    Imaging:       Assessment:    Pain after cuff repair    Plan:   Continue physical therapy would like to see her back in 6 weeks      Procedures          Disclaimer: Part of this note may be an electronic transcription/translation of spoken language to printed text using the Dragon Dictation System  "

## 2024-08-20 ENCOUNTER — TREATMENT (OUTPATIENT)
Dept: PHYSICAL THERAPY | Facility: CLINIC | Age: 49
End: 2024-08-20
Payer: COMMERCIAL

## 2024-08-20 DIAGNOSIS — Z98.890 H/O REPAIR OF LEFT ROTATOR CUFF: ICD-10-CM

## 2024-08-20 DIAGNOSIS — M25.512 LEFT SHOULDER PAIN, UNSPECIFIED CHRONICITY: Primary | ICD-10-CM

## 2024-08-20 DIAGNOSIS — Z47.89 ORTHOPEDIC AFTERCARE: ICD-10-CM

## 2024-08-20 PROCEDURE — 97110 THERAPEUTIC EXERCISES: CPT | Performed by: PHYSICAL THERAPIST

## 2024-08-20 PROCEDURE — 97112 NEUROMUSCULAR REEDUCATION: CPT | Performed by: PHYSICAL THERAPIST

## 2024-08-20 PROCEDURE — 97140 MANUAL THERAPY 1/> REGIONS: CPT | Performed by: PHYSICAL THERAPIST

## 2024-08-20 NOTE — PROGRESS NOTES
Physical Therapy Daily Treatment Note  [facility] Physical Therapy  [address] [place] [address]     Patient: April SABA Espinoza   : 1975   Referring practitioner: Km Nino, *  Date of initial visit: Type: THERAPY  Noted: 6/3/2024   Today's date: 2024   Patient seen for 15 visits    Visit Diagnoses:    ICD-10-CM ICD-9-CM   1. Left shoulder pain, unspecified chronicity  M25.512 719.41   2. Orthopedic aftercare  Z47.89 V54.9   3. H/O repair of left rotator cuff  Z98.890 V15.29       Subjective   Pt reports: Shoulder pain improving per pt. HEP going well, has improved participation.      Objective     See Exercise, Manual, and Modality Logs for complete treatment.     Patient Education: HEP    Assessment/Plan Fatigued post visit, rx tolerance remains limited by c/o pain.      Progress per Plan of Care            Timed:         Manual Therapy:    10     mins  12142;     Therapeutic Exercise:    10     mins  58131;     Neuromuscular Ivett:    10    mins  85694;    Therapeutic Activity:          mins  13403;     Gait Training:           mins  56601;     Ultrasound:          mins  51874;    Ionto                                   mins   01461  Self Care                            mins   69664    Un-Timed:  Electrical Stimulation:         mins  04570 ( );  Traction          mins 35863  Low Eval          Mins  33023  Mod Eval          Mins  36629  High Eval                            Mins  06964  Re-Eval                               mins  75526    Timed Treatment:   30   mins   Total Treatment:     30   mins      Matt Meléndez, PT, DPT, OCS  [place] license: [id number]  Physical Therapist

## 2024-08-22 ENCOUNTER — TREATMENT (OUTPATIENT)
Dept: PHYSICAL THERAPY | Facility: CLINIC | Age: 49
End: 2024-08-22
Payer: COMMERCIAL

## 2024-08-22 DIAGNOSIS — Z47.89 ORTHOPEDIC AFTERCARE: ICD-10-CM

## 2024-08-22 DIAGNOSIS — M25.512 LEFT SHOULDER PAIN, UNSPECIFIED CHRONICITY: Primary | ICD-10-CM

## 2024-08-22 DIAGNOSIS — Z98.890 H/O REPAIR OF LEFT ROTATOR CUFF: ICD-10-CM

## 2024-08-22 PROCEDURE — 97112 NEUROMUSCULAR REEDUCATION: CPT | Performed by: PHYSICAL THERAPIST

## 2024-08-22 PROCEDURE — 97140 MANUAL THERAPY 1/> REGIONS: CPT | Performed by: PHYSICAL THERAPIST

## 2024-08-22 PROCEDURE — 97110 THERAPEUTIC EXERCISES: CPT | Performed by: PHYSICAL THERAPIST

## 2024-08-22 NOTE — PROGRESS NOTES
Physical Therapy Daily Treatment Note  [facility] Physical Therapy  [address] [place] [address]     Patient: April SABA Espinoza   : 1975   Referring practitioner: Km Nino, *  Date of initial visit: Type: THERAPY  Noted: 6/3/2024   Today's date: 2024   Patient seen for 16 visits    Visit Diagnoses:    ICD-10-CM ICD-9-CM   1. Left shoulder pain, unspecified chronicity  M25.512 719.41   2. Orthopedic aftercare  Z47.89 V54.9   3. H/O repair of left rotator cuff  Z98.890 V15.29       Subjective   Pt reports: Having difficulty with abduction. HEP going well.       Objective     See Exercise, Manual, and Modality Logs for complete treatment.     Patient Education: HEP    Assessment/Plan Fatigued post visit, abduction ROM limited.      Progress per Plan of Care            Timed:         Manual Therapy:    10     mins  30820;     Therapeutic Exercise:    15     mins  47062;     Neuromuscular Ivett:    15    mins  00656;    Therapeutic Activity:          mins  53013;     Gait Training:           mins  49850;     Ultrasound:          mins  51503;    Ionto                                   mins   20328  Self Care                            mins   36337    Un-Timed:  Electrical Stimulation:         mins  71068 ( );  Traction          mins 45428  Low Eval          Mins  34133  Mod Eval          Mins  06959  High Eval                            Mins  01629  Re-Eval                               mins  67318    Timed Treatment:   40   mins   Total Treatment:     40   mins      Matt Meléndez, PT, DPT, OCS  [place] license: [id number]  Physical Therapist

## 2024-08-27 ENCOUNTER — TREATMENT (OUTPATIENT)
Dept: PHYSICAL THERAPY | Facility: CLINIC | Age: 49
End: 2024-08-27
Payer: COMMERCIAL

## 2024-08-27 DIAGNOSIS — M25.512 LEFT SHOULDER PAIN, UNSPECIFIED CHRONICITY: Primary | ICD-10-CM

## 2024-08-27 DIAGNOSIS — Z98.890 H/O REPAIR OF LEFT ROTATOR CUFF: ICD-10-CM

## 2024-08-27 DIAGNOSIS — Z47.89 ORTHOPEDIC AFTERCARE: ICD-10-CM

## 2024-08-27 PROCEDURE — 97140 MANUAL THERAPY 1/> REGIONS: CPT | Performed by: PHYSICAL THERAPIST

## 2024-08-27 PROCEDURE — 97112 NEUROMUSCULAR REEDUCATION: CPT | Performed by: PHYSICAL THERAPIST

## 2024-08-27 PROCEDURE — 97110 THERAPEUTIC EXERCISES: CPT | Performed by: PHYSICAL THERAPIST

## 2024-08-27 NOTE — PROGRESS NOTES
Physical Therapy Daily Treatment Note  [facility] Physical Therapy  [address] [place] [address]     Patient:  Alexis   : 1975   Referring practitioner: Km Nino, *  Date of initial visit: Type: THERAPY  Noted: 6/3/2024   Today's date: 2024   Patient seen for 17 visits    Visit Diagnoses:    ICD-10-CM ICD-9-CM   1. Left shoulder pain, unspecified chronicity  M25.512 719.41   2. Orthopedic aftercare  Z47.89 V54.9   3. H/O repair of left rotator cuff  Z98.890 V15.29       Subjective   Pt reports: Pain symptoms and mobility improving. HEP going well.      Objective     See Exercise, Manual, and Modality Logs for complete treatment.     Patient Education: HEP    Assessment/Plan Progressing well. ROM remains limited.      Progress per Plan of Care            Timed:         Manual Therapy:    10     mins  34866;     Therapeutic Exercise:    15     mins  47305;     Neuromuscular Ivett:    13    mins  60503;    Therapeutic Activity:          mins  18051;     Gait Training:           mins  12126;     Ultrasound:          mins  90263;    Ionto                                   mins   24943  Self Care                            mins   32619    Un-Timed:  Electrical Stimulation:         mins  17743 ( );  Traction          mins 95112  Low Eval          Mins  01794  Mod Eval          Mins  61624  High Eval                            Mins  29240  Re-Eval                               mins  91195    Timed Treatment:   38   mins   Total Treatment:     38   mins      Matt Meléndez, PT, DPT, OCS  [place] license: [id number]  Physical Therapist

## 2024-08-29 ENCOUNTER — TREATMENT (OUTPATIENT)
Dept: PHYSICAL THERAPY | Facility: CLINIC | Age: 49
End: 2024-08-29
Payer: COMMERCIAL

## 2024-08-29 DIAGNOSIS — M25.512 LEFT SHOULDER PAIN, UNSPECIFIED CHRONICITY: Primary | ICD-10-CM

## 2024-08-29 DIAGNOSIS — Z47.89 ORTHOPEDIC AFTERCARE: ICD-10-CM

## 2024-08-29 DIAGNOSIS — Z98.890 H/O REPAIR OF LEFT ROTATOR CUFF: ICD-10-CM

## 2024-08-29 PROCEDURE — 97110 THERAPEUTIC EXERCISES: CPT | Performed by: PHYSICAL THERAPIST

## 2024-08-29 PROCEDURE — 97112 NEUROMUSCULAR REEDUCATION: CPT | Performed by: PHYSICAL THERAPIST

## 2024-08-29 PROCEDURE — 97140 MANUAL THERAPY 1/> REGIONS: CPT | Performed by: PHYSICAL THERAPIST

## 2024-08-29 NOTE — PROGRESS NOTES
Physical Therapy Daily Treatment Note  [facility]  [address] [place] [address]     Patient: Kelly Espinoza   : 1975   Referring practitioner: Km Nino, *  Date of initial visit: Type: THERAPY  Noted: 6/3/2024   Today's date: 2024   Patient seen for 18 visits    Visit Diagnoses:    ICD-10-CM ICD-9-CM   1. Left shoulder pain, unspecified chronicity  M25.512 719.41   2. Orthopedic aftercare  Z47.89 V54.9   3. H/O repair of left rotator cuff  Z98.890 V15.29       Subjective   Pt reports: Pt with more stiffness and soreness after spending the night at the hospital for her . HEP going well otherwise.      Objective     See Exercise, Manual, and Modality Logs for complete treatment.     Patient Education: HEP    Assessment/Plan Progressing well. Good response to IR progression. Begin strengthening as appropriate.      Progress per Plan of Care            Timed:         Manual Therapy:    10     mins  71545;     Therapeutic Exercise:    15     mins  90758;     Neuromuscular Ivett:    15    mins  42206;    Therapeutic Activity:          mins  82818;     Gait Training:           mins  03626;     Ultrasound:          mins  00631;    Ionto                                   mins   07356  Self Care                            mins   98745    Un-Timed:  Electrical Stimulation:         mins  85299 ( );  Traction          mins 67562  Low Eval          Mins  06631  Mod Eval          Mins  31577  High Eval                            Mins  77351  Re-Eval                               mins  31089    Timed Treatment:   40   mins   Total Treatment:     40   mins      Matt Meléndez, PT, DPT, OCS  [place] license: [id number]  Physical Therapist

## 2024-09-03 DIAGNOSIS — E55.9 VITAMIN D DEFICIENCY: ICD-10-CM

## 2024-09-03 RX ORDER — ERGOCALCIFEROL 1.25 MG/1
50000 CAPSULE, LIQUID FILLED ORAL WEEKLY
Qty: 4 CAPSULE | Refills: 5 | Status: SHIPPED | OUTPATIENT
Start: 2024-09-03

## 2024-09-05 DIAGNOSIS — M25.512 CHRONIC LEFT SHOULDER PAIN: ICD-10-CM

## 2024-09-05 DIAGNOSIS — M50.30 DDD (DEGENERATIVE DISC DISEASE), CERVICAL: ICD-10-CM

## 2024-09-05 DIAGNOSIS — M54.81 OCCIPITAL NEURALGIA, UNSPECIFIED LATERALITY: ICD-10-CM

## 2024-09-05 DIAGNOSIS — G89.29 CHRONIC LEFT SHOULDER PAIN: ICD-10-CM

## 2024-09-05 DIAGNOSIS — Z79.899 HIGH RISK MEDICATION USE: ICD-10-CM

## 2024-09-05 RX ORDER — HYDROCODONE BITARTRATE AND ACETAMINOPHEN 7.5; 325 MG/1; MG/1
1 TABLET ORAL 3 TIMES DAILY PRN
Qty: 90 TABLET | Refills: 0 | Status: SHIPPED | OUTPATIENT
Start: 2024-09-05 | End: 2024-10-05

## 2024-09-05 NOTE — TELEPHONE ENCOUNTER
Caller: Alexis, April R    Relationship: Self    Best call back number: [phone] (home)     Requested Prescriptions:   Requested Prescriptions     Pending Prescriptions Disp Refills    HYDROcodone-acetaminophen (NORCO) 7.5-325 MG per tablet 90 tablet 0     Sig: Take 1 tablet by mouth 3 (Three) Times a Day As Needed for Moderate Pain or Severe Pain for up to 30 days.        Pharmacy where request should be sent: [facility] [id number] - [place], [place] [address] - [phone] [facility] [phone] FX     Last office visit with prescribing clinician: 7/11/2024   Last telemedicine visit with prescribing clinician: Visit date not found   Next office visit with prescribing clinician: 9/11/2024     Additional details provided by patient: PATIENT WAS SUPPOSED TO HAVE AN APPT TODAY, BUT NEEDED TO RS DUE TO  BEING SENT TO HOSPITAL FOR INFECTION.   THE PATIENT IS GOING TO RUN OUT OF HYDROCODONE ON 9/9 OR 9/10 AND IS ASKING FOR REFILL BEFORE HER NEW APPT DATE WHICH IS 9/11/24.   PLEASE ADVISE     Does the patient have less than a 3 day supply:  [] Yes  [] No      Drew Harvey Rep   09/05/24 13:03 EDT

## 2024-09-10 RX ORDER — ESTRADIOL 2 MG/1
2 TABLET ORAL DAILY
Qty: 90 TABLET | Refills: 1 | Status: SHIPPED | OUTPATIENT
Start: 2024-09-10

## 2024-09-10 NOTE — PROGRESS NOTES
Subjective   Klely Espinoza is a 49 y.o. female is here for follow-up for neck pain.  Patient was last seen for left occipital nerve block. Some pain is increased as she hasn't been able to do her exercises due to her  being in hospital.       On last visit:     Neck pain is 6/10 on VAS, maximum 6/10.  Pain is aching, throbbing, tingling and sometimes weakness and numbness in nature.  Referred to left neck and shoulder.  Constant pain but improved by prednisone, Norco, gabapentin.  Worse with bending or laying down.  Severe headaches in occipital region with numbness and tingling in both hands mostly in third fourth and fifth digit.  Trouble with gripping with the left arm.  Left-sided occipital region headache radiating to left temporal and frontal region.    Left shoulder pain is 6/10 on VAS.     Lower back pain is 7/10 on VAS, at maximum is 8/10. Pain is sharp, shooting and stabbing in nature. Pain is referred left buttock, left anterior thigh, left anterior shin and left foot. The pain is constnat. The pain is improved by nothing. The pain is worse with bending.      Previous Injection:   7/31/2024-left occipital nerve block- improved headaches for about 2 weeks.   3/5/2024-left shoulder injection with ultrasound- some relief for 1-2  weeks.  7/10/2023 - left occipital nerve block; left trapezius and rhomboids TPI- improvement  with sharp, shooting pain; didn't much relief with TPI.   2/15/2023- left occipital nerve block- good relief with sharp shooting pain- 4 months   11/8/2022-KENDRA C7-T1- improved headache, still has some pain.   3/8/2022-KENDRA C6/7- 75% pain relief- pain relief, improved headaches and improved flexibility - 4 months     Hx: Referred by Vivian Fontanez APRN  for neck pain.  Her pain started about 3 years ago without any significant injuries. 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   She has been seen by neurosurgery and was told she was non surgical  candidate.  EMG showed bilateral carpal tunnel syndrome and cubital tunnel syndrome.  S/p bilateral carpal tunnel injection with 50% relief. She is s/p R carpal tunnel release. Seen by rheumatology who r/o lupus and has been diagnosed with Fibromyalgia. S/p  left shoulder arthroscopy and supraspinatus repair with Dr. Villegas on 5/17/2024       PHQ-9- 9  SOAPP-2      PMH:   Lupus, GERD, anxiety, ADD, s/p L5-S1 dissectomy 2000, s/p hand and arm surgery with Dr. Faulkner on 4/21/2023 with improvement in numbness in R hand.      Current Medications:   Norco 5-325 mg BID PRN  Celebrex 200 mg BID PRN  Cymbalta 30 mg daily  Gabapentin 600 mg TID   Ambien        Past Medications:   Tramadol 50 mg q8h PRN-1/16/2022 - didn't help     Past Modalities:  TENS:                                                                          no                                                  Physical Therapy Within The Last 6 Months              Yes - 8 weeks of PT for shoulder without much improvement.  Psychotherapy                                                            no  Massage Therapy                                                       no     Patient Complains Of:  Uro-Fecal Incontinence          no  Weight Gain/Loss                   no  Fever/Chills                             no  Weakness                               no           Current Outpatient Medications:     albuterol (ACCUNEB) 0.63 MG/3ML nebulizer solution, Take 3 mL by nebulization Every 6 (Six) Hours As Needed for Wheezing or Shortness of Air., Disp: 120 each, Rfl: 0    busPIRone (BUSPAR) 5 MG tablet, TAKE 1 TABLET BY MOUTH 2 TIMES A DAY AS NEEDED FOR ANXIETY, Disp: 60 tablet, Rfl: 4    estradiol (ESTRACE) 2 MG tablet, Take 1 tablet by mouth Daily., Disp: 90 tablet, Rfl: 1    gabapentin (NEURONTIN) 600 MG tablet, TAKE 1 TABLET BY MOUTH 3 TIMES A DAY, Disp: 90 tablet, Rfl: 2    HYDROcodone-acetaminophen (NORCO) 7.5-325 MG per tablet, Take 1 tablet by mouth 3  (Three) Times a Day As Needed for Moderate Pain or Severe Pain for up to 30 days., Disp: 90 tablet, Rfl: 0    levalbuterol (XOPENEX HFA) 45 MCG/ACT inhaler, Inhale 1-2 puffs Every 4 (Four) Hours As Needed for Wheezing or Shortness of Air., Disp: 15 g, Rfl: 5    naproxen (NAPROSYN) 500 MG tablet, Take 1 tablet by mouth 2 (Two) Times a Day With Meals., Disp: 28 tablet, Rfl: 0    ondansetron (Zofran) 4 MG tablet, Take 1 tablet by mouth Every 8 (Eight) Hours As Needed for Nausea or Vomiting., Disp: 20 tablet, Rfl: 1    pantoprazole (PROTONIX) 40 MG EC tablet, Take 1 tablet by mouth Every Night., Disp: , Rfl:     vitamin D (ERGOCALCIFEROL) 1.25 MG (78599 UT) capsule capsule, TAKE 1 CAPSULE BY MOUTH ONCE WEEKLY, Disp: 4 capsule, Rfl: 5    zolpidem (AMBIEN) 10 MG tablet, Take 1 tablet by mouth At Night As Needed for Sleep., Disp: 30 tablet, Rfl: 5    azithromycin (Zithromax) 250 MG tablet, Take 2 tablets the first day, then 1 tablet daily for 4 days., Disp: 6 tablet, Rfl: 0    DULoxetine (CYMBALTA) 60 MG capsule, Take 1 capsule by mouth Daily for 90 days. (Patient taking differently: Take 1 capsule by mouth Every Night.), Disp: 90 capsule, Rfl: 1    metoprolol succinate XL (TOPROL-XL) 50 MG 24 hr tablet, Take 1 tablet by mouth Daily for 90 days., Disp: 90 tablet, Rfl: 1    The following portions of the patient's history were reviewed and updated as appropriate: allergies, current medications, past family history, past medical history, past social history, past surgical history, and problem list.      REVIEW OF PERTINENT MEDICAL DATA    Past Medical History:   Diagnosis Date    ADD (attention deficit disorder)     SARMAD positive     Anxiety     Arthralgia     Asthma     Bursitis of left hip     Carpal tunnel syndrome     Roberto.    DDD (degenerative disc disease), lumbar     Depression, major     Fibromyalgia     Flu syndrome     GERD (gastroesophageal reflux disease)     Heart disease     Hiatal hernia     Hormone replacement  therapy     Hyperlipidemia     Hypertension     Inflammatory arthritis     Influenza     Insomnia     Knee pain, bilateral     Lupus     Migraine     Mitral valve regurgitation     Neck pain     Obesity     Osteoarthritis     Pain, joint, shoulder, left     Pharyngitis, acute     PONV (postoperative nausea and vomiting)     Postmenopausal     Sinus congestion     Surgical menopause     at age 34    Urinary frequency     Vitamin B12 deficiency     Vitamin D deficiency      Past Surgical History:   Procedure Laterality Date    CARPAL TUNNEL RELEASE WITH CUBITAL TUNNEL RELEASE  2023    CHOLECYSTECTOMY      LUMBAR DISCECTOMY      SHOULDER ARTHROSCOPY W/ ROTATOR CUFF REPAIR Left 2024    Procedure: SHOULDER ARTHROSCOPY WITH ROTATOR CUFF REPAIR; extensive debridment;  Surgeon: Km Nino MD;  Location: [facility] MAIN OR;  Service: Orthopedics;  Laterality: Left;    TONSILLECTOMY      TOTAL ABDOMINAL HYSTERECTOMY WITH SALPINGO OOPHORECTOMY Bilateral     FOR ENDOMETRIOSIS     Family History   Problem Relation Age of Onset    Hypertension Mother     Hypertension Father     Diabetes Other     Heart disease Other     Hypertension Other     Cancer Other      Social History     Socioeconomic History    Marital status:    Tobacco Use    Smoking status: Former     Current packs/day: 0.00     Average packs/day: 0.5 packs/day for 3.0 years (1.5 ttl pk-yrs)     Types: Cigarettes     Start date:      Quit date:      Years since quittin.7     Passive exposure: Past    Smokeless tobacco: Never   Vaping Use    Vaping status: Never Used   Substance and Sexual Activity    Alcohol use: Never    Drug use: Never    Sexual activity: Defer         Review of Systems   Musculoskeletal:  Positive for arthralgias, back pain and neck pain.         Vitals:    24 1533   BP: 147/87   Pulse: 80   Resp: 16   SpO2: 98%   Weight: 106 kg (233 lb)   PainSc:   6                     Objective   Physical  Exam  Neck:     Musculoskeletal:         General: Tenderness present.        Arms:         Legs:    Neurological:      Deep Tendon Reflexes:      Reflex Scores:       Tricep reflexes are 2+ on the right side and 2+ on the left side.       Bicep reflexes are 2+ on the right side and 2+ on the left side.       Brachioradialis reflexes are 2+ on the right side and 2+ on the left side.     Comments: Motor strength 5/5 b/l UE  Sensory intact b/l UE             Imaging Reviewed:  Left shoulder MRI-11/21/2023  - Moderate to severe distal tendinosis supraspinatus tendon and subscapularis tendon.  Moderate distal tendinosis infraspinatus tendon.  - Tendinosis and probable partial-thickness interstitial tearing of the intra-articular long head biceps tendon.  - Nondisplaced tear of anterior labrum suspected  - Mild age-appropriate degenerative changes of AC joint.    MRI cervical spine-1/31/2022  -C2-3-degenerative facet changes bilateral  C3-4-WNL  C4-5-WNL  C5-6-mild facet changes.  Mild left neural foraminal narrowing.  C6-7-mild bilateral uncovertebral joint spurring.  C7-T1-WNL    EMG upper extremity-12/5/2022  - Abnormal study.  Evidence of moderate right and mild left median neuropathy at the wrist consistent with carpal tunnel syndrome    Assessment:    1. DDD (degenerative disc disease), cervical    2. High risk medication use    3. Occipital neuralgia, unspecified laterality    4. Chronic left shoulder pain        Plan:   1.  UDS consistent with patient interview on 7/12/2024.. narcotic contract signed - 11/30/22.   2. We discussed trying a course of formal physical therapy.  Physical therapy can help strengthen and stretch the muscles around the joints. Continue to be as active as possible.  Patient has done 8 weeks of physical therapy without much improvement.  3.. Continue Gabapepntin 600 mg TID (good till March 2024). Side effects discussed with the patient including but not limited to somnolence, dizziness,  ataxia, headache, fatigue, blurred vision, cold or flu-like symptoms,delusions, hoarseness, lack or loss of strength, lower back or side pain, swelling of the hands, feet, or lower legs trembling or shaking. Patient understands and agrees with the plan.  4.  Due to increased pain, continue Norco 7.5-325 mg BID PRN (10/8.  Patient has been advised not to take Ambien within 4 hours of taking hydrocodone.  Discussed with the patient regarding long-term side effects of opioids including but not limited to opioid induced hormonal suppression, hyperalgesia, fatigue, weight gain, possible opioid induced altered immune system, addiction, tolerance, dependence, risk of hearing loss and elevated risk of myocardial infarction. Proper use and potential life threatening side effects of over use discussed with patient. Patient states understanding of their use and risks.  5. .  Patient has severe left SI joint tenderness with MELE positive and intermittent radiculopathy.  Due to recent onset, will prescribe Medrol Dosepak for 5 days to see if that improves pain.  We will consider left SI joint injection in future if pain worsens in lower back.   6. Agree with Rheum regarding increasing Cymbalta. She will talk to PCP.  7. Recommend TENS unit 15-20 mins 2-3 times daily for left trapezius pain.  8. Recommend following with orthopedics for L shoulder pain.      RTC in 2 months.    Rome Springer DO  Pain Management   [facility]            INSPECT REPORT    As part of the patient's treatment plan, I may be prescribing controlled substances. The patient has been made aware of appropriate use of such medications, including potential risk of somnolence, limited ability to drive and/or work safely, and the potential for dependence or overdose. It has also been made clear that these medications are for use by this patient only, without concomitant use of alcohol or other substances unless prescribed.     Patient has completed  prescribing agreement detailing terms of continued prescribing of controlled substances, including monitoring INSPECT reports, urine drug screening, and pill counts if necessary. The patient is aware that inappropriate use will results in cessation of prescribing such medications.    INSPECT report has been reviewed and scanned into the patient's chart.

## 2024-09-11 ENCOUNTER — OFFICE VISIT (OUTPATIENT)
Dept: PAIN MEDICINE | Facility: CLINIC | Age: 49
End: 2024-09-11
Payer: COMMERCIAL

## 2024-09-11 VITALS
WEIGHT: 233 LBS | BODY MASS INDEX: 42.62 KG/M2 | OXYGEN SATURATION: 98 % | SYSTOLIC BLOOD PRESSURE: 147 MMHG | RESPIRATION RATE: 16 BRPM | HEART RATE: 80 BPM | DIASTOLIC BLOOD PRESSURE: 87 MMHG

## 2024-09-11 DIAGNOSIS — Z79.899 HIGH RISK MEDICATION USE: ICD-10-CM

## 2024-09-11 DIAGNOSIS — M25.512 CHRONIC LEFT SHOULDER PAIN: ICD-10-CM

## 2024-09-11 DIAGNOSIS — M50.30 DDD (DEGENERATIVE DISC DISEASE), CERVICAL: ICD-10-CM

## 2024-09-11 DIAGNOSIS — M54.81 OCCIPITAL NEURALGIA, UNSPECIFIED LATERALITY: ICD-10-CM

## 2024-09-11 DIAGNOSIS — G89.29 CHRONIC LEFT SHOULDER PAIN: ICD-10-CM

## 2024-09-11 PROCEDURE — 99214 OFFICE O/P EST MOD 30 MIN: CPT | Performed by: STUDENT IN AN ORGANIZED HEALTH CARE EDUCATION/TRAINING PROGRAM

## 2024-09-11 RX ORDER — HYDROCODONE BITARTRATE AND ACETAMINOPHEN 7.5; 325 MG/1; MG/1
1 TABLET ORAL 3 TIMES DAILY PRN
Qty: 90 TABLET | Refills: 0 | Status: SHIPPED | OUTPATIENT
Start: 2024-10-08 | End: 2024-11-07

## 2024-09-15 DIAGNOSIS — F51.01 PRIMARY INSOMNIA: ICD-10-CM

## 2024-09-16 RX ORDER — ZOLPIDEM TARTRATE 10 MG/1
10 TABLET ORAL NIGHTLY PRN
Qty: 30 TABLET | Refills: 5 | Status: SHIPPED | OUTPATIENT
Start: 2024-09-16

## 2024-09-30 ENCOUNTER — OFFICE VISIT (OUTPATIENT)
Dept: ORTHOPEDIC SURGERY | Facility: CLINIC | Age: 49
End: 2024-09-30
Payer: COMMERCIAL

## 2024-09-30 VITALS
HEIGHT: 62 IN | WEIGHT: 233 LBS | RESPIRATION RATE: 18 BRPM | BODY MASS INDEX: 42.88 KG/M2 | HEART RATE: 70 BPM | OXYGEN SATURATION: 97 %

## 2024-09-30 DIAGNOSIS — M75.122 COMPLETE TEAR OF LEFT ROTATOR CUFF, UNSPECIFIED WHETHER TRAUMATIC: Primary | ICD-10-CM

## 2024-09-30 PROCEDURE — 99212 OFFICE O/P EST SF 10 MIN: CPT | Performed by: ORTHOPAEDIC SURGERY

## 2024-09-30 NOTE — PROGRESS NOTES
"     Patient ID: Kelly Espinoza is a 49 y.o. female.  5/17/24 left shoulder arthroscopy and supraspinatus repair   Has not been able to do a lot of rehab due to her 's illness but overall still feeling better  Review of Systems:        Objective:    Pulse 70   Resp 18   Ht 157.5 cm (62\")   Wt 106 kg (233 lb)   SpO2 97%   BMI 42.62 kg/m²     Physical Examination:     Left shoulder passive elevation 170 abduction 150 external rotation 40 with intact repair mild pain on supraspinatus testing belly press and liftoff are 5/5    Imaging:       Assessment:    Improving after cuff repair    Plan:   Is much as possible continue home exercise program gradual activity as tolerated see me as needed      Procedures          Disclaimer: Part of this note may be an electronic transcription/translation of spoken language to printed text using the Dragon Dictation System  "

## 2024-10-18 PROBLEM — R09.81 CONGESTION OF NASAL SINUS: Status: ACTIVE | Noted: 2024-10-18

## 2024-10-18 PROBLEM — J02.9 SORE THROAT: Status: ACTIVE | Noted: 2024-10-18

## 2024-10-21 DIAGNOSIS — F41.9 ANXIETY: ICD-10-CM

## 2024-10-21 RX ORDER — BUSPIRONE HYDROCHLORIDE 5 MG/1
5 TABLET ORAL 2 TIMES DAILY PRN
Qty: 60 TABLET | Refills: 4 | Status: SHIPPED | OUTPATIENT
Start: 2024-10-21

## 2024-10-22 RX ORDER — AZITHROMYCIN 250 MG/1
TABLET, FILM COATED ORAL
Qty: 6 TABLET | Refills: 0 | Status: SHIPPED | OUTPATIENT
Start: 2024-10-22

## 2024-11-05 NOTE — PROGRESS NOTES
Subjective   Kelly Espinoza is a 49 y.o. female is here for follow-up for neck pain.  Patient was last seen on 9/11/2024.  No significant changes in pain and physical exam since last visit. Some increased pain due to change in weather.     On last visit:     Neck pain is 6/10 on VAS, maximum 6/10.  Pain is aching, throbbing, tingling and sometimes weakness and numbness in nature.  Referred to left neck and shoulder.  Constant pain but improved by prednisone, Norco, gabapentin.  Worse with bending or laying down.  Severe headaches in occipital region with numbness and tingling in both hands mostly in third fourth and fifth digit.  Trouble with gripping with the left arm.  Left-sided occipital region headache radiating to left temporal and frontal region.    Left shoulder pain is 6/10 on VAS.     Lower back pain is 7/10 on VAS, at maximum is 8/10. Pain is sharp, shooting and stabbing in nature. Pain is referred left buttock, left anterior thigh, left anterior shin and left foot. The pain is constnat. The pain is improved by nothing. The pain is worse with bending.      Previous Injection:   7/31/2024-left occipital nerve block- improved headaches for about 2 weeks.   3/5/2024-left shoulder injection with ultrasound- some relief for 1-2  weeks.  7/10/2023 - left occipital nerve block; left trapezius and rhomboids TPI- improvement  with sharp, shooting pain; didn't much relief with TPI.   2/15/2023- left occipital nerve block- good relief with sharp shooting pain- 4 months   11/8/2022-KENDRA C7-T1- improved headache, still has some pain.   3/8/2022-KENDRA C6/7- 75% pain relief- pain relief, improved headaches and improved flexibility - 4 months     Hx: Referred by Vivian Fontanez APRN  for neck pain.  Her pain started about 3 years ago without any significant injuries. 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   She has been seen by neurosurgery and was told she was non surgical candidate.  EMG  showed bilateral carpal tunnel syndrome and cubital tunnel syndrome.  S/p bilateral carpal tunnel injection with 50% relief. She is s/p R carpal tunnel release. Seen by rheumatology who r/o lupus and has been diagnosed with Fibromyalgia. S/p  left shoulder arthroscopy and supraspinatus repair with Dr. Villegas on 5/17/2024       PHQ-9- 9  SOAPP-2      PMH:   Lupus, GERD, anxiety, ADD, s/p L5-S1 dissectomy 2000, s/p hand and arm surgery with Dr. Faulkner on 4/21/2023 with improvement in numbness in R hand.      Current Medications:   Norco 5-325 mg BID PRN  Celebrex 200 mg BID PRN  Cymbalta 30 mg daily  Gabapentin 600 mg TID   Ambien        Past Medications:   Tramadol 50 mg q8h PRN-1/16/2022 - didn't help     Past Modalities:  TENS:                                                                          no                                                  Physical Therapy Within The Last 6 Months              Yes - 8 weeks of PT for shoulder without much improvement.  Psychotherapy                                                            no  Massage Therapy                                                       no     Patient Complains Of:  Uro-Fecal Incontinence          no  Weight Gain/Loss                   no  Fever/Chills                             no  Weakness                               no           Current Outpatient Medications:     albuterol (ACCUNEB) 0.63 MG/3ML nebulizer solution, Take 3 mL by nebulization Every 6 (Six) Hours As Needed for Wheezing or Shortness of Air., Disp: 120 each, Rfl: 0    estradiol (ESTRACE) 2 MG tablet, Take 1 tablet by mouth Daily., Disp: 90 tablet, Rfl: 1    gabapentin (NEURONTIN) 600 MG tablet, TAKE 1 TABLET BY MOUTH 3 TIMES A DAY, Disp: 90 tablet, Rfl: 2    HYDROcodone-acetaminophen (NORCO) 7.5-325 MG per tablet, Take 1 tablet by mouth 3 (Three) Times a Day As Needed for Moderate Pain or Severe Pain for up to 30 days., Disp: 90 tablet, Rfl: 0    levalbuterol (XOPENEX HFA) 45  MCG/ACT inhaler, Inhale 1-2 puffs Every 4 (Four) Hours As Needed for Wheezing or Shortness of Air., Disp: 15 g, Rfl: 5    pantoprazole (PROTONIX) 40 MG EC tablet, Take 1 tablet by mouth Every Night., Disp: , Rfl:     vitamin D (ERGOCALCIFEROL) 1.25 MG (31221 UT) capsule capsule, TAKE 1 CAPSULE BY MOUTH ONCE WEEKLY, Disp: 4 capsule, Rfl: 5    zolpidem (AMBIEN) 10 MG tablet, Take 1 tablet by mouth At Night As Needed for Sleep., Disp: 30 tablet, Rfl: 5    azithromycin (Zithromax) 250 MG tablet, Take 2 tablets the first day, then 1 tablet daily for 4 days., Disp: 6 tablet, Rfl: 0    busPIRone (BUSPAR) 5 MG tablet, TAKE ONE TABLET BY MOUTH TWICE A DAY AS NEEDED FOR ANXIETY, Disp: 60 tablet, Rfl: 4    DULoxetine (CYMBALTA) 60 MG capsule, Take 1 capsule by mouth Daily for 90 days. (Patient taking differently: Take 1 capsule by mouth Every Night.), Disp: 90 capsule, Rfl: 1    metoprolol succinate XL (TOPROL-XL) 50 MG 24 hr tablet, Take 1 tablet by mouth Daily for 90 days., Disp: 90 tablet, Rfl: 1    ondansetron (Zofran) 4 MG tablet, Take 1 tablet by mouth Every 8 (Eight) Hours As Needed for Nausea or Vomiting., Disp: 20 tablet, Rfl: 1    predniSONE (DELTASONE) 10 MG (21) dose pack, Take  by mouth Daily. Use as directed on package, Disp: 21 each, Rfl: 0    The following portions of the patient's history were reviewed and updated as appropriate: allergies, current medications, past family history, past medical history, past social history, past surgical history, and problem list.      REVIEW OF PERTINENT MEDICAL DATA    Past Medical History:   Diagnosis Date    ADD (attention deficit disorder)     SARMAD positive     Anxiety     Arthralgia     Asthma     Bursitis of left hip     Carpal tunnel syndrome     Roberto.    DDD (degenerative disc disease), lumbar     Depression, major     Fibromyalgia     Flu syndrome     GERD (gastroesophageal reflux disease)     Heart disease     Hiatal hernia     Hormone replacement therapy      Hyperlipidemia     Hypertension     Inflammatory arthritis     Influenza     Insomnia     Knee pain, bilateral     Lupus     Migraine     Mitral valve regurgitation     Neck pain     Obesity     Osteoarthritis     Pain, joint, shoulder, left     Pharyngitis, acute     PONV (postoperative nausea and vomiting)     Postmenopausal     Sinus congestion     Surgical menopause     at age 34    Urinary frequency     Vitamin B12 deficiency     Vitamin D deficiency      Past Surgical History:   Procedure Laterality Date    CARPAL TUNNEL RELEASE WITH CUBITAL TUNNEL RELEASE  2023    CHOLECYSTECTOMY      LUMBAR DISCECTOMY      SHOULDER ARTHROSCOPY W/ ROTATOR CUFF REPAIR Left 2024    Procedure: SHOULDER ARTHROSCOPY WITH ROTATOR CUFF REPAIR; extensive debridment;  Surgeon: Km Nino MD;  Location: [facility] MAIN OR;  Service: Orthopedics;  Laterality: Left;    TONSILLECTOMY      TOTAL ABDOMINAL HYSTERECTOMY WITH SALPINGO OOPHORECTOMY Bilateral     FOR ENDOMETRIOSIS     Family History   Problem Relation Age of Onset    Hypertension Mother     Hypertension Father     Diabetes Other     Heart disease Other     Hypertension Other     Cancer Other      Social History     Socioeconomic History    Marital status:    Tobacco Use    Smoking status: Former     Current packs/day: 0.00     Average packs/day: 0.5 packs/day for 3.0 years (1.5 ttl pk-yrs)     Types: Cigarettes     Start date:      Quit date:      Years since quittin.8     Passive exposure: Past    Smokeless tobacco: Never   Vaping Use    Vaping status: Never Used   Substance and Sexual Activity    Alcohol use: Never    Drug use: Never    Sexual activity: Defer         Review of Systems   Musculoskeletal:  Positive for arthralgias, back pain and neck pain.         Vitals:    24 1500   BP: 129/73   Pulse: 68   Resp: 16   SpO2: 98%   Weight: 105 kg (231 lb)   PainSc:   6                       Objective   Physical Exam  Neck:      Musculoskeletal:         General: Tenderness present.        Arms:         Legs:    Neurological:      Deep Tendon Reflexes:      Reflex Scores:       Tricep reflexes are 2+ on the right side and 2+ on the left side.       Bicep reflexes are 2+ on the right side and 2+ on the left side.       Brachioradialis reflexes are 2+ on the right side and 2+ on the left side.     Comments: Motor strength 5/5 b/l UE  Sensory intact b/l UE             Imaging Reviewed:  Left shoulder MRI-11/21/2023  - Moderate to severe distal tendinosis supraspinatus tendon and subscapularis tendon.  Moderate distal tendinosis infraspinatus tendon.  - Tendinosis and probable partial-thickness interstitial tearing of the intra-articular long head biceps tendon.  - Nondisplaced tear of anterior labrum suspected  - Mild age-appropriate degenerative changes of AC joint.    MRI cervical spine-1/31/2022  -C2-3-degenerative facet changes bilateral  C3-4-WNL  C4-5-WNL  C5-6-mild facet changes.  Mild left neural foraminal narrowing.  C6-7-mild bilateral uncovertebral joint spurring.  C7-T1-WNL    EMG upper extremity-12/5/2022  - Abnormal study.  Evidence of moderate right and mild left median neuropathy at the wrist consistent with carpal tunnel syndrome    Assessment:    1. DDD (degenerative disc disease), cervical    2. High risk medication use    3. Occipital neuralgia, unspecified laterality    4. Chronic left shoulder pain    5. Cervical spondylosis          Plan:   1.  UDS consistent with patient interview on 7/12/2024.. narcotic contract signed - 11/30/22.   2. We discussed trying a course of formal physical therapy.  Physical therapy can help strengthen and stretch the muscles around the joints. Continue to be as active as possible.  Patient has done 8 weeks of physical therapy without much improvement.  3.. Continue Gabapepntin 600 mg TID (good till March 2024). Side effects discussed with the patient including but not limited to  somnolence, dizziness, ataxia, headache, fatigue, blurred vision, cold or flu-like symptoms,delusions, hoarseness, lack or loss of strength, lower back or side pain, swelling of the hands, feet, or lower legs trembling or shaking. Patient understands and agrees with the plan.  4.  Due to increased pain, continue Norco 7.5-325 mg TID PRN (11/7; 12/7; 1/7).  Patient has been advised not to take Ambien within 4 hours of taking hydrocodone.  Discussed with the patient regarding long-term side effects of opioids including but not limited to opioid induced hormonal suppression, hyperalgesia, fatigue, weight gain, possible opioid induced altered immune system, addiction, tolerance, dependence, risk of hearing loss and elevated risk of myocardial infarction. Proper use and potential life threatening side effects of over use discussed with patient. Patient states understanding of their use and risks.  5. .  Patient has severe left SI joint tenderness with MELE positive and intermittent radiculopathy.  Due to recent onset, will prescribe Medrol Dosepak for 5 days to see if that improves pain.  We will consider left SI joint injection in future if pain worsens in lower back.   6. Agree with Rheum regarding increasing Cymbalta. She will talk to PCP.  7. Recommend TENS unit 15-20 mins 2-3 times daily for left trapezius pain.  8. Recommend following with orthopedics for L shoulder pain.      RTC in 2 months.    oRme Springer DO  Pain Management   [facility]            INSPECT REPORT    As part of the patient's treatment plan, I may be prescribing controlled substances. The patient has been made aware of appropriate use of such medications, including potential risk of somnolence, limited ability to drive and/or work safely, and the potential for dependence or overdose. It has also been made clear that these medications are for use by this patient only, without concomitant use of alcohol or other substances unless prescribed.      Patient has completed prescribing agreement detailing terms of continued prescribing of controlled substances, including monitoring INSPECT reports, urine drug screening, and pill counts if necessary. The patient is aware that inappropriate use will results in cessation of prescribing such medications.    INSPECT report has been reviewed and scanned into the patient's chart.

## 2024-11-06 ENCOUNTER — OFFICE VISIT (OUTPATIENT)
Dept: PAIN MEDICINE | Facility: CLINIC | Age: 49
End: 2024-11-06
Payer: COMMERCIAL

## 2024-11-06 VITALS
SYSTOLIC BLOOD PRESSURE: 129 MMHG | HEART RATE: 68 BPM | WEIGHT: 231 LBS | BODY MASS INDEX: 42.25 KG/M2 | OXYGEN SATURATION: 98 % | DIASTOLIC BLOOD PRESSURE: 73 MMHG | RESPIRATION RATE: 16 BRPM

## 2024-11-06 DIAGNOSIS — M50.30 DDD (DEGENERATIVE DISC DISEASE), CERVICAL: ICD-10-CM

## 2024-11-06 DIAGNOSIS — M25.512 CHRONIC LEFT SHOULDER PAIN: ICD-10-CM

## 2024-11-06 DIAGNOSIS — M47.812 CERVICAL SPONDYLOSIS: ICD-10-CM

## 2024-11-06 DIAGNOSIS — G89.29 CHRONIC LEFT SHOULDER PAIN: ICD-10-CM

## 2024-11-06 DIAGNOSIS — M54.81 OCCIPITAL NEURALGIA, UNSPECIFIED LATERALITY: ICD-10-CM

## 2024-11-06 DIAGNOSIS — Z79.899 HIGH RISK MEDICATION USE: ICD-10-CM

## 2024-11-06 RX ORDER — HYDROCODONE BITARTRATE AND ACETAMINOPHEN 7.5; 325 MG/1; MG/1
1 TABLET ORAL 3 TIMES DAILY PRN
Qty: 90 TABLET | Refills: 0 | Status: SHIPPED | OUTPATIENT
Start: 2025-01-06 | End: 2025-02-05

## 2024-11-06 RX ORDER — HYDROCODONE BITARTRATE AND ACETAMINOPHEN 7.5; 325 MG/1; MG/1
1 TABLET ORAL 3 TIMES DAILY PRN
Qty: 90 TABLET | Refills: 0 | Status: SHIPPED | OUTPATIENT
Start: 2024-12-07 | End: 2025-01-06

## 2024-11-06 RX ORDER — GABAPENTIN 600 MG/1
600 TABLET ORAL 3 TIMES DAILY
Qty: 90 TABLET | Refills: 2 | Status: SHIPPED | OUTPATIENT
Start: 2024-11-06

## 2024-11-06 RX ORDER — HYDROCODONE BITARTRATE AND ACETAMINOPHEN 7.5; 325 MG/1; MG/1
1 TABLET ORAL 3 TIMES DAILY PRN
Qty: 90 TABLET | Refills: 0 | Status: SHIPPED | OUTPATIENT
Start: 2024-11-07 | End: 2024-12-07

## 2024-11-08 RX ORDER — DULOXETIN HYDROCHLORIDE 60 MG/1
60 CAPSULE, DELAYED RELEASE ORAL DAILY
Qty: 90 CAPSULE | Refills: 1 | Status: SHIPPED | OUTPATIENT
Start: 2024-11-08

## 2024-11-08 RX ORDER — METOPROLOL SUCCINATE 50 MG/1
50 TABLET, EXTENDED RELEASE ORAL DAILY
Qty: 90 TABLET | Refills: 1 | Status: SHIPPED | OUTPATIENT
Start: 2024-11-08

## 2024-11-15 DIAGNOSIS — M50.30 DDD (DEGENERATIVE DISC DISEASE), CERVICAL: ICD-10-CM

## 2024-11-15 DIAGNOSIS — M47.812 CERVICAL SPONDYLOSIS: ICD-10-CM

## 2024-11-15 RX ORDER — GABAPENTIN 600 MG/1
600 TABLET ORAL 3 TIMES DAILY
Qty: 90 TABLET | Refills: 2 | OUTPATIENT
Start: 2024-11-15

## 2025-01-13 ENCOUNTER — HOSPITAL ENCOUNTER (EMERGENCY)
Facility: HOSPITAL | Age: 50
Discharge: HOME | End: 2025-01-13
Admitting: EMERGENCY MEDICINE

## 2025-01-13 VITALS
BODY MASS INDEX: 42.75 KG/M2 | SYSTOLIC BLOOD PRESSURE: 127 MMHG | DIASTOLIC BLOOD PRESSURE: 91 MMHG | TEMPERATURE: 97.9 F | HEART RATE: 70 BPM | RESPIRATION RATE: 16 BRPM | OXYGEN SATURATION: 99 % | WEIGHT: 226.41 LBS | HEIGHT: 61 IN

## 2025-01-13 DIAGNOSIS — J02.0 STREP THROAT: Primary | ICD-10-CM

## 2025-01-13 LAB
B PARAPERT DNA SPEC QL NAA+PROBE: NOT DETECTED
B PERT DNA SPEC QL NAA+PROBE: NOT DETECTED
C PNEUM DNA NPH QL NAA+NON-PROBE: NOT DETECTED
FLUAV SUBTYP SPEC NAA+PROBE: NOT DETECTED
FLUBV RNA ISLT QL NAA+PROBE: NOT DETECTED
HADV DNA SPEC NAA+PROBE: NOT DETECTED
HCOV 229E RNA SPEC QL NAA+PROBE: NOT DETECTED
HCOV HKU1 RNA SPEC QL NAA+PROBE: NOT DETECTED
HCOV NL63 RNA SPEC QL NAA+PROBE: NOT DETECTED
HCOV OC43 RNA SPEC QL NAA+PROBE: NOT DETECTED
HMPV RNA NPH QL NAA+NON-PROBE: NOT DETECTED
HPIV1 RNA ISLT QL NAA+PROBE: NOT DETECTED
HPIV2 RNA SPEC QL NAA+PROBE: NOT DETECTED
HPIV3 RNA NPH QL NAA+PROBE: NOT DETECTED
HPIV4 P GENE NPH QL NAA+PROBE: NOT DETECTED
M PNEUMO IGG SER IA-ACNC: NOT DETECTED
RHINOVIRUS RNA SPEC NAA+PROBE: NOT DETECTED
RSV RNA NPH QL NAA+NON-PROBE: NOT DETECTED
S PYO AG THROAT QL: POSITIVE
SARS-COV-2 RNA RESP QL NAA+PROBE: NOT DETECTED

## 2025-01-13 PROCEDURE — 87651 STREP A DNA AMP PROBE: CPT

## 2025-01-13 PROCEDURE — 99283 EMERGENCY DEPT VISIT LOW MDM: CPT

## 2025-01-13 PROCEDURE — 0202U NFCT DS 22 TRGT SARS-COV-2: CPT

## 2025-01-13 PROCEDURE — 25010000002 DEXAMETHASONE PER 1 MG

## 2025-01-13 RX ORDER — AMOXICILLIN 500 MG/1
500 CAPSULE ORAL 2 TIMES DAILY
Qty: 20 CAPSULE | Refills: 0 | Status: SHIPPED | OUTPATIENT
Start: 2025-01-13

## 2025-01-13 RX ADMIN — DEXAMETHASONE SODIUM PHOSPHATE 10 MG: 10 INJECTION, SOLUTION INTRAMUSCULAR; INTRAVENOUS at 06:50

## 2025-01-13 NOTE — ED PROVIDER NOTES
"Subjective   History of Present Illness  Chief Complaint: Sore Throat      HPI: Patient is a 49-year-old female who presents by private vehicle with a known history of anxiety, GERD, hyperlipidemia, hypertension, OA, lupus, chronic neck pain with complaints of sore throat she states the symptoms started approximately 3 days ago, fever at home with a Tmax 103 states that she took ibuprofen and hydrocodone prior to arrival today.  Describes the sore throat as \"swallowing glass\" she also has mild right ear pain she attributes to some drainage.  She has had no nausea or vomiting, denies recent illness or exposure.  Has had no change in phonation.    PCP: Huseyin    History provided by:  Patient      Review of Systems  See above as noted    Past Medical History:   Diagnosis Date    ADD (attention deficit disorder)     SARMAD positive     Anxiety     Arthralgia     Asthma     Bursitis of left hip     Carpal tunnel syndrome     Roberto.    DDD (degenerative disc disease), lumbar     Depression, major     Fibromyalgia     Flu syndrome     GERD (gastroesophageal reflux disease)     Heart disease     Hiatal hernia     Hormone replacement therapy     Hyperlipidemia     Hypertension     Inflammatory arthritis     Influenza     Insomnia     Knee pain, bilateral     Lupus     Migraine     Mitral valve regurgitation     Neck pain     Obesity     Osteoarthritis     Pain, joint, shoulder, left     Pharyngitis, acute     PONV (postoperative nausea and vomiting)     Postmenopausal     Sinus congestion     Surgical menopause     at age 34    Urinary frequency     Vitamin B12 deficiency     Vitamin D deficiency        Allergies   Allergen Reactions    Compazine [Prochlorperazine] Irritability    Sulfa Antibiotics Hives    Ceftin [Cefuroxime] GI Intolerance    Benadryl [Diphenhydramine] Anxiety       Past Surgical History:   Procedure Laterality Date    CARPAL TUNNEL RELEASE WITH CUBITAL TUNNEL RELEASE  08/02/2023    CHOLECYSTECTOMY      LUMBAR " DISCECTOMY  2000    SHOULDER ARTHROSCOPY W/ ROTATOR CUFF REPAIR Left 5/17/2024    Procedure: SHOULDER ARTHROSCOPY WITH ROTATOR CUFF REPAIR; extensive debridment;  Surgeon: Km Nino MD;  Location: [facility] MAIN OR;  Service: Orthopedics;  Laterality: Left;    TONSILLECTOMY      TOTAL ABDOMINAL HYSTERECTOMY WITH SALPINGO OOPHORECTOMY Bilateral 2009    FOR ENDOMETRIOSIS       Family History   Problem Relation Age of Onset    Hypertension Mother     Hypertension Father     Diabetes Other     Heart disease Other     Hypertension Other     Cancer Other        Social History     Socioeconomic History    Marital status:    Tobacco Use    Smoking status: Former     Current packs/day: 0.00     Average packs/day: 0.5 packs/day for 3.0 years (1.5 ttl pk-yrs)     Types: Cigarettes     Start date: 2007     Quit date: 2010     Years since quitting: 15.0     Passive exposure: Past    Smokeless tobacco: Never   Vaping Use    Vaping status: Never Used   Substance and Sexual Activity    Alcohol use: Never    Drug use: Never    Sexual activity: Defer           Objective   Physical Exam  Vitals reviewed.   Constitutional:       Appearance: She is obese.   HENT:      Head: Normocephalic.      Right Ear: Tympanic membrane normal.      Left Ear: Tympanic membrane normal.      Nose: Nose normal.      Mouth/Throat:      Lips: Pink.      Mouth: Mucous membranes are moist. No injury.      Dentition: Does not have dentures. No dental caries.      Tongue: No lesions.      Palate: No mass.      Pharynx: Oropharynx is clear. Uvula midline. Posterior oropharyngeal erythema present. No uvula swelling.      Tonsils: No tonsillar exudate or tonsillar abscesses. 1+ on the right. 1+ on the left.      Comments: No Trismus  Cardiovascular:      Rate and Rhythm: Normal rate.      Pulses: Normal pulses.   Pulmonary:      Effort: Pulmonary effort is normal.   Musculoskeletal:      Cervical back: Neck supple. No tenderness.  "  Lymphadenopathy:      Cervical: No cervical adenopathy.   Neurological:      Mental Status: She is alert.         Procedures           ED Course      /91 (BP Location: Left arm, Patient Position: Sitting)   Pulse 100   Temp 97.9 °F (36.6 °C) (Oral)   Resp 18   Ht 154.9 cm (61\")   Wt 103 kg (226 lb 6.6 oz)   SpO2 97%   BMI 42.78 kg/m²   Labs Reviewed   RAPID STREP A SCREEN - Abnormal; Notable for the following components:       Result Value    Strep A Ag Positive (*)     All other components within normal limits   RESPIRATORY PANEL PCR W/ COVID-19 (SARS-COV-2), NP SWAB IN UTM/VTP, 2 HR TAT     Medications   dexAMETHasone (DECADRON) 10 MG/ML oral solution 10 mg (10 mg Oral Given 1/13/25 0650)     No radiology results for the last day                                                   Medical Decision Making  No history of immunocompromise. Nontoxic appearance. Patient euvolemic with no trismus. No airway compromise. No change in voice, exudates, enlarged lymph nodes. Able to tolerate PO. Given History and Exam I have low suspicion for this presentation being caused by PTA, RPA, Ludwigs angina, Epiglottitis or Bacterial Tracheitis, EBV.  Patient tested positive for strep throat she was given a dose of Decadron in our facility with prescription for amoxicillin sent to her preferred pharmacy.  Respiratory panel was pending at time of discharge I did offer patient to remain in the emergency room until this result has been completed, she states that she would like to go home antibiotics sent to her preferred pharmacy we discussed nonpharmacological treatment as well as over-the-counter pain reliever treatment.  She gave verbal understanding, no further questions or negative discharge    Chart review:  11/16/2024 outpatient visit at local urgent care facility related to acute maxillary sinusitis    Labs: Strep positive    Radiology: Not warranted for this visit    Medications Medications  dexAMETHasone " (DECADRON) 10 MG/ML oral solution 10 mg (has no administration in time range)    Part of this note may be an electronic transcription/translation of spoken language to printed text using the Dragon Dictation System.    Appropriate PPE worn during exam.      Note Disclaimer: At [facility], we believe that sharing information builds trust and better  relationships. You are receiving this note because you recently visited [facility]. It is possible you will see health information before a provider has talked with you about it. This kind of information can be easy to misunderstand. To help you fully understand what it means for your health, we urge you to discuss this note with your provider.      Problems Addressed:  Strep throat: complicated acute illness or injury    Amount and/or Complexity of Data Reviewed  External Data Reviewed: notes.  Labs: ordered. Decision-making details documented in ED Course.    Risk  Prescription drug management.        Final diagnoses:   Strep throat       ED Disposition  ED Disposition       ED Disposition   Discharge    Condition   Stable    Comment   --               Vivian Fontanez, APRN  [address] [place] [address] [phone]               Medication List        New Prescriptions      amoxicillin 500 MG capsule  Commonly known as: AMOXIL  Take 1 capsule by mouth 2 (Two) Times a Day.               Where to Get Your Medications        These medications were sent to [id number] - [place], [place] - [address] [facility]  - [phone]  - [phone] [address] [place] [address]      Phone: [phone]   amoxicillin 500 MG capsule            Beena Tipton APRN  01/13/25 3537

## 2025-01-13 NOTE — DISCHARGE INSTRUCTIONS
Continue to take your pain medications as prescribed can also take ibuprofen, Chloraseptic sprays or lozenges.    Change her toothbrush, do not share utensils    Antibiotics until completed    Follow-up PCP as needed    Return to the emergency room as needed

## 2025-01-15 NOTE — PROGRESS NOTES
Subjective   Kelly Espinoza is a 47 y.o. female is here for follow up for neck pain. Patient was last seen on 3/8/2022 with KENDRA C6-C7 with 75% pain relief.  Patient did not follow-up since that time. She has been seen by neurosurgery and was told she was non surgical candidate. Scheduled to see Valentina with Neurology.     On last visit:     Neck pain is 4/10 on VAS, and maximum 5/10.  Pain is aching, burning, throbbing, tingling, weakness, numbness in nature.  Pain is referred to left neck and shoulder.  Pain is constant.  Improved by prednisone, tramadol, gabapentin.  Worse with bending, lying down, lifting.  Also complains of severe headache in occipital region and numbness and tingling in both hands. - mostly in 3,4, 5th digits  Gripping issues in left arm.  Her main complaint is numbness and severe intermittent headaches.    Previous Injection:   3/8/2022-KENDRA C6/7- 75% pain relief- pain relief, improved headaches and improved flexibility    Hx: Referred by Vivian Fontanez APRN  for neck pain.  Her pain started about 3 years ago without any significant injuries. 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        PHQ-9- 9  SOAPP-2      PMH:   Lupus, GERD, anxiety, ADD     Current Medications:   Celebrex 200 mg BID PRN  Cymbalta 30 mg daily  Gabapentin 400 mg TID   Tramadol 50 mg q8h PRN-1/16/2022.  Ambien        Past Medications:     Past Modalities:  TENS:                                                                          no                                                  Physical Therapy Within The Last 6 Months              Yes - 8 weeks of PT.  Psychotherapy                                                            no  Massage Therapy                                                       no     Patient Complains Of:  Uro-Fecal Incontinence          no  Weight Gain/Loss                   no  Fever/Chills                             no  Weakness                               no    1/15/2025      Michael Amin  [address]  [facility] [address]      Dear Colleague,    Thank you for referring your patient, Michael Amin, to the [facility]. Please see a copy of my visit note below.    TRANSPLANT NEPHROLOGY CLINIC VISIT     Assessment & Plan  # LDKT: CKD Stage 3a/3b - Stable. Had increased up to 2.3 on 12/12/24 with associated low BP and lightheadedness. Now on Florinef. The past month the creatinine is getting closer to 1.3-1.6   - Baseline Creatinine: ~ 1.1-1.3. Higher since pancreas transplant on higher tacrolimus levels.    - Proteinuria: Normal (<0.2 grams)   - DSA Hx: No DSA   - Last cPRA: 0%   - BK Viremia: No   - Kidney Tx Biopsy Hx: Acute cellular-mediated rejection.    # Pancreas Tx (IVETTE):    - Pancreatic Exocrine Drainage: Enteric drained     - Blood glucose: Euglycemia      On insulin: No   - HbA1c: Last checked at time of transplant      Latest HbA1c: 7.8%   - Pancreatic enzymes: Stable   - DSA Hx: No DSA   - Pancreas Tx Biopsy Hx: No biopsy history    # Immunosuppression: Tacrolimus immediate release (goal 8-10) and Mycophenolate mofetil (dose 1000 mg every 12 hours)   - Induction with Recent Transplant:  High Intensity Protocol   - Continue with intensive monitoring of immunosuppression for efficacy and toxicity.   - Historical Changes in Immunosuppression: None   - Changes: Not at this time as we await yesterday's tacrolimus level.    # Infection Prevention:      - PJP: Sulfa/TMP (Bactrim) 400-80 mg every day.   - CMV: Valganciclovir (Valcyte) 900 mg every day to be stopped on 02/02/2025      - CMV IgG Ab High Risk Discordance (D+/R-): No  CMV Serostatus: Positive  - EBV IgG Ab High Risk Discordance (D+/R-): No  EBV Serostatus: Positive    # Hypertension: Controlled;  Goal BP: > 100, but < 130 systolic   - Florinef 0.1 mg every other day.   - Changes: No    # Anemia in Chronic Renal Disease: Hgb: Trend down      JOSE: No   - Iron          Current Outpatient Medications:   •  albuterol (ACCUNEB) 0.63 MG/3ML nebulizer solution, Take 3 mL by nebulization Every 6 (Six) Hours As Needed for Wheezing or Shortness of Air., Disp: 120 each, Rfl: 0  •  celecoxib (CeleBREX) 200 MG capsule, Take 1 capsule by mouth 2 (Two) Times a Day., Disp: 60 capsule, Rfl: 5  •  ciprofloxacin (Cipro) 250 MG tablet, Take 1 tablet by mouth 2 (Two) Times a Day., Disp: 14 tablet, Rfl: 0  •  cyclobenzaprine (FLEXERIL) 10 MG tablet, Take 1 tablet by mouth 3 (Three) Times a Day As Needed for Muscle Spasms., Disp: 30 tablet, Rfl: 5  •  DULoxetine (CYMBALTA) 30 MG capsule, TAKE ONE CAPSULE BY MOUTH DAILY, Disp: 30 capsule, Rfl: 5  •  estradiol (ESTRACE) 2 MG tablet, TAKE ONE TABLET BY MOUTH DAILY, Disp: 30 tablet, Rfl: 5  •  levalbuterol (XOPENEX HFA) 45 MCG/ACT inhaler, Inhale 1-2 puffs Every 4 (Four) Hours As Needed for Wheezing or Shortness of Air., Disp: 15 g, Rfl: 5  •  metoprolol tartrate (LOPRESSOR) 25 MG tablet, Take 1 tablet by mouth 2 (Two) Times a Day., Disp: 60 tablet, Rfl: 5  •  omeprazole (priLOSEC) 40 MG capsule, , Disp: , Rfl:   •  ondansetron ODT (ZOFRAN-ODT) 4 MG disintegrating tablet, 1 tablet orally every 6 hours as needed for nausea and vomiting, Disp: 12 tablet, Rfl: 0  •  phenazopyridine (Pyridium) 200 MG tablet, Take 1 tablet by mouth 3 (Three) Times a Day As Needed for Bladder Spasms., Disp: 20 tablet, Rfl: 0  •  traMADol (ULTRAM) 50 MG tablet, Take 1 tablet by mouth Every 8 (Eight) Hours As Needed for Severe Pain., Disp: 40 tablet, Rfl: 2  •  vitamin D (ERGOCALCIFEROL) 1.25 MG (49202 UT) capsule capsule, Take 1 capsule by mouth 1 (One) Time Per Week., Disp: 12 capsule, Rfl: 1  •  zolpidem (AMBIEN) 10 MG tablet, TAKE ONE TABLET BY MOUTH EVERY NIGHT AT BEDTIME AS NEEDED FOR SLEEP, Disp: 30 tablet, Rfl: 0    The following portions of the patient's history were reviewed and updated as appropriate: allergies, current medications, past family history, past  studies: Low iron saturation, followed by anemia services. Completed ca course IV iron, though still low. He may need every other day iron pills to avoid constipation but treat iron deficiency.    # Mineral Bone Disorder:    - Secondary renal hyperparathyroidism; PTH level: Normal (15-65 pg/ml)        On treatment: None  - Vitamin D; level: Normal        On supplement: Yes cholecalciferol 50 mcg every day.  - Calcium; level: Low normal        On supplement: No  - Phosphorus; level: Normal        On supplement: No    # Electrolytes:   - Potassium; level: Normal        On supplement: No  - Magnesium; level: Low        On supplement: Yes magnesium oxide 800 mg every day.  - Bicarbonate; level: Normal        On supplement: No prescription was sent but bicarbonate normal perhaps due to florinef. Okay to hold sodium bicarbonate for now.     # Other Significant PMH:   - Obesity, Class I (BMI = 32): Stable.   - GERD: on PPI with symptom control.      # Skin Cancer Risk: Discussed sun protection and recommend regular follow up with Dermatology.    # Transplant History:  Etiology of Kidney Failure: Diabetic nephropathy  Tx: LDKT on 10/1/2019 and IVETTE  Transplant: 11/2/2024 (Pancreas), 10/1/2019 (Kidney)  Significant transplant-related complications: None    Transplant Office Phone Number: [phone]    Assessment and plan was discussed with the patient and he voiced his understanding and agreement.    Return visit: Follow up as previously scheduled    Jose L Reyna MD    The longitudinal plan of care for the diagnosis(es)/condition(s) as documented were addressed during this visit. Due to the added complexity in care, I will continue to support Rudi in the subsequent management and with ongoing continuity of care.      Chief Complaint  Mr. Amin is a 45 year old here for kidney transplant, pancreas transplant, immunosuppression management, and RICHARD.     History of Present Illness   Mr. Amin reports feeling great  medical history, past social history, past surgical history, and problem list.      REVIEW OF PERTINENT MEDICAL DATA    Past Medical History:   Diagnosis Date   • ADD (attention deficit disorder)    • SARMAD positive    • Anxiety    • Arthralgia    • Asthma    • Bursitis of left hip    • DDD (degenerative disc disease), lumbar    • Depression, major    • Flu syndrome    • GERD (gastroesophageal reflux disease)    • Heart disease    • Hiatal hernia    • Hormone replacement therapy    • Hyperlipidemia    • Inflammatory arthritis    • Influenza    • Insomnia    • Knee pain, bilateral    • Lupus (HCC)    • Neck pain    • Obesity    • Osteoarthritis    • Pain, joint, shoulder, left    • Pharyngitis, acute    • Postmenopausal    • Sinus congestion    • Surgical menopause     at age 34   • Vitamin B12 deficiency    • Vitamin D deficiency      Past Surgical History:   Procedure Laterality Date   • CHOLECYSTECTOMY     • LUMBAR DISCECTOMY     • TONSILLECTOMY     • TOTAL ABDOMINAL HYSTERECTOMY WITH SALPINGO OOPHORECTOMY Bilateral     FOR ENDOMETRIOSIS     Family History   Problem Relation Age of Onset   • Hypertension Mother    • Hypertension Father    • Diabetes Other    • Heart disease Other    • Hypertension Other    • Cancer Other      Social History     Socioeconomic History   • Marital status:    Tobacco Use   • Smoking status: Former Smoker     Packs/day: 0.50     Years: 3.00     Pack years: 1.50     Types: Cigarettes     Start date:      Quit date:      Years since quittin.6   • Smokeless tobacco: Never Used   Vaping Use   • Vaping Use: Never used   Substance and Sexual Activity   • Alcohol use: Not Currently     Alcohol/week: 0.0 standard drinks   • Drug use: Not Currently         Review of Systems      Vitals:    22 1308   BP: 116/63   Pulse: 82   Resp: 16   SpO2: 98%   PainSc:   5         Objective   Physical Exam  Neck:     Musculoskeletal:         General: Tenderness present.         overall.    Since last clinic visit:   Hospitalizations: No   New Medical Issues: No    Activity: back at the gym.  Chest pain or shortness of breath: No  Lower extremity swelling: No  Weight change: No  Nausea and vomiting: No  Diarrhea: No  Heartburn symptoms: No  Fever, sweats or chills: No  Night sweats: No  Urinary complaints: No    Home BP:  120-125/80  without lightheadedness.       Problem List  Patient Active Problem List   Diagnosis     HTN, kidney transplant related     Diabetes mellitus type 1 (H)     Dyslipidemia     Anemia in chronic kidney disease     Secondary renal hyperparathyroidism     Kidney replaced by transplant     Aftercare following organ transplant     Vitamin D deficiency     Hypomagnesemia     Kidney transplant rejection     Class 1 obesity with serious comorbidity and body mass index (BMI) of 33.0 to 33.9 in adult, unspecified obesity type     Shingles     Organ transplant candidate     Pre-operative cardiovascular examination     Status post coronary angiogram     Pancreas replaced by transplant (H)     Immunosuppressed status     Acute kidney injury     Hypervolemia     Acute post-operative pain     Thrombocytopenia     At risk for malnutrition     Syncope     Anemia of chronic renal failure     Neurogenic orthostatic hypotension (H)       Allergies  Allergies   Allergen Reactions     Cats Itching     Itchy eyes     Anti-Thymocyte Glob (Rabbit)      Had reaction with rigors after steroid-free dose. No reaction with steroids.       Medications  Current Outpatient Medications   Medication Sig Dispense Refill     acetaminophen (TYLENOL) 325 MG tablet Take 2 tablets (650 mg) by mouth every 6 hours as needed (For optimal non-opioid multimodal pain management to improve pain control.). 60 tablet 0     alcohol swab prep pads Use to swab area of injection/zak as directed. 100 each 3     aspirin (ASA) 81 MG EC tablet Take 81 mg by mouth daily        atorvastatin (LIPITOR) 10 MG tablet Take 2  Arms:    Neurological:      Deep Tendon Reflexes:      Reflex Scores:       Tricep reflexes are 2+ on the right side and 2+ on the left side.       Bicep reflexes are 2+ on the right side and 2+ on the left side.       Brachioradialis reflexes are 2+ on the right side and 2+ on the left side.     Comments: Motor strength 5/5 b/l UE  Sensory intact b/l UE             Imaging Reviewed:  MRI cervical spine-1/31/2022  -C2-3-degenerative facet changes bilateral  C3-4-WNL  C4-5-WNL  C5-6-mild facet changes.  Mild left neural foraminal narrowing.  C6-7-mild bilateral uncovertebral joint spurring.  C7-T1-WNL      Assessment:    1. DDD (degenerative disc disease), cervical    2. Cervical spondylosis    3. Occipital neuralgia of left side         Plan:   1. We discussed trying a course of formal physical therapy.  Physical therapy can help strengthen and stretch the muscles around the joints. Continue to be as active as possible.  Patient has done 8 weeks of physical therapy without much improvement.  2. Patient has pain in the neck with radicular pain in the arm, patient has failed conservative therapy including PT and pharmacological management for more than 6 weeks and pain interferes with activities of daily living. Spurling’s test is positive. MRI is not impressive but does show mild left neural foraminal narrowing at C5-6.  Unable to explain right-sided radicular symptoms.  Patient has tried PT, neuropathic agents, pain medications without significant pain relief. She had good relief with KENDRA previously.  Discussed cervical JAYE C7-T1 (left). Discussed the possibility of infection, bleeding, nerve damage, post dural puncture headache, increased pain, paraplegia. Patient understands and agrees.   5.  Recommend following up with neurology.   6. Increase Gabapentin from 400 mg to 600 mg TID (1 refill). Side effects discussed with the patient including but not limited to somnolence, dizziness, ataxia, headache, fatigue,  tablets (20 mg) by mouth daily. 60 tablet 11     blood glucose (NO BRAND SPECIFIED) lancets standard Use to test blood sugar 3 times daily or as directed. 100 each 11     blood glucose (NO BRAND SPECIFIED) test strip Use to test blood sugar 3 times daily or as directed. 100 strip 11     blood glucose monitoring (NO BRAND SPECIFIED) meter device kit Use to test blood sugar 3 times daily or as directed. 2 kit 0     CELLCEPT (BRAND) 250 MG capsule Take 4 capsules (1,000 mg) by mouth 2 times daily. 240 capsule 11     Continuous Glucose Sensor (DEXCOM G6 SENSOR) MISC CHANGE SENSOR EVERY 10 DAYS 9 each 3     Continuous Glucose Transmitter (DEXCOM G6 TRANSMITTER) MISC Change every 3 months 1 each 3     famotidine (PEPCID) 20 MG tablet Take 1 tablet (20 mg) by mouth 2 times daily. 60 tablet 3     fludrocortisone (FLORINEF) 0.1 MG tablet Take 1 tablet (0.1 mg) by mouth every other day. 45 tablet 1     magnesium oxide (MAG-OX) 400 MG tablet Take 2 tablets (800 mg) by mouth daily (with lunch). 60 tablet 4     senna-docusate (SENOKOT-S/PERICOLACE) 8.6-50 MG tablet Take 1-2 tablets by mouth 2 times daily as needed for constipation. 60 tablet 3     sulfamethoxazole-trimethoprim (BACTRIM) 400-80 MG tablet Take 1 tablet by mouth daily. 30 tablet 11     tacrolimus (GENERIC EQUIVALENT) 1 MG capsule Take  4 mg in am and 3.5 mg in pm  Profile  Rx (Patient taking differently: Take  4 mg in am and 4 mg in pm  Profile  Rx) 240 capsule 11     valGANciclovir (VALCYTE) 450 MG tablet Take 2 tablets (900 mg) by mouth daily. 60 tablet 1     vitamin D3 (CHOLECALCIFEROL) 50 mcg (2000 units) tablet Take 1 tablet (50 mcg) by mouth daily. 30 tablet 3     calcium carbonate-vitamin D (OSCAL) 500-5 MG-MCG tablet Take 1 tablet by mouth 2 times daily (with meals). (Patient not taking: Reported on 1/15/2025) 60 tablet 0     nystatin (MYCOSTATIN) 133097 UNIT/ML suspension Swish and swallow 5 mLs (500,000 Units) in mouth 4 times daily. (Patient not taking:  Reported on 1/15/2025) 473 mL 2     polyethylene glycol (MIRALAX) 17 GM/Dose powder Take 17 g by mouth daily. (Patient not taking: Reported on 1/15/2025)       sodium bicarbonate 650 MG tablet Take 2 tablets (1,300 mg) by mouth 2 times daily. (Patient not taking: Reported on 1/15/2025) 120 tablet 2     tacrolimus (GENERIC EQUIVALENT) 0.5 MG capsule Take  4.0 mg in am and 3.5 mg in pm  profile RX (Patient not taking: Reported on 1/15/2025) 60 capsule 1     No current facility-administered medications for this visit.     There are no discontinued medications.    Physical Exam  Vital Signs: BP (!) 156/82 (BP Location: Left arm, Patient Position: Sitting, Cuff Size: Adult Large)   Pulse 87   Temp 98.1  F (36.7  C) (Oral)   Wt 87.4 kg (192 lb 11.2 oz)   SpO2 100%   BMI 30.17 kg/m      GENERAL APPEARANCE: alert and no distress  EYES: eyes grossly normal to inspection  HENT: normal cephalic/atraumatic  RESP:   CV: S1, S2 present  EDEMA: no LE edema bilaterally  ABDOMEN: soft, non distended  MS: extremities normal - no gross deformities noted  SKIN: no rash  NEURO: mentation intact and speech normal  PSYCH: mentation appears normal and affect normal/bright  TX KIDNEY: normal    Data        Latest Ref Rng & Units 1/15/2025    10:14 AM 1/14/2025     7:43 AM 1/9/2025     8:57 AM   Renal   Sodium 135 - 145 mmol/L 142      Na (external) 137 - 145 mmol/L  137 - 145 mmol/L  138        138  139    K 3.4 - 5.3 mmol/L 4.8      K (external) 3.5 - 5.1 mmol/L  3.5 - 5.1 mmol/L  4.6        4.6  4.8    Cl 98 - 107 mmol/L 109  111        111  111    Cl (external) 98 - 107 mmol/L 109  111        111  111    CO2 22 - 29 mmol/L 23      CO2 (external) 22 - 30 mmol/L  22 - 30 mmol/L  21        21  18    Urea Nitrogen 6.0 - 20.0 mg/dL 19.0      BUN (external) 9 - 20 mg/dL  9 - 20 mg/dL  25        25  28    Creatinine 0.67 - 1.17 mg/dL 1.42      Cr (external) 0.66 - 1.25 mg/dL  0.66 - 1.25 mg/dL  1.50        1.50  1.38    Glucose 70 - 99  blurred vision, cold or flu-like symptoms,delusions, hoarseness, lack or loss of strength, lower back or side pain, swelling of the hands, feet, or lower legs trembling or shaking. Patient understands and agrees with the plan.       RTC for injection and follow up after neurology visit.      Rome Springer DO  Pain Management   [facility]            INSPECT REPORT    As part of the patient's treatment plan, I may be prescribing controlled substances. The patient has been made aware of appropriate use of such medications, including potential risk of somnolence, limited ability to drive and/or work safely, and the potential for dependence or overdose. It has also been made clear that these medications are for use by this patient only, without concomitant use of alcohol or other substances unless prescribed.     Patient has completed prescribing agreement detailing terms of continued prescribing of controlled substances, including monitoring INSPECT reports, urine drug screening, and pill counts if necessary. The patient is aware that inappropriate use will results in cessation of prescribing such medications.    INSPECT report has been reviewed and scanned into the patient's chart.             mg/dL 105      Glucose (external) 70 - 100 mg/dL  70 - 100 mg/dL  103        103  104    Calcium 8.8 - 10.4 mg/dL 9.3      Ca (external) 8.4 - 10.2 mg/dL  8.4 - 10.2 mg/dL  9.2        9.2  9.4    Magnesium 1.7 - 2.3 mg/dL 1.4      Mg (external) 1.6 - 2.3 mg/dL  1.6 - 2.3 mg/dL  1.2        1.2         This result is from an external source.         Latest Ref Rng & Units 1/15/2025    10:14 AM 1/14/2025     7:43 AM 12/30/2024    10:49 AM   Bone Health   Phosphorus 2.5 - 4.5 mg/dL 3.4   3.2    Phos (external) 2.5 - 4.5 mg/dL  2.5 - 4.5 mg/dL  4.5        4.5         This result is from an external source.         Latest Ref Rng & Units 1/15/2025    10:14 AM 1/14/2025     7:43 AM 1/9/2025     8:57 AM   Heme   WBC (external) 4.2 - 9.1 10*9/L  4.2 - 9.1 10*9/L  4.4        4.4  6.5    Hgb 13.3 - 17.7 g/dL 10.0      Hgb (external) 13.7 - 17.5 g/dL  13.7 - 17.5 gm/dL  9.9        9.9  10.3    Plt (external) 150 - 440 10*9/L  150 - 440 10*9/L  194        194  238    ABSOLUTE NEUTROPHILS (EXTERNAL) 1.8 - 5.4 10*9/L  1.8 - 5.4 10*9/L  3.8        3.8     5.9       ABSOLUTE LYMPHOCYTES (EXTERNAL) 1.3 - 3.6 10*9/L  1.3 - 3.6 10*9/L  0.3        0.3     0.2       ABSOLUTE MONOCYTES (EXTERNAL) 0.3 - 0.8 10*9/L  0.3 - 0.8 10*9/L  0.2        0.2     0.2       ABSOLUTE EOSINOPHILS (EXTERNAL) 0.0 - 0.5 10*9/L  0.0 - 0.51 10*9/L  0.1        0.1     0.2       ABSOLUTE BASOPHILS (EXTERNAL) 0.0 - 0.1 10*9/L   0.0           This result is from an external source.         Latest Ref Rng & Units 11/4/2024     6:22 AM 11/2/2024     1:52 PM 9/26/2019     9:45 AM   Liver   AP 40 - 150 U/L  113  100    TBili <=1.2 mg/dL  0.5  0.2    ALT 0 - 70 U/L  13  42    AST 0 - 45 U/L  15  16    Tot Protein 6.4 - 8.3 g/dL  6.9  7.5    Albumin 3.4 - 5.0 g/dL   3.5    Albumin 3.5 - 5.2 g/dL 2.4  4.1           Latest Ref Rng & Units 1/15/2025    10:14 AM 1/14/2025     7:43 AM 1/9/2025     8:57 AM   Pancreas   Amylase 28 - 100 U/L 40      Amylase (external) 30 - 110  U/L  30 - 110 U/L  62        62  48    Lipase (Roche) 13 - 60 U/L 27      Lipase (external) 30 - 110 U/L  30 - 110 U/L  62        62  48        This result is from an external source.         Latest Ref Rng & Units 1/15/2025    10:14 AM 1/14/2025     7:43 AM 12/30/2024    10:49 AM   Iron studies   Iron 61 - 157 ug/dL 31   24    Iron (external) 23 - 300 U/L  23 - 300 U/L  65        65     Iron Sat Index 15 - 46 % 12   10    Ferritin 31 - 409 ng/mL   468        This result is from an external source.         Latest Ref Rng & Units 12/2/2024     8:48 AM 11/2/2024     2:29 PM 8/13/2021     8:39 AM   [facility] Virology   BK Quant Log Ext log IU/mL Undetected   Undetected    BK Quant Result Ext Undetected IU/mL Undetected   Undetected    BK Quant Spec Ext  Plasma      EBV CAPSID ANTIBODY IGG No detectable antibody.  Positive     HIV 1&2 Ext Undetected Copies/mL Undetected             This result is from an external source.     Failed to redirect to the Timeline version of the REVFS SmartLink.  Recent Labs   Lab Test 11/29/24  1023 12/12/24  0855 12/30/24  1048   DOSTAC 11/28/2024 12/11/2024 12/29/2024   TACROL 8.5 10.5 9.9     Recent Labs   Lab Test 10/07/19  0742 11/21/19  0813 11/14/24  0759   DOSMPA Not Provided 11/20/19 1800 11/13/2024   7:00 PM   MPACID 0.82* 0.91* 1.59   MPAG 14.1* 39.5 57.5       Again, thank you for allowing me to participate in the care of your patient.        Sincerely,        Jose L Reyna MD    Electronically signed

## 2025-02-22 DIAGNOSIS — M47.812 CERVICAL SPONDYLOSIS: ICD-10-CM

## 2025-02-22 DIAGNOSIS — M50.30 DDD (DEGENERATIVE DISC DISEASE), CERVICAL: ICD-10-CM

## 2025-02-24 RX ORDER — GABAPENTIN 600 MG/1
600 TABLET ORAL 3 TIMES DAILY
Qty: 90 TABLET | Refills: 2 | OUTPATIENT
Start: 2025-02-24

## 2025-03-05 DIAGNOSIS — E55.9 VITAMIN D DEFICIENCY: ICD-10-CM

## 2025-03-05 RX ORDER — ERGOCALCIFEROL 1.25 MG/1
50000 CAPSULE, LIQUID FILLED ORAL WEEKLY
Qty: 4 CAPSULE | Refills: 5 | Status: SHIPPED | OUTPATIENT
Start: 2025-03-05

## 2025-03-26 DIAGNOSIS — M50.30 DDD (DEGENERATIVE DISC DISEASE), CERVICAL: ICD-10-CM

## 2025-03-26 DIAGNOSIS — M47.812 CERVICAL SPONDYLOSIS: ICD-10-CM

## 2025-03-26 RX ORDER — GABAPENTIN 600 MG/1
600 TABLET ORAL 3 TIMES DAILY
Qty: 270 TABLET | Refills: 1 | Status: SHIPPED | OUTPATIENT
Start: 2025-03-26

## 2025-04-09 ENCOUNTER — LAB (OUTPATIENT)
Dept: FAMILY MEDICINE CLINIC | Facility: CLINIC | Age: 50
End: 2025-04-09
Payer: COMMERCIAL

## 2025-04-09 ENCOUNTER — OFFICE VISIT (OUTPATIENT)
Dept: FAMILY MEDICINE CLINIC | Facility: CLINIC | Age: 50
End: 2025-04-09
Payer: COMMERCIAL

## 2025-04-09 VITALS
DIASTOLIC BLOOD PRESSURE: 81 MMHG | HEIGHT: 61 IN | BODY MASS INDEX: 41.88 KG/M2 | OXYGEN SATURATION: 100 % | WEIGHT: 221.8 LBS | TEMPERATURE: 98.3 F | SYSTOLIC BLOOD PRESSURE: 127 MMHG | HEART RATE: 68 BPM

## 2025-04-09 DIAGNOSIS — E66.813 CLASS 3 SEVERE OBESITY WITHOUT SERIOUS COMORBIDITY WITH BODY MASS INDEX (BMI) OF 40.0 TO 44.9 IN ADULT, UNSPECIFIED OBESITY TYPE: ICD-10-CM

## 2025-04-09 DIAGNOSIS — M54.2 CERVICALGIA: ICD-10-CM

## 2025-04-09 DIAGNOSIS — E78.2 MIXED HYPERLIPIDEMIA: ICD-10-CM

## 2025-04-09 DIAGNOSIS — R01.1 CARDIAC MURMUR: ICD-10-CM

## 2025-04-09 DIAGNOSIS — M79.7 FIBROMYALGIA: ICD-10-CM

## 2025-04-09 DIAGNOSIS — M15.0 PRIMARY OSTEOARTHRITIS INVOLVING MULTIPLE JOINTS: ICD-10-CM

## 2025-04-09 DIAGNOSIS — F51.01 PRIMARY INSOMNIA: ICD-10-CM

## 2025-04-09 DIAGNOSIS — I10 PRIMARY HYPERTENSION: ICD-10-CM

## 2025-04-09 DIAGNOSIS — K14.0 TONGUE ULCERATION: ICD-10-CM

## 2025-04-09 DIAGNOSIS — F41.9 ANXIETY: ICD-10-CM

## 2025-04-09 DIAGNOSIS — J45.20 MILD INTERMITTENT ASTHMA WITHOUT COMPLICATION: ICD-10-CM

## 2025-04-09 DIAGNOSIS — E66.01 CLASS 3 SEVERE OBESITY WITHOUT SERIOUS COMORBIDITY WITH BODY MASS INDEX (BMI) OF 40.0 TO 44.9 IN ADULT, UNSPECIFIED OBESITY TYPE: ICD-10-CM

## 2025-04-09 DIAGNOSIS — E55.9 VITAMIN D DEFICIENCY: ICD-10-CM

## 2025-04-09 DIAGNOSIS — Z00.00 PREVENTATIVE HEALTH CARE: Primary | ICD-10-CM

## 2025-04-09 DIAGNOSIS — Z00.00 PREVENTATIVE HEALTH CARE: ICD-10-CM

## 2025-04-09 DIAGNOSIS — Z12.31 BREAST CANCER SCREENING BY MAMMOGRAM: ICD-10-CM

## 2025-04-09 LAB
DEPRECATED RDW RBC AUTO: 41 FL (ref 37–54)
ERYTHROCYTE [DISTWIDTH] IN BLOOD BY AUTOMATED COUNT: 13 % (ref 12.3–15.4)
HCT VFR BLD AUTO: 38.4 % (ref 34–46.6)
HGB BLD-MCNC: 12.2 G/DL (ref 12–15.9)
INSULIN SERPL-ACNC: 9.12 UU/ML (ref 2–23)
MCH RBC QN AUTO: 27.7 PG (ref 26.6–33)
MCHC RBC AUTO-ENTMCNC: 31.8 G/DL (ref 31.5–35.7)
MCV RBC AUTO: 87.3 FL (ref 79–97)
PLATELET # BLD AUTO: 273 10*3/MM3 (ref 140–450)
PMV BLD AUTO: 9.8 FL (ref 6–12)
RBC # BLD AUTO: 4.4 10*6/MM3 (ref 3.77–5.28)
WBC NRBC COR # BLD AUTO: 6.64 10*3/MM3 (ref 3.4–10.8)

## 2025-04-09 PROCEDURE — 82533 TOTAL CORTISOL: CPT | Performed by: NURSE PRACTITIONER

## 2025-04-09 PROCEDURE — 84443 ASSAY THYROID STIM HORMONE: CPT | Performed by: NURSE PRACTITIONER

## 2025-04-09 PROCEDURE — 80053 COMPREHEN METABOLIC PANEL: CPT | Performed by: NURSE PRACTITIONER

## 2025-04-09 PROCEDURE — 86696 HERPES SIMPLEX TYPE 2 TEST: CPT | Performed by: NURSE PRACTITIONER

## 2025-04-09 PROCEDURE — 84403 ASSAY OF TOTAL TESTOSTERONE: CPT | Performed by: NURSE PRACTITIONER

## 2025-04-09 PROCEDURE — 85027 COMPLETE CBC AUTOMATED: CPT | Performed by: NURSE PRACTITIONER

## 2025-04-09 PROCEDURE — 36415 COLL VENOUS BLD VENIPUNCTURE: CPT | Performed by: NURSE PRACTITIONER

## 2025-04-09 PROCEDURE — 80061 LIPID PANEL: CPT | Performed by: NURSE PRACTITIONER

## 2025-04-09 PROCEDURE — 83525 ASSAY OF INSULIN: CPT | Performed by: NURSE PRACTITIONER

## 2025-04-09 PROCEDURE — 83036 HEMOGLOBIN GLYCOSYLATED A1C: CPT | Performed by: NURSE PRACTITIONER

## 2025-04-09 PROCEDURE — 86695 HERPES SIMPLEX TYPE 1 TEST: CPT | Performed by: NURSE PRACTITIONER

## 2025-04-09 RX ORDER — DULOXETIN HYDROCHLORIDE 60 MG/1
60 CAPSULE, DELAYED RELEASE ORAL DAILY
Qty: 90 CAPSULE | Refills: 1 | Status: SHIPPED | OUTPATIENT
Start: 2025-04-09

## 2025-04-09 RX ORDER — VALACYCLOVIR HYDROCHLORIDE 500 MG/1
500 TABLET, FILM COATED ORAL 2 TIMES DAILY
Qty: 30 TABLET | Refills: 2 | Status: SHIPPED | OUTPATIENT
Start: 2025-04-09 | End: 2025-04-14

## 2025-04-09 RX ORDER — LEVALBUTEROL TARTRATE 45 UG/1
1-2 AEROSOL, METERED ORAL EVERY 4 HOURS PRN
Qty: 15 G | Refills: 5 | Status: SHIPPED | OUTPATIENT
Start: 2025-04-09

## 2025-04-09 RX ORDER — ESTRADIOL 2 MG/1
2 TABLET ORAL DAILY
Qty: 90 TABLET | Refills: 1 | Status: SHIPPED | OUTPATIENT
Start: 2025-04-09

## 2025-04-09 RX ORDER — ALBUTEROL SULFATE 0.63 MG/3ML
1 SOLUTION RESPIRATORY (INHALATION) EVERY 6 HOURS PRN
Qty: 120 EACH | Refills: 0 | Status: SHIPPED | OUTPATIENT
Start: 2025-04-09

## 2025-04-09 RX ORDER — ZOLPIDEM TARTRATE 10 MG/1
10 TABLET ORAL NIGHTLY PRN
Qty: 30 TABLET | Refills: 5 | Status: SHIPPED | OUTPATIENT
Start: 2025-04-09

## 2025-04-09 RX ORDER — METOPROLOL SUCCINATE 50 MG/1
50 TABLET, EXTENDED RELEASE ORAL DAILY
Qty: 90 TABLET | Refills: 1 | Status: SHIPPED | OUTPATIENT
Start: 2025-04-09

## 2025-04-09 NOTE — PROGRESS NOTES
"Chief Complaint  Chief Complaint   Patient presents with    Annual Exam     Pt would like to discuss \"electrical shocks\" on sides on tongue  Pt has had total hysterectomy- no longer gets pap smears  Last mammo 1/13/23- normal           Subjective          April SABA Espinoza presents to [facility] for   History of Present Illness    50-year-old female patient presents for annual exam.  She has a history of total hysterectomy, she is on estradiol daily. She is overall doing well, has insurance now. Hasn't been seen since march 2024 d/t loss of job/insurance.     She c/o tongue tingling and sharp shooting pain then develops a canker sore, occurring 1-2 times/mo lately.     Anxiety, mostly stable, patient denies significant weight loss/gain, insomnia, hypersomnia, psychomotor agitation, psychomotor retardation, fatigue (loss of energy), feelings of worthlessness (guilt), impaired concentration (indecisiveness), thoughts of death or suicide.       GERD, has been stable, no longer taking pantoprazole, denies nausea, vomiting, constipation, abdominal pain and diarrhea.    HTN, stable on meds and takes as directed, denies chest pain, headache, shortness of air, palpitations and swelling of extremities.     Cervical spine pain and h/a's, following with pain management and neurosurgery, MRI showed multilevel degenerative facet changes with mild neuroforaminal narrowing on the left at C5-6 level. Received cervical epidural spinal injections and occipital steroid injections per Dr. Springer. She uses norco prn, flexeril as needed, Celebrex and gabapentin 600 mg 3 times daily, taking all medications as directed.   -She is now seeing  rheumatology, fibromyalgia, increased duloxetine to 60mg     L rotator cuff surgery did not improve s/s, still with mod jimenes rom    CTS and cubital tunnel syndrome, EMG completed per neuro, received injections and then release with significant improvement, s/s are starting " back again.     Insomnia, not sleeping well, ran out of ambien d/t loss of insurance and med coveerage      The following portions of the patient's history were reviewed and updated as appropriate: allergies, current medications, past family history, past medical history, past social history, past surgical history and problem list.    Past Medical History:   Diagnosis Date    ADD (attention deficit disorder)     SARMAD positive     Anxiety     Arthralgia     Asthma     Bursitis of left hip     Carpal tunnel syndrome     DDD (degenerative disc disease), lumbar     Depression, major     Fibromyalgia     Flu syndrome     GERD (gastroesophageal reflux disease)     Heart disease     Heart murmur     Hiatal hernia     Hormone replacement therapy     Hyperlipidemia     Hypertension     Inflammatory arthritis     Influenza     Insomnia     Knee pain, bilateral     Lupus     Migraine     Mitral valve regurgitation     Neck pain     Obesity     Osteoarthritis     Pain, joint, shoulder, left     Pharyngitis, acute     PONV (postoperative nausea and vomiting)     Postmenopausal     Sinus congestion     Surgical menopause     Urinary frequency     Vitamin B12 deficiency     Vitamin D deficiency      Past Surgical History:   Procedure Laterality Date    CARPAL TUNNEL RELEASE WITH CUBITAL TUNNEL RELEASE  08/02/2023    CHOLECYSTECTOMY      LUMBAR DISCECTOMY  2000    SHOULDER ARTHROSCOPY W/ ROTATOR CUFF REPAIR Left 5/17/2024    Procedure: SHOULDER ARTHROSCOPY WITH ROTATOR CUFF REPAIR; extensive debridment;  Surgeon: Km Nino MD;  Location: [facility] OR;  Service: Orthopedics;  Laterality: Left;    TONSILLECTOMY      TOTAL ABDOMINAL HYSTERECTOMY WITH SALPINGO OOPHORECTOMY Bilateral 2009    FOR ENDOMETRIOSIS     Family History   Problem Relation Age of Onset    Hypertension Mother     Hypertension Father     Diabetes Other     Heart disease Other     Hypertension Other     Cancer Other      Social History     Tobacco Use  "   Smoking status: Former     Current packs/day: 0.00     Average packs/day: 0.5 packs/day for 3.0 years (1.5 ttl pk-yrs)     Types: Cigarettes     Start date: 2007     Quit date: 2010     Years since quitting: 15.2     Passive exposure: Past    Smokeless tobacco: Never   Substance Use Topics    Alcohol use: Never       Current Outpatient Medications:     albuterol (ACCUNEB) 0.63 MG/3ML nebulizer solution, Take 3 mL by nebulization Every 6 (Six) Hours As Needed for Wheezing or Shortness of Air., Disp: 120 each, Rfl: 0    DULoxetine (CYMBALTA) 60 MG capsule, Take 1 capsule by mouth Daily., Disp: 90 capsule, Rfl: 1    estradiol (ESTRACE) 2 MG tablet, TAKE 1 TABLET BY MOUTH DAILY, Disp: 90 tablet, Rfl: 1    gabapentin (NEURONTIN) 600 MG tablet, Take 1 tablet by mouth 3 (Three) Times a Day., Disp: 270 tablet, Rfl: 1    levalbuterol (XOPENEX HFA) 45 MCG/ACT inhaler, Inhale 1-2 puffs Every 4 (Four) Hours As Needed for Wheezing or Shortness of Air., Disp: 15 g, Rfl: 5    metoprolol succinate XL (TOPROL-XL) 50 MG 24 hr tablet, Take 1 tablet by mouth Daily., Disp: 90 tablet, Rfl: 1    zolpidem (AMBIEN) 10 MG tablet, Take 1 tablet by mouth At Night As Needed for Sleep., Disp: 30 tablet, Rfl: 5    valACYclovir (Valtrex) 500 MG tablet, Take 1 tablet by mouth 2 (Two) Times a Day for 5 days. At onset of breakout, Disp: 30 tablet, Rfl: 2    Objective   Vital Signs:   Vitals:    04/09/25 1013   BP: 127/81   BP Location: Right arm   Patient Position: Sitting   Cuff Size: Adult   Pulse: 68   Temp: 98.3 °F (36.8 °C)   TempSrc: Oral   SpO2: 100%   Weight: 101 kg (221 lb 12.8 oz)   Height: 154.9 cm (61\")   PainSc: 7    PainLoc: Generalized       Body mass index is 41.91 kg/m².    Physical Exam  Constitutional:       General: She is not in acute distress.     Appearance: Normal appearance. She is well-developed. She is obese. She is not ill-appearing or diaphoretic.   HENT:      Head: Normocephalic.   Eyes:      Conjunctiva/sclera: " Conjunctivae normal.      Pupils: Pupils are equal, round, and reactive to light.   Neck:      Thyroid: No thyromegaly.      Vascular: No JVD.   Cardiovascular:      Rate and Rhythm: Normal rate and regular rhythm.      Heart sounds: Murmur heard.   Pulmonary:      Effort: Pulmonary effort is normal. No respiratory distress.      Breath sounds: Normal breath sounds. No wheezing or rhonchi.   Abdominal:      General: Bowel sounds are normal. There is no distension.      Palpations: Abdomen is soft.      Tenderness: There is no abdominal tenderness.   Musculoskeletal:         General: Tenderness (chronic pain multi joints, >12 sites pain daily) present. No swelling. Normal range of motion.      Cervical back: Normal range of motion and neck supple. No tenderness.   Lymphadenopathy:      Cervical: No cervical adenopathy.   Skin:     General: Skin is warm and dry.      Coloration: Skin is not jaundiced.      Findings: No erythema or rash.   Neurological:      General: No focal deficit present.      Mental Status: She is alert and oriented to person, place, and time. Mental status is at baseline.      Sensory: No sensory deficit.      Motor: No weakness.      Gait: Gait normal.   Psychiatric:         Mood and Affect: Mood normal.         Behavior: Behavior normal.         Thought Content: Thought content normal.         Judgment: Judgment normal.          Result Review :     Lab on 04/09/2025   Component Date Value Ref Range Status    Hemoglobin A1C 04/09/2025 5.70 (H)  4.80 - 5.60 % Final    Insulin 04/09/2025 9.12  2 - 23 uU/mL Final    Total Cholesterol 04/09/2025 209 (H)  0 - 200 mg/dL Final    Triglycerides 04/09/2025 119  0 - 150 mg/dL Final    HDL Cholesterol 04/09/2025 54  40 - 60 mg/dL Final    LDL Cholesterol  04/09/2025 134 (H)  0 - 100 mg/dL Final    VLDL Cholesterol 04/09/2025 21  5 - 40 mg/dL Final    LDL/HDL Ratio 04/09/2025 2.43   Final    Glucose 04/09/2025 82  65 - 99 mg/dL Final    BUN 04/09/2025 15  6  - 20 mg/dL Final    Creatinine 04/09/2025 0.56 (L)  0.57 - 1.00 mg/dL Final    Sodium 04/09/2025 139  136 - 145 mmol/L Final    Potassium 04/09/2025 4.1  3.5 - 5.2 mmol/L Final    Chloride 04/09/2025 103  98 - 107 mmol/L Final    CO2 04/09/2025 26.9  22.0 - 29.0 mmol/L Final    Calcium 04/09/2025 9.1  8.6 - 10.5 mg/dL Final    Total Protein 04/09/2025 7.4  6.0 - 8.5 g/dL Final    Albumin 04/09/2025 4.1  3.5 - 5.2 g/dL Final    ALT (SGPT) 04/09/2025 21  1 - 33 U/L Final    AST (SGOT) 04/09/2025 22  1 - 32 U/L Final    Alkaline Phosphatase 04/09/2025 91  39 - 117 U/L Final    Total Bilirubin 04/09/2025 0.3  0.0 - 1.2 mg/dL Final    Globulin 04/09/2025 3.3  gm/dL Final    A/G Ratio 04/09/2025 1.2  g/dL Final    BUN/Creatinine Ratio 04/09/2025 26.8 (H)  7.0 - 25.0 Final    Anion Gap 04/09/2025 9.1  5.0 - 15.0 mmol/L Final    eGFR 04/09/2025 111.3  >60.0 mL/min/1.73 Final    WBC 04/09/2025 6.64  3.40 - 10.80 10*3/mm3 Final    RBC 04/09/2025 4.40  3.77 - 5.28 10*6/mm3 Final    Hemoglobin 04/09/2025 12.2  12.0 - 15.9 g/dL Final    Hematocrit 04/09/2025 38.4  34.0 - 46.6 % Final    MCV 04/09/2025 87.3  79.0 - 97.0 fL Final    MCH 04/09/2025 27.7  26.6 - 33.0 pg Final    MCHC 04/09/2025 31.8  31.5 - 35.7 g/dL Final    RDW 04/09/2025 13.0  12.3 - 15.4 % Final    RDW-SD 04/09/2025 41.0  37.0 - 54.0 fl Final    MPV 04/09/2025 9.8  6.0 - 12.0 fL Final    Platelets 04/09/2025 273  140 - 450 10*3/mm3 Final    TSH 04/09/2025 0.454  0.270 - 4.200 uIU/mL Final    Cortisol 04/09/2025 3.60    mcg/dL Final    Testosterone, Total 04/09/2025 <2.50 (L)  2.90 - 40.80 ng/dL Final   Office Visit on 04/09/2025   Component Date Value Ref Range Status    HSV 1 IgG, Type Specific 04/09/2025 Reactive (A)  Non Reactive Final    **Please note reference interval change**  HSV-1 IgG testing performed using the Roche Elecsys HSV-1 IgG assay.    HSV 2 IgG, Type Spec 04/09/2025 Non Reactive  Non Reactive Final    **Please note reference interval  change**  Current guidelines and recommendations do not recommend routine  screening for HSV-2 in asymptomatic individuals, including those that  are pregnant. The detection of HSV-2 IgG antibodies in a single sample  indicates previous exposure to HSV-2 but does not give information as  to the site of HSV infection or the timing of exposure. The predictive  value of positive and negative results depends on the population's  prevalence and the pretest likelihood of HSV-2. HSV-2 IgG testing  performed using the Roche Elecsys HSV-2 IgG assay.                              Assessment and Plan    Diagnoses and all orders for this visit:    1. Preventative health care (Primary)    2. Tongue ulceration  Comments:  check hsv 1/2  use valtrex prn for initial s/s  rec start L-lysine supplement  Orders:  -     HSV 1 and 2-Specific Ab, IgG  -     valACYclovir (Valtrex) 500 MG tablet; Take 1 tablet by mouth 2 (Two) Times a Day for 5 days. At onset of breakout  Dispense: 30 tablet; Refill: 2    3. Breast cancer screening by mammogram  -     Mammo Screening Digital Tomosynthesis Bilateral With CAD; Future    4. Vitamin D deficiency    5. Anxiety    6. Cervicalgia    7. Mixed hyperlipidemia    8. Primary osteoarthritis involving multiple joints  Comments:  Follow up with rheumatology and pain management as directed    9. Fibromyalgia  -     DULoxetine (CYMBALTA) 60 MG capsule; Take 1 capsule by mouth Daily.  Dispense: 90 capsule; Refill: 1    10. Mild intermittent asthma without complication  Comments:  cont/refill albuterol nebs  Continue OTC allergy meds prn  Orders:  -     albuterol (ACCUNEB) 0.63 MG/3ML nebulizer solution; Take 3 mL by nebulization Every 6 (Six) Hours As Needed for Wheezing or Shortness of Air.  Dispense: 120 each; Refill: 0  -     levalbuterol (XOPENEX HFA) 45 MCG/ACT inhaler; Inhale 1-2 puffs Every 4 (Four) Hours As Needed for Wheezing or Shortness of Air.  Dispense: 15 g; Refill: 5    11. Primary  insomnia  Comments:  continue and refill ambien for nightly use  Orders:  -     zolpidem (AMBIEN) 10 MG tablet; Take 1 tablet by mouth At Night As Needed for Sleep.  Dispense: 30 tablet; Refill: 5    12. Class 3 severe obesity without serious comorbidity with body mass index (BMI) of 40.0 to 44.9 in adult, unspecified obesity type  Comments:  Patient has lost 12 pounds, praised efforts  Continue to work on weight loss and increase exercise  will discuss possible GLP-1 next visit    13. Primary hypertension  -     metoprolol succinate XL (TOPROL-XL) 50 MG 24 hr tablet; Take 1 tablet by mouth Daily.  Dispense: 90 tablet; Refill: 1    14. Cardiac murmur  -     metoprolol succinate XL (TOPROL-XL) 50 MG 24 hr tablet; Take 1 tablet by mouth Daily.  Dispense: 90 tablet; Refill: 1      Overall doing well   Cont current med regimen, refills when needed   Labs today as ordered, will notify results.   Age appropriate preventative counseling provided, including healthy lifestyle modifications and exercise      I spent 30 minutes caring for Kelly Espinoza on this date of service. This time includes time spent by me in the following activities: preparing for the visit, reviewing tests, performing a medically appropriate examination and/or evaluation , counseling and educating the patient/family/caregiver, ordering medications, tests, or procedures and documenting information in the medical record        Follow Up     Return in about 6 months (around 10/9/2025) for Recheck.  Patient was given instructions and counseling regarding her condition or for health maintenance advice. Please see specific information pulled into the AVS if appropriate.        Part of this note may be an electronic transcription/translation of spoken language to printed text using the Dragon Dictation System

## 2025-04-10 LAB
ALBUMIN SERPL-MCNC: 4.1 G/DL (ref 3.5–5.2)
ALBUMIN/GLOB SERPL: 1.2 G/DL
ALP SERPL-CCNC: 91 U/L (ref 39–117)
ALT SERPL W P-5'-P-CCNC: 21 U/L (ref 1–33)
ANION GAP SERPL CALCULATED.3IONS-SCNC: 9.1 MMOL/L (ref 5–15)
AST SERPL-CCNC: 22 U/L (ref 1–32)
BILIRUB SERPL-MCNC: 0.3 MG/DL (ref 0–1.2)
BUN SERPL-MCNC: 15 MG/DL (ref 6–20)
BUN/CREAT SERPL: 26.8 (ref 7–25)
CALCIUM SPEC-SCNC: 9.1 MG/DL (ref 8.6–10.5)
CHLORIDE SERPL-SCNC: 103 MMOL/L (ref 98–107)
CHOLEST SERPL-MCNC: 209 MG/DL (ref 0–200)
CO2 SERPL-SCNC: 26.9 MMOL/L (ref 22–29)
CORTIS SERPL-MCNC: 3.6 MCG/DL
CREAT SERPL-MCNC: 0.56 MG/DL (ref 0.57–1)
EGFRCR SERPLBLD CKD-EPI 2021: 111.3 ML/MIN/1.73
GLOBULIN UR ELPH-MCNC: 3.3 GM/DL
GLUCOSE SERPL-MCNC: 82 MG/DL (ref 65–99)
HBA1C MFR BLD: 5.7 % (ref 4.8–5.6)
HDLC SERPL-MCNC: 54 MG/DL (ref 40–60)
LDLC SERPL CALC-MCNC: 134 MG/DL (ref 0–100)
LDLC/HDLC SERPL: 2.43 {RATIO}
POTASSIUM SERPL-SCNC: 4.1 MMOL/L (ref 3.5–5.2)
PROT SERPL-MCNC: 7.4 G/DL (ref 6–8.5)
SODIUM SERPL-SCNC: 139 MMOL/L (ref 136–145)
TESTOST SERPL-MCNC: <2.5 NG/DL (ref 2.9–40.8)
TRIGL SERPL-MCNC: 119 MG/DL (ref 0–150)
TSH SERPL DL<=0.05 MIU/L-ACNC: 0.45 UIU/ML (ref 0.27–4.2)
VLDLC SERPL-MCNC: 21 MG/DL (ref 5–40)

## 2025-04-11 LAB
HSV1 IGG SERPL QL IA: REACTIVE
HSV2 IGG SERPL QL IA: NON REACTIVE

## 2025-04-12 ENCOUNTER — RESULTS FOLLOW-UP (OUTPATIENT)
Dept: FAMILY MEDICINE CLINIC | Facility: CLINIC | Age: 50
End: 2025-04-12
Payer: COMMERCIAL

## 2025-04-25 ENCOUNTER — PATIENT MESSAGE (OUTPATIENT)
Dept: FAMILY MEDICINE CLINIC | Facility: CLINIC | Age: 50
End: 2025-04-25
Payer: COMMERCIAL

## 2025-05-19 NOTE — TELEPHONE ENCOUNTER
LVM regarding mammogram order. Inquiring if has been scheduled or has been completed, Gave phone number to [facility] at [phone] to call and schedule    Relay

## 2025-05-20 RX ORDER — GABAPENTIN 600 MG/1
600 TABLET ORAL 3 TIMES DAILY
Qty: 270 TABLET | Refills: 1 | Status: CANCELLED | OUTPATIENT
Start: 2025-05-20

## 2025-05-20 NOTE — PROGRESS NOTES
Subjective   Kelly Espinoza is a 50 y.o. female is here for follow-up for neck pain.  Patient was last seen 11/6/2024.  No follow-up since then due to loss in insurance. She will be scheduled to see Ortho for L shoulder issues due to still having spasms. Her occipital headaches are returning as well.     On last visit:     Neck pain is 6/10 on VAS, maximum 6/10.  Pain is aching, throbbing, tingling and sometimes weakness and numbness in nature.  Referred to left neck and shoulder.  Constant pain but improved by prednisone, Norco, gabapentin.  Worse with bending or laying down.  Severe headaches in occipital region with numbness and tingling in both hands mostly in third fourth and fifth digit.  Trouble with gripping with the left arm.  Bilateral  occipital region headache radiating to left temporal and frontal region and R temple region - sharp, stabbing pains.     Left shoulder pain is 6/10 on VAS.     Lower back pain is 7/10 on VAS, at maximum is 8/10. Pain is sharp, shooting and stabbing in nature. Pain is referred left buttock, left anterior thigh, left anterior shin and left foot. The pain is constnat. The pain is improved by nothing. The pain is worse with bending.      Previous Injection:   7/31/2024-left occipital nerve block- improved headaches for about 2 weeks.   3/5/2024-left shoulder injection with ultrasound- some relief for 1-2  weeks.  7/10/2023 - left occipital nerve block; left trapezius and rhomboids TPI- improvement  with sharp, shooting pain; didn't much relief with TPI.   2/15/2023- left occipital nerve block- good relief with sharp shooting pain- 4 months   11/8/2022-KENDRA C7-T1- improved headache, still has some pain.   3/8/2022-KENDRA C6/7- 75% pain relief- pain relief, improved headaches and improved flexibility - 4 months     Hx: Referred by Vivian Fontanez APRN  for neck pain.  Her pain started about 3 years ago without any significant injuries. 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   She has been seen by neurosurgery and was told she was non surgical candidate.  EMG showed bilateral carpal tunnel syndrome and cubital tunnel syndrome.  S/p bilateral carpal tunnel injection with 50% relief. She is s/p R carpal tunnel release. Seen by rheumatology who r/o lupus and has been diagnosed with Fibromyalgia. S/p  left shoulder arthroscopy and supraspinatus repair with Dr. Villegas on 5/17/2024       PHQ-9- 9  SOAPP-2      PMH:   Lupus, GERD, anxiety, ADD, s/p L5-S1 dissectomy 2000, s/p hand and arm surgery with Dr. Faulkner on 4/21/2023 with improvement in numbness in R hand.      Current Medications:   Norco 5-325 mg BID PRN  Celebrex 200 mg BID PRN  Cymbalta 30 mg daily  Gabapentin 600 mg TID   Ambien        Past Medications:   Tramadol 50 mg q8h PRN-1/16/2022 - didn't help     Past Modalities:  TENS:                                                                          no                                                  Physical Therapy Within The Last 6 Months              Yes - 8 weeks of PT for shoulder without much improvement.  Psychotherapy                                                            no  Massage Therapy                                                       no     Patient Complains Of:  Uro-Fecal Incontinence          no  Weight Gain/Loss                   no  Fever/Chills                             no  Weakness                               no           Current Outpatient Medications:     albuterol (ACCUNEB) 0.63 MG/3ML nebulizer solution, Take 3 mL by nebulization Every 6 (Six) Hours As Needed for Wheezing or Shortness of Air., Disp: 120 each, Rfl: 0    DULoxetine (CYMBALTA) 60 MG capsule, Take 1 capsule by mouth Daily., Disp: 90 capsule, Rfl: 1    estradiol (ESTRACE) 2 MG tablet, TAKE 1 TABLET BY MOUTH DAILY, Disp: 90 tablet, Rfl: 1    gabapentin (NEURONTIN) 600 MG tablet, Take 1 tablet by mouth 3 (Three) Times a Day., Disp: 270 tablet, Rfl: 1     levalbuterol (XOPENEX HFA) 45 MCG/ACT inhaler, Inhale 1-2 puffs Every 4 (Four) Hours As Needed for Wheezing or Shortness of Air., Disp: 15 g, Rfl: 5    metoprolol succinate XL (TOPROL-XL) 50 MG 24 hr tablet, Take 1 tablet by mouth Daily., Disp: 90 tablet, Rfl: 1    zolpidem (AMBIEN) 10 MG tablet, Take 1 tablet by mouth At Night As Needed for Sleep., Disp: 30 tablet, Rfl: 5    HYDROcodone-acetaminophen (NORCO) 7.5-325 MG per tablet, Take 1 tablet by mouth 3 (Three) Times a Day As Needed for Moderate Pain for up to 30 days., Disp: 90 tablet, Rfl: 0    [START ON 6/20/2025] HYDROcodone-acetaminophen (NORCO) 7.5-325 MG per tablet, Take 1 tablet by mouth 3 (Three) Times a Day As Needed for Moderate Pain for up to 30 days., Disp: 90 tablet, Rfl: 0    The following portions of the patient's history were reviewed and updated as appropriate: allergies, current medications, past family history, past medical history, past social history, past surgical history, and problem list.      REVIEW OF PERTINENT MEDICAL DATA    Past Medical History:   Diagnosis Date    ADD (attention deficit disorder)     SARMAD positive     Anxiety     Arthralgia     Asthma     Bursitis of left hip     Carpal tunnel syndrome     Roberto.    DDD (degenerative disc disease), lumbar     Depression, major     Fibromyalgia     Flu syndrome     GERD (gastroesophageal reflux disease)     Heart disease     Heart murmur     Hiatal hernia     Hormone replacement therapy     Hyperlipidemia     Hypertension     Inflammatory arthritis     Influenza     Insomnia     Knee pain, bilateral     Lupus     Migraine     Mitral valve regurgitation     Neck pain     Obesity     Osteoarthritis     Pain, joint, shoulder, left     Pharyngitis, acute     PONV (postoperative nausea and vomiting)     Postmenopausal     Sinus congestion     Surgical menopause     at age 34    Urinary frequency     Vitamin B12 deficiency     Vitamin D deficiency      Past Surgical History:   Procedure  Laterality Date    CARPAL TUNNEL RELEASE WITH CUBITAL TUNNEL RELEASE  08/02/2023    CHOLECYSTECTOMY      LUMBAR DISCECTOMY  2000    SHOULDER ARTHROSCOPY W/ ROTATOR CUFF REPAIR Left 5/17/2024    Procedure: SHOULDER ARTHROSCOPY WITH ROTATOR CUFF REPAIR; extensive debridment;  Surgeon: Km Nino MD;  Location: [facility] MAIN OR;  Service: Orthopedics;  Laterality: Left;    TONSILLECTOMY      TOTAL ABDOMINAL HYSTERECTOMY WITH SALPINGO OOPHORECTOMY Bilateral 2009    FOR ENDOMETRIOSIS     Family History   Problem Relation Age of Onset    Hypertension Mother     Hypertension Father     Diabetes Other     Heart disease Other     Hypertension Other     Cancer Other      Social History     Socioeconomic History    Marital status:    Tobacco Use    Smoking status: Former     Current packs/day: 0.00     Average packs/day: 0.5 packs/day for 3.0 years (1.5 ttl pk-yrs)     Types: Cigarettes     Start date: 2007     Quit date: 2010     Years since quitting: 15.3     Passive exposure: Past    Smokeless tobacco: Never   Vaping Use    Vaping status: Never Used   Substance and Sexual Activity    Alcohol use: Never    Drug use: Never    Sexual activity: Defer         Review of Systems   Musculoskeletal:  Positive for arthralgias, back pain and neck pain.         Vitals:    05/21/25 1434   BP: 131/90   Pulse: 89   Resp: 16   SpO2: 99%   Weight: 98 kg (216 lb)   PainSc: 6                          Objective   Physical Exam  Neck:     Musculoskeletal:         General: Tenderness present.        Arms:         Legs:    Neurological:      Deep Tendon Reflexes:      Reflex Scores:       Tricep reflexes are 2+ on the right side and 2+ on the left side.       Bicep reflexes are 2+ on the right side and 2+ on the left side.       Brachioradialis reflexes are 2+ on the right side and 2+ on the left side.     Comments: Motor strength 5/5 b/l UE  Sensory intact b/l UE             Imaging Reviewed:  Left shoulder MRI-11/21/2023  -  Moderate to severe distal tendinosis supraspinatus tendon and subscapularis tendon.  Moderate distal tendinosis infraspinatus tendon.  - Tendinosis and probable partial-thickness interstitial tearing of the intra-articular long head biceps tendon.  - Nondisplaced tear of anterior labrum suspected  - Mild age-appropriate degenerative changes of AC joint.    MRI cervical spine-1/31/2022  -C2-3-degenerative facet changes bilateral  C3-4-WNL  C4-5-WNL  C5-6-mild facet changes.  Mild left neural foraminal narrowing.  C6-7-mild bilateral uncovertebral joint spurring.  C7-T1-WNL    EMG upper extremity-12/5/2022  - Abnormal study.  Evidence of moderate right and mild left median neuropathy at the wrist consistent with carpal tunnel syndrome    Assessment:    1. Occipital neuralgia, unspecified laterality    2. DDD (degenerative disc disease), cervical    3. Cervical spondylosis    4. High risk medication use    5. Tendinosis of left shoulder            Plan:   1.  Repeat UDS-5/21/2025.  UDS consistent with patient interview on 7/12/2024.. narcotic contract signed - 11/30/22.   2. We discussed trying a course of formal physical therapy.  Physical therapy can help strengthen and stretch the muscles around the joints. Continue to be as active as possible.  Patient has done 8 weeks of physical therapy without much improvement.  3.. Continue Gabapepntin 600 mg TID (good till March 2024). Side effects discussed with the patient including but not limited to somnolence, dizziness, ataxia, headache, fatigue, blurred vision, cold or flu-like symptoms,delusions, hoarseness, lack or loss of strength, lower back or side pain, swelling of the hands, feet, or lower legs trembling or shaking. Patient understands and agrees with the plan.  4.  Due continuous pain, continue Norco 7.5-325 mg TID PRN (5/21; 6/20).  Patient has been advised not to take Ambien within 4 hours of taking hydrocodone.  Discussed with the patient regarding long-term  side effects of opioids including but not limited to opioid induced hormonal suppression, hyperalgesia, fatigue, weight gain, possible opioid induced altered immune system, addiction, tolerance, dependence, risk of hearing loss and elevated risk of myocardial infarction. Proper use and potential life threatening side effects of over use discussed with patient. Patient states understanding of their use and risks.  5. .  Patient has severe left SI joint tenderness with MELE positive and intermittent radiculopathy.  Due to recent onset, will prescribe Medrol Dosepak for 5 days to see if that improves pain.  We will consider left SI joint injection in future if pain worsens in lower back.   6. Agree with Rheum regarding increasing Cymbalta. She will talk to PCP.  7. Recommend TENS unit 15-20 mins 2-3 times daily for left trapezius pain.  8. Recommend following with orthopedics for L shoulder pain.  9. Patient has pain in the head with referred pain on the top of the head. Bilateral occipital tenderness present. Discussed bilateral greater and lesser occipital nerve block. Discussed the possibility of infection, bleeding, nerve damage, headache, increased pain. Patient understands and agrees.     RTC for b/l occipital N block and then 4 weeks follow up.     Rome Springer DO  Pain Management   [facility]            INSPECT REPORT    As part of the patient's treatment plan, I may be prescribing controlled substances. The patient has been made aware of appropriate use of such medications, including potential risk of somnolence, limited ability to drive and/or work safely, and the potential for dependence or overdose. It has also been made clear that these medications are for use by this patient only, without concomitant use of alcohol or other substances unless prescribed.     Patient has completed prescribing agreement detailing terms of continued prescribing of controlled substances, including monitoring INSPECT  reports, urine drug screening, and pill counts if necessary. The patient is aware that inappropriate use will results in cessation of prescribing such medications.    INSPECT report has been reviewed and scanned into the patient's chart.

## 2025-05-21 ENCOUNTER — OFFICE VISIT (OUTPATIENT)
Dept: PAIN MEDICINE | Facility: CLINIC | Age: 50
End: 2025-05-21
Payer: COMMERCIAL

## 2025-05-21 VITALS
HEART RATE: 89 BPM | RESPIRATION RATE: 16 BRPM | SYSTOLIC BLOOD PRESSURE: 131 MMHG | WEIGHT: 216 LBS | OXYGEN SATURATION: 99 % | DIASTOLIC BLOOD PRESSURE: 90 MMHG | BODY MASS INDEX: 40.81 KG/M2

## 2025-05-21 DIAGNOSIS — Z79.899 HIGH RISK MEDICATION USE: ICD-10-CM

## 2025-05-21 DIAGNOSIS — M50.30 DDD (DEGENERATIVE DISC DISEASE), CERVICAL: ICD-10-CM

## 2025-05-21 DIAGNOSIS — M67.814 TENDINOSIS OF LEFT SHOULDER: ICD-10-CM

## 2025-05-21 DIAGNOSIS — M47.812 CERVICAL SPONDYLOSIS: ICD-10-CM

## 2025-05-21 DIAGNOSIS — M54.81 OCCIPITAL NEURALGIA, UNSPECIFIED LATERALITY: Primary | ICD-10-CM

## 2025-05-21 RX ORDER — HYDROCODONE BITARTRATE AND ACETAMINOPHEN 7.5; 325 MG/1; MG/1
1 TABLET ORAL 3 TIMES DAILY PRN
Qty: 90 TABLET | Refills: 0 | Status: SHIPPED | OUTPATIENT
Start: 2025-06-20 | End: 2025-07-20

## 2025-05-21 RX ORDER — HYDROCODONE BITARTRATE AND ACETAMINOPHEN 7.5; 325 MG/1; MG/1
1 TABLET ORAL 3 TIMES DAILY PRN
Qty: 90 TABLET | Refills: 0 | Status: SHIPPED | OUTPATIENT
Start: 2025-05-21 | End: 2025-05-22 | Stop reason: SDUPTHER

## 2025-05-22 ENCOUNTER — PATIENT MESSAGE (OUTPATIENT)
Dept: PAIN MEDICINE | Facility: CLINIC | Age: 50
End: 2025-05-22
Payer: COMMERCIAL

## 2025-05-22 DIAGNOSIS — M47.812 CERVICAL SPONDYLOSIS: ICD-10-CM

## 2025-05-22 DIAGNOSIS — M50.30 DDD (DEGENERATIVE DISC DISEASE), CERVICAL: ICD-10-CM

## 2025-05-22 RX ORDER — HYDROCODONE BITARTRATE AND ACETAMINOPHEN 7.5; 325 MG/1; MG/1
1 TABLET ORAL 3 TIMES DAILY PRN
Qty: 21 TABLET | Refills: 0 | Status: SHIPPED | OUTPATIENT
Start: 2025-05-22 | End: 2025-05-29

## 2025-05-29 RX ORDER — HYDROCODONE BITARTRATE AND ACETAMINOPHEN 7.5; 325 MG/1; MG/1
1 TABLET ORAL 3 TIMES DAILY PRN
Qty: 69 TABLET | Refills: 0 | Status: SHIPPED | OUTPATIENT
Start: 2025-05-29 | End: 2025-06-21

## 2025-06-16 ENCOUNTER — PROCEDURE VISIT (OUTPATIENT)
Dept: PAIN MEDICINE | Facility: CLINIC | Age: 50
End: 2025-06-16
Payer: COMMERCIAL

## 2025-06-16 VITALS
BODY MASS INDEX: 40.81 KG/M2 | RESPIRATION RATE: 16 BRPM | WEIGHT: 216 LBS | OXYGEN SATURATION: 99 % | DIASTOLIC BLOOD PRESSURE: 85 MMHG | HEART RATE: 72 BPM | SYSTOLIC BLOOD PRESSURE: 142 MMHG

## 2025-06-16 DIAGNOSIS — M54.81 OCCIPITAL NEURALGIA, UNSPECIFIED LATERALITY: Primary | ICD-10-CM

## 2025-06-16 RX ORDER — BUPIVACAINE HYDROCHLORIDE 2.5 MG/ML
10 INJECTION, SOLUTION INFILTRATION; PERINEURAL ONCE
Status: COMPLETED | OUTPATIENT
Start: 2025-06-16 | End: 2025-06-16

## 2025-06-16 RX ORDER — METHYLPREDNISOLONE ACETATE 40 MG/ML
40 INJECTION, SUSPENSION INTRA-ARTICULAR; INTRALESIONAL; INTRAMUSCULAR; SOFT TISSUE ONCE
Status: COMPLETED | OUTPATIENT
Start: 2025-06-16 | End: 2025-06-16

## 2025-06-16 RX ADMIN — BUPIVACAINE HYDROCHLORIDE 10 ML: 2.5 INJECTION, SOLUTION INFILTRATION; PERINEURAL at 16:18

## 2025-06-16 RX ADMIN — METHYLPREDNISOLONE ACETATE 40 MG: 40 INJECTION, SUSPENSION INTRA-ARTICULAR; INTRALESIONAL; INTRAMUSCULAR; SOFT TISSUE at 16:18

## 2025-06-16 NOTE — PROGRESS NOTES
H and P reviewed from previous visit and no changes to patient's clinical presentation. Will proceed with procedure as planned.     Rome Springer DO  Pain Management   [facility]     Pre-procedure diagnosis: B/l occipital neuralgia    The patient's chart was reviewed.  After obtaining written informed consent, the patient was placed in a sitting position with the cervical spine flexed and the forehead on a padded bedside table.  The right occipital artery was palpated at the level of the superior nuchal ridge.  The area was prepped with antiseptic solution.  A 27 gauge 1 1/2 inch needle is inserted just medial to the artery and advanced perpendicular until the needle approached the periostium of the underlying occipital bone.  Then, the needle was redirected superiorly.  After gentle aspiration, 2 ml of the solution was injected in a fanlike manner.  The right lesser occipital nerve was blocked by redirecting the needle laterally and slightly inferiorly.  After gentle aspiration, an additional 2 ml of the solution was injected. The procedure was repeated to inject around the left greater and lesser occipital nerve. A total mixture of 10 ml of 0.25% marcaine with 80 mg depo-medrol was injected slowly.    The patient tolerated the procedure well.  The needle was flushed and withdrawn, and a Band-Aid was applied.    Rome Springer DO  Pain Management   [facility]

## 2025-06-27 NOTE — PROGRESS NOTES
Physical Therapy Daily Note  [address] [place] [address]     Diagnosis Plan   1. Tendinosis of left shoulder            VISIT#: 3  Referring practitioner: DO Dolores Braun reports: still having problems with HEP and work conditions: lifting, etc that aggravate shoulder.       Objective     See Exercise, Manual, and Modality Logs for complete treatment.     Patient Education: replaced prior HEP with tabletop with ball:  Flexion, CW and CCW; Scaption: 2/15 ea daily    Assessment/Plan  - replaced HEP with above - minimal to no pain.  - verbal cues for form and technique of exercise.  - advised patient to discuss with MD work conditions and possibly obtain restrictions.  - consider isometrics on next visit.    Progress strengthening /stabilization /functional activity            Timed:         Manual Therapy:     10    mins  37213;     Therapeutic Exercise:    20     mins  95620;     Neuromuscular Ivett:       mins  29198;    Therapeutic Activity:          mins  68740;     Gait Training:           mins  08802;     Ultrasound:          mins  35631;    Ionto                                   mins   33976  Self Care                           mins   26045  Canalith Repos                   mins  4209  Aquatic                               mins 85077    Un-Timed:  Electrical Stimulation:    15     mins  45818 ( );  Dry Needling          mins self-pay  Traction          mins 42282  Low Eval         Mins  60639  Mod Eval          Mins  52475  High Eval                            Mins  27979  Re-Eval                               mins  66054    Timed Treatment:   30   mins   Total Treatment:     45   mins    Clay Dotson PT PT, DPT, [place] License #: [id number]       
[Time Spent: ___ minutes] : I have spent [unfilled] minutes of time on the encounter which excludes teaching and separately reported services.
Patient

## 2025-07-05 PROCEDURE — 87086 URINE CULTURE/COLONY COUNT: CPT | Performed by: NURSE PRACTITIONER

## 2025-07-07 RX ORDER — ONDANSETRON 4 MG/1
4 TABLET, FILM COATED ORAL EVERY 8 HOURS PRN
Qty: 30 TABLET | Refills: 0 | Status: SHIPPED | OUTPATIENT
Start: 2025-07-07

## 2025-07-07 RX ORDER — AZITHROMYCIN 250 MG/1
TABLET, FILM COATED ORAL
Qty: 6 TABLET | Refills: 0 | Status: SHIPPED | OUTPATIENT
Start: 2025-07-07

## 2025-07-08 ENCOUNTER — RESULTS FOLLOW-UP (OUTPATIENT)
Dept: URGENT CARE | Facility: CLINIC | Age: 50
End: 2025-07-08
Payer: COMMERCIAL

## 2025-07-21 ENCOUNTER — OFFICE VISIT (OUTPATIENT)
Dept: PAIN MEDICINE | Facility: CLINIC | Age: 50
End: 2025-07-21
Payer: COMMERCIAL

## 2025-07-21 VITALS
HEART RATE: 72 BPM | RESPIRATION RATE: 16 BRPM | WEIGHT: 207 LBS | SYSTOLIC BLOOD PRESSURE: 126 MMHG | DIASTOLIC BLOOD PRESSURE: 81 MMHG | OXYGEN SATURATION: 100 % | BODY MASS INDEX: 39.11 KG/M2

## 2025-07-21 DIAGNOSIS — M25.512 CHRONIC LEFT SHOULDER PAIN: ICD-10-CM

## 2025-07-21 DIAGNOSIS — M50.30 DDD (DEGENERATIVE DISC DISEASE), CERVICAL: ICD-10-CM

## 2025-07-21 DIAGNOSIS — M54.81 OCCIPITAL NEURALGIA, UNSPECIFIED LATERALITY: Primary | ICD-10-CM

## 2025-07-21 DIAGNOSIS — Z79.899 HIGH RISK MEDICATION USE: ICD-10-CM

## 2025-07-21 DIAGNOSIS — G89.29 CHRONIC LEFT SHOULDER PAIN: ICD-10-CM

## 2025-07-21 DIAGNOSIS — M67.814 TENDINOSIS OF LEFT SHOULDER: ICD-10-CM

## 2025-07-21 DIAGNOSIS — M79.7 FIBROMYALGIA: ICD-10-CM

## 2025-07-21 DIAGNOSIS — M54.16 LUMBAR RADICULOPATHY: ICD-10-CM

## 2025-07-21 DIAGNOSIS — M47.812 CERVICAL SPONDYLOSIS: ICD-10-CM

## 2025-07-21 DIAGNOSIS — M53.3 SACROILIAC JOINT DYSFUNCTION: ICD-10-CM

## 2025-07-21 RX ORDER — HYDROCODONE BITARTRATE AND ACETAMINOPHEN 7.5; 325 MG/1; MG/1
1 TABLET ORAL 3 TIMES DAILY PRN
Qty: 90 TABLET | Refills: 0 | Status: SHIPPED | OUTPATIENT
Start: 2025-07-21 | End: 2025-08-20

## 2025-07-21 RX ORDER — HYDROCODONE BITARTRATE AND ACETAMINOPHEN 7.5; 325 MG/1; MG/1
1 TABLET ORAL 3 TIMES DAILY PRN
Qty: 90 TABLET | Refills: 0 | Status: SHIPPED | OUTPATIENT
Start: 2025-08-20 | End: 2025-09-19

## 2025-07-21 NOTE — PROGRESS NOTES
Subjective   Kelly Espinoza is a 50 y.o. female is here for follow-up for neck pain. Last seen for b/l occipital N block with improved headaches. Scheduled to see Dr. Villegas for left shoulder.      On last visit:     Neck pain is 6/10 on VAS, maximum 6/10.  Pain is aching, throbbing, tingling and sometimes weakness and numbness in nature.  Referred to left neck and shoulder.  Constant pain but improved by prednisone, Norco, gabapentin.  Worse with bending or laying down.  Severe headaches in occipital region with numbness and tingling in both hands mostly in third fourth and fifth digit.  Trouble with gripping with the left arm.  Bilateral  occipital region headache radiating to left temporal and frontal region and R temple region - sharp, stabbing pains.     Left shoulder pain is 6/10 on VAS.     Lower back pain is 5/10 on VAS, at maximum is 8/10. Pain is sharp, shooting and stabbing in nature. Pain is referred left buttock, left anterior thigh, left anterior shin and left foot. The pain is constnat. The pain is improved by nothing. The pain is worse with bending. Chronic right foot numbness.       Previous Injection:   6/16/25- b/l occipital N blocks - 70% pain improvement with improved headaches.   7/31/2024-left occipital nerve block- improved headaches for about 2 weeks.   3/5/2024-left shoulder injection with ultrasound- some relief for 1-2  weeks.  7/10/2023 - left occipital nerve block; left trapezius and rhomboids TPI- improvement  with sharp, shooting pain; didn't much relief with TPI.   2/15/2023- left occipital nerve block- good relief with sharp shooting pain- 4 months   11/8/2022-KENDRA C7-T1- improved headache, still has some pain.   3/8/2022-KENDRA C6/7- 75% pain relief- pain relief, improved headaches and improved flexibility - 4 months     Hx: Referred by Vivian Fontanez APRN  for neck pain.  Her pain started about 3 years ago without any significant injuries. 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   She has been seen by neurosurgery and was told she was non surgical candidate.  EMG showed bilateral carpal tunnel syndrome and cubital tunnel syndrome.  S/p bilateral carpal tunnel injection with 50% relief. She is s/p R carpal tunnel release. Seen by rheumatology who r/o lupus and has been diagnosed with Fibromyalgia. S/p  left shoulder arthroscopy and supraspinatus repair with Dr. Villegas on 5/17/2024       PHQ-9- 9  SOAPP-2      PMH:   Lupus, GERD, anxiety, ADD, s/p L5-S1 dissectomy 2000, s/p hand and arm surgery with Dr. Faulkner on 4/21/2023 with improvement in numbness in R hand.      Current Medications:   Norco 5-325 mg BID PRN  Celebrex 200 mg BID PRN  Cymbalta 30 mg daily  Gabapentin 600 mg TID   Ambien        Past Medications:   Tramadol 50 mg q8h PRN-1/16/2022 - didn't help     Past Modalities:  TENS:                                                                          no                                                  Physical Therapy Within The Last 6 Months              Yes - 8 weeks of PT for shoulder without much improvement.  Psychotherapy                                                            no  Massage Therapy                                                       no     Patient Complains Of:  Uro-Fecal Incontinence          no  Weight Gain/Loss                   no  Fever/Chills                             no  Weakness                               no           Current Outpatient Medications:     albuterol (ACCUNEB) 0.63 MG/3ML nebulizer solution, Take 3 mL by nebulization Every 6 (Six) Hours As Needed for Wheezing or Shortness of Air., Disp: 120 each, Rfl: 0    ciprofloxacin (CILOXAN) 0.3 % ophthalmic solution, Administer 1 drop to both eyes 4 (Four) Times a Day for 5 days., Disp: 2.5 mL, Rfl: 0    DULoxetine (CYMBALTA) 60 MG capsule, Take 1 capsule by mouth Daily., Disp: 90 capsule, Rfl: 1    estradiol (ESTRACE) 2 MG tablet, TAKE 1 TABLET BY MOUTH DAILY, Disp:  90 tablet, Rfl: 1    gabapentin (NEURONTIN) 600 MG tablet, Take 1 tablet by mouth 3 (Three) Times a Day., Disp: 270 tablet, Rfl: 1    levalbuterol (XOPENEX HFA) 45 MCG/ACT inhaler, Inhale 1-2 puffs Every 4 (Four) Hours As Needed for Wheezing or Shortness of Air., Disp: 15 g, Rfl: 5    metoprolol succinate XL (TOPROL-XL) 50 MG 24 hr tablet, Take 1 tablet by mouth Daily., Disp: 90 tablet, Rfl: 1    ondansetron (Zofran) 4 MG tablet, Take 1 tablet by mouth Every 8 (Eight) Hours As Needed for Nausea or Vomiting., Disp: 30 tablet, Rfl: 0    zolpidem (AMBIEN) 10 MG tablet, Take 1 tablet by mouth At Night As Needed for Sleep., Disp: 30 tablet, Rfl: 5    The following portions of the patient's history were reviewed and updated as appropriate: allergies, current medications, past family history, past medical history, past social history, past surgical history, and problem list.      REVIEW OF PERTINENT MEDICAL DATA    Past Medical History:   Diagnosis Date    ADD (attention deficit disorder)     SARMAD positive     Anxiety     Arthralgia     Asthma     Bursitis of left hip     Carpal tunnel syndrome     Roberto.    DDD (degenerative disc disease), lumbar     Depression, major     Fibromyalgia     Flu syndrome     GERD (gastroesophageal reflux disease)     Heart disease     Heart murmur     Hiatal hernia     Hormone replacement therapy     Hyperlipidemia     Hypertension     Inflammatory arthritis     Influenza     Insomnia     Knee pain, bilateral     Lupus     Migraine     Mitral valve regurgitation     Neck pain     Obesity     Osteoarthritis     Pain, joint, shoulder, left     Pharyngitis, acute     PONV (postoperative nausea and vomiting)     Postmenopausal     Sinus congestion     Surgical menopause     at age 34    Urinary frequency     Vitamin B12 deficiency     Vitamin D deficiency      Past Surgical History:   Procedure Laterality Date    CARPAL TUNNEL RELEASE WITH CUBITAL TUNNEL RELEASE  08/02/2023    CHOLECYSTECTOMY       LUMBAR DISCECTOMY  2000    SHOULDER ARTHROSCOPY W/ ROTATOR CUFF REPAIR Left 5/17/2024    Procedure: SHOULDER ARTHROSCOPY WITH ROTATOR CUFF REPAIR; extensive debridment;  Surgeon: Km Nino MD;  Location: [facility] MAIN OR;  Service: Orthopedics;  Laterality: Left;    TONSILLECTOMY      TOTAL ABDOMINAL HYSTERECTOMY WITH SALPINGO OOPHORECTOMY Bilateral 2009    FOR ENDOMETRIOSIS     Family History   Problem Relation Age of Onset    Hypertension Mother     Hypertension Father     Diabetes Other     Heart disease Other     Hypertension Other     Cancer Other      Social History     Socioeconomic History    Marital status:    Tobacco Use    Smoking status: Former     Current packs/day: 0.00     Average packs/day: 0.5 packs/day for 3.0 years (1.5 ttl pk-yrs)     Types: Cigarettes     Start date: 2007     Quit date: 2010     Years since quitting: 15.5     Passive exposure: Past    Smokeless tobacco: Never   Vaping Use    Vaping status: Never Used   Substance and Sexual Activity    Alcohol use: Never    Drug use: Never    Sexual activity: Defer         Review of Systems   Musculoskeletal:  Positive for arthralgias, back pain and neck pain.         Vitals:    07/21/25 1100   BP: 126/81   Pulse: 72   Resp: 16   SpO2: 100%   Weight: 93.9 kg (207 lb)   PainSc: 5                            Objective   Physical Exam  Neck:     Musculoskeletal:         General: Tenderness present.        Arms:         Legs:    Neurological:      Deep Tendon Reflexes:      Reflex Scores:       Tricep reflexes are 2+ on the right side and 2+ on the left side.       Bicep reflexes are 2+ on the right side and 2+ on the left side.       Brachioradialis reflexes are 2+ on the right side and 2+ on the left side.     Comments: Motor strength 5/5 b/l UE  Sensory intact b/l UE             Imaging Reviewed:  Left shoulder MRI-11/21/2023  - Moderate to severe distal tendinosis supraspinatus tendon and subscapularis tendon.  Moderate distal  tendinosis infraspinatus tendon.  - Tendinosis and probable partial-thickness interstitial tearing of the intra-articular long head biceps tendon.  - Nondisplaced tear of anterior labrum suspected  - Mild age-appropriate degenerative changes of AC joint.    MRI cervical spine-1/31/2022  -C2-3-degenerative facet changes bilateral  C3-4-WNL  C4-5-WNL  C5-6-mild facet changes.  Mild left neural foraminal narrowing.  C6-7-mild bilateral uncovertebral joint spurring.  C7-T1-WNL    EMG upper extremity-12/5/2022  - Abnormal study.  Evidence of moderate right and mild left median neuropathy at the wrist consistent with carpal tunnel syndrome    Assessment:    1. Occipital neuralgia, unspecified laterality    2. Cervical spondylosis    3. High risk medication use    4. Tendinosis of left shoulder    5. Chronic left shoulder pain    6. Fibromyalgia    7. Lumbar radiculopathy    8. Sacroiliac joint dysfunction    9. DDD (degenerative disc disease), cervical          Plan:   1.  Repeat UDS-7/21/25.  UDS consistent with patient interview on 7/12/2024.. narcotic contract signed - 11/30/22.   2. We discussed trying a course of formal physical therapy.  Physical therapy can help strengthen and stretch the muscles around the joints. Continue to be as active as possible.  Patient has done 8 weeks of physical therapy without much improvement.  3.. Continue Gabapepntin 600 mg TID (good till March 2024). Side effects discussed with the patient including but not limited to somnolence, dizziness, ataxia, headache, fatigue, blurred vision, cold or flu-like symptoms,delusions, hoarseness, lack or loss of strength, lower back or side pain, swelling of the hands, feet, or lower legs trembling or shaking. Patient understands and agrees with the plan.  4.  Due continuous pain, continue Norco 7.5-325 mg TID PRN (7/21; 8/20).  Patient has been advised not to take Ambien within 4 hours of taking hydrocodone.  Discussed with the patient regarding  long-term side effects of opioids including but not limited to opioid induced hormonal suppression, hyperalgesia, fatigue, weight gain, possible opioid induced altered immune system, addiction, tolerance, dependence, risk of hearing loss and elevated risk of myocardial infarction. Proper use and potential life threatening side effects of over use discussed with patient. Patient states understanding of their use and risks.  5. .  Patient has severe left SI joint tenderness with MELE positive and intermittent radiculopathy. Obtain Lumbar MRI wo contrast - 7/21/25.   6. Agree with Rheum regarding increasing Cymbalta. She will talk to PCP.  7. Recommend TENS unit 15-20 mins 2-3 times daily for left trapezius pain.  8. Recommend following with orthopedics for L shoulder pain.  9. Good relief with b/l occipital N block. Repeat PRN.     RTC before 9/19/25.    Rome Springer DO  Pain Management   [facility]            INSPECT REPORT    As part of the patient's treatment plan, I may be prescribing controlled substances. The patient has been made aware of appropriate use of such medications, including potential risk of somnolence, limited ability to drive and/or work safely, and the potential for dependence or overdose. It has also been made clear that these medications are for use by this patient only, without concomitant use of alcohol or other substances unless prescribed.     Patient has completed prescribing agreement detailing terms of continued prescribing of controlled substances, including monitoring INSPECT reports, urine drug screening, and pill counts if necessary. The patient is aware that inappropriate use will results in cessation of prescribing such medications.    INSPECT report has been reviewed and scanned into the patient's chart.

## 2025-08-06 ENCOUNTER — TELEPHONE (OUTPATIENT)
Dept: PAIN MEDICINE | Facility: CLINIC | Age: 50
End: 2025-08-06
Payer: COMMERCIAL

## (undated) DEVICE — GLV SURG SIGNATURE ESSENTIAL PF LTX SZ8.5

## (undated) DEVICE — TBG ARTHSCP PUMP W CONN/REDUC 8FT

## (undated) DEVICE — GLV SURG SENSICARE PI ORTHO SZ8 LF STRL

## (undated) DEVICE — KT SURG TURNOVER 050

## (undated) DEVICE — ADHS LIQ MASTISOL 2/3ML

## (undated) DEVICE — SUTURELASSO CRV 25D RT

## (undated) DEVICE — UNDRGLV SURG BIOGEL PIMICROINDICATOR SYNTH SZ8 LF STRL

## (undated) DEVICE — SUTURELASSO CRV 25D LT

## (undated) DEVICE — TBG ARTHSCP PT W CONN/REDUC 8FT

## (undated) DEVICE — UNDERGLV SURG BIOGEL/PI PF SYNTH SURG SZ8.5 BLU 50/BX

## (undated) DEVICE — SOL IRR NACL 0.9PCT ARTHROMATIC 3000ML

## (undated) DEVICE — TUBING, SUCTION, 1/4" X 12', STRAIGHT: Brand: MEDLINE

## (undated) DEVICE — ABL ASP APOLLORF I90 90DEG

## (undated) DEVICE — UNDERGLV SURG BIOGEL INDICAT PI SZ8 BLU

## (undated) DEVICE — GLV SURG SENSICARE PI LF PF 8 GRN STRL

## (undated) DEVICE — BLD SHAVER EXCALIBUR CRV 4MM

## (undated) DEVICE — TBG ARTHSCP DUALWAVE

## (undated) DEVICE — SPNG GZ AVANT 6PLY 4X4IN STRL PK/2

## (undated) DEVICE — ANTIBACTERIAL UNDYED BRAIDED (POLYGLACTIN 910), SYNTHETIC ABSORBABLE SUTURE: Brand: COATED VICRYL

## (undated) DEVICE — PAD,ABDOMINAL,5"X9",STERILE,LF,1/PK: Brand: MEDLINE INDUSTRIES, INC.

## (undated) DEVICE — SLV SCD CALF HEMOFORCE DVT THERP REPROC MD

## (undated) DEVICE — CANN PASSPORT BUTN 8MM 4CM

## (undated) DEVICE — PK SHLDR ARTHROSCOPY 50

## (undated) DEVICE — CANN TRPL DAM 7X7MM

## (undated) DEVICE — GOWN,SLEEVE,STERILE,W/CSR WRAP,1/P: Brand: MEDLINE

## (undated) DEVICE — SUT PDS 1 CTX 36IN VIO PDP371T